# Patient Record
Sex: MALE | Race: OTHER
[De-identification: names, ages, dates, MRNs, and addresses within clinical notes are randomized per-mention and may not be internally consistent; named-entity substitution may affect disease eponyms.]

---

## 2019-04-02 ENCOUNTER — APPOINTMENT (OUTPATIENT)
Dept: GASTROENTEROLOGY | Facility: CLINIC | Age: 79
End: 2019-04-02
Payer: MEDICARE

## 2019-04-02 VITALS
BODY MASS INDEX: 29.99 KG/M2 | HEART RATE: 80 BPM | TEMPERATURE: 97.8 F | OXYGEN SATURATION: 97 % | HEIGHT: 65 IN | DIASTOLIC BLOOD PRESSURE: 77 MMHG | WEIGHT: 180 LBS | SYSTOLIC BLOOD PRESSURE: 136 MMHG | RESPIRATION RATE: 16 BRPM

## 2019-04-02 DIAGNOSIS — Z86.010 PERSONAL HISTORY OF COLONIC POLYPS: ICD-10-CM

## 2019-04-02 PROCEDURE — 99203 OFFICE O/P NEW LOW 30 MIN: CPT

## 2019-04-02 NOTE — ASSESSMENT
[FreeTextEntry1] : 78 year old male with a history of colon polyps presents for hospital based colonoscopy evaluation. \par \par Prep with [facility] \par Colonoscopy: The risks, benefits, and alternatives were explained in detail to the patient. Risks including but not limited to bleeding, perforation, adverse reaction to medications, cardiopulmonary compromise and their attendant sequalae explained.  Sequelae including but not limited to need for surgery, colostomy, prolonged hospital stay, placement of drainage tubes, blood transfusions, disability, morbidity and mortality was explained. \par 	It has been made clear to the patient that although colonoscopy is still considered the best test to screen for colon cancer and polyps, no test is 100% accurate. He/she indicated understanding of the above and wishes to proceed. \par All questions and concerns have been addressed. \par \par

## 2019-04-02 NOTE — HISTORY OF PRESENT ILLNESS
[de-identified] : Referring Physician: DEANN HERNANDEZ,KEY ASHBY /Zaina \par 78 year  old   male  with  a past medical history as below presents for evaluation for surveillance colonoscopy. Last colonoscopy was in 2014 and he was found to have polyps with suggested repeat in 3 years. He has no GI complaints. Was referred for a hospital based colonoscopy given his age. \par ROS: He  denies headache, rash, eye pain, joint pain, weight loss, fever, chills, night sweats, chest pain, dyspnea, cough, diarrhea, constipation, melena, abdominal pain, nausea and vomiting. \par MHx: HTN, Diabetes , CKD \par SHx:  None \par Significant Meds: Denies ASA or AC. \par Allergies: \par Family history: Denies a family history of colon CA, gastric CA, high risk polyps, Inflammatory and autoimmune disease. \par Social: Denies TOB, ETOH, IVDA. No Tattoos \par Health maintenance: -ve history of blood transfusions, \par Last colonoscopy:   2014              Last EGD :     \par \par

## 2019-06-19 ENCOUNTER — RESULT REVIEW (OUTPATIENT)
Age: 79
End: 2019-06-19

## 2019-06-19 ENCOUNTER — OUTPATIENT (OUTPATIENT)
Dept: OUTPATIENT SERVICES | Facility: HOSPITAL | Age: 79
LOS: 1 days | End: 2019-06-19
Payer: COMMERCIAL

## 2019-06-19 DIAGNOSIS — Z86.010 PERSONAL HISTORY OF COLONIC POLYPS: ICD-10-CM

## 2019-06-19 PROCEDURE — 45380 COLONOSCOPY AND BIOPSY: CPT

## 2019-06-19 PROCEDURE — 88305 TISSUE EXAM BY PATHOLOGIST: CPT | Mod: 26

## 2019-06-19 PROCEDURE — 45380 COLONOSCOPY AND BIOPSY: CPT | Mod: PT

## 2019-06-19 PROCEDURE — 88305 TISSUE EXAM BY PATHOLOGIST: CPT

## 2021-06-25 ENCOUNTER — APPOINTMENT (OUTPATIENT)
Dept: UROLOGY | Facility: CLINIC | Age: 81
End: 2021-06-25
Payer: MEDICARE

## 2021-06-25 VITALS
DIASTOLIC BLOOD PRESSURE: 73 MMHG | RESPIRATION RATE: 15 BRPM | TEMPERATURE: 98.7 F | BODY MASS INDEX: 30.62 KG/M2 | WEIGHT: 184 LBS | SYSTOLIC BLOOD PRESSURE: 126 MMHG | HEART RATE: 73 BPM

## 2021-06-25 DIAGNOSIS — Z87.891 PERSONAL HISTORY OF NICOTINE DEPENDENCE: ICD-10-CM

## 2021-06-25 DIAGNOSIS — Z86.79 PERSONAL HISTORY OF OTHER DISEASES OF THE CIRCULATORY SYSTEM: ICD-10-CM

## 2021-06-25 DIAGNOSIS — Z86.39 PERSONAL HISTORY OF OTHER ENDOCRINE, NUTRITIONAL AND METABOLIC DISEASE: ICD-10-CM

## 2021-06-25 PROCEDURE — 99204 OFFICE O/P NEW MOD 45 MIN: CPT

## 2021-06-28 LAB — PSA SERPL-MCNC: 0.73 NG/ML

## 2021-06-28 NOTE — HISTORY OF PRESENT ILLNESS
[FreeTextEntry1] : cc bph\par pt is  yo male referred for bph . The patient reports dribbling hesitancy  . He denies frequency, incomplete emptying, intermittency, straining, urgency and weak stream. hematuria and dysuria  The patient states symptoms are of  moderate severity. The symptoms have been present for months. He reports a history of no complicating symptoms. He denies flank pain, gross hematuria, kidney stones and recurrent UTI.\par  Prior treatments include none\par last psa 0.69 2018\par The is no family history of prostate cancer\par ua neg \par

## 2021-06-28 NOTE — REVIEW OF SYSTEMS
[see HPI] : see HPI [Wake up at night to urinate  How many times?  ___] : wakes up to urinate [unfilled] times during the night [Interrupted urine stream] : interrupted urine stream [Negative] : Heme/Lymph [Wait a long time to urinate] : denies waiting a long time to urinate [Slow urine stream] : denies slow urine stream

## 2021-06-28 NOTE — ASSESSMENT
[FreeTextEntry1] : pvr 70 ml \par will start tamsulosin\par side effects reviewed\par fam would like psa checked

## 2021-08-04 ENCOUNTER — APPOINTMENT (OUTPATIENT)
Dept: UROLOGY | Facility: CLINIC | Age: 81
End: 2021-08-04

## 2021-08-05 ENCOUNTER — APPOINTMENT (OUTPATIENT)
Dept: UROLOGY | Facility: CLINIC | Age: 81
End: 2021-08-05
Payer: MEDICARE

## 2021-08-05 PROCEDURE — 99213 OFFICE O/P EST LOW 20 MIN: CPT

## 2021-08-19 NOTE — HISTORY OF PRESENT ILLNESS
[FreeTextEntry1] : cc bph\par pt is  yo male referred for bph . The patient reports dribbling hesitancy  . He denies frequency, incomplete emptying, intermittency, straining, urgency and weak stream. hematuria and dysuria  The patient states symptoms are of  moderate severity. The symptoms have been present for months. He reports a history of no complicating symptoms. He denies flank pain, gross hematuria, kidney stones and recurrent UTI.\par  Prior treatments include none\par last psa 0.69 2018\par The is no family history of prostate cancer\par ua neg \par now taking tamsulosin bid with improvement \par

## 2021-12-11 ENCOUNTER — INPATIENT (INPATIENT)
Facility: HOSPITAL | Age: 81
LOS: 1 days | Discharge: HOME | End: 2021-12-13
Attending: INTERNAL MEDICINE | Admitting: INTERNAL MEDICINE
Payer: MEDICARE

## 2021-12-11 ENCOUNTER — TRANSCRIPTION ENCOUNTER (OUTPATIENT)
Age: 81
End: 2021-12-11

## 2021-12-11 VITALS
OXYGEN SATURATION: 96 % | RESPIRATION RATE: 14 BRPM | SYSTOLIC BLOOD PRESSURE: 139 MMHG | HEART RATE: 65 BPM | TEMPERATURE: 98 F | DIASTOLIC BLOOD PRESSURE: 76 MMHG

## 2021-12-11 DIAGNOSIS — N18.9 CHRONIC KIDNEY DISEASE, UNSPECIFIED: ICD-10-CM

## 2021-12-11 DIAGNOSIS — A41.9 SEPSIS, UNSPECIFIED ORGANISM: ICD-10-CM

## 2021-12-11 DIAGNOSIS — R53.1 WEAKNESS: ICD-10-CM

## 2021-12-11 DIAGNOSIS — Z29.9 ENCOUNTER FOR PROPHYLACTIC MEASURES, UNSPECIFIED: ICD-10-CM

## 2021-12-11 DIAGNOSIS — Z87.438 PERSONAL HISTORY OF OTHER DISEASES OF MALE GENITAL ORGANS: ICD-10-CM

## 2021-12-11 DIAGNOSIS — I10 ESSENTIAL (PRIMARY) HYPERTENSION: ICD-10-CM

## 2021-12-11 DIAGNOSIS — I95.9 HYPOTENSION, UNSPECIFIED: ICD-10-CM

## 2021-12-11 LAB
ALBUMIN SERPL ELPH-MCNC: 3.8 G/DL — SIGNIFICANT CHANGE UP (ref 3.3–5)
ALP SERPL-CCNC: 118 U/L — SIGNIFICANT CHANGE UP (ref 40–120)
ALT FLD-CCNC: 180 U/L — HIGH (ref 4–41)
ANION GAP SERPL CALC-SCNC: 10 MMOL/L — SIGNIFICANT CHANGE UP (ref 7–14)
AST SERPL-CCNC: 89 U/L — HIGH (ref 4–40)
BASE EXCESS BLDV CALC-SCNC: 0.2 MMOL/L — SIGNIFICANT CHANGE UP (ref -2–3)
BASOPHILS # BLD AUTO: 0.02 K/UL — SIGNIFICANT CHANGE UP (ref 0–0.2)
BASOPHILS NFR BLD AUTO: 0.4 % — SIGNIFICANT CHANGE UP (ref 0–2)
BILIRUB SERPL-MCNC: 0.7 MG/DL — SIGNIFICANT CHANGE UP (ref 0.2–1.2)
BLOOD GAS VENOUS COMPREHENSIVE RESULT: SIGNIFICANT CHANGE UP
BUN SERPL-MCNC: 25 MG/DL — HIGH (ref 7–23)
CALCIUM SERPL-MCNC: 8.6 MG/DL — SIGNIFICANT CHANGE UP (ref 8.4–10.5)
CHLORIDE BLDV-SCNC: 105 MMOL/L — SIGNIFICANT CHANGE UP (ref 96–108)
CHLORIDE SERPL-SCNC: 105 MMOL/L — SIGNIFICANT CHANGE UP (ref 98–107)
CO2 BLDV-SCNC: 28.5 MMOL/L — HIGH (ref 22–26)
CO2 SERPL-SCNC: 25 MMOL/L — SIGNIFICANT CHANGE UP (ref 22–31)
CREAT SERPL-MCNC: 1.37 MG/DL — HIGH (ref 0.5–1.3)
EOSINOPHIL # BLD AUTO: 0.17 K/UL — SIGNIFICANT CHANGE UP (ref 0–0.5)
EOSINOPHIL NFR BLD AUTO: 3 % — SIGNIFICANT CHANGE UP (ref 0–6)
FLUAV AG NPH QL: SIGNIFICANT CHANGE UP
FLUBV AG NPH QL: SIGNIFICANT CHANGE UP
GAS PNL BLDV: 136 MMOL/L — SIGNIFICANT CHANGE UP (ref 136–145)
GAS PNL BLDV: SIGNIFICANT CHANGE UP
GLUCOSE BLDV-MCNC: 131 MG/DL — HIGH (ref 70–99)
GLUCOSE SERPL-MCNC: 133 MG/DL — HIGH (ref 70–99)
HCO3 BLDV-SCNC: 27 MMOL/L — SIGNIFICANT CHANGE UP (ref 22–29)
HCT VFR BLD CALC: 40.1 % — SIGNIFICANT CHANGE UP (ref 39–50)
HCT VFR BLDA CALC: 41 % — SIGNIFICANT CHANGE UP (ref 39–51)
HGB BLD CALC-MCNC: 13.5 G/DL — SIGNIFICANT CHANGE UP (ref 13–17)
HGB BLD-MCNC: 13.6 G/DL — SIGNIFICANT CHANGE UP (ref 13–17)
IANC: 3.71 K/UL — SIGNIFICANT CHANGE UP (ref 1.5–8.5)
IMM GRANULOCYTES NFR BLD AUTO: 0.4 % — SIGNIFICANT CHANGE UP (ref 0–1.5)
LACTATE BLDV-MCNC: 1.4 MMOL/L — SIGNIFICANT CHANGE UP (ref 0.5–2)
LYMPHOCYTES # BLD AUTO: 0.98 K/UL — LOW (ref 1–3.3)
LYMPHOCYTES # BLD AUTO: 17.4 % — SIGNIFICANT CHANGE UP (ref 13–44)
MCHC RBC-ENTMCNC: 30.2 PG — SIGNIFICANT CHANGE UP (ref 27–34)
MCHC RBC-ENTMCNC: 33.9 GM/DL — SIGNIFICANT CHANGE UP (ref 32–36)
MCV RBC AUTO: 89.1 FL — SIGNIFICANT CHANGE UP (ref 80–100)
MONOCYTES # BLD AUTO: 0.73 K/UL — SIGNIFICANT CHANGE UP (ref 0–0.9)
MONOCYTES NFR BLD AUTO: 13 % — SIGNIFICANT CHANGE UP (ref 2–14)
NEUTROPHILS # BLD AUTO: 3.71 K/UL — SIGNIFICANT CHANGE UP (ref 1.8–7.4)
NEUTROPHILS NFR BLD AUTO: 65.8 % — SIGNIFICANT CHANGE UP (ref 43–77)
NRBC # BLD: 0 /100 WBCS — SIGNIFICANT CHANGE UP
NRBC # FLD: 0 K/UL — SIGNIFICANT CHANGE UP
PCO2 BLDV: 51 MMHG — SIGNIFICANT CHANGE UP (ref 42–55)
PH BLDV: 7.33 — SIGNIFICANT CHANGE UP (ref 7.32–7.43)
PLATELET # BLD AUTO: 134 K/UL — LOW (ref 150–400)
PO2 BLDV: 34 MMHG — SIGNIFICANT CHANGE UP
POTASSIUM BLDV-SCNC: 3.6 MMOL/L — SIGNIFICANT CHANGE UP (ref 3.5–5.1)
POTASSIUM SERPL-MCNC: 3.8 MMOL/L — SIGNIFICANT CHANGE UP (ref 3.5–5.3)
POTASSIUM SERPL-SCNC: 3.8 MMOL/L — SIGNIFICANT CHANGE UP (ref 3.5–5.3)
PROT SERPL-MCNC: 6.3 G/DL — SIGNIFICANT CHANGE UP (ref 6–8.3)
RBC # BLD: 4.5 M/UL — SIGNIFICANT CHANGE UP (ref 4.2–5.8)
RBC # FLD: 13.2 % — SIGNIFICANT CHANGE UP (ref 10.3–14.5)
RSV RNA NPH QL NAA+NON-PROBE: SIGNIFICANT CHANGE UP
SAO2 % BLDV: 51.5 % — SIGNIFICANT CHANGE UP
SARS-COV-2 RNA SPEC QL NAA+PROBE: SIGNIFICANT CHANGE UP
SODIUM SERPL-SCNC: 140 MMOL/L — SIGNIFICANT CHANGE UP (ref 135–145)
WBC # BLD: 5.63 K/UL — SIGNIFICANT CHANGE UP (ref 3.8–10.5)
WBC # FLD AUTO: 5.63 K/UL — SIGNIFICANT CHANGE UP (ref 3.8–10.5)

## 2021-12-11 PROCEDURE — 76705 ECHO EXAM OF ABDOMEN: CPT | Mod: 26

## 2021-12-11 PROCEDURE — 99223 1ST HOSP IP/OBS HIGH 75: CPT | Mod: GC

## 2021-12-11 PROCEDURE — 71045 X-RAY EXAM CHEST 1 VIEW: CPT | Mod: 26

## 2021-12-11 PROCEDURE — 99285 EMERGENCY DEPT VISIT HI MDM: CPT

## 2021-12-11 RX ORDER — SODIUM CHLORIDE 9 MG/ML
1000 INJECTION, SOLUTION INTRAVENOUS
Refills: 0 | Status: DISCONTINUED | OUTPATIENT
Start: 2021-12-11 | End: 2021-12-13

## 2021-12-11 RX ORDER — VANCOMYCIN HCL 1 G
1000 VIAL (EA) INTRAVENOUS ONCE
Refills: 0 | Status: COMPLETED | OUTPATIENT
Start: 2021-12-11 | End: 2021-12-11

## 2021-12-11 RX ORDER — LANOLIN ALCOHOL/MO/W.PET/CERES
3 CREAM (GRAM) TOPICAL AT BEDTIME
Refills: 0 | Status: DISCONTINUED | OUTPATIENT
Start: 2021-12-11 | End: 2021-12-13

## 2021-12-11 RX ORDER — INSULIN LISPRO 100/ML
VIAL (ML) SUBCUTANEOUS AT BEDTIME
Refills: 0 | Status: DISCONTINUED | OUTPATIENT
Start: 2021-12-11 | End: 2021-12-13

## 2021-12-11 RX ORDER — DEXTROSE 50 % IN WATER 50 %
25 SYRINGE (ML) INTRAVENOUS ONCE
Refills: 0 | Status: DISCONTINUED | OUTPATIENT
Start: 2021-12-11 | End: 2021-12-13

## 2021-12-11 RX ORDER — ACETAMINOPHEN 500 MG
650 TABLET ORAL EVERY 6 HOURS
Refills: 0 | Status: DISCONTINUED | OUTPATIENT
Start: 2021-12-11 | End: 2021-12-13

## 2021-12-11 RX ORDER — INSULIN LISPRO 100/ML
VIAL (ML) SUBCUTANEOUS
Refills: 0 | Status: DISCONTINUED | OUTPATIENT
Start: 2021-12-11 | End: 2021-12-13

## 2021-12-11 RX ORDER — GLUCAGON INJECTION, SOLUTION 0.5 MG/.1ML
1 INJECTION, SOLUTION SUBCUTANEOUS ONCE
Refills: 0 | Status: DISCONTINUED | OUTPATIENT
Start: 2021-12-11 | End: 2021-12-13

## 2021-12-11 RX ORDER — ONDANSETRON 8 MG/1
4 TABLET, FILM COATED ORAL EVERY 8 HOURS
Refills: 0 | Status: DISCONTINUED | OUTPATIENT
Start: 2021-12-11 | End: 2021-12-13

## 2021-12-11 RX ORDER — SODIUM CHLORIDE 9 MG/ML
1400 INJECTION INTRAMUSCULAR; INTRAVENOUS; SUBCUTANEOUS ONCE
Refills: 0 | Status: COMPLETED | OUTPATIENT
Start: 2021-12-11 | End: 2021-12-11

## 2021-12-11 RX ORDER — DEXTROSE 50 % IN WATER 50 %
15 SYRINGE (ML) INTRAVENOUS ONCE
Refills: 0 | Status: DISCONTINUED | OUTPATIENT
Start: 2021-12-11 | End: 2021-12-13

## 2021-12-11 RX ORDER — PIPERACILLIN AND TAZOBACTAM 4; .5 G/20ML; G/20ML
3.38 INJECTION, POWDER, LYOPHILIZED, FOR SOLUTION INTRAVENOUS ONCE
Refills: 0 | Status: COMPLETED | OUTPATIENT
Start: 2021-12-11 | End: 2021-12-11

## 2021-12-11 RX ORDER — DEXTROSE 50 % IN WATER 50 %
12.5 SYRINGE (ML) INTRAVENOUS ONCE
Refills: 0 | Status: DISCONTINUED | OUTPATIENT
Start: 2021-12-11 | End: 2021-12-13

## 2021-12-11 RX ORDER — INFLUENZA VIRUS VACCINE 15; 15; 15; 15 UG/.5ML; UG/.5ML; UG/.5ML; UG/.5ML
0.7 SUSPENSION INTRAMUSCULAR ONCE
Refills: 0 | Status: DISCONTINUED | OUTPATIENT
Start: 2021-12-11 | End: 2021-12-13

## 2021-12-11 RX ADMIN — Medication 250 MILLIGRAM(S): at 12:13

## 2021-12-11 RX ADMIN — PIPERACILLIN AND TAZOBACTAM 200 GRAM(S): 4; .5 INJECTION, POWDER, LYOPHILIZED, FOR SOLUTION INTRAVENOUS at 11:54

## 2021-12-11 RX ADMIN — PIPERACILLIN AND TAZOBACTAM 3.38 GRAM(S): 4; .5 INJECTION, POWDER, LYOPHILIZED, FOR SOLUTION INTRAVENOUS at 12:20

## 2021-12-11 RX ADMIN — SODIUM CHLORIDE 1400 MILLILITER(S): 9 INJECTION INTRAMUSCULAR; INTRAVENOUS; SUBCUTANEOUS at 11:47

## 2021-12-11 NOTE — ED ADULT NURSE NOTE - CHIEF COMPLAINT QUOTE
pt biba from urgent care with low bp, given right wrist #[id number] with ns given by ems with increased bp achieved. pt given 100% NRB at , ems removed Os with stable 02 sat achieved, pt in no acute distress at this time on room air. as per ems pt went to  for dizziness, chills/ fevers/ body aches x 3 days. pt states he feels better, denies chest pain.  pt is Kinyarwanda speaking, PMH: HTN, HDL

## 2021-12-11 NOTE — ED PROVIDER NOTE - NS ED ROS FT
Gen: No F/NS; +chills, +weakness   Head: No falls   Eyes: No changes in vision   Resp: No cough or trouble breathing  Cardiovascular: No chest pain or palpitation  Gastroenteric: No N/V/D  :  No change in urine output, dysuria or hematuria   MS: No joint or muscle pain  Neuro: No headache; +dizziness   Skin: No new rash

## 2021-12-11 NOTE — H&P ADULT - ATTENDING COMMENTS
The patient is an 82 yo man with PMH of HTN, T2DM, CKD p/w hypotension and generalized weakness    - not hypotensive at this time, responded to fluid bolus  - no definitive infectious source, f/u blood cx x2, f/u RVP, monitor off abx  - procal in AM  - hold home antihypertensives at this time, f/u orthostatics  - PT evaluation  - hep panel for transaminitis   - pending orthostatics may require TTE, EKG NSR no Q waves.

## 2021-12-11 NOTE — ED ADULT TRIAGE NOTE - CHIEF COMPLAINT QUOTE
pt biba from urgent care with low bp, given right wrist #[id number] with ns given by ems with increased bp achieved. pt given 100% NRB at , ems removed Os with stable 02 sat achieved, pt in no acute distress at this time on room air. as per ems pt went to  for dizziness, chills/ fevers/ body aches x 3 days. pt states he feels better, denies chest pain.  pt is Romanian speaking, PMH: HTN, HDL

## 2021-12-11 NOTE — ED ADULT NURSE NOTE - NSFALLRSKINDICATORS_ED_ALL_ED
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

## 2021-12-11 NOTE — H&P ADULT - NSHPLABSRESULTS_GEN_ALL_CORE
LABS:                        13.6   5.63  )-----------( 134      ( 11 Dec 2021 11:36 )             40.1     11 Dec 2021 11:36    140    |  105    |  25     ----------------------------<  133    3.8     |  25     |  1.37     Ca    8.6        11 Dec 2021 11:36    TPro  6.3    /  Alb  3.8    /  TBili  0.7    /  DBili  x      /  AST  89     /  ALT  180    /  AlkPhos  118    11 Dec 2021 11:36      CAPILLARY BLOOD GLUCOSE        BLOOD CULTURE    RADIOLOGY & ADDITIONAL TESTS:    Imaging Personally Reviewed:  [ ] YES

## 2021-12-11 NOTE — ED PROVIDER NOTE - ATTENDING CONTRIBUTION TO CARE
DR. ARCHULETA, ATTENDING MD-  I performed a face to face bedside interview with the patient regarding history of present illness, review of symptoms and past medical history. I completed an independent physical exam.  I have discussed the patient's plan of care with the resident.   Documentation as above in the note.    80 y/o male h/o htn hld bibems from [facility] after found to be hypotensive.  Initially went to [facility] for 3 days of myalgias, fatigue, f/c.  Likely viral sepsis vs pna vs uti.  Obtain cbc cmp blood cx x2 covid swab vbg ua ucx cxr give ivf bolus abx admit for further care and evaluation.

## 2021-12-11 NOTE — ED PROVIDER NOTE - CLINICAL SUMMARY MEDICAL DECISION MAKING FREE TEXT BOX
82yo Urdu speaking male with a past medical history significant for HTN, Diabetes, CKD, BPH, sciatica presents from  for hypotension. S/p 1L IVF. Hypotensive to 80s/50s at  per report. Received Tylenol at 7am this morning. Likely masking a fever. Plan to obtain rectal temp, obtain sepsis labs, reassess.

## 2021-12-11 NOTE — PATIENT PROFILE ADULT - FALL HARM RISK - HARM RISK INTERVENTIONS
Assistance with ambulation/Assistance OOB with selected safe patient handling equipment/Communicate Risk of Fall with Harm to all staff/Discuss with provider need for PT consult/Monitor for mental status changes/Monitor gait and stability/Provide patient with walking aids - walker, cane, crutches/Reinforce activity limits and safety measures with patient and family/Reorient to person, place and time as needed/Review medications for side effects contributing to fall risk/Sit up slowly, dangle for a short time, stand at bedside before walking/Tailored Fall Risk Interventions/Toileting schedule using arm’s reach rule for commode and bathroom/Visual Cue: Yellow wristband and red socks/Bed in lowest position, wheels locked, appropriate side rails in place/Call bell, personal items and telephone in reach/Instruct patient to call for assistance before getting out of bed or chair/Non-slip footwear when patient is out of bed/[place] to call system/Physically safe environment - no spills, clutter or unnecessary equipment/Purposeful Proactive Rounding/Room/bathroom lighting operational, light cord in reach

## 2021-12-11 NOTE — H&P ADULT - HISTORY OF PRESENT ILLNESS
81M Albanian speaking male w/ PMH of HTN, Diabetes, CKD, and BPH presents from urgent care for hypotension. The patient has been feeling "unwell," for the past 3 days. He denies fevers, but endorses feeling hot followed by chills, body aches and extreme fatigue. He denies N/V/C/D. He felt a little short of breath this morning while sitting in the urgent care before he felt like he was going to pass out, but denies previously feeling SOB on exertion or orthopnea. He denies dysuria, hematuria or melena/BRBPR. He denies any sick contacts or travel but note 1 month ago he was unable to travel to [place] because he felt dizzy and they did not let him onto the plane. This morning the patient took tylenol at 7 am. He denies any CP, palpitations, current dizziness or SOB, vision changes, sore throat, cough, abdominal pain or increased swelling of lower extremities. Of note pt has increased swelling of hands he noticed during exam but could not place when the swelling began.     The patient was reported to be hypotensive 80s/50s in UC, was given 1L NS by EMS on his way over here. On px to the ED vitals were 97.7, 60 bpm, 124/62  CXR showed left basilar opacity w/ potential consolidation +/- atelectasis w/ adj. pleural effusions. , s/p vanc and zosyn

## 2021-12-11 NOTE — ED PROVIDER NOTE - CARE PLAN
1 Principal Discharge DX:	Sepsis   Principal Discharge DX:	Sepsis  Secondary Diagnosis:	Near syncope  Secondary Diagnosis:	Hypotension

## 2021-12-11 NOTE — H&P ADULT - PROBLEM SELECTOR PLAN 1
No documented fever, so does not meet sepsis criteria, however pt received tylenol this morning so temperature may not be accurate (Pt hypotensive at UC), no leukocytosis  Unclear etx, may be secondary to viral illness vs PNA vs UTI  CXR shows potential consolidation  s/p 2L IV Fluid, s/p vanc and zosyn  f/up UCx, blood Cx  f/up urine legionella  - continue vanc/zosyn/azithro  - trend CBC No documented fever, so does not meet sepsis criteria, however pt received tylenol this morning so temperature so temp may not be accuate   May be secondary to viral illness vs PNA vs UTI  CXR shows potential consolidation  No leukocytosis, elevated LFTs  s/p 2L IV Fluid, s/p vanc and zosyn  f/up UCx, blood Cx  f/up urine legionella  f/up hep panel  f/up RUQ U/S   - continue vanc/zosyn/azithro  - trend CBC No documented fever, so does not meet sepsis criteria, however pt received tylenol this morning so temperature so temp may not be accuate   May be secondary to viral illness vs PNA vs UTI or dehydration   CXR shows potential consolidation  No leukocytosis, elevated LFTs  s/p 2L IV Fluid, s/p vanc and zosyn  f/up UCx, blood Cx  f/up urine legionella  f/up hep panel  f/up RUQ U/S   f/up procal   f/up RVP   f/up TSH, B12, folate   - trend CBC

## 2021-12-11 NOTE — PATIENT PROFILE ADULT - OVER THE PAST TWO WEEKS HAVE YOU FELT DOWN, DEPRESSED OR HOPELESS?
w/ mech soft solids/Delayed oral transit time/Lingual stasis Decreased anterior-posterior movement of the bolus/Delayed oral transit time/Stasis in anterior sulcus/Lingual stasis no

## 2021-12-11 NOTE — H&P ADULT - ASSESSMENT
81M Mongolian speaking male w/ PMH of HTN, Diabetes, CKD, and BPH presents from urgent care for myalgias and hypotension.

## 2021-12-11 NOTE — H&P ADULT - NSHPREVIEWOFSYSTEMS_GEN_ALL_CORE
CONSTITUTIONAL:  No weight loss or fever + chills, weakness and fatigue.  HEENT:  Eyes:  No visual loss, blurred vision, double vision or yellow sclerae. Ears, Nose, Throat:  No hearing loss, sneezing, congestion, runny nose or sore throat.  CARDIOVASCULAR:  No chest pain, chest pressure or chest discomfort. No palpitations.  RESPIRATORY:  No shortness of breath, cough or sputum.  GASTROINTESTINAL:  No anorexia, nausea, vomiting or diarrhea. No abdominal pain or blood.  GENITOURINARY:  Denies hematuria, dysuria.   NEUROLOGICAL:  No headache, syncope, paralysis, ataxia, numbness or tingling in the extremities. No change in bowel or bladder control. +dizziness prior to admission  MUSCULOSKELETAL:  . + bodyaches  HEMATOLOGIC:  No anemia, bleeding or bruising.  LYMPHATICS:  No enlarged nodes.   PSYCHIATRIC:  No history of depression or anxiety.  ENDOCRINOLOGIC:  No reports of sweating, cold or heat intolerance. No polyuria or polydipsia.  ALLERGIES:  No history of asthma, hives, eczema or rhinitis.

## 2021-12-11 NOTE — H&P ADULT - NSHPPHYSICALEXAM_GEN_ALL_CORE
GENERAL APPEARANCE: Well developed, NAD  HEENT:  PERRL, EOMI. hearing grossly intact.  NECK: Neck supple, non-tender no lymphadenopathy, masses or thyromegaly.  CARDIAC: Normal S1 and S2. no mrg. RRR  LUNGS: Clear to auscultation B/L, no rales, rhonchi, or wheezing  ABDOMEN: Soft , NTND, bowel sounds normal. No guarding or rebound.   MUSCULOSKELETAL: ROM intact.  No joint erythema or tenderness.   EXTREMITIES: +1 pitting edema in LE, hands appear swollen  NEUROLOGICAL: Non focal. Strength and sensation symmetric and intact throughout.   SKIN: Warm and dry , Well perfused

## 2021-12-11 NOTE — ED ADULT NURSE NOTE - OBJECTIVE STATEMENT
pt c/o feeling dizzy and shaking chills that started today/ pt had iv on rt hand placed by ems  I v placed in left ac  labs sent off/  fluids infusing/ antibiotics strted

## 2021-12-11 NOTE — H&P ADULT - PROBLEM SELECTOR PLAN 2
RESOLVED   - s/p 2L NS w/ resolution of hypotension to 124/82  - hold antihypertensives RESOLVED   - s/p 2L NS w/ resolution of hypotension to 124/82  - hold antihypertensives  - f/up orhtostatics

## 2021-12-11 NOTE — PHARMACOTHERAPY INTERVENTION NOTE - COMMENTS
attempted to reach pharmacy multiple times, no response- Luis Daniel [phone]   per ins fill patient should be on the following:   Amlodipine 10mg   Carvedilol 12.5mg   Atorvastatin 20mg  Omega-3-acid  Losartan 25mg

## 2021-12-11 NOTE — H&P ADULT - NSICDXPASTMEDICALHX_GEN_ALL_CORE_FT
PAST MEDICAL HISTORY:  Benign prostate hyperplasia     Chronic kidney disease     Diabetes mellitus     Hyperlipidemia     Hypertension

## 2021-12-11 NOTE — PATIENT PROFILE ADULT - ARRIVAL FROM
Problem: Safety  Goal: Will remain free from injury  Outcome: PROGRESSING AS EXPECTED  Patient free from accidental injury at this time. Safety precautions in place. Hourly rounding in place.     Problem: Pain Management  Goal: Pain level will decrease to patient's comfort goal  Outcome: PROGRESSING AS EXPECTED  Patient declining pain medication at this time. Patient encouraged to call if pain worsens.Hourly rounding in place.        Home

## 2021-12-11 NOTE — ED PROVIDER NOTE - OBJECTIVE STATEMENT
82yo Romansh speaking male with a past medical history significant for HTN, Diabetes, CKD, BPH, sciatica presents from  for hypotension. Per pt, he presented to UC d/t c/f dizziness (not room-spinning, intermittent in nature), chills, fevers, body aches ongoing for 3 days. No dysuria, hematuria, change in frequency. No rash. No new meds. No sick contacts. Given 1L IVF by EMS. Last received Tyelnol at 7am this morning; received it at 3am prior to that. Last colonoscopy 2019; 2014 colonoscopy w polyps. No hematochezia or melena.

## 2021-12-12 ENCOUNTER — TRANSCRIPTION ENCOUNTER (OUTPATIENT)
Age: 81
End: 2021-12-12

## 2021-12-12 DIAGNOSIS — E11.9 TYPE 2 DIABETES MELLITUS WITHOUT COMPLICATIONS: ICD-10-CM

## 2021-12-12 LAB
A1C WITH ESTIMATED AVERAGE GLUCOSE RESULT: 6.4 % — HIGH (ref 4–5.6)
ALBUMIN SERPL ELPH-MCNC: 4 G/DL — SIGNIFICANT CHANGE UP (ref 3.3–5)
ALP SERPL-CCNC: 135 U/L — HIGH (ref 40–120)
ALT FLD-CCNC: 158 U/L — HIGH (ref 4–41)
ANION GAP SERPL CALC-SCNC: 13 MMOL/L — SIGNIFICANT CHANGE UP (ref 7–14)
AST SERPL-CCNC: 66 U/L — HIGH (ref 4–40)
BILIRUB SERPL-MCNC: 0.6 MG/DL — SIGNIFICANT CHANGE UP (ref 0.2–1.2)
BUN SERPL-MCNC: 18 MG/DL — SIGNIFICANT CHANGE UP (ref 7–23)
CALCIUM SERPL-MCNC: 9.2 MG/DL — SIGNIFICANT CHANGE UP (ref 8.4–10.5)
CHLORIDE SERPL-SCNC: 102 MMOL/L — SIGNIFICANT CHANGE UP (ref 98–107)
CO2 SERPL-SCNC: 24 MMOL/L — SIGNIFICANT CHANGE UP (ref 22–31)
CREAT SERPL-MCNC: 1.19 MG/DL — SIGNIFICANT CHANGE UP (ref 0.5–1.3)
CULTURE RESULTS: NO GROWTH — SIGNIFICANT CHANGE UP
ESTIMATED AVERAGE GLUCOSE: 137 — SIGNIFICANT CHANGE UP
FOLATE SERPL-MCNC: 17.5 NG/ML — SIGNIFICANT CHANGE UP (ref 3.1–17.5)
GLUCOSE SERPL-MCNC: 106 MG/DL — HIGH (ref 70–99)
HCT VFR BLD CALC: 46.8 % — SIGNIFICANT CHANGE UP (ref 39–50)
HGB BLD-MCNC: 15 G/DL — SIGNIFICANT CHANGE UP (ref 13–17)
MAGNESIUM SERPL-MCNC: 2.2 MG/DL — SIGNIFICANT CHANGE UP (ref 1.6–2.6)
MCHC RBC-ENTMCNC: 29.5 PG — SIGNIFICANT CHANGE UP (ref 27–34)
MCHC RBC-ENTMCNC: 32.1 GM/DL — SIGNIFICANT CHANGE UP (ref 32–36)
MCV RBC AUTO: 92.1 FL — SIGNIFICANT CHANGE UP (ref 80–100)
NRBC # BLD: 0 /100 WBCS — SIGNIFICANT CHANGE UP
NRBC # FLD: 0 K/UL — SIGNIFICANT CHANGE UP
PHOSPHATE SERPL-MCNC: 3 MG/DL — SIGNIFICANT CHANGE UP (ref 2.5–4.5)
PLATELET # BLD AUTO: 163 K/UL — SIGNIFICANT CHANGE UP (ref 150–400)
POTASSIUM SERPL-MCNC: 3.7 MMOL/L — SIGNIFICANT CHANGE UP (ref 3.5–5.3)
POTASSIUM SERPL-SCNC: 3.7 MMOL/L — SIGNIFICANT CHANGE UP (ref 3.5–5.3)
PROT SERPL-MCNC: 6.5 G/DL — SIGNIFICANT CHANGE UP (ref 6–8.3)
RBC # BLD: 5.08 M/UL — SIGNIFICANT CHANGE UP (ref 4.2–5.8)
RBC # FLD: 12.9 % — SIGNIFICANT CHANGE UP (ref 10.3–14.5)
SODIUM SERPL-SCNC: 139 MMOL/L — SIGNIFICANT CHANGE UP (ref 135–145)
SPECIMEN SOURCE: SIGNIFICANT CHANGE UP
TSH SERPL-MCNC: 3.21 UIU/ML — SIGNIFICANT CHANGE UP (ref 0.27–4.2)
VIT B12 SERPL-MCNC: 623 PG/ML — SIGNIFICANT CHANGE UP (ref 200–900)
WBC # BLD: 5.82 K/UL — SIGNIFICANT CHANGE UP (ref 3.8–10.5)
WBC # FLD AUTO: 5.82 K/UL — SIGNIFICANT CHANGE UP (ref 3.8–10.5)

## 2021-12-12 PROCEDURE — 99232 SBSQ HOSP IP/OBS MODERATE 35: CPT | Mod: GC

## 2021-12-12 RX ORDER — ENOXAPARIN SODIUM 100 MG/ML
40 INJECTION SUBCUTANEOUS DAILY
Refills: 0 | Status: DISCONTINUED | OUTPATIENT
Start: 2021-12-12 | End: 2021-12-13

## 2021-12-12 RX ADMIN — ENOXAPARIN SODIUM 40 MILLIGRAM(S): 100 INJECTION SUBCUTANEOUS at 11:19

## 2021-12-12 NOTE — PROGRESS NOTE ADULT - PROBLEM SELECTOR PLAN 4
hold antihypertensives in the setting of hypotension/potential sepsis hold antihypertensives in the setting of hypotension/potential sepsis  - BP 120s/150s systolic, continue to trend, add meds back in as eneded hold antihypertensives in the setting of hypotension/potential sepsis  - BP 120s/150s systolic, continue to trend, add meds back in as needed hold antihypertensives in the setting of hypotension  - BP 120s/150s systolic, continue to trend, add meds back in as needed

## 2021-12-12 NOTE — DISCHARGE NOTE PROVIDER - NSDCFUADDAPPT_GEN_ALL_CORE_FT
Please follow up with PCP Dr. Meneses in 1 to 2 weeks for:   -Further adjustments of blood pressure medications as needed   -Continued monitoring of your liver function after discontinuation of Atorvastatin (Lipitor)   -Follow up for lung opacity noted on chest x-ray

## 2021-12-12 NOTE — PHYSICAL THERAPY INITIAL EVALUATION ADULT - PATIENT PROFILE REVIEW, REHAB EVAL
PT orders received:  no formal activity order. Consult with MARILEE Vail , pt may participate in PT evaluation./yes

## 2021-12-12 NOTE — DISCHARGE NOTE PROVIDER - NSDCCPCAREPLAN_GEN_ALL_CORE_FT
PRINCIPAL DISCHARGE DIAGNOSIS  Diagnosis: Generalized weakness  Assessment and Plan of Treatment: Your generalized weakness resolved without any specific treatment while you were in the hospital. This weakness may be secondary to your statin use. Please follow up with your primary care doctor outpatient.  If you have increasing weakness, dizziness or pains please return to the hospital      SECONDARY DISCHARGE DIAGNOSES  Diagnosis: Hypotension  Assessment and Plan of Treatment: Your blood pressure was low when you came to the hospital. We gave you fluids and your blood pressure got better. You will be discharged on x antihypertensive medications  If you have increasing weakness, dizziness or pains please return to the hospital     PRINCIPAL DISCHARGE DIAGNOSIS  Diagnosis: Generalized weakness  Assessment and Plan of Treatment: Your generalized weakness resolved while you were in the hospital. We performed an infectious workup which was negative. However, we noted that your liver enzymes were elevated (a condition called transaminitis) - we were concerned that this was due to Atorvastatin (Lipitor). We did not give Lipitor while you were in the hospital, and your liver enzymes improved.   Please do not take Lipitor until you see your primary care physician. At the time of discharge, your generalized weakness was improved. Please be sure to eat all your meals and stay hydrated.      SECONDARY DISCHARGE DIAGNOSES  Diagnosis: Hypotension  Assessment and Plan of Treatment: Your blood pressure was low when you came to the hospital. We gave you fluids and your blood pressure got better.   It is possible that your blood pressure became low because of decreased fluid and food intake.   Since your blood pressure was low during this admission, we recommend only taking Losartan for now, and to hold your remaining blood pressure medications (hold Amlodipine, Carvedilol, and Hydralazine) until you see your primary care physician.    Please be sure to stay hydrated and eat all your meals.   If you have increasing weakness, dizziness or pains please return to the hospital.    Diagnosis: Opacity of lung on imaging study  Assessment and Plan of Treatment: An area of opacity was noted in the left basilar region of your lungs. This is unlikely to be infectious as your infectious workup was negative. Please be sure to follow up with your primary care physician for additional monitoring.

## 2021-12-12 NOTE — DISCHARGE NOTE PROVIDER - ATTENDING DISCHARGE PHYSICAL EXAMINATION:
Patient was seen at bedside with resident team.  Patient is stable   .  VITAL SIGNS:  T(C): 36.6 (12-13-21 @ 12:16), Max: 36.9 (12-13-21 @ 04:44)  T(F): 97.8 (12-13-21 @ 12:16), Max: 98.5 (12-13-21 @ 04:44)  HR: 68 (12-13-21 @ 12:16) (68 - 73)  BP: 136/66 (12-13-21 @ 12:16) (123/67 - 147/67)  BP(mean): --  RR: 18 (12-13-21 @ 12:16) (18 - 18)  SpO2: 98% (12-13-21 @ 12:16) (96% - 100%)  Wt(kg): --    PHYSICAL EXAM:    Constitutional: WDWN resting comfortably in bed; NAD  Head: NC/AT  Eyes: PERRL, EOMI, clear conjunctiva  Neck: supple; no JVD or thyromegaly  Respiratory: CTA B/L; no W/R/R, no retractions  Cardiac: +S1/S2; RRR; no M/R/G; PMI non-displaced  Gastrointestinal: soft, NT/ND; no rebound or guarding; +BSx4  Genitourinary: normal external genitalia  Back: spine midline, no bony tenderness or step-offs; no CVAT B/L  Extremities: WWP, no clubbing or cyanosis; no peripheral edema  Musculoskeletal: NROM x4; no joint swelling, tenderness or erythema  Vascular: 2+ radial, femoral, DP/PT pulses B/L  Dermatologic: skin warm, dry and intact; no rashes, wounds, or scars  Lymphatic: no submandibular or cervical LAD  Neurologic: AAOx3; CNII-XII grossly intact; no focal deficits  Psychiatric: affect and characteristics of appearance, verbalizations, behaviors are appropriate

## 2021-12-12 NOTE — PROGRESS NOTE ADULT - PROBLEM SELECTOR PLAN 1
Does not meet sepsis criteria, however did take tylenol prior to admission to initial temp may be inaccurate   May be secondary to viral illness vs PNA vs UTI or dehydration   CXR shows potential consolidation  No leukocytosis, elevated LFTs  s/p 2L IV Fluid, s/p vanc and zosyn  f/up UCx, blood Cx  f/up urine legionella  f/up hep panel  - RUQ U/S - performed not read yet   - procal elevated at 0.71  - RVP negative   f/up TSH, B12, folate   - trend CBC Does not meet sepsis criteria, however did take tylenol prior to admission to initial temp may be inaccurate   May be secondary to viral illness vs PNA vs UTI or dehydration   CXR shows potential consolidation  No leukocytosis, elevated LFTs    #infectious workup  s/p 2L IV Fluid, s/p vanc and zosyn - d/c'ed antibiotics   - RVP negative   - procal elevated at 0.71  - F/up urine Cx, blood cx  - F/up urine legionella  - trend CBC    #elevated LFTs, suspect underlying liver pathology   - RUQ U/S - performed not read yet   - f/up hep panel    #metabolic causes   - f/up TSH, B12, folate Does not meet sepsis criteria, however did take tylenol prior to admission to initial temp may be inaccurate   May be secondary to viral illness vs PNA vs UTI or dehydration   CXR shows potential consolidation  No leukocytosis, elevated LFTs    #infectious workup  s/p 2L IV Fluid, s/p vanc and zosyn - d/c'ed antibiotics   - RVP negative   - procal elevated at 0.71  - F/up urine Cx, blood cx  - F/up urine legionella  - trend CBC    #elevated LFTs, suspect underlying liver pathology   - may be secondary to new statin use (started September), hold statin while in hospital  - RUQ U/S - performed not read yet   - f/up hep panel    #metabolic causes   - f/up TSH, B12, folate Does not meet sepsis criteria, however did take tylenol prior to admission to initial temp may be inaccurate   May be secondary to viral illness vs PNA vs UTI or dehydration   CXR shows potential consolidation  No leukocytosis, elevated LFTs    #infectious workup  s/p 2L IV Fluid, s/p vanc and zosyn - d/c'ed antibiotics   - RVP negative   - procal elevated at 0.71  - F/up urine Cx, blood cx  - F/up urine legionella  - trend CBC    # transaminitis, elevated LFTs, suspect underlying liver pathology   - may be secondary to new statin use (started September), hold statin while in hospital  - RUQ U/S - + cholelithiasis w/o cholecystitis  - f/up hep panel    #metabolic causes   - f/up TSH, B12, folate

## 2021-12-12 NOTE — PROGRESS NOTE ADULT - PROBLEM SELECTOR PLAN 2
RESOLVED   - s/p 2L NS w/ resolution of hypotension to 124/82  - hold antihypertensives  - BP coming back up to 150s/70-80s systolic  - orthostatics negative RESOLVED   - s/p 2L NS w/ resolution of hypotension to 124/82  - hold antihypertensives for now  - BP coming back up to 150s/70-80s systolic  - will slowly reintroduce BP meds as needed  - goal SBP <150 given age and wish to avoid hypotension/falls  - orthostatics negative

## 2021-12-12 NOTE — DISCHARGE NOTE PROVIDER - NSDCCPTREATMENT_GEN_ALL_CORE_FT
PRINCIPAL PROCEDURE  Procedure: Right upper quadrant ultrasound  Findings and Treatment:   ACC: [id number] EXAM:  US ABDOMEN RT UPR QUADRANT                        PROCEDURE DATE:  12/11/2021    INTERPRETATION:  CLINICAL INFORMATION: Transaminitis. Chronic kidney   disease. BPH..  COMPARISON: None available.  TECHNIQUE: Sonography of the right upper quadrant.  FINDINGS:  Liver: Right lobe of the liver = 15.3 cm in CC dimension. Smooth hepatic   contour with normal appearance of the parenchyma. Main portal vein is   patent with normal anterograde flow.  Bile ducts: Normal caliber. Common bile duct measures 5 mm.  Gallbladder: 1.1 cm gallstone within the gallbladder neck. Assessment for   gallstone mobility was limited due to overlying bowel gas. However, the   gallbladder is not significantly distended measuring up to 3.3 cm in AP   dimension. No pericholecystic fluid. Sonographic Hill sign is reported   negative.  Pancreas: Visualized portions are within normal limits. Evaluation of the   pancreatic body and tail is limited due to overlying bowel gas.  Right kidney: 11.1 cm. No hydronephrosis. Mild increased cortical   echogenicity.  Ascites: None.  IVC: Visualized portions are within normal limits.  IMPRESSION:  Cholelithiasis without other secondary findings to suggest acute   cholecystitis.  Mild increased cortical echogenicity of the right kidney suggesting   medical renal disease. No right-sided hydronephrosis.  --- End of Report ---  ALEXANDER LEE MD; Fellow Radiology  This document has been electronically signed.  MELBA VELARDE MD; Attending Radiologist  This document has been electronically signed. Dec 12 2021  2:54PM        SECONDARY PROCEDURE  Procedure: Portable x-ray of chest, single view  Findings and Treatment: ACC: [id number] EXAM:  XR CHEST PORTABLE URGENT 1V                        PROCEDURE DATE:  12/11/2021    INTERPRETATION:  EXAMINATION: XR CHEST URGENT  CLINICAL INDICATION: Sepsis  TECHNIQUE: Single frontal, portable view of the chest wasobtained.  COMPARISON: Chest x-ray None.  FINDINGS:  The heart is normal in size.  Left basilar opacity.  There is no pneumothorax.  IMPRESSION:  Left basilar opacity which may represent consolidation and/or atelectasis   with adjacent pleural effusion.  --- End of Report ---  GUNNAR SHUKLA M.D., RADIOLOGY RESIDENT  This document has been electronically signed.  BATSHEVA GARCIA MD; Attending Radiologist  This document has been electronically signed. Dec 11 2021 12:57PM

## 2021-12-12 NOTE — PHYSICAL THERAPY INITIAL EVALUATION ADULT - ORIENTATION, REHAB EVAL
Chief Complaint   Patient presents with     Annual Eye Exam      Accompanied by father  Last Eye Exam: 1 years  Dilated Previously: No, side effects of dilation explained today,info given to dad    What are you currently using to see?  does not use glasses or contacts       Distance Vision Acuity: Noticed gradual change in both eyes    Near Vision Acuity: Satisfied with vision while reading  unaided    Eye Comfort: good  Do you use eye drops? : No  Occupation or Hobbies: 6th grade    Susan Gomez Optometric Assistant, A.B.O.C.          Medical, surgical and family histories reviewed and updated 1/7/2020.       OBJECTIVE: See Ophthalmology exam    ASSESSMENT:    ICD-10-CM    1. Examination of eyes and vision Z01.00    2. Myopia of both eyes H52.13       PLAN:     Patient Instructions   Recommend new glasses.  Glasses mainly for distance vision.    Return in 1 year for a complete eye exam or sooner if needed.    Aden Mclean, OD        oriented to person, place, time and situation

## 2021-12-12 NOTE — PHYSICAL THERAPY INITIAL EVALUATION ADULT - GENERAL OBSERVATIONS, REHAB EVAL
Patient was received semi-supine in bed in NAD. Patient was received sitting at edge of bed in [facility].

## 2021-12-12 NOTE — PROGRESS NOTE ADULT - PROBLEM SELECTOR PLAN 3
baseline 1.2-1.3 per review of HIE  avoid nephrotoxic agents  continue to trend bmp baseline 1.2-1.3 per review of HIE  avoid nephrotoxic agents  continue to trend bmp  - downtrending 1.37 --> 1.19 baseline 1.2-1.3 per review of HIE, likely CKD3  avoid nephrotoxic agents  continue to trend bmp  - downtrending 1.37 --> 1.19

## 2021-12-12 NOTE — PROGRESS NOTE ADULT - ASSESSMENT
81M Vietnamese speaking male w/ PMH of HTN, Diabetes, CKD, and BPH presents from urgent care for myalgias and hypotension. 81M Romansh speaking male w/ PMH of HTN, DM2, CKD3, and BPH presents from urgent care for myalgias and hypotension. Found to have transminitis.

## 2021-12-12 NOTE — DISCHARGE NOTE PROVIDER - NSDCMRMEDTOKEN_GEN_ALL_CORE_FT
amLODIPine 10 mg oral tablet: 1 tab(s) orally once a day  atorvastatin 20 mg oral tablet: 1 tab(s) orally once a day  carvedilol 12.5 mg oral tablet: 1 tab(s) orally once a day  ciclopirox 0.77% topical cream: Apply topically to affected area 2 times a day  hydrALAZINE 25 mg oral tablet: 1 tab(s) orally once a day  losartan 25 mg oral tablet: 1 tab(s) orally once a day   ciclopirox 0.77% topical cream: Apply topically to affected area 2 times a day  losartan 25 mg oral tablet: 1 tab(s) orally once a day   ciclopirox 0.77% topical cream: Apply topically to affected area 2 times a day  losartan 25 mg oral tablet: 1 tab(s) orally once a day  Outpatient physical therapy: Outpatient physical therapy

## 2021-12-12 NOTE — PROGRESS NOTE ADULT - PROBLEM SELECTOR PLAN 7
DVT ppx: lovenox  PT consult, pt walks with cane DVT ppx: lovenox  PT consult, pt walks with cane  MEDS: Meds reconciled based on old image family has, bringing in bottles currently in use for further reconciliation

## 2021-12-12 NOTE — DISCHARGE NOTE PROVIDER - NSDCQMCOGNITION_NEU_ALL_CORE
NO CHANGE IN STATUS ,STABLE, CALM ON PROPOFOL DRIP,AROUSBALE TO PAIN. FEEDING OFF ,NPO POST 
MIDNIGHT FOR POSSIBLE PEG TUBE INSERTION IN AM. No difficulties

## 2021-12-12 NOTE — PHYSICAL THERAPY INITIAL EVALUATION ADULT - ADDITIONAL COMMENTS
Patient reports he lives with his daughter in a private house, +flight of stairs. Patient reports being independent in ADLs and ambulated with a cane.    Patient was left sitting at edge of bed, all lines/tubes intact and call bell within reach, RN aware

## 2021-12-12 NOTE — PROGRESS NOTE ADULT - SUBJECTIVE AND OBJECTIVE BOX
Patient is a 81y old  Male who presents with a chief complaint of dizziness and body aches (11 Dec 2021 15:57)      SUBJECTIVE / OVERNIGHT EVENTS: Patient seen and examined at bedside.    MEDICATIONS  (STANDING):  dextrose 40% Gel 15 Gram(s) Oral once  dextrose 5%. 1000 milliLiter(s) (50 mL/Hr) IV Continuous <Continuous>  dextrose 5%. 1000 milliLiter(s) (100 mL/Hr) IV Continuous <Continuous>  dextrose 50% Injectable 25 Gram(s) IV Push once  dextrose 50% Injectable 12.5 Gram(s) IV Push once  dextrose 50% Injectable 25 Gram(s) IV Push once  glucagon  Injectable 1 milliGRAM(s) IntraMuscular once  influenza  Vaccine (HIGH DOSE) 0.7 milliLiter(s) IntraMuscular once  insulin lispro (ADMELOG) corrective regimen sliding scale   SubCutaneous three times a day before meals  insulin lispro (ADMELOG) corrective regimen sliding scale   SubCutaneous at bedtime    MEDICATIONS  (PRN):  acetaminophen     Tablet .. 650 milliGRAM(s) Oral every 6 hours PRN Temp greater or equal to 38C (100.4F), Mild Pain (1 - 3)  aluminum hydroxide/magnesium hydroxide/simethicone Suspension 30 milliLiter(s) Oral every 4 hours PRN Dyspepsia  melatonin 3 milliGRAM(s) Oral at bedtime PRN Insomnia  ondansetron Injectable 4 milliGRAM(s) IV Push every 8 hours PRN Nausea and/or Vomiting      Vital Signs Last 24 Hrs  T(C): 37.1 (12 Dec 2021 05:42), Max: 37.2 (11 Dec 2021 23:13)  T(F): 98.7 (12 Dec 2021 05:42), Max: 98.9 (11 Dec 2021 23:13)  HR: 78 (12 Dec 2021 05:42) (60 - 78)  BP: 151/82 (12 Dec 2021 05:42) (118/62 - 152/62)  BP(mean): --  RR: 18 (12 Dec 2021 05:42) (14 - 18)  SpO2: 97% (12 Dec 2021 05:42) (94% - 99%)  CAPILLARY BLOOD GLUCOSE      POCT Blood Glucose.: 147 mg/dL (11 Dec 2021 22:35)  POCT Blood Glucose.: 118 mg/dL (11 Dec 2021 17:58)    I&O's Summary      PHYSICAL EXAM:  GENERAL APPEARANCE: Well developed, NAD  HEENT:  PERRL, EOMI. hearing grossly intact.  NECK: Neck supple, non-tender no lymphadenopathy, masses or thyromegaly.  CARDIAC: Normal S1 and S2. no mrg. RRR  LUNGS: Clear to auscultation B/L, no rales, rhonchi, or wheezing  ABDOMEN: Soft , NTND, bowel sounds normal. No guarding or rebound.   MUSCULOSKELETAL: ROM intact.  No joint erythema or tenderness.   EXTREMITIES: +1 pitting edema in LE, hands appear swollen  NEUROLOGICAL: Non focal. Strength and sensation symmetric and intact throughout.   SKIN: Warm and dry , Well perfused    LABS:                        15.0   5.82  )-----------( 163      ( 12 Dec 2021 07:11 )             46.8     12-11    140  |  105  |  25<H>  ----------------------------<  133<H>  3.8   |  25  |  1.37<H>    Ca    8.6      11 Dec 2021 11:36    TPro  6.3  /  Alb  3.8  /  TBili  0.7  /  DBili  x   /  AST  89<H>  /  ALT  180<H>  /  AlkPhos  118  12-11              RADIOLOGY & ADDITIONAL TESTS:    Imaging Personally Reviewed:    Consultant(s) Notes Reviewed:      Care Discussed with Consultants/Other Providers:    Adenike Sosa, PGY-1; [facility] Pager: [phone]; [facility] Pager: [phone]   Patient is a 81y old  Male who presents with a chief complaint of dizziness and body aches (11 Dec 2021 15:57)      SUBJECTIVE / OVERNIGHT EVENTS: Patient seen and examined at bedside.  Swedish translation provided by family member per patient request. Patient denies any current fever/cough/SOB/lightheadedness/abdominal pain. Feels back to his normal self.     MEDICATIONS  (STANDING):  dextrose 40% Gel 15 Gram(s) Oral once  dextrose 5%. 1000 milliLiter(s) (50 mL/Hr) IV Continuous <Continuous>  dextrose 5%. 1000 milliLiter(s) (100 mL/Hr) IV Continuous <Continuous>  dextrose 50% Injectable 25 Gram(s) IV Push once  dextrose 50% Injectable 12.5 Gram(s) IV Push once  dextrose 50% Injectable 25 Gram(s) IV Push once  glucagon  Injectable 1 milliGRAM(s) IntraMuscular once  influenza  Vaccine (HIGH DOSE) 0.7 milliLiter(s) IntraMuscular once  insulin lispro (ADMELOG) corrective regimen sliding scale   SubCutaneous three times a day before meals  insulin lispro (ADMELOG) corrective regimen sliding scale   SubCutaneous at bedtime    MEDICATIONS  (PRN):  acetaminophen     Tablet .. 650 milliGRAM(s) Oral every 6 hours PRN Temp greater or equal to 38C (100.4F), Mild Pain (1 - 3)  aluminum hydroxide/magnesium hydroxide/simethicone Suspension 30 milliLiter(s) Oral every 4 hours PRN Dyspepsia  melatonin 3 milliGRAM(s) Oral at bedtime PRN Insomnia  ondansetron Injectable 4 milliGRAM(s) IV Push every 8 hours PRN Nausea and/or Vomiting      Vital Signs Last 24 Hrs  T(C): 37.1 (12 Dec 2021 05:42), Max: 37.2 (11 Dec 2021 23:13)  T(F): 98.7 (12 Dec 2021 05:42), Max: 98.9 (11 Dec 2021 23:13)  HR: 78 (12 Dec 2021 05:42) (60 - 78)  BP: 151/82 (12 Dec 2021 05:42) (118/62 - 152/62)  RR: 18 (12 Dec 2021 05:42) (14 - 18)  SpO2: 97% (12 Dec 2021 05:42) (94% - 99%)  CAPILLARY BLOOD GLUCOSE      POCT Blood Glucose.: 147 mg/dL (11 Dec 2021 22:35)  POCT Blood Glucose.: 118 mg/dL (11 Dec 2021 17:58)    I&O's Summary    PHYSICAL EXAM:  GENERAL APPEARANCE: Well developed, NAD  HEENT:  PERRL, EOMI. hearing grossly intact.  NECK: Neck supple, non-tender no lymphadenopathy, masses or thyromegaly.  CARDIAC: Normal S1 and S2. no mrg. RRR  LUNGS: Clear to auscultation B/L, no rales, rhonchi, or wheezing  ABDOMEN: Soft , NTND, bowel sounds normal. No guarding or rebound.   MUSCULOSKELETAL: ROM intact.  No joint erythema or tenderness.   EXTREMITIES: +1 pitting edema in LE, hands appear swollen  NEUROLOGICAL: Non focal. Strength and sensation symmetric and intact throughout.   SKIN: Warm and dry , Well perfused    LABS:                        15.0   5.82  )-----------( 163      ( 12 Dec 2021 07:11 )             46.8     12-12    139  |  102  |  18  ----------------------------<  106<H>  3.7   |  24  |  1.19    Ca    9.2      12 Dec 2021 07:11  Phos  3.0     12-12  Mg     2.20     12-12    TPro  6.5  /  Alb  4.0  /  TBili  0.6  /  DBili  x   /  AST  66<H>  /  ALT  158<H>  /  AlkPhos  135<H>  12-12      12-11    140  |  105  |  25<H>  ----------------------------<  133<H>  3.8   |  25  |  1.37<H>    Ca    8.6      11 Dec 2021 11:36    TPro  6.3  /  Alb  3.8  /  TBili  0.7  /  DBili  x   /  AST  89<H>  /  ALT  180<H>  /  AlkPhos  118  12-11      RADIOLOGY & ADDITIONAL TESTS:    Imaging Personally Reviewed:  < from: US Abdomen Upper Quadrant Right (12.11.21 @ 16:41) >  IMPRESSION:    Cholelithiasis without other secondary findings to suggest acute   cholecystitis.    Mild increased cortical echogenicity of the right kidney suggesting   medical renal disease. No right-sided hydronephrosis.    < end of copied text >      Consultant(s) Notes Reviewed:      Care Discussed with Consultants/Other Providers:    Adenike Sosa, PGY-1; [facility] Pager: [phone]; [facility] Pager: [phone]

## 2021-12-12 NOTE — PHYSICAL THERAPY INITIAL EVALUATION ADULT - PERTINENT HX OF CURRENT PROBLEM, REHAB EVAL
Patient is an 81 year old male admitted to [facility] from urgent care for hypotension. The patient has been feeling "unwell," for the past 3 days. He denies fevers, but endorses feeling hot followed by chills, body aches and extreme fatigue. PMH of HTN, Diabetes, CKD.

## 2021-12-12 NOTE — DISCHARGE NOTE PROVIDER - HOSPITAL COURSE
81M Kuwaiti speaking male w/ PMH of HTN, Diabetes, CKD, and BPH presents from urgent care for hypotension. The patient has been feeling "unwell," for the past 3 days. He denies fevers, but endorses feeling hot followed by chills, body aches and extreme fatigue. He denies N/V/C/D. He felt a little short of breath this morning while sitting in the urgent care before he felt like he was going to pass out, but denies previously feeling SOB on exertion or orthopnea. He denies dysuria, hematuria or melena/BRBPR. He denies any sick contacts or travel but note 1 month ago he was unable to travel to [place] because he felt dizzy and they did not let him onto the plane. This morning the patient took tylenol at 7 am. He denies any CP, palpitations, current dizziness or SOB, vision changes, sore throat, cough, abdominal pain or increased swelling of lower extremities. Of note pt has increased swelling of hands he noticed during exam but could not place when the swelling began.     Hospital Course:    The patient was reported to be hypotensive 80s/50s in UC, was given 1L NS by EMS on his way over here. On px to the ED vitals were 97.7, 60 bpm, 124/62. CXR showed left basilar opacity w/ potential consolidation +/- atelectasis w/ adj. pleural effusions. , s/p vanc and zosyn    For his generalized weakness the patient did not meet sepsis criteria, however did take tylenol prior to admission to initial temp may be inaccurate, CXR shows potential consolidation and pt but the patient had no leukocytosis. Antibiotics were not continued. RVP was negative, procal elevated at 0.71, Urine and blood cultures showed X.     His elevated LFTs, may be secondary to new statin use (started September), hold statin while in hospital. The RUQ U/S and hep panel showed X    His hypotension resolved after initial 2L NS, his BP came up to 150s systolic, hypertensives were held. His orthostatics were negative. He will be discharged on X    The patient is currently medically stable to be discharged home 81M Sammarinese speaking male w/ PMH of HTN, Diabetes, CKD, and BPH presents from urgent care for hypotension. The patient has been feeling "unwell," for the past 3 days. He denies fevers, but endorses feeling hot followed by chills, body aches and extreme fatigue. He denies N/V/C/D. He felt a little short of breath this morning while sitting in the urgent care before he felt like he was going to pass out, but denies previously feeling SOB on exertion or orthopnea. He denies dysuria, hematuria or melena/BRBPR. He denies any sick contacts or travel but note 1 month ago he was unable to travel to [place] because he felt dizzy and they did not let him onto the plane. This morning the patient took tylenol at 7 am. He denies any CP, palpitations, current dizziness or SOB, vision changes, sore throat, cough, abdominal pain or increased swelling of lower extremities. Of note pt has increased swelling of hands he noticed during exam but could not place when the swelling began.     Hospital Course:  The patient was reported to be hypotensive 80s/50s in UC, was given 1L NS by EMS on his way over here. On px to the ED vitals were 97.7, 60 bpm, 124/62. CXR showed left basilar opacity w/ potential consolidation +/- atelectasis w/ adj. pleural effusions, s/p dose of vanc and zosyn in ED.   Infectious workup sent for pt w negative findings. No elevated WBC, no fevers during hospital stay. No growth on blood cultures and urine cultures. RVP negative.   However, noted elevated LFTs, suspect secondary to new statin use since September.   Held statins, with gradual improvement of transaminitis. RUQ US w cholelithiasis w/o cholecystitis, hepatitis panel negative.   Generalized weakness and myalgias improved while in the hospital. Hypotension resolved after       His hypotension resolved after initial 2L NS, his BP came up to 150s systolic, hypertensives were held. His orthostatics were negative. He will be discharged on X    The patient is currently medically stable to be discharged home 81M Cook Islander speaking male w/ PMH of HTN, Diabetes, CKD, and BPH presents from urgent care for hypotension. The patient has been feeling "unwell," for the past 3 days. He denies fevers, but endorses feeling hot followed by chills, body aches and extreme fatigue. He denies N/V/C/D. He felt a little short of breath this morning while sitting in the urgent care before he felt like he was going to pass out, but denies previously feeling SOB on exertion or orthopnea. He denies dysuria, hematuria or melena/BRBPR. He denies any sick contacts or travel but note 1 month ago he was unable to travel to [place] because he felt dizzy and they did not let him onto the plane. This morning the patient took tylenol at 7 am. He denies any CP, palpitations, current dizziness or SOB, vision changes, sore throat, cough, abdominal pain or increased swelling of lower extremities. Of note pt has increased swelling of hands he noticed during exam but could not place when the swelling began.     Hospital Course:  The patient was reported to be hypotensive 80s/50s in UC, was given 1L NS by EMS on his way over here. On px to the ED vitals were 97.7, 60 bpm, 124/62. CXR showed left basilar opacity w/ potential consolidation +/- atelectasis w/ adj. pleural effusions, s/p dose of vanc and zosyn in ED.   Infectious workup sent for pt w negative findings. No elevated WBC, no fevers during hospital stay. No growth on blood cultures and urine cultures. RVP negative.   However, noted elevated LFTs, suspect secondary to new statin use since September.   Held statins, with gradual improvement of transaminitis. RUQ US w cholelithiasis w/o cholecystitis, hepatitis panel negative.   Generalized weakness and myalgias improved while in the hospital. Hypotension resolved after fluid boluses and encouragement of PO intake.       Possible that statins led to myalgias and discomfort. Pt's family noted lower PO intake in recent days - hypotension could be related to decreased PO intake, also could be due to blood pressure medications.     At time of discharge, pt's weakness and myalgias have resolved, hypotension also resolved. He will be discharged with Losartan 20, with other blood pressure medications on hold until follow up with PCP. Will also discontinue statin due to transaminitis. He will additionally follow up with PCP for left basilar opacity noted on CXR. 81M Haitian speaking male w/ PMH of HTN, Diabetes, CKD, and BPH presents from urgent care for hypotension. The patient has been feeling "unwell," for the past 3 days. He denies fevers, but endorses feeling hot followed by chills, body aches and extreme fatigue. He denies N/V/C/D. He felt a little short of breath this morning while sitting in the urgent care before he felt like he was going to pass out, but denies previously feeling SOB on exertion or orthopnea. He denies dysuria, hematuria or melena/BRBPR. He denies any sick contacts or travel but note 1 month ago he was unable to travel to [place] because he felt dizzy and they did not let him onto the plane. This morning the patient took tylenol at 7 am. He denies any CP, palpitations, current dizziness or SOB, vision changes, sore throat, cough, abdominal pain or increased swelling of lower extremities. Of note pt has increased swelling of hands he noticed during exam but could not place when the swelling began.     Hospital Course:  The patient was reported to be hypotensive 80s/50s in UC, was given 1L NS by EMS on his way over here. On px to the ED vitals were 97.7, 60 bpm, 124/62. CXR showed left basilar opacity w/ potential consolidation +/- atelectasis w/ adj. pleural effusions, s/p dose of vanc and zosyn in ED.   Infectious workup sent for pt w negative findings. No elevated WBC, no fevers during hospital stay. No growth on blood cultures and urine cultures. RVP negative.   However, noted elevated LFTs, suspect secondary to new statin use since September.   Held statins, with gradual improvement of transaminitis. RUQ US w cholelithiasis w/o cholecystitis, hepatitis panel negative.   Generalized weakness and myalgias improved while in the hospital. Hypotension resolved after fluid boluses and encouragement of PO intake.       Possible that statins led to myalgias and discomfort. Pt's family noted lower PO intake in recent days - hypotension could be related to decreased PO intake, also could be due to blood pressure medications.     At time of discharge, pt's weakness and myalgias have resolved, hypotension also resolved. He will be discharged with Losartan 20, with other blood pressure medications on hold until follow up with PCP. Will also discontinue statin due to transaminitis. He will additionally follow up with PCP for left basilar opacity noted on CXR.     Pt will require TTE as outpt.

## 2021-12-12 NOTE — PROGRESS NOTE ADULT - PROBLEM SELECTOR PLAN 5
- continue tamsulosin - a1c 6.4  - No meds outpt, diet controlled  - continue to monitor POC glucose levels while in hospital - a1c 6.4, lss inpatient  - No meds outpt, diet controlled  - continue to monitor POC glucose levels while in hospital

## 2021-12-13 ENCOUNTER — TRANSCRIPTION ENCOUNTER (OUTPATIENT)
Age: 81
End: 2021-12-13

## 2021-12-13 VITALS
OXYGEN SATURATION: 98 % | RESPIRATION RATE: 18 BRPM | HEART RATE: 68 BPM | TEMPERATURE: 98 F | SYSTOLIC BLOOD PRESSURE: 136 MMHG | DIASTOLIC BLOOD PRESSURE: 66 MMHG

## 2021-12-13 LAB
ALBUMIN SERPL ELPH-MCNC: 3.6 G/DL — SIGNIFICANT CHANGE UP (ref 3.3–5)
ALP SERPL-CCNC: 132 U/L — HIGH (ref 40–120)
ALT FLD-CCNC: 116 U/L — HIGH (ref 4–41)
ANION GAP SERPL CALC-SCNC: 11 MMOL/L — SIGNIFICANT CHANGE UP (ref 7–14)
AST SERPL-CCNC: 49 U/L — HIGH (ref 4–40)
BILIRUB SERPL-MCNC: 0.4 MG/DL — SIGNIFICANT CHANGE UP (ref 0.2–1.2)
BUN SERPL-MCNC: 24 MG/DL — HIGH (ref 7–23)
CALCIUM SERPL-MCNC: 8.8 MG/DL — SIGNIFICANT CHANGE UP (ref 8.4–10.5)
CHLORIDE SERPL-SCNC: 103 MMOL/L — SIGNIFICANT CHANGE UP (ref 98–107)
CK SERPL-CCNC: 73 U/L — SIGNIFICANT CHANGE UP (ref 30–200)
CO2 SERPL-SCNC: 22 MMOL/L — SIGNIFICANT CHANGE UP (ref 22–31)
CREAT SERPL-MCNC: 1.15 MG/DL — SIGNIFICANT CHANGE UP (ref 0.5–1.3)
GLUCOSE BLDC GLUCOMTR-MCNC: 115 MG/DL — HIGH (ref 70–99)
GLUCOSE SERPL-MCNC: 110 MG/DL — HIGH (ref 70–99)
HAV IGM SER-ACNC: SIGNIFICANT CHANGE UP
HBV CORE IGM SER-ACNC: SIGNIFICANT CHANGE UP
HBV SURFACE AG SER-ACNC: SIGNIFICANT CHANGE UP
HCT VFR BLD CALC: 41.7 % — SIGNIFICANT CHANGE UP (ref 39–50)
HCV AB S/CO SERPL IA: 0.09 S/CO — SIGNIFICANT CHANGE UP (ref 0–0.99)
HCV AB SERPL-IMP: SIGNIFICANT CHANGE UP
HGB BLD-MCNC: 14 G/DL — SIGNIFICANT CHANGE UP (ref 13–17)
MAGNESIUM SERPL-MCNC: 2.1 MG/DL — SIGNIFICANT CHANGE UP (ref 1.6–2.6)
MCHC RBC-ENTMCNC: 29.7 PG — SIGNIFICANT CHANGE UP (ref 27–34)
MCHC RBC-ENTMCNC: 33.6 GM/DL — SIGNIFICANT CHANGE UP (ref 32–36)
MCV RBC AUTO: 88.5 FL — SIGNIFICANT CHANGE UP (ref 80–100)
NRBC # BLD: 0 /100 WBCS — SIGNIFICANT CHANGE UP
NRBC # FLD: 0 K/UL — SIGNIFICANT CHANGE UP
PHOSPHATE SERPL-MCNC: 3.4 MG/DL — SIGNIFICANT CHANGE UP (ref 2.5–4.5)
PLATELET # BLD AUTO: 161 K/UL — SIGNIFICANT CHANGE UP (ref 150–400)
POTASSIUM SERPL-MCNC: 3.5 MMOL/L — SIGNIFICANT CHANGE UP (ref 3.5–5.3)
POTASSIUM SERPL-SCNC: 3.5 MMOL/L — SIGNIFICANT CHANGE UP (ref 3.5–5.3)
PROT SERPL-MCNC: 6.4 G/DL — SIGNIFICANT CHANGE UP (ref 6–8.3)
RBC # BLD: 4.71 M/UL — SIGNIFICANT CHANGE UP (ref 4.2–5.8)
RBC # FLD: 12.7 % — SIGNIFICANT CHANGE UP (ref 10.3–14.5)
SODIUM SERPL-SCNC: 136 MMOL/L — SIGNIFICANT CHANGE UP (ref 135–145)
WBC # BLD: 4.73 K/UL — SIGNIFICANT CHANGE UP (ref 3.8–10.5)
WBC # FLD AUTO: 4.73 K/UL — SIGNIFICANT CHANGE UP (ref 3.8–10.5)

## 2021-12-13 PROCEDURE — 99239 HOSP IP/OBS DSCHRG MGMT >30: CPT

## 2021-12-13 RX ORDER — HYDRALAZINE HCL 50 MG
1 TABLET ORAL
Qty: 0 | Refills: 0 | DISCHARGE

## 2021-12-13 RX ORDER — AMLODIPINE BESYLATE 2.5 MG/1
1 TABLET ORAL
Qty: 0 | Refills: 0 | DISCHARGE

## 2021-12-13 RX ORDER — ATORVASTATIN CALCIUM 80 MG/1
1 TABLET, FILM COATED ORAL
Qty: 0 | Refills: 0 | DISCHARGE

## 2021-12-13 RX ORDER — CARVEDILOL PHOSPHATE 80 MG/1
1 CAPSULE, EXTENDED RELEASE ORAL
Qty: 0 | Refills: 0 | DISCHARGE

## 2021-12-13 RX ADMIN — ENOXAPARIN SODIUM 40 MILLIGRAM(S): 100 INJECTION SUBCUTANEOUS at 12:42

## 2021-12-13 NOTE — PROGRESS NOTE ADULT - PROBLEM SELECTOR PLAN 7
DVT ppx: lovenox  PT consult, pt walks with cane  MEDS: Meds reconciled based on old image family has, bringing in bottles currently in use for further reconciliation

## 2021-12-13 NOTE — PROGRESS NOTE ADULT - ATTENDING COMMENTS
Patient is able for DC.  Patient should stop atorvastatin as hepatitis/ myalgia .  LFT's improving, hepatitis panel negative.  DC home on losartan as DM2, follow up with PCP outpatient .  Patient is stable for DC with PT
Patient seen and examined, d/w Dr. Sosa, agree w/ above with following additions:     80 yo M w/ HTN, DM2, CKD3, BPH, presenting w/ weakness, myalgias and hypotension at urgent care, s/p IV fluid administration, now feeling back at baseline. Infectious workup thus far has been negative (RVP/COVID/BCx/Ucx) and patient remains stable off antibiotics, not hypotensive. Further history provided by family, they report he had some diarrhea recently (family was concerned if he could have had "food poisoning"). Could the patient have had a viral GI illness resulting in weakness/hypotension and myalgias? Appears improved at this time, anticipate d/c home tomorrow, no skilled PT needs. Discussed w/ patient and family differential dx for transaminitis, pending hepatitis panel, holding statin (family states they are familiar w/ concept of statin myopathy, liver injury), they are in agreement w/ plan.

## 2021-12-13 NOTE — PROGRESS NOTE ADULT - PROBLEM SELECTOR PLAN 2
RESOLVED   - s/p 2L NS w/ resolution of hypotension to 124/82  - hold antihypertensives for now  - BP coming back up to 150s/70-80s systolic  - will slowly reintroduce BP meds as needed  - goal SBP <150 given age and wish to avoid hypotension/falls  - orthostatics negative

## 2021-12-13 NOTE — PROGRESS NOTE ADULT - PROBLEM SELECTOR PLAN 1
Does not meet sepsis criteria, however did take tylenol prior to admission to initial temp may be inaccurate   May be secondary to viral illness vs PNA vs UTI or dehydration   CXR shows potential consolidation  No leukocytosis, elevated LFTs    #infectious workup  s/p 2L IV Fluid, s/p vanc and zosyn - d/c'ed antibiotics   - RVP negative   - procal elevated at 0.71  - F/up urine Cx, blood cx  - F/up urine legionella  - trend CBC    # transaminitis, elevated LFTs, suspect underlying liver pathology   - may be secondary to new statin use (started September), hold statin while in hospital  - RUQ U/S - + cholelithiasis w/o cholecystitis  - f/up hep panel    #metabolic causes   - f/up TSH, B12, folate

## 2021-12-13 NOTE — PROGRESS NOTE ADULT - ASSESSMENT
81M Kinyarwanda speaking male w/ PMH of HTN, DM2, CKD3, and BPH presents from urgent care for myalgias and hypotension. Found to have transminitis.

## 2021-12-13 NOTE — DISCHARGE NOTE NURSING/CASE MANAGEMENT/SOCIAL WORK - PATIENT PORTAL LINK FT
You can access the FollowMyHealth Patient Portal offered by [facility] by registering at the following website: [email or web address]. By joining [employer]’s FollowMyHealth portal, you will also be able to view your health information using other applications (apps) compatible with our system.

## 2021-12-13 NOTE — PROGRESS NOTE ADULT - SUBJECTIVE AND OBJECTIVE BOX
COBY BEGUM  81y  Male    Patient is a 81y old  Male who presents with a chief complaint of dizziness and body aches (12 Dec 2021 12:35)    INTERVAL HPI/OVERNIGHT EVENTS:  No acute events overnight.       VITALS:   T(C): 36.9 (12-13-21 @ 04:44), Max: 36.9 (12-13-21 @ 04:44)  HR: 73 (12-13-21 @ 04:44) (70 - 73)  BP: 147/67 (12-13-21 @ 04:44) (123/66 - 147/67)  RR: 18 (12-13-21 @ 04:44) (18 - 18)  SpO2: 100% (12-13-21 @ 04:44) (96% - 100%)    PHYSICAL EXAM:  GENERAL APPEARANCE: Well developed, NAD  HEENT:  PERRL, EOMI. hearing grossly intact.  NECK: Neck supple, non-tender no lymphadenopathy, masses or thyromegaly.  CARDIAC: Normal S1 and S2. no mrg. RRR  LUNGS: Clear to auscultation B/L, no rales, rhonchi, or wheezing  ABDOMEN: Soft , NTND, bowel sounds normal. No guarding or rebound.   MUSCULOSKELETAL: ROM intact.  No joint erythema or tenderness.   EXTREMITIES: +1 pitting edema in LE, hands appear swollen  NEUROLOGICAL: Non focal. Strength and sensation symmetric and intact throughout.   SKIN: Warm and dry , Well perfused    Consultant(s) Notes Reviewed:  [x ] YES  [ ] NO  Care Discussed with Consultants/Other Providers [ x] YES  [ ] NO    LABS:      RADIOLOGY & ADDITIONAL TESTS:    Imaging Personally Reviewed:  [ ] YES  [ ] NO  acetaminophen     Tablet .. 650 milliGRAM(s) Oral every 6 hours PRN  aluminum hydroxide/magnesium hydroxide/simethicone Suspension 30 milliLiter(s) Oral every 4 hours PRN  dextrose 40% Gel 15 Gram(s) Oral once  dextrose 5%. 1000 milliLiter(s) IV Continuous <Continuous>  dextrose 5%. 1000 milliLiter(s) IV Continuous <Continuous>  dextrose 50% Injectable 25 Gram(s) IV Push once  dextrose 50% Injectable 12.5 Gram(s) IV Push once  dextrose 50% Injectable 25 Gram(s) IV Push once  enoxaparin Injectable 40 milliGRAM(s) SubCutaneous daily  glucagon  Injectable 1 milliGRAM(s) IntraMuscular once  influenza  Vaccine (HIGH DOSE) 0.7 milliLiter(s) IntraMuscular once  insulin lispro (ADMELOG) corrective regimen sliding scale   SubCutaneous three times a day before meals  insulin lispro (ADMELOG) corrective regimen sliding scale   SubCutaneous at bedtime  melatonin 3 milliGRAM(s) Oral at bedtime PRN  ondansetron Injectable 4 milliGRAM(s) IV Push every 8 hours PRN      HEALTH ISSUES - PROBLEM Dx:  Prophylactic measure    Hypotension    HTN (hypertension)    History of BPH    CKD (chronic kidney disease)    General weakness    DM (diabetes mellitus)           COBY BEGUM  81y  Male    Patient is a 81y old  Male who presents with a chief complaint of dizziness and body aches (12 Dec 2021 12:35)    INTERVAL HPI/OVERNIGHT EVENTS:  No acute events overnight. Reports feeling well this AM, no complaints.       VITALS:   T(C): 36.9 (12-13-21 @ 04:44), Max: 36.9 (12-13-21 @ 04:44)  HR: 73 (12-13-21 @ 04:44) (70 - 73)  BP: 147/67 (12-13-21 @ 04:44) (123/66 - 147/67)  RR: 18 (12-13-21 @ 04:44) (18 - 18)  SpO2: 100% (12-13-21 @ 04:44) (96% - 100%)    PHYSICAL EXAM:  GENERAL APPEARANCE: Well developed, NAD  HEENT:  PERRL, EOMI. hearing grossly intact.  NECK: Neck supple, non-tender no lymphadenopathy, masses or thyromegaly.  CARDIAC: Normal S1 and S2. no mrg. RRR  LUNGS: Clear to auscultation B/L, no rales, rhonchi, or wheezing  ABDOMEN: Soft , NTND, bowel sounds normal. No guarding or rebound.   MUSCULOSKELETAL: ROM intact.  No joint erythema or tenderness.   EXTREMITIES: +1 pitting edema in LE, hands appear swollen  NEUROLOGICAL: Non focal. Strength and sensation symmetric and intact throughout.   SKIN: Warm and dry , Well perfused    Consultant(s) Notes Reviewed:  [x ] YES  [ ] NO  Care Discussed with Consultants/Other Providers [ x] YES  [ ] NO      LABS:                          14.0   4.73  )-----------( 161      ( 13 Dec 2021 06:43 )             41.7     12-13    136  |  103  |  24<H>  ----------------------------<  110<H>  3.5   |  22  |  1.15    Ca    8.8      13 Dec 2021 06:43  Phos  3.4     12-13  Mg     2.10     12-13    TPro  6.4  /  Alb  3.6  /  TBili  0.4  /  DBili  x   /  AST  49<H>  /  ALT  116<H>  /  AlkPhos  132<H>  12-13    LIVER FUNCTIONS - ( 13 Dec 2021 06:43 )  Alb: 3.6 g/dL / Pro: 6.4 g/dL / ALK PHOS: 132 U/L / ALT: 116 U/L / AST: 49 U/L / GGT: x                 acetaminophen     Tablet .. 650 milliGRAM(s) Oral every 6 hours PRN  aluminum hydroxide/magnesium hydroxide/simethicone Suspension 30 milliLiter(s) Oral every 4 hours PRN  dextrose 40% Gel 15 Gram(s) Oral once  dextrose 5%. 1000 milliLiter(s) IV Continuous <Continuous>  dextrose 5%. 1000 milliLiter(s) IV Continuous <Continuous>  dextrose 50% Injectable 25 Gram(s) IV Push once  dextrose 50% Injectable 12.5 Gram(s) IV Push once  dextrose 50% Injectable 25 Gram(s) IV Push once  enoxaparin Injectable 40 milliGRAM(s) SubCutaneous daily  glucagon  Injectable 1 milliGRAM(s) IntraMuscular once  influenza  Vaccine (HIGH DOSE) 0.7 milliLiter(s) IntraMuscular once  insulin lispro (ADMELOG) corrective regimen sliding scale   SubCutaneous three times a day before meals  insulin lispro (ADMELOG) corrective regimen sliding scale   SubCutaneous at bedtime  melatonin 3 milliGRAM(s) Oral at bedtime PRN  ondansetron Injectable 4 milliGRAM(s) IV Push every 8 hours PRN      HEALTH ISSUES - PROBLEM Dx:  Prophylactic measure    Hypotension    HTN (hypertension)    History of BPH    CKD (chronic kidney disease)    General weakness    DM (diabetes mellitus)

## 2021-12-13 NOTE — PROGRESS NOTE ADULT - PROBLEM SELECTOR PLAN 4
hold antihypertensives in the setting of hypotension  - BP 120s/150s systolic, continue to trend, add meds back in as needed

## 2021-12-13 NOTE — PROGRESS NOTE ADULT - PROBLEM SELECTOR PLAN 5
- a1c 6.4, lss inpatient  - No meds outpt, diet controlled  - continue to monitor POC glucose levels while in hospital

## 2021-12-13 NOTE — DISCHARGE NOTE NURSING/CASE MANAGEMENT/SOCIAL WORK - NSDCPEFALRISK_GEN_ALL_CORE
For information on Fall & Injury Prevention, visit: [email or web address] OR  [email or web address] OR  https://www.cdc.gov/steadi/patient.html

## 2021-12-13 NOTE — PROGRESS NOTE ADULT - PROBLEM SELECTOR PLAN 3
baseline 1.2-1.3 per review of HIE, likely CKD3  avoid nephrotoxic agents  continue to trend bmp  - downtrending 1.37 --> 1.19

## 2021-12-16 LAB
CULTURE RESULTS: SIGNIFICANT CHANGE UP
CULTURE RESULTS: SIGNIFICANT CHANGE UP
SPECIMEN SOURCE: SIGNIFICANT CHANGE UP
SPECIMEN SOURCE: SIGNIFICANT CHANGE UP

## 2022-01-13 PROBLEM — I10 ESSENTIAL (PRIMARY) HYPERTENSION: Chronic | Status: ACTIVE | Noted: 2021-12-11

## 2022-01-13 PROBLEM — N18.9 CHRONIC KIDNEY DISEASE, UNSPECIFIED: Chronic | Status: ACTIVE | Noted: 2021-12-11

## 2022-01-13 PROBLEM — E78.5 HYPERLIPIDEMIA, UNSPECIFIED: Chronic | Status: ACTIVE | Noted: 2021-12-11

## 2022-01-13 PROBLEM — N40.0 BENIGN PROSTATIC HYPERPLASIA WITHOUT LOWER URINARY TRACT SYMPTOMS: Chronic | Status: ACTIVE | Noted: 2021-12-11

## 2022-02-09 ENCOUNTER — APPOINTMENT (OUTPATIENT)
Dept: UROLOGY | Facility: CLINIC | Age: 82
End: 2022-02-09
Payer: MEDICARE

## 2022-02-09 VITALS
RESPIRATION RATE: 14 BRPM | HEART RATE: 86 BPM | SYSTOLIC BLOOD PRESSURE: 150 MMHG | TEMPERATURE: 98.2 F | DIASTOLIC BLOOD PRESSURE: 83 MMHG

## 2022-02-09 PROCEDURE — 99213 OFFICE O/P EST LOW 20 MIN: CPT

## 2022-02-09 NOTE — HISTORY OF PRESENT ILLNESS
[FreeTextEntry1] : cc bph\par pt is  yo male referred for bph . was on tamsulosin but admitted to hospital for dizziness and low bp so stopped \par now voidng well nocturia x2 good flow \par The is no family history of prostate cancer\par ua neg \par \par

## 2022-02-16 ENCOUNTER — APPOINTMENT (OUTPATIENT)
Dept: DERMATOLOGY | Facility: CLINIC | Age: 82
End: 2022-02-16
Payer: MEDICARE

## 2022-02-16 VITALS — WEIGHT: 174 LBS | BODY MASS INDEX: 28.99 KG/M2 | HEIGHT: 65 IN

## 2022-02-16 DIAGNOSIS — L82.1 OTHER SEBORRHEIC KERATOSIS: ICD-10-CM

## 2022-02-16 DIAGNOSIS — L82.0 INFLAMED SEBORRHEIC KERATOSIS: ICD-10-CM

## 2022-02-16 PROCEDURE — 99202 OFFICE O/P NEW SF 15 MIN: CPT | Mod: 25

## 2022-02-16 PROCEDURE — 17110 DESTRUCTION B9 LES UP TO 14: CPT

## 2022-03-18 ENCOUNTER — EMERGENCY (EMERGENCY)
Facility: HOSPITAL | Age: 82
LOS: 1 days | Discharge: HOME | End: 2022-03-18
Attending: EMERGENCY MEDICINE | Admitting: EMERGENCY MEDICINE
Payer: MEDICARE

## 2022-03-18 VITALS
HEIGHT: 65 IN | SYSTOLIC BLOOD PRESSURE: 189 MMHG | TEMPERATURE: 98 F | HEART RATE: 88 BPM | RESPIRATION RATE: 16 BRPM | OXYGEN SATURATION: 100 % | DIASTOLIC BLOOD PRESSURE: 87 MMHG

## 2022-03-18 VITALS
HEART RATE: 76 BPM | OXYGEN SATURATION: 97 % | SYSTOLIC BLOOD PRESSURE: 155 MMHG | DIASTOLIC BLOOD PRESSURE: 88 MMHG | RESPIRATION RATE: 16 BRPM | TEMPERATURE: 98 F

## 2022-03-18 LAB
ALBUMIN SERPL ELPH-MCNC: 4.4 G/DL — SIGNIFICANT CHANGE UP (ref 3.3–5)
ALP SERPL-CCNC: 96 U/L — SIGNIFICANT CHANGE UP (ref 40–120)
ALT FLD-CCNC: 42 U/L — HIGH (ref 4–41)
ANION GAP SERPL CALC-SCNC: 13 MMOL/L — SIGNIFICANT CHANGE UP (ref 7–14)
AST SERPL-CCNC: 39 U/L — SIGNIFICANT CHANGE UP (ref 4–40)
BASOPHILS # BLD AUTO: 0.02 K/UL — SIGNIFICANT CHANGE UP (ref 0–0.2)
BASOPHILS NFR BLD AUTO: 0.3 % — SIGNIFICANT CHANGE UP (ref 0–2)
BILIRUB SERPL-MCNC: 0.4 MG/DL — SIGNIFICANT CHANGE UP (ref 0.2–1.2)
BUN SERPL-MCNC: 17 MG/DL — SIGNIFICANT CHANGE UP (ref 7–23)
CALCIUM SERPL-MCNC: 9.6 MG/DL — SIGNIFICANT CHANGE UP (ref 8.4–10.5)
CHLORIDE SERPL-SCNC: 101 MMOL/L — SIGNIFICANT CHANGE UP (ref 98–107)
CO2 SERPL-SCNC: 25 MMOL/L — SIGNIFICANT CHANGE UP (ref 22–31)
CREAT SERPL-MCNC: 0.9 MG/DL — SIGNIFICANT CHANGE UP (ref 0.5–1.3)
EGFR: 85 ML/MIN/1.73M2 — SIGNIFICANT CHANGE UP
EOSINOPHIL # BLD AUTO: 0.39 K/UL — SIGNIFICANT CHANGE UP (ref 0–0.5)
EOSINOPHIL NFR BLD AUTO: 5.1 % — SIGNIFICANT CHANGE UP (ref 0–6)
GLUCOSE SERPL-MCNC: 118 MG/DL — HIGH (ref 70–99)
HCT VFR BLD CALC: 44.8 % — SIGNIFICANT CHANGE UP (ref 39–50)
HGB BLD-MCNC: 15.3 G/DL — SIGNIFICANT CHANGE UP (ref 13–17)
IANC: 5.28 K/UL — SIGNIFICANT CHANGE UP (ref 1.5–8.5)
IMM GRANULOCYTES NFR BLD AUTO: 0.3 % — SIGNIFICANT CHANGE UP (ref 0–1.5)
LYMPHOCYTES # BLD AUTO: 1.52 K/UL — SIGNIFICANT CHANGE UP (ref 1–3.3)
LYMPHOCYTES # BLD AUTO: 19.8 % — SIGNIFICANT CHANGE UP (ref 13–44)
MCHC RBC-ENTMCNC: 29.7 PG — SIGNIFICANT CHANGE UP (ref 27–34)
MCHC RBC-ENTMCNC: 34.2 GM/DL — SIGNIFICANT CHANGE UP (ref 32–36)
MCV RBC AUTO: 86.8 FL — SIGNIFICANT CHANGE UP (ref 80–100)
MONOCYTES # BLD AUTO: 0.45 K/UL — SIGNIFICANT CHANGE UP (ref 0–0.9)
MONOCYTES NFR BLD AUTO: 5.9 % — SIGNIFICANT CHANGE UP (ref 2–14)
NEUTROPHILS # BLD AUTO: 5.28 K/UL — SIGNIFICANT CHANGE UP (ref 1.8–7.4)
NEUTROPHILS NFR BLD AUTO: 68.6 % — SIGNIFICANT CHANGE UP (ref 43–77)
NRBC # BLD: 0 /100 WBCS — SIGNIFICANT CHANGE UP
NRBC # FLD: 0 K/UL — SIGNIFICANT CHANGE UP
PLATELET # BLD AUTO: 194 K/UL — SIGNIFICANT CHANGE UP (ref 150–400)
POTASSIUM SERPL-MCNC: 4.3 MMOL/L — SIGNIFICANT CHANGE UP (ref 3.5–5.3)
POTASSIUM SERPL-SCNC: 4.3 MMOL/L — SIGNIFICANT CHANGE UP (ref 3.5–5.3)
PROT SERPL-MCNC: 7.4 G/DL — SIGNIFICANT CHANGE UP (ref 6–8.3)
RBC # BLD: 5.16 M/UL — SIGNIFICANT CHANGE UP (ref 4.2–5.8)
RBC # FLD: 12.8 % — SIGNIFICANT CHANGE UP (ref 10.3–14.5)
SODIUM SERPL-SCNC: 139 MMOL/L — SIGNIFICANT CHANGE UP (ref 135–145)
TROPONIN T, HIGH SENSITIVITY RESULT: 15 NG/L — SIGNIFICANT CHANGE UP
WBC # BLD: 7.68 K/UL — SIGNIFICANT CHANGE UP (ref 3.8–10.5)
WBC # FLD AUTO: 7.68 K/UL — SIGNIFICANT CHANGE UP (ref 3.8–10.5)

## 2022-03-18 PROCEDURE — 99285 EMERGENCY DEPT VISIT HI MDM: CPT

## 2022-03-18 RX ORDER — AMLODIPINE BESYLATE 2.5 MG/1
10 TABLET ORAL ONCE
Refills: 0 | Status: COMPLETED | OUTPATIENT
Start: 2022-03-18 | End: 2022-03-18

## 2022-03-18 RX ORDER — ACETAMINOPHEN 500 MG
1000 TABLET ORAL ONCE
Refills: 0 | Status: COMPLETED | OUTPATIENT
Start: 2022-03-18 | End: 2022-03-18

## 2022-03-18 RX ADMIN — Medication 400 MILLIGRAM(S): at 14:11

## 2022-03-18 RX ADMIN — AMLODIPINE BESYLATE 10 MILLIGRAM(S): 2.5 TABLET ORAL at 14:03

## 2022-03-18 NOTE — ED ADULT NURSE NOTE - BEFAST ARM SIDE DRIFT
Dr. Yasir Whitfield updated on bloody stool and Integrilin drip.   H & H ordered, will update Dr. [place] Doing if results are abnormal. No

## 2022-03-18 NOTE — ED PROVIDER NOTE - PHYSICAL EXAMINATION
CONSTITUTIONAL: Well-appearing; well-nourished; in no apparent distress. Non-toxic appearing.   NEURO: Alert & oriented. Gait steady without assistance. Sensory and motor functions are grossly intact. No facial droop, pt speak Kazakh primarily - fluent.   PSYCH: Mood appropriate. Thought processes intact.   NECK: Supple  CARD: Regular rate and rhythm, no murmurs  RESP: No accessory muscle use; breath sounds clear and equal bilaterally; no wheezes, rhonchi, or rales     ABD: Soft; non-distended; non-tender.   MUSCULOSKELETAL/EXTREMITIES: FROM in all four extremities; no extremity edema.  SKIN: Warm; dry; no apparent lesions or exudate

## 2022-03-18 NOTE — ED PROVIDER NOTE - OBJECTIVE STATEMENT
82 y/o male with hx of HTN, HLD, and DM presents c/o elevated blood pressure and HA that began this morning. Pt notes when he took his pressure it was 190/106 despite taking Metoprolol earlier today. Pt states HA is posterior and into his neck. Not associated with visual changes, CP, SOB, abd pain, NVD. Pt concerned because his BP is high but denies any other symptoms. Hx translated by daughter at bedside. No other complaints.

## 2022-03-18 NOTE — ED PROVIDER NOTE - ATTENDING CONTRIBUTION TO CARE
joanne Patient with HTN as described; doubt that HA is related to BP given normal exam and lack of eye findings    /87 mm Hg HR 88 /min RR 16 /min RR 97.7 Degrees F  As documented  IMP: Essential HTN;  do not think CT warranted   Plan  Labs ekg to assess end organ damage  Will reassess as work up further/admit/d/c based on results

## 2022-03-18 NOTE — ED PROVIDER NOTE - NS ED ROS FT
ROS:  GENERAL: No fever, no chills  EYES: no change in vision  HEENT: no trouble swallowing, no trouble speaking  CARDIAC: no chest pain  PULMONARY: no cough, no shortness of breath  GI: no abdominal pain, no nausea, no vomiting, no diarrhea, no constipation  SKIN: no rashes  NEURO: as per HPI  MSK: No joint pain   All other systems reviewed as negative.

## 2022-03-18 NOTE — ED PROVIDER NOTE - NS ED ATTENDING STATEMENT MOD
This was a shared visit with the SHEEBA. I reviewed and verified the documentation and independently performed the documented:

## 2022-03-18 NOTE — ED ADULT NURSE NOTE - OBJECTIVE STATEMENT
81 year old male received to rm 26 AAOx3 [place] citizen speaking 81 year old male received to rm 26 AAOx3 Qatari speaking, family member translating. Pt c/o hypertension and headache. Pt denies dizziness, change in vision, chest pain, shortness of breath, n/v/d. Respirations even and unlabored. Skin intact. PIV #20 right AC, labs drawn and sent. VS as noted. Medicated per MD orders. Bed in lowest position, call bell within reach

## 2022-03-18 NOTE — ED ADULT TRIAGE NOTE - CHIEF COMPLAINT QUOTE
c/o hypertension (194/106) and headache since yesterday, complaint with BP medications, denies vision changes, no facial droop or slurred speech noted

## 2022-03-18 NOTE — ED PROVIDER NOTE - PATIENT PORTAL LINK FT
You can access the FollowMyHealth Patient Portal offered by [facility] by registering at the following website: [email or web address]. By joining [employer]’s FollowMyHealth portal, you will also be able to view your health information using other applications (apps) compatible with our system.

## 2022-03-18 NOTE — ED PROVIDER NOTE - NSFOLLOWUPINSTRUCTIONS_ED_ALL_ED_FT
Your blood pressure came down to 166 about 30 min. after Tylenol.  Your blood pressure at time of discharge was 152/84.  Your bloodwork showed no signs of heart or kidney damage.  Your headache most likely caused an elevation in your blood pressure.  Now that your headache is gone, you can take Tylenol as needed if it returns.  Follow up with your doctor for continued blood pressure checks and to see if you need your medication adjusted.  Return to Emergency for any worsening pain, fever, weakness, numbness, difficulty breathing, change in speech or voice, or any signs of distress.

## 2022-03-18 NOTE — ED PROVIDER NOTE - CLINICAL SUMMARY MEDICAL DECISION MAKING FREE TEXT BOX
82 y/o male with hx of HTN, HLD, and DM presents c/o elevated blood pressure and HA that began this morning. Exam without neurological deficits - will send for CTA and assess labs to r/o hemorrhagic stroke. After attending evaluation Debbie - CT cancelled after speaking with daughter, CT cancelled, BP improved, d/c'ed home to follow up with PCP. 80 y/o male with hx of HTN, HLD, and DM presents c/o elevated blood pressure and HA that began this morning. Exam without neurological deficits - will send for CTA and assess labs to r/o hemorrhagic stroke. After attending evaluation Debbie - CT cancelled after he spoke with the patient's daughter, CT cancelled, BP improved, d/c'ed home to follow up with PCP.

## 2022-03-20 ENCOUNTER — EMERGENCY (EMERGENCY)
Facility: HOSPITAL | Age: 82
LOS: 1 days | Discharge: HOME | End: 2022-03-20
Attending: STUDENT IN AN ORGANIZED HEALTH CARE EDUCATION/TRAINING PROGRAM | Admitting: STUDENT IN AN ORGANIZED HEALTH CARE EDUCATION/TRAINING PROGRAM
Payer: MEDICARE

## 2022-03-20 VITALS
RESPIRATION RATE: 16 BRPM | HEART RATE: 79 BPM | OXYGEN SATURATION: 99 % | HEIGHT: 65 IN | SYSTOLIC BLOOD PRESSURE: 151 MMHG | DIASTOLIC BLOOD PRESSURE: 80 MMHG | TEMPERATURE: 99 F

## 2022-03-20 DIAGNOSIS — Z98.890 OTHER SPECIFIED POSTPROCEDURAL STATES: Chronic | ICD-10-CM

## 2022-03-20 DIAGNOSIS — Z98.49 CATARACT EXTRACTION STATUS, UNSPECIFIED EYE: Chronic | ICD-10-CM

## 2022-03-20 LAB
ALBUMIN SERPL ELPH-MCNC: 4.4 G/DL — SIGNIFICANT CHANGE UP (ref 3.3–5)
ALP SERPL-CCNC: 102 U/L — SIGNIFICANT CHANGE UP (ref 40–120)
ALT FLD-CCNC: 33 U/L — SIGNIFICANT CHANGE UP (ref 4–41)
ANION GAP SERPL CALC-SCNC: 13 MMOL/L — SIGNIFICANT CHANGE UP (ref 7–14)
APTT BLD: 34.6 SEC — SIGNIFICANT CHANGE UP (ref 27–36.3)
AST SERPL-CCNC: 29 U/L — SIGNIFICANT CHANGE UP (ref 4–40)
BASOPHILS # BLD AUTO: 0.02 K/UL — SIGNIFICANT CHANGE UP (ref 0–0.2)
BASOPHILS NFR BLD AUTO: 0.3 % — SIGNIFICANT CHANGE UP (ref 0–2)
BILIRUB SERPL-MCNC: 0.4 MG/DL — SIGNIFICANT CHANGE UP (ref 0.2–1.2)
BUN SERPL-MCNC: 19 MG/DL — SIGNIFICANT CHANGE UP (ref 7–23)
CALCIUM SERPL-MCNC: 9.6 MG/DL — SIGNIFICANT CHANGE UP (ref 8.4–10.5)
CHLORIDE SERPL-SCNC: 100 MMOL/L — SIGNIFICANT CHANGE UP (ref 98–107)
CHOLEST SERPL-MCNC: 170 MG/DL — SIGNIFICANT CHANGE UP
CO2 SERPL-SCNC: 21 MMOL/L — LOW (ref 22–31)
CREAT SERPL-MCNC: 1 MG/DL — SIGNIFICANT CHANGE UP (ref 0.5–1.3)
EGFR: 75 ML/MIN/1.73M2 — SIGNIFICANT CHANGE UP
EOSINOPHIL # BLD AUTO: 0.47 K/UL — SIGNIFICANT CHANGE UP (ref 0–0.5)
EOSINOPHIL NFR BLD AUTO: 6.6 % — HIGH (ref 0–6)
FLUAV AG NPH QL: SIGNIFICANT CHANGE UP
FLUBV AG NPH QL: SIGNIFICANT CHANGE UP
GLUCOSE SERPL-MCNC: 120 MG/DL — HIGH (ref 70–99)
HCT VFR BLD CALC: 45.7 % — SIGNIFICANT CHANGE UP (ref 39–50)
HDLC SERPL-MCNC: 49 MG/DL — SIGNIFICANT CHANGE UP
HGB BLD-MCNC: 15.7 G/DL — SIGNIFICANT CHANGE UP (ref 13–17)
IANC: 4.25 K/UL — SIGNIFICANT CHANGE UP (ref 1.5–8.5)
IMM GRANULOCYTES NFR BLD AUTO: 0.3 % — SIGNIFICANT CHANGE UP (ref 0–1.5)
INR BLD: 0.97 RATIO — SIGNIFICANT CHANGE UP (ref 0.88–1.16)
LIPID PNL WITH DIRECT LDL SERPL: 77 MG/DL — SIGNIFICANT CHANGE UP
LYMPHOCYTES # BLD AUTO: 1.86 K/UL — SIGNIFICANT CHANGE UP (ref 1–3.3)
LYMPHOCYTES # BLD AUTO: 26.2 % — SIGNIFICANT CHANGE UP (ref 13–44)
MCHC RBC-ENTMCNC: 29.7 PG — SIGNIFICANT CHANGE UP (ref 27–34)
MCHC RBC-ENTMCNC: 34.4 GM/DL — SIGNIFICANT CHANGE UP (ref 32–36)
MCV RBC AUTO: 86.4 FL — SIGNIFICANT CHANGE UP (ref 80–100)
MONOCYTES # BLD AUTO: 0.47 K/UL — SIGNIFICANT CHANGE UP (ref 0–0.9)
MONOCYTES NFR BLD AUTO: 6.6 % — SIGNIFICANT CHANGE UP (ref 2–14)
NEUTROPHILS # BLD AUTO: 4.25 K/UL — SIGNIFICANT CHANGE UP (ref 1.8–7.4)
NEUTROPHILS NFR BLD AUTO: 60 % — SIGNIFICANT CHANGE UP (ref 43–77)
NON HDL CHOLESTEROL: 121 MG/DL — SIGNIFICANT CHANGE UP
NRBC # BLD: 0 /100 WBCS — SIGNIFICANT CHANGE UP
NRBC # FLD: 0 K/UL — SIGNIFICANT CHANGE UP
PLATELET # BLD AUTO: 197 K/UL — SIGNIFICANT CHANGE UP (ref 150–400)
POTASSIUM SERPL-MCNC: 3.9 MMOL/L — SIGNIFICANT CHANGE UP (ref 3.5–5.3)
POTASSIUM SERPL-SCNC: 3.9 MMOL/L — SIGNIFICANT CHANGE UP (ref 3.5–5.3)
PROT SERPL-MCNC: 7.5 G/DL — SIGNIFICANT CHANGE UP (ref 6–8.3)
PROTHROM AB SERPL-ACNC: 11.3 SEC — SIGNIFICANT CHANGE UP (ref 10.5–13.4)
RBC # BLD: 5.29 M/UL — SIGNIFICANT CHANGE UP (ref 4.2–5.8)
RBC # FLD: 12.8 % — SIGNIFICANT CHANGE UP (ref 10.3–14.5)
RSV RNA NPH QL NAA+NON-PROBE: SIGNIFICANT CHANGE UP
SARS-COV-2 RNA SPEC QL NAA+PROBE: SIGNIFICANT CHANGE UP
SODIUM SERPL-SCNC: 134 MMOL/L — LOW (ref 135–145)
TRIGL SERPL-MCNC: 219 MG/DL — HIGH
TROPONIN T, HIGH SENSITIVITY RESULT: 15 NG/L — SIGNIFICANT CHANGE UP
TROPONIN T, HIGH SENSITIVITY RESULT: 15 NG/L — SIGNIFICANT CHANGE UP
WBC # BLD: 7.09 K/UL — SIGNIFICANT CHANGE UP (ref 3.8–10.5)
WBC # FLD AUTO: 7.09 K/UL — SIGNIFICANT CHANGE UP (ref 3.8–10.5)

## 2022-03-20 PROCEDURE — 70496 CT ANGIOGRAPHY HEAD: CPT | Mod: 26,MA

## 2022-03-20 PROCEDURE — 70498 CT ANGIOGRAPHY NECK: CPT | Mod: 26,MA

## 2022-03-20 PROCEDURE — 99220: CPT

## 2022-03-20 RX ORDER — ATORVASTATIN CALCIUM 80 MG/1
80 TABLET, FILM COATED ORAL AT BEDTIME
Refills: 0 | Status: DISCONTINUED | OUTPATIENT
Start: 2022-03-20 | End: 2022-03-24

## 2022-03-20 RX ORDER — DIAZEPAM 5 MG
5 TABLET ORAL ONCE
Refills: 0 | Status: DISCONTINUED | OUTPATIENT
Start: 2022-03-20 | End: 2022-03-21

## 2022-03-20 RX ORDER — ASPIRIN/CALCIUM CARB/MAGNESIUM 324 MG
81 TABLET ORAL DAILY
Refills: 0 | Status: DISCONTINUED | OUTPATIENT
Start: 2022-03-20 | End: 2022-03-24

## 2022-03-20 RX ORDER — ASPIRIN/CALCIUM CARB/MAGNESIUM 324 MG
324 TABLET ORAL ONCE
Refills: 0 | Status: COMPLETED | OUTPATIENT
Start: 2022-03-20 | End: 2022-03-20

## 2022-03-20 RX ORDER — LOSARTAN POTASSIUM 100 MG/1
25 TABLET, FILM COATED ORAL DAILY
Refills: 0 | Status: DISCONTINUED | OUTPATIENT
Start: 2022-03-20 | End: 2022-03-24

## 2022-03-20 RX ORDER — CLOPIDOGREL BISULFATE 75 MG/1
75 TABLET, FILM COATED ORAL DAILY
Refills: 0 | Status: DISCONTINUED | OUTPATIENT
Start: 2022-03-20 | End: 2022-03-24

## 2022-03-20 RX ORDER — AMLODIPINE BESYLATE 2.5 MG/1
5 TABLET ORAL DAILY
Refills: 0 | Status: DISCONTINUED | OUTPATIENT
Start: 2022-03-20 | End: 2022-03-24

## 2022-03-20 RX ORDER — CLOPIDOGREL BISULFATE 75 MG/1
75 TABLET, FILM COATED ORAL ONCE
Refills: 0 | Status: COMPLETED | OUTPATIENT
Start: 2022-03-20 | End: 2022-03-20

## 2022-03-20 RX ADMIN — Medication 324 MILLIGRAM(S): at 18:30

## 2022-03-20 RX ADMIN — CLOPIDOGREL BISULFATE 75 MILLIGRAM(S): 75 TABLET, FILM COATED ORAL at 18:30

## 2022-03-20 RX ADMIN — ATORVASTATIN CALCIUM 80 MILLIGRAM(S): 80 TABLET, FILM COATED ORAL at 21:52

## 2022-03-20 NOTE — ED PROVIDER NOTE - PHYSICAL EXAMINATION
Gen: Alert and oriented. Lying comfortably in bed. Answering questions appropriately  HEENT: symmetric facies, extra occular movements intact, no nasal discharge, mucous membranes moist  CV: Regular rate and rhythm, +S1/S2, no murmurs/rubs/gallops,   Resp: Clear to ausculation bilaterally, no wheezes/rhonchi/rales  GI: Abdomen soft non-distended, non tender to palpation, no masses  MSK: No open wounds, no bruising, no LE edema, Homans sign negative bl  Neuro: A&Ox4, following commands, moving all four extremities spontaneously, strength and sensation intact in extremities, no pronator drift  Psych: appropriate mood

## 2022-03-20 NOTE — ED PROVIDER NOTE - SHIFT CHANGE DETAILS
Dr. Evans: Pt signed out to me by Dr. Rojas. Pt pending CT/CTA and neuro eval. Likely adm. Hemodynamically stable at this time.

## 2022-03-20 NOTE — ED PROVIDER NOTE - OBJECTIVE STATEMENT
80 y/o male with hx of HTN, HLD, and DM presenting for headache and slurred speech. Per daughter, at bedside, patient has been having posterior headaches for 3 days which was gradual in onset and have come and gone. thinks it may be secondary to elevated blood pressure. Currently it is only mild. Daughter concerned because today she witnessed an episode of slurred speech at around 9 am which resolved after an hour. Patient is now asymptomatic aside from headache. Denies n/v, weakness, numbness, tingling, vision changes.

## 2022-03-20 NOTE — ED ADULT TRIAGE NOTE - WILL THE PATIENT ACCEPT THE PFIZER COVID-19 VACCINE IF ELIGIBLE AND IT IS AVAILABLE?
Health Maintenance Summary     Topic Due On Due Status Completed On    Colorectal Cancer Screening - Colonoscopy Nov 12, 2023 Not Due Nov 12, 2013    IMMUNIZATION - DTaP/Tdap/Td Feb 10, 2016 Overdue Feb 10, 2006    Immunization-Influenza Sep 1, 2017 Not Due Feb 9, 2017    Hepatitis C Screening Apr 5, 2014 Overdue           Patient is due for topics as listed above, he wishes to proceed at this time, order (s) placed and patient given information .       Not applicable

## 2022-03-20 NOTE — CONSULT NOTE ADULT - ATTENDING COMMENTS
Mr. Hans june is an 81-year-old man who presented with sudden onset of dysarthria and headache.  He also had symptoms of persistent headache with extension of neck associated with dizziness.  Today on neurological exam he is alert, awake oriented x4, motor strength 5 x 5 all 4 extremities, no sensory loss or cranial nerve signs.  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  Left vertebral atherosclerosis/ right vertebral dominant  Imaging also shows a mass at the level of cervical spinal cord on T1 sagittal images.  Impression: Dizziness/headache–nonspecific differential diagnosis includes vertebrobasilar insufficiency however there is good filling of basilar artery through right vertebral.  No ischemia or infarction on MRI.  Incidentally detected cervical mass with mass-effect on cervical spinal cord  Will need neurosurgical consultation/MR spine imaging with and without contrast  No objection to continuation of aspirin and statins from neurological standpoint   N# [id number]

## 2022-03-20 NOTE — ED PROVIDER NOTE - CLINICAL SUMMARY MEDICAL DECISION MAKING FREE TEXT BOX
Joseph Frankel PGY3: Patient with persistent headache and episode of slurred speech. VSS. Patient looks well and is non toxic appearing. PE as above. Concern for stroke vs mass vs bleed but less likely as no trauma. Will get labs, imaging, neuro consult, and reassess.

## 2022-03-20 NOTE — CONSULT NOTE ADULT - ASSESSMENT
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%), Small vessel disease, Embolic stroke of undetermined source, Stroke of other determined etiology    Recommendations:  []   [] Atorvastatin 80 mg daily (long-term goal and titrate to LDL < 70)  [] HgbA1C, fasting lipid panel, CBC, CMP, coag panel, troponin, toxic/ metabolic workup per primary team  [] follow up CT head w/o contrast and CTA head/neck w/ contrast  [] MRI brain w/o con if CT head is unrevealing  [] TTE w/ bubble study  [] telemetry to check for arrhythmia, EKG, while here    - Tight glucose control (long-term goal HgbA1c < 6%)  - Neuro-checks and VS q4h  - Permissive HTN up to 220/120 for 24-48h from symptom onset  - Dysphagia screen. If fails, speech/swallow eval  - aspiration precautions  - STAT CT head non-contrast for change in neuro exam.       To be discussed with neurology attending and will be formally staffed by attending during morning rounds. Recommendations will be finalized once signed by attending.  Patient ED is a 82 yo M PMHx HTN, HLD, preDM, who presents with headache and acute onset slurred speech. Patient has been having, posterior intermittent mild headache w/ extension to neck, with dizziness the latter which was new today. LKN 22:00 last night 3/19/22 prior to bed. Today AM still with headache but was noted at 9AM by daughter with slurred speech that resolved at 10AM today. She did note patient's face was twisted but resolved as well. Then later in ED was noted on phone call with another family member to have mild slurred speech. Otherwise denies blurry vision, double vision, word finding difficulty, focal weakness or numbness. Not on any antiplatelet or anticoagulant agents.      NIHSS:  1 dysarthria  Baseline MRS: 2   Not a tPA candidate due to outside window and very mild deficits   Not a thrombectomy candidate due to absent large vessel occlusion     CT head: There is moderate to severe cerebral volume loss and ventricles are enlarged. There are areas of white matter hypoattenuation surrounding the ventricles indicative of old infarct and chronic microvascular disease.  CTA head/neck: No intracranial hemodynamically significant stenosis. Mild (less than 50%) narrowing at origin of right internal carotid artery. Moderate (50-70% ) narrowing at origin of left internal carotid artery.    Impression: Patient with headache, dizziness, slurred speech found to be hypertensive concerning for stroke. Difficult to localize at this time but given prior chronic strokes seen on CT imaging, there is suspicion for acute stroke. Not on antiplatelet/anticoagulant agents prior to admission.     Mechanism: possibly large artery athero, otherwise ESUS    Recommendations:  [] Recommend starting aspirin 81mg daily and adding plavix 75 mg (plavix for 3 weeks only) as per CHANCE given concern for stroke  [] Atorvastatin 80 mg daily (long-term goal and titrate to LDL < 70)  [] HgbA1C, fasting lipid panel, CBC, CMP, coag panel, troponin, toxic/ metabolic workup per primary team  [] follow up CT head w/o contrast and CTA head/neck w/ contrast  [] MRI brain w/o con   [] TTE w/ bubble study  [] telemetry to check for arrhythmia, EKG, while here    - Tight glucose control (long-term goal HgbA1c < 6%)  - Neuro-checks and VS q4h  - Permissive HTN up to 220/120 for 24-48h from symptom onset  - Dysphagia screen. If fails, speech/swallow eval  - aspiration precautions  - STAT CT head non-contrast for change in neuro exam.       To be discussed with neurology attending and will be formally staffed by attending during morning rounds. Recommendations will be finalized once signed by attending.  Patient ED is a 80 yo M PMHx HTN, HLD, preDM, who presents with headache and acute onset slurred speech. Patient has been having, posterior intermittent mild headache w/ extension to neck, with dizziness the latter which was new today. LKN 22:00 last night 3/19/22 prior to bed. Today AM still with headache but was noted at 9AM by daughter with slurred speech that resolved at 10AM today. She did note patient's face was twisted but resolved as well. Then later in ED was noted on phone call with another family member to have mild slurred speech. Otherwise denies blurry vision, double vision, word finding difficulty, focal weakness or numbness. Not on any antiplatelet or anticoagulant agents.      NIHSS:  1 dysarthria  Baseline MRS: 2   Not a tPA candidate due to outside window and very mild deficits   Not a thrombectomy candidate due to absent large vessel occlusion     CT head: There is moderate to severe cerebral volume loss and ventricles are enlarged. There are areas of white matter hypoattenuation surrounding the ventricles indicative of old infarct and chronic microvascular disease. No acute intracranial hemorrhage.  CTA head/neck: No intracranial hemodynamically significant stenosis. Mild (less than 50%) narrowing at origin of right internal carotid artery. Moderate (50-70% ) narrowing at origin of left internal carotid artery.  `  Impression: Patient with headache, dizziness, slurred speech found to be hypertensive concerning for stroke. Difficult to localize at this time but given prior chronic strokes seen on CT imaging, there is suspicion for acute stroke. Not on antiplatelet/anticoagulant agents prior to admission.     Mechanism: possibly large artery athero, otherwise ESUS    Recommendations:  [] Recommend starting aspirin 81mg daily and adding plavix 75 mg (plavix for 3 weeks only) as per CHANCE given concern for stroke  [] Atorvastatin 80 mg daily (long-term goal and titrate to LDL < 70)  [] HgbA1C, fasting lipid panel, CBC, CMP, coag panel, troponin, toxic/ metabolic workup per primary team  [] follow up CT head w/o contrast and CTA head/neck w/ contrast  [] MRI brain w/o con   [] TTE w/ bubble study  [] telemetry to check for arrhythmia, EKG, while here    - Tight glucose control (long-term goal HgbA1c < 6%)  - Neuro-checks and VS q4h  - Permissive HTN up to 220/120 for 24-48h from symptom onset  - Dysphagia screen. If fails, speech/swallow eval  - aspiration precautions  - STAT CT head non-contrast for change in neuro exam.       To be discussed with neurology attending and will be formally staffed by attending during morning rounds. Recommendations will be finalized once signed by attending.

## 2022-03-20 NOTE — ED CDU PROVIDER INITIAL DAY NOTE - OBJECTIVE STATEMENT
82 y/o male with hx of HTN, HLD, and DM presenting for headache and slurred speech. Per daughter, at bedside, patient has been having posterior headaches for 3 days which was gradual in onset and have come and gone. thinks it may be secondary to elevated blood pressure. Currently it is only mild. Daughter concerned because today she witnessed an episode of slurred speech at around 9 am which resolved after an hour. Patient is now asymptomatic aside from headache. Denies n/v, weakness, numbness, tingling, vision changes.    CDU PA Sosa: agrees w/ above: 82 yo M with PMH of HTN, HLD, pre-DM (diet control), presents to ER c/o headache x3 days w/ slurred speech this morning around 8-9am. In ED, Code stroke called, Pt seen & evaluated by neurology team. CTA head & neck w/o acute finding.

## 2022-03-20 NOTE — ED PROVIDER NOTE - ATTENDING CONTRIBUTION TO CARE
Garry Rojas DO:  patient seen and evaluated with the resident.  I was present for key portions of the History & Physical, and I agree with the Impression & Plan. 82 yo m pmh htn, hld, pre dm, pw ha. pw daughter who provides collateral. reports that pt has been having HA, posterior, intermittent, sometimes improved w/ apap, had episode 2-3 days ago, seen here, treated and DC. last known normal 2200 yesterday. then today had continued ha, no relief w/ apap. at 0900 this morning pts daughter noted slurred speech, first encounter with him today, which resolved by 1000. asymptomatic in ed, except for mild ha. denies n/v, cp, sob, paresthesias, f/c, cough, congestion. arrives hds, well appearing, neurovascularly intact. will check labs, imaging, obtain neuro consult, reassess.

## 2022-03-20 NOTE — ED CDU PROVIDER INITIAL DAY NOTE - ATTENDING CONTRIBUTION TO CARE
I have personally performed a history and physical examination of the patient and discussed management with the ACP as well as the patient.  I reviewed the ACP's note and agree with the documented findings and plan of care.  I have authored and modified critical sections of the Provider Note, including but not limited to HPI, Physical Exam and MDM.    82 y/o male with hx of HTN, HLD, and DM presenting for headache and slurred speech. Per daughter, at bedside, patient has been having posterior headaches for 3 days which was gradual in onset and have come and gone. thinks it may be secondary to elevated blood pressure. Pt presented for witnessed episode of slurred speech at around 9 am which resolved after an hour. Patient is now asymptomatic aside from headache. Unknown last known well. Pt seen and evaluated by neurology team. CTA head and neck w/o acute finding. Admitted to CDU for MRI. Neuro recommended ASA and Plavix starting today.

## 2022-03-20 NOTE — CONSULT NOTE ADULT - SUBJECTIVE AND OBJECTIVE BOX
Neurology Consultation  Franco Chance, PGY-2      HPI: Patient ED is a 82 yo M PMHx HTN, HLD, preDM, who presents with headache and acute onset slurred speech. Patient has been having, posterior intermittent headache sometimes improved w/ apap, with an episode 2-3 days ago for which he was treated in ED and discharged. LKN 22:00 last night 3/19/22 prior to bed. Today AM still with headache but was noted at 9AM by daughter with slurred sleech that resolved at 10AM today. Was without symptoms in ED.      (Stroke only)  NIHSS:   preMRS:     PAST MEDICAL & SURGICAL HISTORY:  Hypertension    Hyperlipidemia    Chronic kidney disease    Diabetes mellitus    Benign prostate hyperplasia    MEDICATIONS (HOME):  Home Medications:  ciclopirox 0.77% topical cream: Apply topically to affected area 2 times a day (12 Dec 2021 14:09)  losartan 25 mg oral tablet: 1 tab(s) orally once a day (12 Dec 2021 14:09)    MEDICATIONS  (STANDING):    MEDICATIONS  (PRN):    ALLERGIES/INTOLERANCES:  Allergies  penicillin (Rash)    Intolerances    VITALS & EXAMINATION:  Vital Signs Last 24 Hrs  T(C): 36.7 (20 Mar 2022 13:23), Max: 37.1 (20 Mar 2022 12:49)  T(F): 98.1 (20 Mar 2022 13:23), Max: 98.7 (20 Mar 2022 12:49)  HR: 74 (20 Mar 2022 13:23) (74 - 79)  BP: 181/85 (20 Mar 2022 13:23) (151/80 - 181/85)  BP(mean): --  RR: 16 (20 Mar 2022 13:23) (16 - 16)  SpO2: 100% (20 Mar 2022 13:23) (99% - 100%)    LABORATORY:  CBC                       15.7   7.09  )-----------( 197      ( 20 Mar 2022 13:25 )             45.7     Chem 03-20    134<L>  |  100  |  19  ----------------------------<  120<H>  3.9   |  21<L>  |  1.00    Ca    9.6      20 Mar 2022 13:25    TPro  7.5  /  Alb  4.4  /  TBili  0.4  /  DBili  x   /  AST  29  /  ALT  33  /  AlkPhos  102  03-20    LFTs LIVER FUNCTIONS - ( 20 Mar 2022 13:25 )  Alb: 4.4 g/dL / Pro: 7.5 g/dL / ALK PHOS: 102 U/L / ALT: 33 U/L / AST: 29 U/L / GGT: x           Coagulopathy PT/INR - ( 20 Mar 2022 13:25 )   PT: 11.3 sec;   INR: 0.97 ratio      PTT - ( 20 Mar 2022 13:25 )  PTT:34.6 sec  Lipid Panel   A1c   Cardiac enzymes     U/A   CSF  Other    STUDIES & IMAGING: (EEG, CT, MR, U/S, TTE/ALISE): Neurology Consultation  Franco Chance, PGY-2      HPI: Patient ED is a 80 yo M PMHx HTN, HLD, preDM, who presents with headache and acute onset slurred speech. Patient has been having, posterior intermittent mild headache w/ extension to neck, with dizziness the latter which was new today. LKN 22:00 last night 3/19/22 prior to bed. Today AM still with headache but was noted at 9AM by daughter with slurred speech that resolved at 10AM today. She did note patient's face was twisted but resolved as well. Then later in ED was noted on phone call with another family member to have mild slurred speech. Otherwise denies blurry vision, double vision, word finding difficulty, focal weakness or numbness. Not on any antiplatelet or anticoagulant agents.     (Stroke only)  NIHSS: 1 slurred speech  preMRS: 2     PAST MEDICAL & SURGICAL HISTORY:  Hypertension    Hyperlipidemia    Chronic kidney disease    Diabetes mellitus    Benign prostate hyperplasia    MEDICATIONS (HOME):  Home Medications:  ciclopirox 0.77% topical cream: Apply topically to affected area 2 times a day (12 Dec 2021 14:09)  losartan 25 mg oral tablet: 1 tab(s) orally once a day (12 Dec 2021 14:09)    MEDICATIONS  (STANDING):    MEDICATIONS  (PRN):    ALLERGIES/INTOLERANCES:  Allergies  penicillin (Rash)    Intolerances    VITALS & EXAMINATION:  Vital Signs Last 24 Hrs  T(C): 36.7 (20 Mar 2022 13:23), Max: 37.1 (20 Mar 2022 12:49)  T(F): 98.1 (20 Mar 2022 13:23), Max: 98.7 (20 Mar 2022 12:49)  HR: 74 (20 Mar 2022 13:23) (74 - 79)  BP: 181/85 (20 Mar 2022 13:23) (151/80 - 181/85)  BP(mean): --  RR: 16 (20 Mar 2022 13:23) (16 - 16)  SpO2: 100% (20 Mar 2022 13:23) (99% - 100%)    General:  Constitutional: Male, appears stated age, in no apparent distress including pain  Head: Normocephalic; Eyes: clear sclera;   Extremities: No cyanosis, clubbing; Skin: No ever edema of LE  Resp: breathing comfortably     Neurological (>12):  MS: Awake, alert, oriented to person, place, situation, time. Normal affect. Follows all commands. Cooperative.   Language: Speech is mildly dysarthric, fluent, good repetition,  comprehension, registration of words.      CNs: PERRL (R = 3mm, L = 3mm). VFF to finger movement. EOMI no nystagmus. V1-3 intact to LT, No facial asymmetry b/l, full eye closure strength b/l. Tongue midline. Hearing grossly normal (rubbing fingers) b/l.       Motor: Normal muscle bulk & tone. No noticeable tremor or seizure. No pronator drift.              Deltoid	Biceps	Triceps	   R	5	5	5	5		 	  L	5	5	5	5			  	H-Flex	K-Ext	D-Flex	P-Flex  R	5	-	5	5			 	   L	5          -	5	5		     Sensation: Intact to LT b/l., Cortical: Extinction on DSS (neglect): none   Coordination: No dysmetria to FTN b/l UE    LABORATORY:  CBC                       15.7   7.09  )-----------( 197      ( 20 Mar 2022 13:25 )             45.7     Chem 03-20    134<L>  |  100  |  19  ----------------------------<  120<H>  3.9   |  21<L>  |  1.00    Ca    9.6      20 Mar 2022 13:25    TPro  7.5  /  Alb  4.4  /  TBili  0.4  /  DBili  x   /  AST  29  /  ALT  33  /  AlkPhos  102  03-20    LFTs LIVER FUNCTIONS - ( 20 Mar 2022 13:25 )  Alb: 4.4 g/dL / Pro: 7.5 g/dL / ALK PHOS: 102 U/L / ALT: 33 U/L / AST: 29 U/L / GGT: x           Coagulopathy PT/INR - ( 20 Mar 2022 13:25 )   PT: 11.3 sec;   INR: 0.97 ratio      PTT - ( 20 Mar 2022 13:25 )  PTT:34.6 sec  Lipid Panel   A1c   Cardiac enzymes     U/A   CSF  Other    STUDIES & IMAGING: (EEG, CT, MR, U/S, TTE/ALISE):    < from: CT Angio Neck w/ IV Cont (03.20.22 @ 15:38) >    ACC: [id number] EXAM:  CT ANGIO BRAIN (W)AW IC                        ACC: [id number] EXAM:  CT ANGIO NECK (W)AW IC                          PROCEDURE DATE:  03/20/2022          INTERPRETATION:  CLINICAL INFORMATION: Slurred speech.    TECHNIQUE:  1.  Noncontrast head CT scan was performed reconstructed into 5 mm thick   axial slices.  2.  Contrast enhanced CT angiography of the neck and head was performed.     MIP reformats were generated.  3.  Postcontrast head CT scan was performed and reconstructed into 5 mm   thick axial slices.    Intravenous contrast: 90 cc of Omnipaque-350 mg/ml were administered, and   10 cc were discarded.    COMPARISON STUDY: None.    FINDINGS:  NONCONTRAST HEAD CT SCAN:  There is no CT evidence of acute intracranialhemorrhage, mass effect or   midline shift.    There is moderate to severe cerebral volume loss and ventricles are   enlarged. There are areas of white matter hypoattenuation surrounding the   ventricles indicative of old infarct and chronic microvascular disease.    The paranasal sinuses and tympanomastoid cavities are grossly clear.    The calvarium and skull base are intact.      CTA of the neck:      The common carotid and internal carotid arteries are patent. Calcified   atherosclerotic plaques carotid bulbs noted. Mild narrowing at the origin   of right internal carotid artery. Moderate focal narrowing at origin of   left internal carotid artery.    The extracranial vertebral arteries are patent. Dominant right vertebral   artery    Multilevel degenerative changes noted. There is thickening of the   esophagus wall.    CTA Head:      The proximal anterior, middle and posterior cerebral arteries are patent.    The intracranial vertebral and basilar arteries are patent.    There is no intracranial aneurysm.    IMPRESSION:  No acute intracranial hemorrhage or acute territorial infarct. If acute   ischemia is a strong clinical concern, MRI exam is recommended for   further evaluation if there is no contraindication.    No intracranial hemodynamically significant stenosis.    Mild (less than 50%) narrowing at origin of right internal carotid artery.    Moderate (50-70% ) narrowing at origin of left internal carotid artery.    --- End of Report ---    WADE DUBOIS MD; Resident Radiologist  This document has been electronically signed.  PEDRO PABLO PAPPAS MD; Attending Radiologist  This document has been electronically signed. Mar 20 2022  5:28PM    < end of copied text >

## 2022-03-20 NOTE — CONSULT NOTE ADULT - CONSULT REASON
slurred speech [Yes] : Patient goes to dentist yearly [Father] : father [Toothpaste] : Primary Fluoride Source: Toothpaste [Eats meals with family] : eats meals with family [Needs Immunizations] : needs immunizations [Is permitted and is able to make independent decisions] : Is permitted and is able to make independent decisions [Sleep Concerns] : sleep concerns [Has family members/adults to turn to for help] : has family members/adults to turn to for help [Normal Homework] : normal homework [Normal Performance] : normal performance [Normal Behavior/Attention] : normal behavior/attention [Calcium source] : calcium source [Drinks non-sweetened liquids] : drinks non-sweetened liquids  [Eats regular meals including adequate fruits and vegetables] : eats regular meals including adequate fruits and vegetables [Has friends] : has friends [At least 1 hour of physical activity a day] : at least 1 hour of physical activity a day [Has interests/participates in community activities/volunteers] : has interests/participates in community activities/volunteers. [Screen time (except homework) less than 2 hours a day] : screen time (except homework) less than 2 hours a day [No] : No cigarette smoke exposure [Has peer relationships free of violence] : has peer relationships free of violence [Uses safety belts/safety equipment] : uses safety belts/safety equipment  [Displays self-confidence] : displays self-confidence [Has ways to cope with stress] : has ways to cope with stress [With Parent/Guardian] : parent/guardian [With Teen] : teen [Grade: ____] : Grade: [unfilled] [Has concerns about body or appearance] : does not have concerns about body or appearance [Uses electronic nicotine delivery system] : does not use electronic nicotine delivery system [Exposure to electronic nicotine delivery system] : no exposure to electronic nicotine delivery system [Uses tobacco] : does not use tobacco [Exposure to tobacco] : no exposure to tobacco [Uses drugs] : does not use drugs  [Exposure to drugs] : no exposure to drugs [Drinks alcohol] : does not drink alcohol [Exposure to alcohol] : no exposure to alcohol [Gets depressed, anxious, or irritable/has mood swings] : does not get depressed, anxious, or irritable/has mood swings [Has problems with sleep] : does not have problems with sleep [Has thought about hurting self or considered suicide] : has not thought about hurting self or considered suicide [FreeTextEntry7] : concussion [de-identified] : basketball, camps / soccer/ softball- 2 teams/ reading/baking/ [employer] envir group  [FreeTextEntry8] : p [de-identified] : History/ writing

## 2022-03-20 NOTE — ED CDU PROVIDER INITIAL DAY NOTE - MEDICAL DECISION MAKING DETAILS
80 yo M with PMH of HTN, HLD, pre-DM (diet control), presents to ER c/o headache x3 days w/ slurred speech this morning around 8-9am. In ED, Code stroke called, Pt seen & evaluated by neurology team. CTA head & neck w/o acute finding. trop 15. Pt w/ no focal neuro deficits. Pt given Aspirin & Plavix. Pt sent to CDU for MR brain, neuro checks, echo, and telemetry and observation.

## 2022-03-20 NOTE — ED ADULT TRIAGE NOTE - CHIEF COMPLAINT QUOTE
As per daughter, pt. c/o headache, dizziness and was slurring his words this AM. All symptoms have since subsided. No neuro deficits noted.  in triage.

## 2022-03-20 NOTE — ED ADULT NURSE NOTE - OBJECTIVE STATEMENT
Received patient to tr [facility] for hypertension. a&o4, ambulatory, [place]ian speaking but can make needs known in english, daughter at bedside to translate, c/o headache at the back of head and hypertension for few days, went to bed around 10pm last night with no complaints, after breakfast took BP and noticed elevation. per daughter, pt was slurring words, has now resolved. NSR on monitor, hypertensive at this time, right 20G AC placed, labs sent.

## 2022-03-21 DIAGNOSIS — N88.8 OTHER SPECIFIED NONINFLAMMATORY DISORDERS OF CERVIX UTERI: ICD-10-CM

## 2022-03-21 PROCEDURE — 71260 CT THORAX DX C+: CPT | Mod: 26,MG

## 2022-03-21 PROCEDURE — 74177 CT ABD & PELVIS W/CONTRAST: CPT | Mod: 26,MG

## 2022-03-21 PROCEDURE — G1004: CPT

## 2022-03-21 PROCEDURE — 99226: CPT

## 2022-03-21 PROCEDURE — 99285 EMERGENCY DEPT VISIT HI MDM: CPT

## 2022-03-21 PROCEDURE — 93306 TTE W/DOPPLER COMPLETE: CPT | Mod: 26

## 2022-03-21 PROCEDURE — 70551 MRI BRAIN STEM W/O DYE: CPT | Mod: 26,MA

## 2022-03-21 RX ORDER — KETOROLAC TROMETHAMINE 30 MG/ML
15 SYRINGE (ML) INJECTION EVERY 6 HOURS
Refills: 0 | Status: DISCONTINUED | OUTPATIENT
Start: 2022-03-21 | End: 2022-03-21

## 2022-03-21 RX ORDER — ACETAMINOPHEN 500 MG
650 TABLET ORAL EVERY 6 HOURS
Refills: 0 | Status: DISCONTINUED | OUTPATIENT
Start: 2022-03-21 | End: 2022-03-24

## 2022-03-21 RX ORDER — MORPHINE SULFATE 50 MG/1
2 CAPSULE, EXTENDED RELEASE ORAL ONCE
Refills: 0 | Status: DISCONTINUED | OUTPATIENT
Start: 2022-03-21 | End: 2022-03-22

## 2022-03-21 RX ADMIN — AMLODIPINE BESYLATE 5 MILLIGRAM(S): 2.5 TABLET ORAL at 05:25

## 2022-03-21 RX ADMIN — Medication 650 MILLIGRAM(S): at 22:35

## 2022-03-21 RX ADMIN — ATORVASTATIN CALCIUM 80 MILLIGRAM(S): 80 TABLET, FILM COATED ORAL at 22:35

## 2022-03-21 RX ADMIN — CLOPIDOGREL BISULFATE 75 MILLIGRAM(S): 75 TABLET, FILM COATED ORAL at 12:00

## 2022-03-21 RX ADMIN — Medication 81 MILLIGRAM(S): at 12:00

## 2022-03-21 RX ADMIN — Medication 650 MILLIGRAM(S): at 23:05

## 2022-03-21 RX ADMIN — Medication 5 MILLIGRAM(S): at 01:26

## 2022-03-21 RX ADMIN — Medication 1 MILLIGRAM(S): at 13:45

## 2022-03-21 RX ADMIN — LOSARTAN POTASSIUM 25 MILLIGRAM(S): 100 TABLET, FILM COATED ORAL at 05:25

## 2022-03-21 NOTE — CONSULT NOTE ADULT - ASSESSMENT
81year old male with HTN, HLD presenting with 3 days of posterior HA/neck pain, episode of self-resolving slurred speech, numbness in BLE with ambulation, found to have craniocervical mass on MRI without contrast.

## 2022-03-21 NOTE — CONSULT NOTE ADULT - SUBJECTIVE AND OBJECTIVE BOX
HPI:   82 yo M PMHx HTN, HLD, preDM, who presents with headache and acute onset slurred speech. Patient has been having, posterior intermittent mild headache w/ extension to neck traveling down his shoulders. LKN 22:00 last night 3/19/22 prior to bed. Today AM still with headache but was noted at 9AM by daughter with slurred speech that resolved at 10AM today. She did note patient's face was "twisted" but resolved as well. Pt states he also has numbness in BLE when walking around requiring him to sit to rest. Otherwise denies blurry vision, double vision, word finding difficulty, focal weakness or numbness. Not on any antiplatelet or anticoagulant agents.      PAST MEDICAL & SURGICAL HISTORY:  Hypertension  Hyperlipidemia  Chronic kidney disease  Diabetes mellitus  Benign prostate hyperplasia  History of carpal tunnel surgery  History of cataract surgery      Allergies  penicillin (Rash)  Intolerances      amLODIPine   Tablet 5 milliGRAM(s) Oral daily  aspirin  chewable 81 milliGRAM(s) Oral daily  atorvastatin 80 milliGRAM(s) Oral at bedtime  clopidogrel Tablet 75 milliGRAM(s) Oral daily  losartan 25 milliGRAM(s) Oral daily    SOCIAL HISTORY:  FAMILY HISTORY:  No pertinent family history in first degree relatives      Vital Signs Last 24 Hrs  T(C): 36.5 (21 Mar 2022 10:00), Max: 37.1 (20 Mar 2022 12:49)  T(F): 97.7 (21 Mar 2022 10:00), Max: 98.7 (20 Mar 2022 12:49)  HR: 85 (21 Mar 2022 10:00) (64 - 85)  BP: 156/85 (21 Mar 2022 10:00) (133/72 - 201/88)  BP(mean): --  RR: 17 (21 Mar 2022 10:00) (16 - 20)  SpO2: 97% (21 Mar 2022 10:00) (97% - 100%)    PHYSICAL EXAM:  Awake Alert Attentive Affect appropriate Ox3  PERRL EOMI  MotorStrength:  5/5 throughout  Sensory Intact to Light Touch  No drift  No clonus, no hoffmans    LABS:                          15.7   7.09  )-----------( 197      ( 20 Mar 2022 13:25 )             45.7     03-20    134<L>  |  100  |  19  ----------------------------<  120<H>  3.9   |  21<L>  |  1.00    Ca    9.6      20 Mar 2022 13:25    TPro  7.5  /  Alb  4.4  /  TBili  0.4  /  DBili  x   /  AST  29  /  ALT  33  /  AlkPhos  102  03-20    PT/INR - ( 20 Mar 2022 13:25 )   PT: 11.3 sec;   INR: 0.97 ratio         PTT - ( 20 Mar 2022 13:25 )  PTT:34.6 sec      RADIOLOGY & ADDITIONAL STUDIES:

## 2022-03-21 NOTE — ED CDU PROVIDER SUBSEQUENT DAY NOTE - HISTORY
82 yo M with PMH of HTN, HLD, pre-DM (diet control), presents to ER c/o headache x3 days w/ slurred speech. Now resolved. In ED, Code stroke called, Pt seen & evaluated by neurology team. CTA head & neck w/o acute finding.  MRI pending. No events overnight.

## 2022-03-22 VITALS
DIASTOLIC BLOOD PRESSURE: 78 MMHG | OXYGEN SATURATION: 93 % | HEART RATE: 81 BPM | RESPIRATION RATE: 19 BRPM | SYSTOLIC BLOOD PRESSURE: 139 MMHG | TEMPERATURE: 98 F

## 2022-03-22 PROCEDURE — 72149 MRI LUMBAR SPINE W/DYE: CPT | Mod: 26,MG

## 2022-03-22 PROCEDURE — G1004: CPT

## 2022-03-22 PROCEDURE — 72147 MRI CHEST SPINE W/DYE: CPT | Mod: 26,MG

## 2022-03-22 PROCEDURE — 99217: CPT | Mod: FS

## 2022-03-22 PROCEDURE — 72142 MRI NECK SPINE W/DYE: CPT | Mod: 26,MA

## 2022-03-22 PROCEDURE — 70552 MRI BRAIN STEM W/DYE: CPT | Mod: 26,MA

## 2022-03-22 RX ORDER — MORPHINE SULFATE 50 MG/1
4 CAPSULE, EXTENDED RELEASE ORAL ONCE
Refills: 0 | Status: DISCONTINUED | OUTPATIENT
Start: 2022-03-22 | End: 2022-03-22

## 2022-03-22 RX ORDER — KETOROLAC TROMETHAMINE 30 MG/ML
15 SYRINGE (ML) INJECTION ONCE
Refills: 0 | Status: DISCONTINUED | OUTPATIENT
Start: 2022-03-22 | End: 2022-03-22

## 2022-03-22 RX ADMIN — Medication 15 MILLIGRAM(S): at 12:41

## 2022-03-22 RX ADMIN — CLOPIDOGREL BISULFATE 75 MILLIGRAM(S): 75 TABLET, FILM COATED ORAL at 12:22

## 2022-03-22 RX ADMIN — MORPHINE SULFATE 2 MILLIGRAM(S): 50 CAPSULE, EXTENDED RELEASE ORAL at 04:30

## 2022-03-22 RX ADMIN — AMLODIPINE BESYLATE 5 MILLIGRAM(S): 2.5 TABLET ORAL at 06:47

## 2022-03-22 RX ADMIN — Medication 1 MILLIGRAM(S): at 03:19

## 2022-03-22 RX ADMIN — MORPHINE SULFATE 2 MILLIGRAM(S): 50 CAPSULE, EXTENDED RELEASE ORAL at 03:12

## 2022-03-22 RX ADMIN — LOSARTAN POTASSIUM 25 MILLIGRAM(S): 100 TABLET, FILM COATED ORAL at 06:47

## 2022-03-22 RX ADMIN — MORPHINE SULFATE 4 MILLIGRAM(S): 50 CAPSULE, EXTENDED RELEASE ORAL at 12:42

## 2022-03-22 RX ADMIN — Medication 81 MILLIGRAM(S): at 12:22

## 2022-03-22 NOTE — ED CDU PROVIDER DISPOSITION NOTE - CLINICAL COURSE
80 yo M with PMH of HTN, HLD, pre-DM (diet control), presents to ER c/o headache x3 days w/ slurred speech. Now resolved. In ED, Code stroke called, Pt seen & evaluated by neurology team. CTA head & neck w/o acute findings. MRI with possible cervical mass, CT abdomen and pelvis with incidental CBD enlargement, renal cyst and gastric lesion. Pt has no acute abdominal pain and is not jaundiced or febrile. Pt was seen by Neurosurgery and is awaiting MRI C T and L spine with IV contrast. Pt states he is feeling better, reassessed the patient with  [id number] 82 yo M with PMH of HTN, HLD, pre-DM (diet control), presents to ER c/o headache x3 days w/ slurred speech. Now resolved. In ED, Code stroke called, Pt seen & evaluated by neurology team. CTA head & neck w/o acute findings. 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 Pt to follow up with neurosurgery, gi, rheum

## 2022-03-22 NOTE — ED CDU PROVIDER SUBSEQUENT DAY NOTE - CRANIAL NERVE AND PUPILLARY EXAM
EOMI, 5/5 strength and intact sensation bilaterally in upper and lower extremity./cranial nerves 2-12 intact
cranial nerves 2-12 intact/extra-ocular movements intact/tongue is midline

## 2022-03-22 NOTE — ED CDU PROVIDER SUBSEQUENT DAY NOTE - PROGRESS NOTE DETAILS
MRI tech reports pt not able to tolerate MRI in spite of pre-medication with Morphine and Ativan. Pt to return to CDU. MRI results reviewed by neurosurgery, recommending f/u outpt with dr. long     number: [id number]  Discussed results with pt, answered all questions. Pt to f/u with neurosurgery, rheum, gi

## 2022-03-22 NOTE — ED CDU PROVIDER SUBSEQUENT DAY NOTE - NS ED ATTENDING STATEMENT MOD
This was a shared visit with the SHEEBA. I reviewed and verified the documentation and independently performed the documented:
This was a shared visit with the SHEEBA. I reviewed and verified the documentation and independently performed the documented:

## 2022-03-22 NOTE — ED CDU PROVIDER DISPOSITION NOTE - PATIENT PORTAL LINK FT
You can access the FollowMyHealth Patient Portal offered by [facility] by registering at the following website: [email or web address]. By joining [facility]’s FollowMyHealth portal, you will also be able to view your health information using other applications (apps) compatible with our system.

## 2022-03-22 NOTE — ED CDU PROVIDER SUBSEQUENT DAY NOTE - NSICDXPASTMEDICALHX_GEN_ALL_CORE_FT
PAST MEDICAL HISTORY:  Benign prostate hyperplasia     Chronic kidney disease     Diabetes mellitus     Hyperlipidemia     Hypertension     
PAST MEDICAL HISTORY:  Benign prostate hyperplasia     Chronic kidney disease     Diabetes mellitus     Hyperlipidemia     Hypertension

## 2022-03-22 NOTE — ED CDU PROVIDER SUBSEQUENT DAY NOTE - ATTENDING CONTRIBUTION TO CARE
82 yo m past medical history htn, hld, pre-DM presented to ED for HA assoc with slurred speech s/p ct/cta, MR head C- showing mass at craniocervical junction, MR neg for stroke. several attempts overnight to performed MR C+ to further evaluate but at that time pain too uncomfortable to complete exam. NSX following. patient feeling better without focal deficit, slurring of speech or HA. exam as above. plan: for MR (will premedicate for pain), NSX, reassess.   MR reviewed, stable for out-patient follow up with NSX, rheum, GI given gastric, hepatobiliary findings on CT, pcp for DM management.
I performed a history and physical exam of the patient and discussed their management with the advanced care provider. I reviewed the advanced care provider's note and agree with the documented findings and plan of care. My medical decision making and objective findings are found above.

## 2022-03-22 NOTE — ED CDU PROVIDER DISPOSITION NOTE - NSFOLLOWUPINSTRUCTIONS_ED_ALL_ED_FT
You came to the ER with a headache and slurred speech, there was concern for stroke, however the CT of the head as well as the angiogram of the head and neck were negative for a stroke. An epidural mass was seen, to better visualize this an MRI was performed of the head and entire spine. The mass was found to be fat that was ca You came to the ER with a headache and slurred speech, there was concern for stroke, however the CT of the head as well as the angiogram of the head and neck were negative for a stroke. An epidural mass was seen, to better visualize this an MRI was performed of the head and entire spine. The mass was found to be fat in upper ventral cervical canal.  You will need to follow up with a neurosurgeon, Dr. Levin, see more info on the first page  You will also need to follow up with:  Rheumatology, as the fat in upper ventral cervical canal may be from a rheumatological disorder  Gastroenterology, You will need an endoscopy as 2 polypoid structures were visualized in the stomach and a possible lesion was seen in the common bile duct  Darshana will call to help arrange follow ups

## 2022-03-22 NOTE — ED CDU PROVIDER DISPOSITION NOTE - ATTENDING CONTRIBUTION TO CARE
I performed a history and physical exam of the patient and discussed their management with the advanced care provider. I reviewed the advanced care provider's note and agree with the documented findings and plan of care. My medical decision making and objective findings are found above. Bj Cameron DO:  80 yo m past medical history htn, hld, pre-DM presented to ED for HA assoc with slurred speech s/p ct/cta, MR head C- showing mass at craniocervical junction, MR neg for stroke. several attempts overnight to performed MR C+ to further evaluate but at that time pain too uncomfortable to complete exam. NSX following. patient feeling better without focal deficit, slurring of speech or HA. exam as above. plan: for MR (will premedicate for pain), NSX, reassess.   MR reviewed, stable for out-patient follow up with NSX, rheum, GI given gastric, hepatobiliary findings on CT, pcp for DM managemen

## 2022-03-22 NOTE — ED ADULT NURSE REASSESSMENT NOTE - NS ED NURSE REASSESS COMMENT FT1
Break coverage RN: pt pre medicated for MRI as per MD order. PT aware of plan of care. MRI aware, awaiting transport at this time.

## 2022-03-22 NOTE — ED CDU PROVIDER SUBSEQUENT DAY NOTE - HISTORY
82 yo M with PMH of HTN, HLD, pre-DM (diet control), presents to ER c/o headache x3 days w/ slurred speech. Now resolved. In ED, Code stroke called, Pt seen & evaluated by neurology team. CTA head & neck w/o acute findings. MRI with possible cervical mass, CT abdomen and pelvis with incidental CBD enlargemnt, renal cyst and 80 yo M with PMH of HTN, HLD, pre-DM (diet control), presents to ER c/o headache x3 days w/ slurred speech. Now resolved. In ED, Code stroke called, Pt seen & evaluated by neurology team. CTA head & neck w/o acute findings. MRI with possible cervical mass, CT abdomen and pelvis with incidental CBD enlargement, renal cyst and gastric lesion. Pt has no acute abdominal pain and is not jaundiced or febrile. Pt was seen by Neurosurgery and is awaiting MRI C T and L spine with IV contrast. Pt states he is feeling better, reassessed the patient with  [id number].

## 2022-03-22 NOTE — ED CDU PROVIDER DISPOSITION NOTE - NS ED ATTENDING STATEMENT MOD
This was a shared visit with the SHEEBA. I reviewed and verified the documentation and independently performed the documented: Attending with

## 2022-03-22 NOTE — ED CDU PROVIDER SUBSEQUENT DAY NOTE - MEDICAL DECISION MAKING DETAILS
80 yo M with PMH of HTN, HLD, pre-DM (diet control), presents to ER c/o headache x3 days w/ slurred speech this morning around 8-9am. In ED, Code stroke called, Pt seen & evaluated by neurology team. CTA head & neck w/o acute finding. trop 15. Pt w/ no focal neuro deficits. Pt given Aspirin & Plavix. Pt sent to CDU for MR brain, neuro checks, echo, and telemetry and observation.
82 yo M with PMH of HTN, HLD, pre-DM (diet control), presents to ER c/o headache x3 days w/ slurred speech. Now resolved. In ED, Code stroke called, Pt seen & evaluated by neurology team. CTA head & neck w/o acute findings. MRI with possible cervical mass, CT abdomen and pelvis with incidental CBD enlargement, renal cyst and gastric lesion. Pt has no acute abdominal pain and is not jaundiced or febrile. Pt was seen by Neurosurgery and is awaiting MRI C T and L spine with IV contrast. Pt is currently feeling better, planning to have an MRI to eval to further characterize the patient's cervical lesion.

## 2022-03-22 NOTE — ED CDU PROVIDER DISPOSITION NOTE - CARE PROVIDER_API CALL
Jorge Levin (MD; AYANNA; MS)  Neurosurgery  [address], [address]  [place] [address]  Phone: [phone]  Fax: [phone]  Follow Up Time: 7-10 Days

## 2022-03-27 ENCOUNTER — EMERGENCY (EMERGENCY)
Facility: HOSPITAL | Age: 82
LOS: 1 days | Discharge: HOME | End: 2022-03-27
Attending: STUDENT IN AN ORGANIZED HEALTH CARE EDUCATION/TRAINING PROGRAM | Admitting: STUDENT IN AN ORGANIZED HEALTH CARE EDUCATION/TRAINING PROGRAM
Payer: MEDICARE

## 2022-03-27 VITALS
OXYGEN SATURATION: 100 % | TEMPERATURE: 98 F | SYSTOLIC BLOOD PRESSURE: 189 MMHG | HEIGHT: 65 IN | HEART RATE: 83 BPM | RESPIRATION RATE: 16 BRPM | DIASTOLIC BLOOD PRESSURE: 81 MMHG

## 2022-03-27 DIAGNOSIS — Z98.890 OTHER SPECIFIED POSTPROCEDURAL STATES: Chronic | ICD-10-CM

## 2022-03-27 DIAGNOSIS — Z98.49 CATARACT EXTRACTION STATUS, UNSPECIFIED EYE: Chronic | ICD-10-CM

## 2022-03-27 PROCEDURE — 99284 EMERGENCY DEPT VISIT MOD MDM: CPT

## 2022-03-27 NOTE — ED ADULT TRIAGE NOTE - CHIEF COMPLAINT QUOTE
HTN x3 days with headache. hx of htn, states he is compliant with his medications. no CP/SOB/ Double vision. ekg to be completed

## 2022-03-27 NOTE — ED ADULT TRIAGE NOTE - HEIGHT IN INCHES
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)     Esophageal stricture     GERD (gastroesophageal reflux disease)     HTN (hypertension)     Interstitial lung disease (H)     Moderate persistent asthma without complication     Onychomycosis     Restless leg syndrome     Seasonal allergies     Smoker     Stress     Respiratory bronchiolitis interstitial lung disease (H)     Spinal epidural abscess     Lumbar spondylosis     Inflammatory arthritis     Arthralgia of temporomandibular joint     Articular disc disorder of temporomandibular joint     Derangement of temporomandibular joint     Calculus of gallbladder without cholecystitis without obstruction     Displacement of articular disc of temporomandibular joint with reduction     Myofascial pain     Oral lesion     Symptomatic cholelithiasis     Sacro-iliac pain     Degenerative lumbar spinal stenosis     Chronic lumbar radiculopathy     Glucose intolerance     Chronic neck pain     Lumbar radiculopathy, chronic       Past Surgical History:    Past Surgical History:   Procedure Laterality Date     CHOLECYSTECTOMY       COLONOSCOPY       COLONOSCOPY N/A 2/6/2020    Procedure: COLONOSCOPY, WITH POLYPECTOMY AND BIOPSY;  Surgeon: Julian Mccullough MD;  Location:  GI     ENT SURGERY       ESOPHAGOSCOPY, GASTROSCOPY, DUODENOSCOPY (EGD), COMBINED N/A 2/6/2020    Procedure: ESOPHAGOGASTRODUODENOSCOPY (EGD);  Surgeon: Julian Mccullough MD;  Location:  GI     EXCISE LESION INTRAORAL Bilateral 10/3/2018    Procedure: EXCISE LESION INTRAORAL;  Wide Local Excision Of of Left Oral Cavity Ulcer;  
temporal
Surgeon: Morro Mijares MD;  Location: [facility] OR     HC DRAIN SKIN ABSCESS SIMPLE/SINGLE  3/16/2012    Procedure:INCISION AND DRAINAGE, ABSCESS, SIMPLE; Surgeon:CHRISTIANO HANCOCK; Location: GI     HEAD & NECK SURGERY       HYSTERECTOMY       INCISION AND DRAINAGE ABDOMEN WASHOUT, COMBINED       INJECT EPIDURAL LUMBAR Right 9/15/2020    Procedure: Lumbar5- sacral 1 epidural steroid injection with fluoroscopy;  Surgeon: Tram Florian MD;  Location: [facility] OR     INJECT SACROILIAC JOINT Bilateral 6/16/2020    Procedure: Bilateral sacroiliac joint steroid injection with fluoroscopy;  Surgeon: Tram Florian MD;  Location: [facility] OR     LAMINECTOMY THORACIC ONE LEVEL N/A 8/19/2019    Procedure: LAMINECTOMY, SPINE, THORACIC, 11-12 and Part of Lumbar 1, DRAINAGE OF EPIDURAL ABCESS, Epidural Drain Placement X 2;  Surgeon: Yadiel Beal MD;  Location: [facility] OR     RADIO FREQUENCY ABLATION / DESTRUCTION OF SACROILOAC JOINT DORSAL PRIMARY RAMUS Bilateral 12/17/2019    Procedure: Bilateral lumbar radiofrequency ablation with fluoroscopy and intravenous sedation ( Lumbar 2,3,4,5 medial branch nerves for the bilateral lumbar3-4, 4-5 and 5-sacral1 joints.;  Surgeon: Tram Florian MD;  Location: [facility] OR     RADIO FREQUENCY ABLATION / DESTRUCTION OF SACROILOAC JOINT DORSAL PRIMARY RAMUS Right 11/17/2020    Procedure: Right lumbar medial branch nerve radiofrequency ablation right L2,3,4,5 nerves supplying the right L3-4, L4-5 and L5-S1 facet joints;  Surgeon: Tram Florian MD;  Location: [facility] [place]     spinal cord stimulator  08/08/2019     spinal cord stimulator removal  08/13/2019       Past Medical History:   Past Medical History:   Diagnosis Date     Allergic rhinitis      Anemia      Arthritis      Asthma     copd     Dental abscess 8-2015     Depressive disorder      Gastroesophageal reflux disease      History of emphysema      Hoarseness      Hypertension      Morbid obesity with BMI of 40.0-44.9, adult (H)      
Obstructive sleep apnea      Other chronic pain      Respiratory bronchiolitis interstitial lung disease (H)      Sleep apnea        Social History:   Social History     Tobacco Use     Smoking status: Current Every Day Smoker     Packs/day: 1.00     Years: 30.00     Pack years: 30.00     Types: Cigarettes     Start date: 1/1/1996     Smokeless tobacco: Never Used     Tobacco comment: has tried the patch   Substance Use Topics     Alcohol use: No     Binge frequency: Monthly       Family History:   Family History   Problem Relation Age of Onset     Other Cancer Father         base of tongue cancer at age ~65     Hypertension Father      Back Pain Father      Restless Leg Syndrome Father      Asthma Mother      Restless Leg Syndrome Mother      Restless Leg Syndrome Sister      Breast Cancer Maternal Aunt 55       Allergies:   Allergies   Allergen Reactions     Bee Venom Anaphylaxis     Bees      Doxycycline Anaphylaxis     Patient thinks it may have been just nausea and vomiting, however unable to confirm     Erythromycin Anaphylaxis and Shortness Of Breath     Other reaction(s): Vomiting     Hydrocodone-Acetaminophen Itching       Active Medications:   Current Outpatient Medications   Medication Sig Dispense Refill     albuterol (PROAIR HFA, PROVENTIL HFA, VENTOLIN HFA) 108 (90 BASE) MCG/ACT inhaler Inhale 2 puffs into the lungs every 6 hours as needed for shortness of breath / dyspnea or wheezing (PT last dose 1.23.2020)        busPIRone HCl (BUSPAR) 30 MG tablet Take 15 mg by mouth At Bedtime       celecoxib (CELEBREX) 200 MG capsule Take 1 capsule (200 mg) by mouth every morning 60 capsule 4     cetirizine (ZYRTEC) 10 MG tablet Take 1 tablet (10 mg) by mouth daily 69 tablet 3     cholecalciferol ( ULTRA STRENGTH) 2000 units CAPS TAKE ONE CAPSULE BY MOUTH DAILY IN AM       cyclobenzaprine (FLEXERIL) 10 MG tablet TAKE 1 TABLET BY MOUTH EVERYDAY AT BEDTIME 90 tablet 0     DULoxetine (CYMBALTA) 60 MG capsule 
Take 120 mg by mouth At Bedtime        ferrous sulfate (FEROSUL) 325 (65 Fe) MG tablet Take 325 mg by mouth daily (with breakfast)   0     fluconazole (DIFLUCAN) 100 MG tablet Take 2 tabs by mouth on day 1, then 1 tab daily x 14 days. 15 tablet 0     lisinopril-hydrochlorothiazide (ZESTORETIC) 20-25 MG tablet Take 1 tablet by mouth daily 90 tablet 1     methotrexate sodium 2.5 MG TABS TAKE 9 TABLETS BY MOUTH ONCE WEEKLY  Labs  past due. appt past due. 108 tablet 5     montelukast (SINGULAIR) 10 MG tablet Take 10 mg by mouth At Bedtime       nystatin (MYCOSTATIN) 387702 UNIT/GM external cream Apply topically 2 times daily 30 g 3     omeprazole (PRILOSEC) 40 MG DR capsule Take 40 mg by mouth as needed (PT last dose appr 2 weeks ago)       pregabalin (LYRICA) 150 MG capsule Take 1 capsule (150 mg) by mouth 2 times daily 60 capsule 3     rOPINIRole (REQUIP) 0.5 MG tablet Take 1 tablet (0.5 mg) by mouth At Bedtime Take 1 tab by mouth at bedtime. 90 tablet 1     vitamin C 500 MG TABS Take 500 mg by mouth daily (with lunch)        zinc sulfate (ZINCATE) 220 (50 Zn) MG capsule Take 220 mg by mouth daily (with lunch)        ARIPiprazole (ABILIFY) 15 MG tablet Take 15 mg by mouth At Bedtime        EPINEPHrine (EPIPEN/ADRENACLICK/OR ANY BX GENERIC EQUIV) 0.3 MG/0.3ML injection 2-pack INJECT 0.3ML INTO THE MUSCLE ONCE AS NEEDED FOR ANAPHYLAXIS       fluconazole (DIFLUCAN) 200 MG tablet TAKE 1 TABLET BY MOUTH EVERY DAY FOR 14 DAYS 14 tablet 0     folic acid (FOLVITE) 1 MG tablet Take 1 tablet (1 mg) by mouth daily 90 tablet 3     OXYGEN-HELIUM IN 2-3L PRN during the day, 3L @ night       Respiratory Therapy Supplies ([facility] CPAP FILTER) MISC          Systemic Review:   CONSTITUTIONAL: NEGATIVE for fever, chills, change in weight  ENT/MOUTH: NEGATIVE for ear, mouth and throat problems  RESP: NEGATIVE for significant cough or SOB  CV: NEGATIVE for chest pain, palpitations or peripheral edema    Physical Examination: 
"  Vital Signs: BP (!) 145/86   Pulse 74   Temp 97.5  F (36.4  C) (Temporal)   Resp 20   Ht 1.619 m (5' 3.75\")   Wt 100.7 kg (222 lb)   LMP  (LMP Unknown)   SpO2 97%   BMI 38.41 kg/m    GENERAL: healthy, alert and no distress  NECK: no adenopathy, no asymmetry, masses, or scars  RESP: lungs clear to auscultation - no rales, rhonchi or wheezes  CV: regular rate and rhythm, normal S1 S2, no S3 or S4, no murmur, click or rub, no peripheral edema and peripheral pulses strong  ABDOMEN: soft, nontender, no hepatosplenomegaly, no masses and bowel sounds normal  MS: no gross musculoskeletal defects noted, no edema    Plan: Appropriate to proceed as scheduled.      Tram Florian MD  6/29/2021          "
5

## 2022-03-28 VITALS
SYSTOLIC BLOOD PRESSURE: 152 MMHG | RESPIRATION RATE: 16 BRPM | OXYGEN SATURATION: 100 % | HEART RATE: 66 BPM | TEMPERATURE: 98 F | DIASTOLIC BLOOD PRESSURE: 76 MMHG

## 2022-03-28 RX ORDER — IBUPROFEN 200 MG
600 TABLET ORAL ONCE
Refills: 0 | Status: COMPLETED | OUTPATIENT
Start: 2022-03-28 | End: 2022-03-28

## 2022-03-28 RX ADMIN — Medication 600 MILLIGRAM(S): at 00:38

## 2022-03-28 NOTE — ED ADULT NURSE NOTE - OBJECTIVE STATEMENT
Pt. is an 82 yo male who presents to [facility] w/ c/o HA. Pt. A&Ox4 and ambulatory, Croatian speaking. Per pt. ok for daughter to translate. Pt. states he was recently told he has a mass on the back of his head after having an MRI done. States he has been experiencing high BP since. Pt. endorsing HA which he rates 8/10. Denies any dizziness/lightheadedness/blurry vision/N/V/D. Respirations equal and unlabored b/l. NSR on cardiac monitor. VSS. Comfort measures provided, safety measures implemented, call bell in reach.

## 2022-03-28 NOTE — ED PROVIDER NOTE - CLINICAL SUMMARY MEDICAL DECISION MAKING FREE TEXT BOX
Plan/MDM: 81M hx of HTN, HLD, and DM presents to the ED w a cc of HTN x3 days, a/w posterior HA that is c/w HA he had approx x1 week ago prompting ED visit at that time, was sent to CDU CTA MRI all non actionable seen by both neuro and neurosurg and cleared for discharge, headache is not worse today, is at baseline, however his daughter has been checking his BP at home ant noticed it was holland prompting ED visit, high to 180/111. Per pt and daughter denies new or worsening features of HA, meds help but then comes back exam vss non toxic well appearing vitals stable neuro exam is reassuring ddx c/f likely HTN in setting of HA pt has access to close follow up w PMD has neuro surg appointment on 4/4 as well, do not think pt would benefit form additional studies imaging or labs at this time discussed plan of care and return precautions with daughter who is in agreement w plan for dc.

## 2022-03-28 NOTE — ED PROVIDER NOTE - NSFOLLOWUPINSTRUCTIONS_ED_ALL_ED_FT
Thank you for visiting our Emergency Department, it has been a pleasure taking part in your healthcare.    Your discharge diagnosis is: HTN, headache  Please take all discharge medications as indicated below:  Take Motrin/Tylenol for pain as needed, please follow instructions on manufacturers label. If you have any questions please consult a pharmacist or your PMD.  Please follow up with your PMD within x48 hours.  A copy of resulted labs, imaging, and findings have been provided to you.   You have had a detailed discussion with your provider regarding your diagnosis, care management and discharge planning including, but not limited to: return precautions, follow up visits with existing or new providers, new prescriptions and/or medication changes, wound and/or splint/cast care or other care   aspects specific to your diagnosis and treatment. You have been given the opportunity to have your questions answered. At this time you have been deemed stable and fit for discharge.  Return precautions to the Emergency Department include but are not limited to: unrelenting nausea, vomiting, fever, chills, chest pain, shortness of breath, dizziness, chest or abdominal pain, worsening back pain, syncope, blood in urine or stool, headache that doesn't resolve, numbness or tingling, loss of sensation, loss of motor function, or any other concerning symptoms.    Take Tylenol 1000mg every 8 hours as needed for headache  Add Motrin 600mg once every 6 hours as needed for headache.

## 2022-03-28 NOTE — ED PROVIDER NOTE - PHYSICAL EXAMINATION
VITALS: Initial triage and subsequent vitals have been reviewed by me.  GEN APPEARANCE: WDWN, alert, non-toxic, NAD  HEAD: Atraumatic.  EYES: PERRLa, EOMI, vision grossly intact.   NECK: Supple  CV: RRR, S1S2, no c/r/m/g. Cap refill <2 seconds. No bruits.   LUNGS: CTAB. No abnormal breath sounds.  ABDOMEN: Soft, NTND. No guarding or rebound.   MSK/EXT: No spinal or extremity point tenderness. No CVA ttp. Pelvis stable. No peripheral edema.  NEURO: Alert, follows commands. Weight bearing normal. Speech normal. Sensation and motor normal x4 extremities. CN2-12 normal, coordination normal, ambulating normally.  SKIN: Warm, dry and intact. No rash.  PSYCH: Appropriate

## 2022-03-28 NOTE — ED PROVIDER NOTE - PATIENT PORTAL LINK FT
You can access the FollowMyHealth Patient Portal offered by [facility] by registering at the following website: [email or web address]. By joining [employer]’s FollowMyHealth portal, you will also be able to view your health information using other applications (apps) compatible with our system.

## 2022-03-28 NOTE — ED PROVIDER NOTE - OBJECTIVE STATEMENT
81M hx of HTN, HLD, and DM presents to the ED w a cc of HTN x3 days, a/w posterior HA that is c/w HA he had approx x1 week ago prompting ED visit at that time, was sent to CDU CTA MRI all non actionable seen by both neuro and neurosurg and cleared for discharge, headache is not worse today, is at baseline, however his daughter has been checking his BP at home ant noticed it was holland prompting ED visit, high to 180/111. Per pt and daughter denies new or worsening features of HA, meds help but then comes back. Denies numbness, tingling or loss of sensation. Denies loss of motor function. Denies changes in vision. Denies n/v/f/c/cp/sob. Denies syncope, lightheadedness, dizziness. Denies chest palpitations, abdominal pain. Denies edema. Denies dysuria, hematuria, BRBPR, tarry stools, diarrhea, constipation.

## 2022-04-04 ENCOUNTER — NON-APPOINTMENT (OUTPATIENT)
Age: 82
End: 2022-04-04

## 2022-04-04 ENCOUNTER — APPOINTMENT (OUTPATIENT)
Dept: SPINE | Facility: CLINIC | Age: 82
End: 2022-04-04
Payer: MEDICARE

## 2022-04-04 VITALS
HEART RATE: 70 BPM | DIASTOLIC BLOOD PRESSURE: 95 MMHG | OXYGEN SATURATION: 94 % | SYSTOLIC BLOOD PRESSURE: 169 MMHG | HEIGHT: 65 IN | WEIGHT: 172 LBS | BODY MASS INDEX: 28.66 KG/M2

## 2022-04-04 PROCEDURE — 99202 OFFICE O/P NEW SF 15 MIN: CPT

## 2022-04-04 RX ORDER — AMLODIPINE BESYLATE 5 MG/1
5 TABLET ORAL
Refills: 0 | Status: ACTIVE | COMMUNITY

## 2022-04-04 RX ORDER — ATORVASTATIN CALCIUM 20 MG/1
20 TABLET, FILM COATED ORAL
Refills: 0 | Status: DISCONTINUED | COMMUNITY
End: 2022-04-04

## 2022-04-04 RX ORDER — TAMSULOSIN HYDROCHLORIDE 0.4 MG/1
0.4 CAPSULE ORAL TWICE DAILY
Qty: 180 | Refills: 1 | Status: DISCONTINUED | COMMUNITY
Start: 2021-06-25 | End: 2022-04-04

## 2022-04-04 RX ORDER — LOSARTAN POTASSIUM 25 MG/1
25 TABLET, FILM COATED ORAL
Refills: 0 | Status: ACTIVE | COMMUNITY

## 2022-04-04 RX ORDER — CARVEDILOL 12.5 MG/1
12.5 TABLET, FILM COATED ORAL
Refills: 0 | Status: DISCONTINUED | COMMUNITY
End: 2022-04-04

## 2022-04-04 RX ORDER — POLYETHYLENE GLYCOL 3350 17 G/17G
17 POWDER, FOR SOLUTION ORAL
Qty: 238 | Refills: 0 | Status: DISCONTINUED | COMMUNITY
Start: 2019-04-02 | End: 2022-04-04

## 2022-04-04 RX ORDER — ATORVASTATIN CALCIUM 80 MG/1
80 TABLET, FILM COATED ORAL
Refills: 0 | Status: ACTIVE | COMMUNITY

## 2022-04-04 RX ORDER — ACETAMINOPHEN 650 MG/20.3ML
650 SUSPENSION ORAL
Refills: 0 | Status: ACTIVE | COMMUNITY

## 2022-04-04 RX ORDER — IRBESARTAN 300 MG/1
300 TABLET ORAL
Refills: 0 | Status: DISCONTINUED | COMMUNITY
End: 2022-04-04

## 2022-04-04 RX ORDER — CETIRIZINE HYDROCHLORIDE 10 MG/1
10 TABLET, COATED ORAL
Refills: 0 | Status: DISCONTINUED | COMMUNITY
End: 2022-04-04

## 2022-04-04 RX ORDER — AMLODIPINE BESYLATE 10 MG/1
10 TABLET ORAL
Refills: 0 | Status: DISCONTINUED | COMMUNITY
End: 2022-04-04

## 2022-04-04 RX ORDER — CALCIUM CARBONATE/VITAMIN D3 600 MG-10
TABLET ORAL
Refills: 0 | Status: DISCONTINUED | COMMUNITY
End: 2022-04-04

## 2022-04-04 RX ORDER — CLOTRIMAZOLE 10 MG/G
1 CREAM TOPICAL
Refills: 0 | Status: DISCONTINUED | COMMUNITY
End: 2022-04-04

## 2022-04-04 RX ORDER — LORAZEPAM 1 MG/1
1 TABLET ORAL
Refills: 0 | Status: ACTIVE | COMMUNITY

## 2022-04-21 ENCOUNTER — APPOINTMENT (OUTPATIENT)
Dept: RHEUMATOLOGY | Facility: CLINIC | Age: 82
End: 2022-04-21
Payer: MEDICARE

## 2022-04-21 VITALS
OXYGEN SATURATION: 98 % | HEART RATE: 76 BPM | WEIGHT: 172 LBS | DIASTOLIC BLOOD PRESSURE: 96 MMHG | HEIGHT: 65 IN | TEMPERATURE: 97.2 F | BODY MASS INDEX: 28.66 KG/M2 | SYSTOLIC BLOOD PRESSURE: 199 MMHG

## 2022-04-21 PROCEDURE — 99214 OFFICE O/P EST MOD 30 MIN: CPT

## 2022-04-21 PROCEDURE — 99204 OFFICE O/P NEW MOD 45 MIN: CPT

## 2022-04-22 ENCOUNTER — OUTPATIENT (OUTPATIENT)
Dept: OUTPATIENT SERVICES | Facility: HOSPITAL | Age: 82
LOS: 1 days | End: 2022-04-22
Payer: COMMERCIAL

## 2022-04-22 ENCOUNTER — APPOINTMENT (OUTPATIENT)
Dept: RADIOLOGY | Facility: IMAGING CENTER | Age: 82
End: 2022-04-22
Payer: MEDICARE

## 2022-04-22 DIAGNOSIS — M06.38: ICD-10-CM

## 2022-04-22 DIAGNOSIS — Z98.890 OTHER SPECIFIED POSTPROCEDURAL STATES: Chronic | ICD-10-CM

## 2022-04-22 DIAGNOSIS — Z98.49 CATARACT EXTRACTION STATUS, UNSPECIFIED EYE: Chronic | ICD-10-CM

## 2022-04-22 PROCEDURE — 73130 X-RAY EXAM OF HAND: CPT | Mod: 26,50

## 2022-04-22 PROCEDURE — 73130 X-RAY EXAM OF HAND: CPT

## 2022-04-23 NOTE — HISTORY OF PRESENT ILLNESS
[FreeTextEntry1] : 81 year old man with multiple comorbidities here today for rheum eval.  Patient has been to the emergency room 3 times this year for headaches with hypertension.  He was found to have a mass on the cervical spine which was evaluated by neurosurgery.  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  Has been referred to another neurosurgeon Dr. Devendra Alicea, to perform surgery, however  wants clearance with rheumatology before doing so.\par \par Along with neck pain, he has complaints of bilateral wrist pain.  He finds relief with tylenol for neck pain, however patient reports his blood pressure remains uncontrolled.  Last year patient was going to PT for pain in knees and walking.  Patient has trouble going up the stairs, daughter reports he has to crawl to get up the stairs. \par \par Patient feels numbness in both feet as well as hands.  Denies any swelling of the joints.  Also having pain in finger.  Has dry eyes.  Daughter reports patient walks hunched over due to back pain, uses walker at home. Patient has trouble walking long distances without back pain.  Is able to care for himself independently.  \par \par patient needs to stretch for 30 minutes every morning due to stiffness, also stretching throughout the day \par \par Patient is unsure of his family medical history.\par \par Denies any fevers, chills, rashes, weight loss, fatigue, hair loss, dry mouth, mouth sores, Raynaud's, chest pain pain or SOB, GI or , numbness/tingling   \par \par  [Currently Experiencing] : currently [Arthralgias] : arthralgias

## 2022-04-23 NOTE — PHYSICAL EXAM
[General Appearance - Alert] : alert [General Appearance - In No Acute Distress] : in no acute distress [Sclera] : the sclera and conjunctiva were normal [PERRL With Normal Accommodation] : pupils were equal in size, round, and reactive to light [Outer Ear] : the ears and nose were normal in appearance [Neck Appearance] : the appearance of the neck was normal [Neck Cervical Mass (___cm)] : no neck mass was observed [Jugular Venous Distention Increased] : there was no jugular-venous distention [Thyroid Diffuse Enlargement] : the thyroid was not enlarged [Thyroid Nodule] : there were no palpable thyroid nodules [Auscultation Breath Sounds / Voice Sounds] : lungs were clear to auscultation bilaterally [Heart Rate And Rhythm] : heart rate was normal and rhythm regular [Heart Sounds] : normal S1 and S2 [Heart Sounds Gallop] : no gallops [Murmurs] : no murmurs [Heart Sounds Pericardial Friction Rub] : no pericardial rub [Full Pulse] : the pedal pulses are present [Edema] : there was no peripheral edema [Bowel Sounds] : normal bowel sounds [Abdomen Soft] : soft [Abdomen Tenderness] : non-tender [Abdomen Mass (___ Cm)] : no abdominal mass palpated [Cervical Lymph Nodes Enlarged Posterior Bilaterally] : posterior cervical [Cervical Lymph Nodes Enlarged Anterior Bilaterally] : anterior cervical [Supraclavicular Lymph Nodes Enlarged Bilaterally] : supraclavicular [No CVA Tenderness] : no ~M costovertebral angle tenderness [No Spinal Tenderness] : no spinal tenderness [Musculoskeletal - Swelling] : no joint swelling seen [Motor Tone] : muscle strength and tone were normal [Skin Color & Pigmentation] : normal skin color and pigmentation [Skin Turgor] : normal skin turgor [] : no rash [Sensation] : the sensory exam was normal to light touch and pinprick [Oriented To Time, Place, And Person] : oriented to person, place, and time [Impaired Insight] : insight and judgment were intact [Affect] : the affect was normal [FreeTextEntry1] : uses cane, hunched over when walking

## 2022-04-23 NOTE — REVIEW OF SYSTEMS
[Dry Eyes] : dryness of the eyes [As Noted in HPI] : as noted in HPI [Arthralgias] : arthralgias [Joint Pain] : joint pain [Joint Stiffness] : joint stiffness [Difficulty Walking] : difficulty walking [Negative] : Heme/Lymph [Joint Swelling] : no joint swelling

## 2022-04-23 NOTE — ASSESSMENT
[FreeTextEntry1] : \par \par 1. C2 pannus formation - no previous hx of RA - add full rheum work-up - add hand x-rays\par unsure as to the origin of pannus\par patient has mild joint pain and no overt signs of RA at this time\par \par patient to call in 1 week to discuss results and next steps\par \par \par f/u 8 weeks

## 2022-04-25 ENCOUNTER — APPOINTMENT (OUTPATIENT)
Dept: NEUROSURGERY | Facility: CLINIC | Age: 82
End: 2022-04-25

## 2022-04-29 ENCOUNTER — NON-APPOINTMENT (OUTPATIENT)
Age: 82
End: 2022-04-29

## 2022-04-29 LAB
CCP AB SER IA-ACNC: <8 UNITS
CRP SERPL-MCNC: <3 MG/L
DSDNA AB SER-ACNC: <12 IU/ML
ENA RNP AB SER IA-ACNC: 0.7 AL
ENA SM AB SER IA-ACNC: <0.2 AL
ENA SS-A AB SER IA-ACNC: <0.2 AL
ENA SS-B AB SER IA-ACNC: <0.2 AL
ERYTHROCYTE [SEDIMENTATION RATE] IN BLOOD BY WESTERGREN METHOD: 7 MM/HR
HBV CORE IGG+IGM SER QL: NONREACTIVE
HBV SURFACE AB SER QL: NONREACTIVE
HBV SURFACE AG SER QL: NONREACTIVE
HCV AB SER QL: NONREACTIVE
HCV S/CO RATIO: 0.09 S/CO
HLA-B27 RELATED AG QL: NEGATIVE
M TB IFN-G BLD-IMP: NEGATIVE
QUANTIFERON TB PLUS MITOGEN MINUS NIL: 9.73 IU/ML
QUANTIFERON TB PLUS NIL: 0.27 IU/ML
QUANTIFERON TB PLUS TB1 MINUS NIL: 0.18 IU/ML
QUANTIFERON TB PLUS TB2 MINUS NIL: 0.05 IU/ML
RF+CCP IGG SER-IMP: NEGATIVE
RHEUMATOID FACT SER QL: <10 IU/ML

## 2022-05-05 ENCOUNTER — APPOINTMENT (OUTPATIENT)
Dept: RHEUMATOLOGY | Facility: CLINIC | Age: 82
End: 2022-05-05

## 2022-05-16 ENCOUNTER — APPOINTMENT (OUTPATIENT)
Dept: SPINE | Facility: CLINIC | Age: 82
End: 2022-05-16

## 2022-05-23 ENCOUNTER — NON-APPOINTMENT (OUTPATIENT)
Age: 82
End: 2022-05-23

## 2022-05-24 ENCOUNTER — NON-APPOINTMENT (OUTPATIENT)
Age: 82
End: 2022-05-24

## 2022-05-25 ENCOUNTER — APPOINTMENT (OUTPATIENT)
Dept: UROLOGY | Facility: CLINIC | Age: 82
End: 2022-05-25
Payer: MEDICARE

## 2022-05-25 VITALS
TEMPERATURE: 97.3 F | DIASTOLIC BLOOD PRESSURE: 85 MMHG | BODY MASS INDEX: 28.49 KG/M2 | HEIGHT: 65 IN | HEART RATE: 79 BPM | WEIGHT: 171 LBS | SYSTOLIC BLOOD PRESSURE: 161 MMHG | OXYGEN SATURATION: 98 %

## 2022-05-25 LAB
APPEARANCE: CLEAR
BACTERIA UR CULT: NORMAL
BACTERIA: NEGATIVE
BILIRUBIN URINE: NEGATIVE
BLOOD URINE: NEGATIVE
COLOR: YELLOW
GLUCOSE QUALITATIVE U: NEGATIVE
HYALINE CASTS: 4 /LPF
KETONES URINE: NORMAL
LEUKOCYTE ESTERASE URINE: NEGATIVE
MICROSCOPIC-UA: NORMAL
NITRITE URINE: NEGATIVE
PH URINE: 6
PROTEIN URINE: ABNORMAL
RED BLOOD CELLS URINE: 5 /HPF
SPECIFIC GRAVITY URINE: 1.03
SQUAMOUS EPITHELIAL CELLS: 1 /HPF
UROBILINOGEN URINE: ABNORMAL
WHITE BLOOD CELLS URINE: 2 /HPF

## 2022-05-25 PROCEDURE — 99213 OFFICE O/P EST LOW 20 MIN: CPT

## 2022-05-31 NOTE — HISTORY OF PRESENT ILLNESS
[FreeTextEntry1] : cc bph\par pt is  yo male referred for bph . was on tamsulosin but admitted to hospital for dizziness and low bp so stopped \par now voidng well nocturia x2 good flow \par The is no family history of prostate cancer\par recently c/o discomfort urinating \par \par

## 2022-06-08 ENCOUNTER — APPOINTMENT (OUTPATIENT)
Dept: UROLOGY | Facility: CLINIC | Age: 82
End: 2022-06-08
Payer: MEDICARE

## 2022-06-08 VITALS
SYSTOLIC BLOOD PRESSURE: 159 MMHG | HEART RATE: 72 BPM | DIASTOLIC BLOOD PRESSURE: 85 MMHG | BODY MASS INDEX: 28.99 KG/M2 | HEIGHT: 65 IN | TEMPERATURE: 97.4 F | WEIGHT: 174 LBS | OXYGEN SATURATION: 98 %

## 2022-06-08 PROCEDURE — 52000 CYSTOURETHROSCOPY: CPT

## 2022-06-13 ENCOUNTER — APPOINTMENT (OUTPATIENT)
Dept: SPINE | Facility: CLINIC | Age: 82
End: 2022-06-13

## 2022-06-20 ENCOUNTER — APPOINTMENT (OUTPATIENT)
Dept: NEUROSURGERY | Facility: CLINIC | Age: 82
End: 2022-06-20
Payer: MEDICARE

## 2022-06-20 ENCOUNTER — RESULT REVIEW (OUTPATIENT)
Age: 82
End: 2022-06-20

## 2022-06-20 ENCOUNTER — OUTPATIENT (OUTPATIENT)
Dept: OUTPATIENT SERVICES | Facility: HOSPITAL | Age: 82
LOS: 1 days | End: 2022-06-20
Payer: COMMERCIAL

## 2022-06-20 ENCOUNTER — APPOINTMENT (OUTPATIENT)
Dept: RADIOLOGY | Facility: IMAGING CENTER | Age: 82
End: 2022-06-20
Payer: MEDICARE

## 2022-06-20 DIAGNOSIS — M48.02 SPINAL STENOSIS, CERVICAL REGION: ICD-10-CM

## 2022-06-20 DIAGNOSIS — M06.38 RHEUMATOID NODULE, VERTEBRAE: ICD-10-CM

## 2022-06-20 DIAGNOSIS — M06.38: ICD-10-CM

## 2022-06-20 DIAGNOSIS — Z98.49 CATARACT EXTRACTION STATUS, UNSPECIFIED EYE: Chronic | ICD-10-CM

## 2022-06-20 DIAGNOSIS — Z98.890 OTHER SPECIFIED POSTPROCEDURAL STATES: Chronic | ICD-10-CM

## 2022-06-20 PROCEDURE — 72052 X-RAY EXAM NECK SPINE 6/>VWS: CPT

## 2022-06-20 PROCEDURE — 72052 X-RAY EXAM NECK SPINE 6/>VWS: CPT | Mod: 26

## 2022-06-20 PROCEDURE — 99214 OFFICE O/P EST MOD 30 MIN: CPT

## 2022-06-20 RX ORDER — OMEGA-3-ACID ETHYL ESTERS CAPSULES 1 G/1
1 CAPSULE, LIQUID FILLED ORAL
Qty: 360 | Refills: 0 | Status: ACTIVE | COMMUNITY
Start: 2021-12-21

## 2022-06-20 NOTE — HISTORY OF PRESENT ILLNESS
[de-identified] : The patient was admitted to [facility] in March 2022 with HTN and slurred speech.  He had extensive work up and MRI of spine. 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 He reports having pain in the back of his head intermittently and it radiates down his neck and to between his shoulders Pain is worse with neck flexion and extension.. He also has been having difficulty with walking. It has gotten worse recently and he had multiple falls. He did physical therapy but is was not helpful. He is using a cane to ambulate.\par

## 2022-06-20 NOTE — ASSESSMENT
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 He reports having pain in the back of his head intermittently and difficulty with walking. \par \par Plan:\par - Xray cervical spine- AP/ lateral and f. /ex/ext\par - Will contact his PCP to understand his overall health condition\par - Follow up after returning from [place] at 805\par \par

## 2022-09-14 ENCOUNTER — APPOINTMENT (OUTPATIENT)
Dept: NEUROSURGERY | Facility: CLINIC | Age: 82
End: 2022-09-14

## 2022-09-14 VITALS
BODY MASS INDEX: 28.99 KG/M2 | HEART RATE: 95 BPM | HEIGHT: 65 IN | DIASTOLIC BLOOD PRESSURE: 98 MMHG | WEIGHT: 174 LBS | SYSTOLIC BLOOD PRESSURE: 180 MMHG | OXYGEN SATURATION: 96 %

## 2022-09-14 DIAGNOSIS — R26.9 UNSPECIFIED ABNORMALITIES OF GAIT AND MOBILITY: ICD-10-CM

## 2022-09-14 PROCEDURE — 99213 OFFICE O/P EST LOW 20 MIN: CPT

## 2022-09-14 RX ORDER — IBUPROFEN 600 MG/1
600 TABLET, FILM COATED ORAL
Qty: 30 | Refills: 0 | Status: DISCONTINUED | COMMUNITY
Start: 2022-02-15 | End: 2022-09-14

## 2022-09-14 RX ORDER — CLOPIDOGREL BISULFATE 75 MG/1
75 TABLET, FILM COATED ORAL
Refills: 0 | Status: DISCONTINUED | COMMUNITY
End: 2022-09-14

## 2022-09-27 ENCOUNTER — OUTPATIENT (OUTPATIENT)
Dept: OUTPATIENT SERVICES | Facility: HOSPITAL | Age: 82
LOS: 1 days | End: 2022-09-27
Payer: COMMERCIAL

## 2022-09-27 VITALS
WEIGHT: 173.5 LBS | RESPIRATION RATE: 15 BRPM | DIASTOLIC BLOOD PRESSURE: 83 MMHG | SYSTOLIC BLOOD PRESSURE: 150 MMHG | TEMPERATURE: 98 F | HEART RATE: 87 BPM | OXYGEN SATURATION: 95 % | HEIGHT: 63.5 IN

## 2022-09-27 DIAGNOSIS — Z98.49 CATARACT EXTRACTION STATUS, UNSPECIFIED EYE: Chronic | ICD-10-CM

## 2022-09-27 DIAGNOSIS — G95.9 DISEASE OF SPINAL CORD, UNSPECIFIED: ICD-10-CM

## 2022-09-27 DIAGNOSIS — Z01.818 ENCOUNTER FOR OTHER PREPROCEDURAL EXAMINATION: ICD-10-CM

## 2022-09-27 DIAGNOSIS — Z98.890 OTHER SPECIFIED POSTPROCEDURAL STATES: Chronic | ICD-10-CM

## 2022-09-27 DIAGNOSIS — Z29.9 ENCOUNTER FOR PROPHYLACTIC MEASURES, UNSPECIFIED: ICD-10-CM

## 2022-09-27 LAB
ANION GAP SERPL CALC-SCNC: 11 MMOL/L — SIGNIFICANT CHANGE UP (ref 5–17)
BLD GP AB SCN SERPL QL: NEGATIVE — SIGNIFICANT CHANGE UP
BUN SERPL-MCNC: 30 MG/DL — HIGH (ref 7–23)
CALCIUM SERPL-MCNC: 9.6 MG/DL — SIGNIFICANT CHANGE UP (ref 8.4–10.5)
CHLORIDE SERPL-SCNC: 102 MMOL/L — SIGNIFICANT CHANGE UP (ref 96–108)
CO2 SERPL-SCNC: 27 MMOL/L — SIGNIFICANT CHANGE UP (ref 22–31)
CREAT SERPL-MCNC: 1.23 MG/DL — SIGNIFICANT CHANGE UP (ref 0.5–1.3)
EGFR: 58 ML/MIN/1.73M2 — LOW
GLUCOSE SERPL-MCNC: 132 MG/DL — HIGH (ref 70–99)
HCT VFR BLD CALC: 46.8 % — SIGNIFICANT CHANGE UP (ref 39–50)
HGB BLD-MCNC: 15.8 G/DL — SIGNIFICANT CHANGE UP (ref 13–17)
MCHC RBC-ENTMCNC: 30.6 PG — SIGNIFICANT CHANGE UP (ref 27–34)
MCHC RBC-ENTMCNC: 33.8 GM/DL — SIGNIFICANT CHANGE UP (ref 32–36)
MCV RBC AUTO: 90.5 FL — SIGNIFICANT CHANGE UP (ref 80–100)
MRSA PCR RESULT.: SIGNIFICANT CHANGE UP
NRBC # BLD: 0 /100 WBCS — SIGNIFICANT CHANGE UP (ref 0–0)
PLATELET # BLD AUTO: 221 K/UL — SIGNIFICANT CHANGE UP (ref 150–400)
POTASSIUM SERPL-MCNC: 4.3 MMOL/L — SIGNIFICANT CHANGE UP (ref 3.5–5.3)
POTASSIUM SERPL-SCNC: 4.3 MMOL/L — SIGNIFICANT CHANGE UP (ref 3.5–5.3)
RBC # BLD: 5.17 M/UL — SIGNIFICANT CHANGE UP (ref 4.2–5.8)
RBC # FLD: 12.8 % — SIGNIFICANT CHANGE UP (ref 10.3–14.5)
RH IG SCN BLD-IMP: POSITIVE — SIGNIFICANT CHANGE UP
S AUREUS DNA NOSE QL NAA+PROBE: DETECTED
SODIUM SERPL-SCNC: 140 MMOL/L — SIGNIFICANT CHANGE UP (ref 135–145)
WBC # BLD: 8.77 K/UL — SIGNIFICANT CHANGE UP (ref 3.8–10.5)
WBC # FLD AUTO: 8.77 K/UL — SIGNIFICANT CHANGE UP (ref 3.8–10.5)

## 2022-09-27 PROCEDURE — 87640 STAPH A DNA AMP PROBE: CPT

## 2022-09-27 PROCEDURE — 86900 BLOOD TYPING SEROLOGIC ABO: CPT

## 2022-09-27 PROCEDURE — 86850 RBC ANTIBODY SCREEN: CPT

## 2022-09-27 PROCEDURE — G0463: CPT

## 2022-09-27 PROCEDURE — 80048 BASIC METABOLIC PNL TOTAL CA: CPT

## 2022-09-27 PROCEDURE — 87641 MR-STAPH DNA AMP PROBE: CPT

## 2022-09-27 PROCEDURE — 83036 HEMOGLOBIN GLYCOSYLATED A1C: CPT

## 2022-09-27 PROCEDURE — 86901 BLOOD TYPING SEROLOGIC RH(D): CPT

## 2022-09-27 PROCEDURE — 85027 COMPLETE CBC AUTOMATED: CPT

## 2022-09-27 RX ORDER — VANCOMYCIN HCL 1 G
1000 VIAL (EA) INTRAVENOUS ONCE
Refills: 0 | Status: COMPLETED | OUTPATIENT
Start: 2022-10-18 | End: 2022-10-18

## 2022-09-27 RX ORDER — LOSARTAN POTASSIUM 100 MG/1
1 TABLET, FILM COATED ORAL
Qty: 0 | Refills: 0 | DISCHARGE

## 2022-09-27 RX ORDER — CICLOPIROX OLAMINE 7.7 MG/G
1 CREAM TOPICAL
Qty: 0 | Refills: 0 | DISCHARGE

## 2022-09-27 NOTE — H&P PST ADULT - HISTORY OF PRESENT ILLNESS
82 year old male with PMH of HTN, HLD    preop covid swab 10/15 @ [facility] 82 year old male with PMH of HTN, HLD, Pre-diabetes, BPH with complaint of neck pain. Patient endorses increasing neck pain, gait instability and clumsiness of the hands, He ambulates with a cane. He denies fever, chills, trauma or injury. He presents to [facility] prior to scheduled Posterior C1-6 Decompression, C4-5 Posterior Column Osteotomy, C1-C7 Fusion, Complex Plastics Closure on 10/18/2022.    preop covid swab 10/15 @ On license of [facility]

## 2022-09-27 NOTE — H&P PST ADULT - FALL HARM RISK - UNIVERSAL INTERVENTIONS
Bed in lowest position, wheels locked, appropriate side rails in place/Call bell, personal items and telephone in reach/Instruct patient to call for assistance before getting out of bed or chair/Non-slip footwear when patient is out of bed/Ray to call system/Physically safe environment - no spills, clutter or unnecessary equipment/Purposeful Proactive Rounding/Room/bathroom lighting operational, light cord in reach

## 2022-09-27 NOTE — H&P PST ADULT - NSANTHOSAYNRD_GEN_A_CORE
No. FATIMAH screening performed.  STOP BANG Legend: 0-2 = LOW Risk; 3-4 = INTERMEDIATE Risk; 5-8 = HIGH Risk

## 2022-09-27 NOTE — H&P PST ADULT - ASSESSMENT
ONURI VTE 2.0 SCORE [CLOT updated 2019]    AGE RELATED RISK FACTORS                                                       MOBILITY RELATED FACTORS  [ ] Age 41-60 years                                            (1 Point)                    [ ] Bed rest                                                        (1 Point)  [ ] Age: 61-74 years                                           (2 Points)                  [ ] Plaster cast                                                   (2 Points)  [x] Age= 75 years                                              (3 Points)                    [ ] Bed bound for more than 72 hours                 (2 Points)    DISEASE RELATED RISK FACTORS                                               GENDER SPECIFIC FACTORS  [ ] Edema in the lower extremities                       (1 Point)              [ ] Pregnancy                                                     (1 Point)  [ ] Varicose veins                                               (1 Point)                     [ ] Post-partum < 6 weeks                                   (1 Point)             [x] BMI > 25 Kg/m2                                            (1 Point)                     [ ] Hormonal therapy  or oral contraception          (1 Point)                 [ ] Sepsis (in the previous month)                        (1 Point)               [ ] History of pregnancy complications                 (1 point)  [ ] Pneumonia or serious lung disease                                               [ ] Unexplained or recurrent                     (1 Point)           (in the previous month)                               (1 Point)  [ ] Abnormal pulmonary function test                     (1 Point)                 SURGERY RELATED RISK FACTORS  [ ] Acute myocardial infarction                              (1 Point)               [ ]  Section                                             (1 Point)  [ ] Congestive heart failure (in the previous month)  (1 Point)      [ ] Minor surgery                                                  (1 Point)   [ ] Inflammatory bowel disease                             (1 Point)               [ ] Arthroscopic surgery                                        (2 Points)  [ ] Central venous access                                      (2 Points)                [x] General surgery lasting more than 45 minutes (2 points)  [ ] Malignancy- Present or previous                   (2 Points)                [ ] Elective arthroplasty                                         (5 points)    [ ] Stroke (in the previous month)                          (5 Points)                                                                                                                                                           HEMATOLOGY RELATED FACTORS                                                 TRAUMA RELATED RISK FACTORS  [ ] Prior episodes of VTE                                     (3 Points)                [ ] Fracture of the hip, pelvis, or leg                       (5 Points)  [ ] Positive family history for VTE                         (3 Points)             [ ] Acute spinal cord injury (in the previous month)  (5 Points)  [ ] Prothrombin 30212 A                                     (3 Points)               [ ] Paralysis  (less than 1 month)                             (5 Points)  [ ] Factor V Leiden                                             (3 Points)                  [ ] Multiple Trauma within 1 month                        (5 Points)  [ ] Lupus anticoagulants                                     (3 Points)                                                           [ ] Anticardiolipin antibodies                               (3 Points)                                                       [ ] High homocysteine in the blood                      (3 Points)                                             [ ] Other congenital or acquired thrombophilia      (3 Points)                                                [ ] Heparin induced thrombocytopenia                  (3 Points)                                     Total Score [  6]

## 2022-09-27 NOTE — H&P PST ADULT - ACTIVITY
ADL's, able to climbs stairs, prepares meals, mops the floors ADL's, prepares meals, light household chores

## 2022-09-27 NOTE — H&P PST ADULT - NSICDXPASTMEDICALHX_GEN_ALL_CORE_FT
PAST MEDICAL HISTORY:  Benign prostate hyperplasia     Chronic kidney disease     COVID-19 virus infection -dec 2021 (cough, fever, malaise)    Diabetes mellitus -patient states prediabetes    Hyperlipidemia     Hypertension      PAST MEDICAL HISTORY:  Benign prostate hyperplasia     Chronic kidney disease     COVID-19 virus infection -dec 2021 (cough, fever, malaise)    Diabetes mellitus -patient states prediabetes, not on medication    Hyperlipidemia     Hypertension

## 2022-09-27 NOTE — H&P PST ADULT - NSICDXPASTSURGICALHX_GEN_ALL_CORE_FT
PAST SURGICAL HISTORY:  H/O colonoscopy     History of carpal tunnel surgery -bilateral hands    History of cataract surgery -bilateral

## 2022-09-27 NOTE — H&P PST ADULT - PROBLEM SELECTOR PLAN 1
Posterior C1-C6 Decompression, C4-5 Posterior Column Osteotomy, C1-C7 Fusion, Complex Plastic Closure  -cbc, bmp, type and screen, A1c, mrsa/mssa swab done at [facility]  -medical evaluation  -preop instructions provided. All questions and concerns addressed  -instructed to hold low dose aspirin and omega-3 supplement 10 days preop  -ABO day of surgery  -fingerstick on admit Posterior C1-C6 Decompression, C4-5 Posterior Column Osteotomy, C1-C7 Fusion, Complex Plastic Closure  -cbc, bmp, type and screen, A1c, mrsa/mssa swab done at [facility]  -medical evaluation  -preop instructions provided. All questions and concerns addressed  -instructed to hold low dose aspirin and omega-3 supplement 10 days preop (as directed by surgeon)  -ABO day of surgery  -fingerstick on admit

## 2022-09-27 NOTE — H&P PST ADULT - MUSCULOSKELETAL
ambulates with cane outdoors/no joint swelling/no joint warmth/no calf tenderness/strength 5/5 bilateral upper extremities/decreased strength details… neck/no joint swelling/no joint warmth/no calf tenderness/decreased ROM due to pain/strength 5/5 bilateral upper extremities/decreased strength

## 2022-09-28 LAB
A1C WITH ESTIMATED AVERAGE GLUCOSE RESULT: 7 % — HIGH (ref 4–5.6)
ESTIMATED AVERAGE GLUCOSE: 154 MG/DL — HIGH (ref 68–114)

## 2022-10-02 ENCOUNTER — NON-APPOINTMENT (OUTPATIENT)
Age: 82
End: 2022-10-02

## 2022-10-05 NOTE — ASSESSMENT
[FreeTextEntry1] : Mr. Georgi Brandt s a 82 year old man presenting with cervical myelopathy and Lumbar Stenosis\par Recommendation to proceed with Posterior Cervical Spine fusion\par Posterior Cervical Fusion C2-C6. Pt ready to proceed. Confirmed that pt is not taking Plavix\par \par \par Surgery recommendation was result of shared decision making. The patient tried conservative treatment and failed therapy. All surgical  and non-surgical options were discussed. After detailed imaging review and patient examination surgical intervention was discussed with the patient to halt progression of symptoms. \par Potential surgical risks and benefits and alternative discussed with time allowed for questions and answers given. \par Pre-op requirements and postop recovery and expectations discussed regarding the benefits and risks for surgery.\par \par Instructions to Stop taking all non-steroidal anti-inflammatory medicines (ibuprofen, naproxen, etc.) and blood thinners (Coumadin, Plavix, aspirin, etc.) 7-10 days before surgery. Stop using nicotine and drinking alcohol 1 week before and 2 weeks after surgery to avoid bleeding and healing problems.\par \par  Pt agrees to proceed with surgery.\par Preop planning and care coordination in progress. Spine surgery questionnaire and Virtual Spine class information provided.\par Teachback protocol implemented.\par \par \par \par Please see Dr. Bond's dictation for details.\par I, Dr. Kerrie Bond evaluated the patient with the nurse practitioner Mian Fields and established the plan of care. I personally discuss this patient with the nurse practitioner at the time of the visit. I agree with the assessment and plan as written, unless noted below.\par \par

## 2022-10-05 NOTE — REASON FOR VISIT
[Follow-Up: _____] : a [unfilled] follow-up visit [Interpreters_IDNumber] : [id number] [Interpreters_FullName] : Jocelyn [[id number]] : [place] [FreeTextEntry1] : Today he reports that he has had multiple fall and continue difficulty with his balance. He reports that his balance difficulty is impacting his ability to care for himself. He ambulates with a cane.

## 2022-10-05 NOTE — PHYSICAL EXAM
[Oriented To Time, Place, And Person] : oriented to person, place, and time [Impaired Insight] : insight and judgment were intact [No Visual Abnormalities] : no visible abnormalities [Neck Appearance] : the appearance of the neck was normal [] : no respiratory distress [Heart Rate And Rhythm] : heart rate was normal and rhythm regular [FreeTextEntry1] : Ambulates with cane

## 2022-10-11 DIAGNOSIS — A49.01 METHICILLIN SUSCEPTIBLE STAPHYLOCOCCUS AUREUS INFECTION, UNSPECIFIED SITE: ICD-10-CM

## 2022-10-15 ENCOUNTER — OUTPATIENT (OUTPATIENT)
Dept: OUTPATIENT SERVICES | Facility: HOSPITAL | Age: 82
LOS: 1 days | End: 2022-10-15
Payer: COMMERCIAL

## 2022-10-15 DIAGNOSIS — Z11.52 ENCOUNTER FOR SCREENING FOR COVID-19: ICD-10-CM

## 2022-10-15 DIAGNOSIS — Z98.890 OTHER SPECIFIED POSTPROCEDURAL STATES: Chronic | ICD-10-CM

## 2022-10-15 DIAGNOSIS — Z98.49 CATARACT EXTRACTION STATUS, UNSPECIFIED EYE: Chronic | ICD-10-CM

## 2022-10-15 LAB — SARS-COV-2 RNA SPEC QL NAA+PROBE: SIGNIFICANT CHANGE UP

## 2022-10-15 PROCEDURE — U0003: CPT

## 2022-10-15 PROCEDURE — U0005: CPT

## 2022-10-15 PROCEDURE — C9803: CPT

## 2022-10-17 ENCOUNTER — TRANSCRIPTION ENCOUNTER (OUTPATIENT)
Age: 82
End: 2022-10-17

## 2022-10-18 ENCOUNTER — APPOINTMENT (OUTPATIENT)
Dept: NEUROSURGERY | Facility: HOSPITAL | Age: 82
End: 2022-10-18

## 2022-10-18 ENCOUNTER — INPATIENT (INPATIENT)
Facility: HOSPITAL | Age: 82
LOS: 8 days | Discharge: NURSING, REHAB OR LONG-TERM CARE | DRG: 471 | End: 2022-10-27
Attending: NEUROLOGICAL SURGERY | Admitting: NEUROLOGICAL SURGERY
Payer: COMMERCIAL

## 2022-10-18 ENCOUNTER — TRANSCRIPTION ENCOUNTER (OUTPATIENT)
Age: 82
End: 2022-10-18

## 2022-10-18 VITALS
OXYGEN SATURATION: 97 % | DIASTOLIC BLOOD PRESSURE: 80 MMHG | SYSTOLIC BLOOD PRESSURE: 158 MMHG | TEMPERATURE: 98 F | HEART RATE: 77 BPM | WEIGHT: 179.9 LBS | HEIGHT: 65 IN | RESPIRATION RATE: 16 BRPM

## 2022-10-18 DIAGNOSIS — Z98.890 OTHER SPECIFIED POSTPROCEDURAL STATES: Chronic | ICD-10-CM

## 2022-10-18 DIAGNOSIS — Z98.49 CATARACT EXTRACTION STATUS, UNSPECIFIED EYE: Chronic | ICD-10-CM

## 2022-10-18 DIAGNOSIS — G95.9 DISEASE OF SPINAL CORD, UNSPECIFIED: ICD-10-CM

## 2022-10-18 LAB
ANION GAP SERPL CALC-SCNC: 16 MMOL/L — SIGNIFICANT CHANGE UP (ref 5–17)
BUN SERPL-MCNC: 14 MG/DL — SIGNIFICANT CHANGE UP (ref 7–23)
CALCIUM SERPL-MCNC: 8.1 MG/DL — LOW (ref 8.4–10.5)
CHLORIDE SERPL-SCNC: 105 MMOL/L — SIGNIFICANT CHANGE UP (ref 96–108)
CO2 SERPL-SCNC: 19 MMOL/L — LOW (ref 22–31)
CREAT SERPL-MCNC: 0.77 MG/DL — SIGNIFICANT CHANGE UP (ref 0.5–1.3)
EGFR: 88 ML/MIN/1.73M2 — SIGNIFICANT CHANGE UP
GAS PNL BLDA: SIGNIFICANT CHANGE UP
GLUCOSE BLDC GLUCOMTR-MCNC: 95 MG/DL — SIGNIFICANT CHANGE UP (ref 70–99)
GLUCOSE SERPL-MCNC: 187 MG/DL — HIGH (ref 70–99)
HCT VFR BLD CALC: 36.8 % — LOW (ref 39–50)
HGB BLD-MCNC: 13 G/DL — SIGNIFICANT CHANGE UP (ref 13–17)
MAGNESIUM SERPL-MCNC: 1.7 MG/DL — SIGNIFICANT CHANGE UP (ref 1.6–2.6)
MCHC RBC-ENTMCNC: 30.2 PG — SIGNIFICANT CHANGE UP (ref 27–34)
MCHC RBC-ENTMCNC: 35.3 GM/DL — SIGNIFICANT CHANGE UP (ref 32–36)
MCV RBC AUTO: 85.6 FL — SIGNIFICANT CHANGE UP (ref 80–100)
NRBC # BLD: 0 /100 WBCS — SIGNIFICANT CHANGE UP (ref 0–0)
PHOSPHATE SERPL-MCNC: 5 MG/DL — HIGH (ref 2.5–4.5)
PLATELET # BLD AUTO: 175 K/UL — SIGNIFICANT CHANGE UP (ref 150–400)
POTASSIUM SERPL-MCNC: 3.5 MMOL/L — SIGNIFICANT CHANGE UP (ref 3.5–5.3)
POTASSIUM SERPL-SCNC: 3.5 MMOL/L — SIGNIFICANT CHANGE UP (ref 3.5–5.3)
RBC # BLD: 4.3 M/UL — SIGNIFICANT CHANGE UP (ref 4.2–5.8)
RBC # FLD: 12.2 % — SIGNIFICANT CHANGE UP (ref 10.3–14.5)
RH IG SCN BLD-IMP: POSITIVE — SIGNIFICANT CHANGE UP
SODIUM SERPL-SCNC: 140 MMOL/L — SIGNIFICANT CHANGE UP (ref 135–145)
WBC # BLD: 9.68 K/UL — SIGNIFICANT CHANGE UP (ref 3.8–10.5)
WBC # FLD AUTO: 9.68 K/UL — SIGNIFICANT CHANGE UP (ref 3.8–10.5)

## 2022-10-18 PROCEDURE — 22614 ARTHRD PST TQ 1NTRSPC EA ADD: CPT

## 2022-10-18 PROCEDURE — 22843 INSERT SPINE FIXATION DEVICE: CPT

## 2022-10-18 PROCEDURE — 63048 LAM FACETEC &FORAMOT EA ADDL: CPT

## 2022-10-18 PROCEDURE — 63045 LAM FACETEC & FORAMOT CRV: CPT

## 2022-10-18 PROCEDURE — 22595 ARTHRD PST TQ ATLAS-AXIS: CPT

## 2022-10-18 PROCEDURE — 22600 ARTHRD PST TQ 1NTRSPC CRV: CPT

## 2022-10-18 PROCEDURE — 99291 CRITICAL CARE FIRST HOUR: CPT

## 2022-10-18 PROCEDURE — 93970 EXTREMITY STUDY: CPT | Mod: 26

## 2022-10-18 PROCEDURE — 72040 X-RAY EXAM NECK SPINE 2-3 VW: CPT | Mod: 26

## 2022-10-18 DEVICE — IMPLANTABLE DEVICE: Type: IMPLANTABLE DEVICE | Status: FUNCTIONAL

## 2022-10-18 DEVICE — SURGIFOAM PAD 8CM X 12.5CM X 10MM (100): Type: IMPLANTABLE DEVICE | Status: FUNCTIONAL

## 2022-10-18 DEVICE — FLOSEAL FAST PREP 10ML: Type: IMPLANTABLE DEVICE | Status: FUNCTIONAL

## 2022-10-18 DEVICE — MAYFIELD SKULL PIN ADULT PLASTIC: Type: IMPLANTABLE DEVICE | Status: FUNCTIONAL

## 2022-10-18 DEVICE — SET SCREW TI T15: Type: IMPLANTABLE DEVICE | Status: FUNCTIONAL

## 2022-10-18 DEVICE — SET SCREW TALL: Type: IMPLANTABLE DEVICE | Status: FUNCTIONAL

## 2022-10-18 DEVICE — SCREW POLY 3.5X14MM: Type: IMPLANTABLE DEVICE | Status: FUNCTIONAL

## 2022-10-18 DEVICE — KIT A-LINE 1LUM 20G X 12CM SAFE KIT: Type: IMPLANTABLE DEVICE | Status: FUNCTIONAL

## 2022-10-18 DEVICE — ROD TI STR 3.5X240MM: Type: IMPLANTABLE DEVICE | Status: FUNCTIONAL

## 2022-10-18 RX ORDER — ACETAMINOPHEN 500 MG
650 TABLET ORAL EVERY 6 HOURS
Refills: 0 | Status: DISCONTINUED | OUTPATIENT
Start: 2022-10-18 | End: 2022-10-24

## 2022-10-18 RX ORDER — OXYCODONE HYDROCHLORIDE 5 MG/1
10 TABLET ORAL EVERY 6 HOURS
Refills: 0 | Status: DISCONTINUED | OUTPATIENT
Start: 2022-10-18 | End: 2022-10-19

## 2022-10-18 RX ORDER — CEFAZOLIN SODIUM 1 G
2000 VIAL (EA) INJECTION EVERY 8 HOURS
Refills: 0 | Status: COMPLETED | OUTPATIENT
Start: 2022-10-18 | End: 2022-10-19

## 2022-10-18 RX ORDER — SODIUM CHLORIDE 9 MG/ML
1000 INJECTION INTRAMUSCULAR; INTRAVENOUS; SUBCUTANEOUS
Refills: 0 | Status: DISCONTINUED | OUTPATIENT
Start: 2022-10-18 | End: 2022-10-19

## 2022-10-18 RX ORDER — NICARDIPINE HYDROCHLORIDE 30 MG/1
5 CAPSULE, EXTENDED RELEASE ORAL
Qty: 40 | Refills: 0 | Status: DISCONTINUED | OUTPATIENT
Start: 2022-10-18 | End: 2022-10-19

## 2022-10-18 RX ORDER — HYDROMORPHONE HYDROCHLORIDE 2 MG/ML
1 INJECTION INTRAMUSCULAR; INTRAVENOUS; SUBCUTANEOUS EVERY 6 HOURS
Refills: 0 | Status: DISCONTINUED | OUTPATIENT
Start: 2022-10-18 | End: 2022-10-19

## 2022-10-18 RX ORDER — ACETAMINOPHEN 500 MG
1000 TABLET ORAL EVERY 6 HOURS
Refills: 0 | Status: DISCONTINUED | OUTPATIENT
Start: 2022-10-18 | End: 2022-10-18

## 2022-10-18 RX ORDER — ATORVASTATIN CALCIUM 80 MG/1
40 TABLET, FILM COATED ORAL AT BEDTIME
Refills: 0 | Status: DISCONTINUED | OUTPATIENT
Start: 2022-10-18 | End: 2022-10-27

## 2022-10-18 RX ORDER — MAGNESIUM HYDROXIDE 400 MG/1
30 TABLET, CHEWABLE ORAL EVERY 12 HOURS
Refills: 0 | Status: DISCONTINUED | OUTPATIENT
Start: 2022-10-18 | End: 2022-10-20

## 2022-10-18 RX ORDER — DEXMEDETOMIDINE HYDROCHLORIDE IN 0.9% SODIUM CHLORIDE 4 UG/ML
0.2 INJECTION INTRAVENOUS
Qty: 200 | Refills: 0 | Status: DISCONTINUED | OUTPATIENT
Start: 2022-10-18 | End: 2022-10-18

## 2022-10-18 RX ORDER — LIDOCAINE HCL 20 MG/ML
0.2 VIAL (ML) INJECTION ONCE
Refills: 0 | Status: DISCONTINUED | OUTPATIENT
Start: 2022-10-18 | End: 2022-10-18

## 2022-10-18 RX ORDER — SODIUM CHLORIDE 9 MG/ML
3 INJECTION INTRAMUSCULAR; INTRAVENOUS; SUBCUTANEOUS EVERY 8 HOURS
Refills: 0 | Status: DISCONTINUED | OUTPATIENT
Start: 2022-10-18 | End: 2022-10-18

## 2022-10-18 RX ORDER — HYDROMORPHONE HYDROCHLORIDE 2 MG/ML
1 INJECTION INTRAMUSCULAR; INTRAVENOUS; SUBCUTANEOUS EVERY 4 HOURS
Refills: 0 | Status: DISCONTINUED | OUTPATIENT
Start: 2022-10-18 | End: 2022-10-18

## 2022-10-18 RX ORDER — AMLODIPINE BESYLATE 2.5 MG/1
5 TABLET ORAL AT BEDTIME
Refills: 0 | Status: DISCONTINUED | OUTPATIENT
Start: 2022-10-18 | End: 2022-10-20

## 2022-10-18 RX ORDER — CHLORHEXIDINE GLUCONATE 213 G/1000ML
1 SOLUTION TOPICAL ONCE
Refills: 0 | Status: DISCONTINUED | OUTPATIENT
Start: 2022-10-18 | End: 2022-10-18

## 2022-10-18 RX ORDER — DEXAMETHASONE 0.5 MG/5ML
4 ELIXIR ORAL EVERY 6 HOURS
Refills: 0 | Status: COMPLETED | OUTPATIENT
Start: 2022-10-18 | End: 2022-10-19

## 2022-10-18 RX ORDER — CYCLOBENZAPRINE HYDROCHLORIDE 10 MG/1
10 TABLET, FILM COATED ORAL EVERY 8 HOURS
Refills: 0 | Status: DISCONTINUED | OUTPATIENT
Start: 2022-10-18 | End: 2022-10-21

## 2022-10-18 RX ORDER — LOSARTAN POTASSIUM 100 MG/1
25 TABLET, FILM COATED ORAL DAILY
Refills: 0 | Status: DISCONTINUED | OUTPATIENT
Start: 2022-10-18 | End: 2022-10-20

## 2022-10-18 RX ORDER — CYCLOBENZAPRINE HYDROCHLORIDE 10 MG/1
10 TABLET, FILM COATED ORAL EVERY 8 HOURS
Refills: 0 | Status: DISCONTINUED | OUTPATIENT
Start: 2022-10-18 | End: 2022-10-18

## 2022-10-18 RX ORDER — CHLORHEXIDINE GLUCONATE 213 G/1000ML
1 SOLUTION TOPICAL
Refills: 0 | Status: DISCONTINUED | OUTPATIENT
Start: 2022-10-18 | End: 2022-10-19

## 2022-10-18 RX ORDER — OXYCODONE HYDROCHLORIDE 5 MG/1
5 TABLET ORAL EVERY 6 HOURS
Refills: 0 | Status: DISCONTINUED | OUTPATIENT
Start: 2022-10-18 | End: 2022-10-19

## 2022-10-18 RX ORDER — SENNA PLUS 8.6 MG/1
2 TABLET ORAL AT BEDTIME
Refills: 0 | Status: DISCONTINUED | OUTPATIENT
Start: 2022-10-18 | End: 2022-10-27

## 2022-10-18 RX ORDER — ONDANSETRON 8 MG/1
4 TABLET, FILM COATED ORAL EVERY 6 HOURS
Refills: 0 | Status: DISCONTINUED | OUTPATIENT
Start: 2022-10-18 | End: 2022-10-27

## 2022-10-18 RX ADMIN — DEXMEDETOMIDINE HYDROCHLORIDE IN 0.9% SODIUM CHLORIDE 4.08 MICROGRAM(S)/KG/HR: 4 INJECTION INTRAVENOUS at 18:18

## 2022-10-18 RX ADMIN — Medication 4 MILLIGRAM(S): at 17:01

## 2022-10-18 RX ADMIN — ATORVASTATIN CALCIUM 40 MILLIGRAM(S): 80 TABLET, FILM COATED ORAL at 22:30

## 2022-10-18 RX ADMIN — HYDROMORPHONE HYDROCHLORIDE 1 MILLIGRAM(S): 2 INJECTION INTRAMUSCULAR; INTRAVENOUS; SUBCUTANEOUS at 16:15

## 2022-10-18 RX ADMIN — LOSARTAN POTASSIUM 25 MILLIGRAM(S): 100 TABLET, FILM COATED ORAL at 22:45

## 2022-10-18 RX ADMIN — NICARDIPINE HYDROCHLORIDE 25 MG/HR: 30 CAPSULE, EXTENDED RELEASE ORAL at 16:49

## 2022-10-18 RX ADMIN — AMLODIPINE BESYLATE 5 MILLIGRAM(S): 2.5 TABLET ORAL at 22:30

## 2022-10-18 RX ADMIN — SENNA PLUS 2 TABLET(S): 8.6 TABLET ORAL at 22:35

## 2022-10-18 RX ADMIN — HYDROMORPHONE HYDROCHLORIDE 1 MILLIGRAM(S): 2 INJECTION INTRAMUSCULAR; INTRAVENOUS; SUBCUTANEOUS at 16:00

## 2022-10-18 RX ADMIN — CHLORHEXIDINE GLUCONATE 1 APPLICATION(S): 213 SOLUTION TOPICAL at 21:30

## 2022-10-18 RX ADMIN — SODIUM CHLORIDE 75 MILLILITER(S): 9 INJECTION INTRAMUSCULAR; INTRAVENOUS; SUBCUTANEOUS at 16:49

## 2022-10-18 RX ADMIN — SODIUM CHLORIDE 75 MILLILITER(S): 9 INJECTION INTRAMUSCULAR; INTRAVENOUS; SUBCUTANEOUS at 17:01

## 2022-10-18 RX ADMIN — Medication 100 MILLIGRAM(S): at 22:00

## 2022-10-18 RX ADMIN — SODIUM CHLORIDE 3 MILLILITER(S): 9 INJECTION INTRAMUSCULAR; INTRAVENOUS; SUBCUTANEOUS at 08:12

## 2022-10-18 NOTE — PRE-ANESTHESIA EVALUATION ADULT - NSANTHPMHFT_GEN_ALL_CORE
82M (Hungarian-speaking) with HTN, pre-DM2, HLD, CKDIII, cervical stenosis with bilateral proximal upper and lower extremity weakness, ambulates with walker.

## 2022-10-18 NOTE — PATIENT PROFILE ADULT - NSPROHMDIABETBLDGLCUSUALFT_GEN_A_NUR
Medical Necessity Clause: This procedure was medically necessary because the lesions that were treated were: Post-Care Instructions: I reviewed with the patient in detail post-care instructions. Patient is to wear sunprotection, and avoid picking at any of the treated lesions. Pt may apply Vaseline to crusted or scabbing areas. Render Post-Care Instructions In Note?: no Detail Level: Detailed Medical Necessity Information: It is in your best interest to select a reason for this procedure from the list below. All of these items fulfill various CMS LCD requirements except the new and changing color options. Number Of Freeze-Thaw Cycles: 2 freeze-thaw cycles Consent: The patient's consent was obtained including but not limited to risks of crusting, scabbing, blistering, scarring, darker or lighter pigmentary change, recurrence, incomplete removal and infection. 150

## 2022-10-18 NOTE — PROGRESS NOTE ADULT - SUBJECTIVE AND OBJECTIVE BOX
NSCU ATTENDING -- ADDITIONAL PROGRESS NOTE    Nighttime rounds were performed -- please refer to earlier Progress Note for HPI details.    T(C): 36.3 (10-18-22 @ 19:00), Max: 36.6 (10-18-22 @ 07:56)  HR: 80 (10-18-22 @ 19:30) (77 - 118)  BP: 158/80 (10-18-22 @ 08:31) (158/80 - 158/80)  RR: 16 (10-18-22 @ 19:30) (12 - 29)  SpO2: 99% (10-18-22 @ 19:30) (94% - 100%)  Wt(kg): --    Relevant labwork and imaging reviewed.    Patient remains critically ill.    [A/P]    Additional 30 minutes of critical care time. NSCU ATTENDING -- ADDITIONAL PROGRESS NOTE    Nighttime rounds were performed -- please refer to earlier Progress Note for HPI details.    T(C): 36.3 (10-18-22 @ 19:00), Max: 36.6 (10-18-22 @ 07:56)  HR: 80 (10-18-22 @ 19:30) (77 - 118)  BP: 158/80 (10-18-22 @ 08:31) (158/80 - 158/80)  RR: 16 (10-18-22 @ 19:30) (12 - 29)  SpO2: 99% (10-18-22 @ 19:30) (94% - 100%)  Wt(kg): --    Relevant labwork and imaging reviewed.    Patient remains critically ill.    off precedex now, much calmer, difficult to arouse, but once woken up, intact exam with  #[id number], oriented x3    [A/P]  d/c precedex  pain control  dysphagia when able   decadron per neurosurgery     Additional 20 minutes of critical care time.

## 2022-10-18 NOTE — PROGRESS NOTE ADULT - ASSESSMENT
Summary: 81 yo male s/p cervical decompression/fusion    NEURO:  q1h neuro checks    CARDS:  MAP > 75 per NSG    PULM:  sat > 92%    RENAL:  IVL    GASTRO:  advance as tolerated  ---> Stress ulcer prophylaxis:  PPI not indicated    HEME:  monitor H/H    ---> DVT prophylaxis: SCDs, hold anticoagulation, fresh post-op    ENDO:  euglycemia    ID:  afebrile    Code status:  Full code  Disposition:  ICU    This patient was at high risk of neurologic deterioration and/or death due to: cervical surgery    ATTENDING STATEMENT:  Patient is critically ill, requiring critical care services.     Attending: I have personally and independently provided 45 minutes of critical care services.  This excludes any time spent on separate procedures or teaching.

## 2022-10-18 NOTE — PROGRESS NOTE ADULT - SUBJECTIVE AND OBJECTIVE BOX
HPI:  82 year old male with PMH of HTN, HLD, Pre-diabetes, BPH with complaint of neck pain. Patient endorses increasing neck pain, gait instability and clumsiness of the hands, He ambulates with a cane. He denies fever, chills, trauma or injury. He presents to [facility] prior to scheduled Posterior C1-6 Decompression, C4-5 Posterior Column Osteotomy, C1-C7 Fusion, Complex Plastics Closure on 10/18/2022.    *** HIGH RISK OF DVT PRESENT ON ADMISSION ***    VITALS:  T(C): , Max: 36.6 (10-18-22 @ 07:56)  HR:  (77 - 106)  BP:  (158/80 - 158/80)  ABP:  (148/59 - 180/84)  RR:  (13 - 29)  SpO2:  (96% - 100%)  Wt(kg): --      10-18-22 @ 07:01  -  10-18-22 @ 17:17  --------------------------------------------------------  IN: 175 mL / OUT: 400 mL / NET: -225 mL      LABS:  Pending      IMAGING:   Recent imaging studies were reviewed.    MEDICATIONS:  acetaminophen     Tablet .. 1000 milliGRAM(s) Oral every 6 hours  amLODIPine   Tablet 5 milliGRAM(s) Oral at bedtime  atorvastatin 40 milliGRAM(s) Oral at bedtime  bisacodyl 5 milliGRAM(s) Oral every 12 hours PRN  ceFAZolin   IVPB 2000 milliGRAM(s) IV Intermittent every 8 hours  cyclobenzaprine 10 milliGRAM(s) Oral every 8 hours  dexAMETHasone  Injectable 4 milliGRAM(s) IV Push every 6 hours  HYDROmorphone  Injectable 1 milliGRAM(s) IV Push every 6 hours PRN  losartan 25 milliGRAM(s) Oral daily  magnesium hydroxide Suspension 30 milliLiter(s) Oral every 12 hours PRN  niCARdipine Infusion 5 mG/Hr IV Continuous <Continuous>  ondansetron Injectable 4 milliGRAM(s) IV Push every 6 hours PRN  oxyCODONE    IR 5 milliGRAM(s) Oral every 6 hours PRN  oxyCODONE    IR 10 milliGRAM(s) Oral every 6 hours PRN  senna 2 Tablet(s) Oral at bedtime  sodium chloride 0.9%. 1000 milliLiter(s) IV Continuous <Continuous>    EXAMINATION:  General:  agitated  HEENT:  MMM  Neuro:  awake, alert, oriented x 1, follows commands, moves all extremities  Cards:  RRR  Respiratory:  no respiratory distress  Adomen:  soft  Extremities:  no edema  Skin:  warm/dry

## 2022-10-18 NOTE — PATIENT PROFILE ADULT - FALL HARM RISK - UNIVERSAL INTERVENTIONS
Bed in lowest position, wheels locked, appropriate side rails in place/Call bell, personal items and telephone in reach/Instruct patient to call for assistance before getting out of bed or chair/Non-slip footwear when patient is out of bed/[place] to call system/Physically safe environment - no spills, clutter or unnecessary equipment/Purposeful Proactive Rounding/Room/bathroom lighting operational, light cord in reach

## 2022-10-18 NOTE — PATIENT PROFILE ADULT - FUNCTIONAL ASSESSMENT - BASIC MOBILITY 2.
Patient:   MARIANA MORENO            MRN: [id number]            FIN: [id number]               Age:   54 years     Sex:  MALE     :  58   Associated Diagnoses:   None   Author:   SYBIL MARROQUIN         H&P UPDATE::    I HAVE REVIEWED THE HISTORY & PHYSICAL (H&P COMPLETED IN THE LAST 30 DAYS) AND EXAMINED THE PATIENT:    __x___   NO CHANGES HAVE OCCURRED IN THE PATIENT'S CONDITION SINCE THE H&P WAS COMPLETED       _____ THE FOLLOWING ARE THE CHANGES IN THE PATIENT'S CONDITION (SPECIFIED BELOW):                   Electronically Signed On 2017 16:02  __________________________________________________   SYBIL MARROQUIN    
CHECK ONBASE FOR SCAN    
4 = No assist / stand by assistance

## 2022-10-18 NOTE — CHART NOTE - NSCHARTNOTEFT_GEN_A_CORE
CAPRINI SCORE [CLOT] Score on Admission for     AGE RELATED RISK FACTORS                                                       MOBILITY RELATED FACTORS  [ ] Age 41-60 years                                            (1 Point)                  [ ] Bed rest                                                        (1 Point)  [ ] Age: 61-74 years                                           (2 Points)                 [ ] Plaster cast                                                   (2 Points)  [ x] Age= 75 years                                              (3 Points)                 [ ] Bed bound for more than 72 hours                 (2 Points)    DISEASE RELATED RISK FACTORS                                               GENDER SPECIFIC FACTORS  [ ] Edema in the lower extremities                       (1 Point)                  [ ] Pregnancy                                                     (1 Point)  [ ] Varicose veins                                               (1 Point)                  [ ] Post-partum < 6 weeks                                   (1 Point)             [ x] BMI > 25 Kg/m2                                            (1 Point)                  [ ] Hormonal therapy  or oral contraception          (1 Point)                 [ ] Sepsis (in the previous month)                        (1 Point)                  [ ] History of pregnancy complications                 (1 point)  [ ] Pneumonia or serious lung disease                                               [ ] Unexplained or recurrent                     (1 Point)           (in the previous month)                               (1 Point)  [ ] Abnormal pulmonary function test                     (1 Point)                 SURGERY RELATED RISK FACTORS (include planned surgeries)  [ ] Acute myocardial infarction                              (1 Point)                 [ ]  Section                                             (1 Point)  [ ] Congestive heart failure (in the previous month)  (1 Point)         [ ] Minor surgery                                                  (1 Point)   [ ] Inflammatory bowel disease                             (1 Point)                 [ ] Arthroscopic surgery                                        (2 Points)  [ ] Central venous access                                      (2 Points)                [ x] General surgery lasting more than 45 minutes   (2 Points)       [ ] Stroke (in the previous month)                          (5 Points)               [ ] Elective arthroplasty                                         (5 Points)            [ ] current or past malignancy                              (2 Points)                                                                                                       HEMATOLOGY RELATED FACTORS                                                 TRAUMA RELATED RISK FACTORS  [ ] Prior episodes of VTE                                     (3 Points)                [ ] Fracture of the hip, pelvis, or leg                       (5 Points)  [ ] Positive family history for VTE                         (3 Points)                 [ ] Acute spinal cord injury (in the previous month)  (5 Points)  [ ] Prothrombin 45612 A                                     (3 Points)                 [ ] Paralysis  (less than 1 month)                             (5 Points)  [ ] Factor V Leiden                                             (3 Points)                  [ ] Multiple Trauma within 1 month                        (5 Points)  [ ] Lupus anticoagulants                                     (3 Points)                                                           [ ] Anticardiolipin antibodies                               (3 Points)                                                       [ ] High homocysteine in the blood                      (3 Points)                                             [ ] Other congenital or acquired thrombophilia      (3 Points)                                                [ ] Heparin induced thrombocytopenia                  (3 Points)                                          Total Score [    6      ]    Risk:  Very low 0   Low 1 to 2   Moderate 3 to 4   High =5       VTE Prophylasix Recommednations:  [x ] mechanical pneumatic compression devices                                      [ ] contraindicated: _____________________  [ ] chemo prophylasix                                                                                   [x ] contraindicated ___newly post op__________________    **** HIGH LIKELIHOOD DVT PRESENT ON ADMISSION  [x ] (please order LE dopplers within 24 hours of admission)

## 2022-10-19 DIAGNOSIS — M48.02 SPINAL STENOSIS, CERVICAL REGION: ICD-10-CM

## 2022-10-19 DIAGNOSIS — I10 ESSENTIAL (PRIMARY) HYPERTENSION: ICD-10-CM

## 2022-10-19 DIAGNOSIS — E11.9 TYPE 2 DIABETES MELLITUS WITHOUT COMPLICATIONS: ICD-10-CM

## 2022-10-19 DIAGNOSIS — J96.01 ACUTE RESPIRATORY FAILURE WITH HYPOXIA: ICD-10-CM

## 2022-10-19 DIAGNOSIS — E78.5 HYPERLIPIDEMIA, UNSPECIFIED: ICD-10-CM

## 2022-10-19 LAB
GLUCOSE BLDC GLUCOMTR-MCNC: 232 MG/DL — HIGH (ref 70–99)
GLUCOSE BLDC GLUCOMTR-MCNC: 238 MG/DL — HIGH (ref 70–99)

## 2022-10-19 PROCEDURE — 71045 X-RAY EXAM CHEST 1 VIEW: CPT | Mod: 26

## 2022-10-19 PROCEDURE — 72125 CT NECK SPINE W/O DYE: CPT | Mod: 26

## 2022-10-19 PROCEDURE — 99223 1ST HOSP IP/OBS HIGH 75: CPT

## 2022-10-19 RX ORDER — INSULIN LISPRO 100/ML
VIAL (ML) SUBCUTANEOUS
Refills: 0 | Status: DISCONTINUED | OUTPATIENT
Start: 2022-10-19 | End: 2022-10-27

## 2022-10-19 RX ORDER — GLUCAGON INJECTION, SOLUTION 0.5 MG/.1ML
1 INJECTION, SOLUTION SUBCUTANEOUS ONCE
Refills: 0 | Status: DISCONTINUED | OUTPATIENT
Start: 2022-10-19 | End: 2022-10-27

## 2022-10-19 RX ORDER — HYDROMORPHONE HYDROCHLORIDE 2 MG/ML
0.5 INJECTION INTRAMUSCULAR; INTRAVENOUS; SUBCUTANEOUS EVERY 6 HOURS
Refills: 0 | Status: DISCONTINUED | OUTPATIENT
Start: 2022-10-19 | End: 2022-10-22

## 2022-10-19 RX ORDER — OXYCODONE HYDROCHLORIDE 5 MG/1
5 TABLET ORAL EVERY 4 HOURS
Refills: 0 | Status: DISCONTINUED | OUTPATIENT
Start: 2022-10-19 | End: 2022-10-24

## 2022-10-19 RX ORDER — INSULIN LISPRO 100/ML
VIAL (ML) SUBCUTANEOUS AT BEDTIME
Refills: 0 | Status: DISCONTINUED | OUTPATIENT
Start: 2022-10-19 | End: 2022-10-27

## 2022-10-19 RX ORDER — DEXTROSE 50 % IN WATER 50 %
25 SYRINGE (ML) INTRAVENOUS ONCE
Refills: 0 | Status: DISCONTINUED | OUTPATIENT
Start: 2022-10-19 | End: 2022-10-27

## 2022-10-19 RX ORDER — ENOXAPARIN SODIUM 100 MG/ML
40 INJECTION SUBCUTANEOUS
Refills: 0 | Status: DISCONTINUED | OUTPATIENT
Start: 2022-10-19 | End: 2022-10-27

## 2022-10-19 RX ORDER — DEXTROSE 50 % IN WATER 50 %
50 SYRINGE (ML) INTRAVENOUS ONCE
Refills: 0 | Status: DISCONTINUED | OUTPATIENT
Start: 2022-10-19 | End: 2022-10-19

## 2022-10-19 RX ORDER — DEXTROSE 50 % IN WATER 50 %
15 SYRINGE (ML) INTRAVENOUS ONCE
Refills: 0 | Status: DISCONTINUED | OUTPATIENT
Start: 2022-10-19 | End: 2022-10-27

## 2022-10-19 RX ORDER — OXYCODONE HYDROCHLORIDE 5 MG/1
10 TABLET ORAL EVERY 4 HOURS
Refills: 0 | Status: DISCONTINUED | OUTPATIENT
Start: 2022-10-19 | End: 2022-10-24

## 2022-10-19 RX ORDER — SODIUM CHLORIDE 9 MG/ML
1000 INJECTION, SOLUTION INTRAVENOUS
Refills: 0 | Status: DISCONTINUED | OUTPATIENT
Start: 2022-10-19 | End: 2022-10-27

## 2022-10-19 RX ORDER — DEXTROSE 50 % IN WATER 50 %
12.5 SYRINGE (ML) INTRAVENOUS ONCE
Refills: 0 | Status: DISCONTINUED | OUTPATIENT
Start: 2022-10-19 | End: 2022-10-27

## 2022-10-19 RX ORDER — POTASSIUM CHLORIDE 20 MEQ
40 PACKET (EA) ORAL ONCE
Refills: 0 | Status: COMPLETED | OUTPATIENT
Start: 2022-10-19 | End: 2022-10-19

## 2022-10-19 RX ORDER — POLYETHYLENE GLYCOL 3350 17 G/17G
17 POWDER, FOR SOLUTION ORAL DAILY
Refills: 0 | Status: DISCONTINUED | OUTPATIENT
Start: 2022-10-19 | End: 2022-10-20

## 2022-10-19 RX ORDER — MAGNESIUM SULFATE 500 MG/ML
2 VIAL (ML) INJECTION ONCE
Refills: 0 | Status: COMPLETED | OUTPATIENT
Start: 2022-10-19 | End: 2022-10-19

## 2022-10-19 RX ADMIN — OXYCODONE HYDROCHLORIDE 10 MILLIGRAM(S): 5 TABLET ORAL at 10:36

## 2022-10-19 RX ADMIN — OXYCODONE HYDROCHLORIDE 5 MILLIGRAM(S): 5 TABLET ORAL at 03:00

## 2022-10-19 RX ADMIN — CYCLOBENZAPRINE HYDROCHLORIDE 10 MILLIGRAM(S): 10 TABLET, FILM COATED ORAL at 13:58

## 2022-10-19 RX ADMIN — Medication 40 MILLIEQUIVALENT(S): at 04:00

## 2022-10-19 RX ADMIN — Medication 4 MILLIGRAM(S): at 06:00

## 2022-10-19 RX ADMIN — OXYCODONE HYDROCHLORIDE 10 MILLIGRAM(S): 5 TABLET ORAL at 23:41

## 2022-10-19 RX ADMIN — OXYCODONE HYDROCHLORIDE 5 MILLIGRAM(S): 5 TABLET ORAL at 02:31

## 2022-10-19 RX ADMIN — OXYCODONE HYDROCHLORIDE 10 MILLIGRAM(S): 5 TABLET ORAL at 15:48

## 2022-10-19 RX ADMIN — Medication 25 GRAM(S): at 04:00

## 2022-10-19 RX ADMIN — HYDROMORPHONE HYDROCHLORIDE 1 MILLIGRAM(S): 2 INJECTION INTRAMUSCULAR; INTRAVENOUS; SUBCUTANEOUS at 08:19

## 2022-10-19 RX ADMIN — OXYCODONE HYDROCHLORIDE 10 MILLIGRAM(S): 5 TABLET ORAL at 10:06

## 2022-10-19 RX ADMIN — Medication 4 MILLIGRAM(S): at 11:21

## 2022-10-19 RX ADMIN — ATORVASTATIN CALCIUM 40 MILLIGRAM(S): 80 TABLET, FILM COATED ORAL at 21:32

## 2022-10-19 RX ADMIN — POLYETHYLENE GLYCOL 3350 17 GRAM(S): 17 POWDER, FOR SOLUTION ORAL at 17:22

## 2022-10-19 RX ADMIN — HYDROMORPHONE HYDROCHLORIDE 1 MILLIGRAM(S): 2 INJECTION INTRAMUSCULAR; INTRAVENOUS; SUBCUTANEOUS at 08:04

## 2022-10-19 RX ADMIN — ENOXAPARIN SODIUM 40 MILLIGRAM(S): 100 INJECTION SUBCUTANEOUS at 17:50

## 2022-10-19 RX ADMIN — OXYCODONE HYDROCHLORIDE 10 MILLIGRAM(S): 5 TABLET ORAL at 14:48

## 2022-10-19 RX ADMIN — AMLODIPINE BESYLATE 5 MILLIGRAM(S): 2.5 TABLET ORAL at 21:31

## 2022-10-19 RX ADMIN — Medication 2: at 18:17

## 2022-10-19 RX ADMIN — LOSARTAN POTASSIUM 25 MILLIGRAM(S): 100 TABLET, FILM COATED ORAL at 21:32

## 2022-10-19 RX ADMIN — SENNA PLUS 2 TABLET(S): 8.6 TABLET ORAL at 21:32

## 2022-10-19 RX ADMIN — Medication 100 MILLIGRAM(S): at 06:00

## 2022-10-19 RX ADMIN — Medication 4 MILLIGRAM(S): at 00:00

## 2022-10-19 NOTE — PHYSICAL THERAPY INITIAL EVALUATION ADULT - GENERAL OBSERVATIONS, REHAB EVAL
Pt rec'd semi-supine in bed in NAD, +IVL, +3LNC, spinal dressing C/D/I, +hemovac, and KIRT drain. Daughter and wife present at bedside.

## 2022-10-19 NOTE — PHYSICAL THERAPY INITIAL EVALUATION ADULT - ADDITIONAL COMMENTS
Pt lives with daughter and spouse in a . Pt will be residing in basement after discharge, with 2 steps to enter. Pt states there is an additional 2 floors with 8-10 steps each. Pt was amb (I) without an AD and (I) with all ADLs PTA. Wife will be able to assist as needed. Daughter works during the day.

## 2022-10-19 NOTE — PHYSICAL THERAPY INITIAL EVALUATION ADULT - LEVEL OF INDEPENDENCE: STAND/SIT, REHAB EVAL
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    Medication verified, no changes.  PCP and Pharmacy verified.    Social History     Tobacco Use   Smoking Status Never Smoker   Smokeless Tobacco Never Used     Advance Directives Filed: No    ECOG:   ECOG [08/20/20 0829]   ECOG Performance Status 0       Height: No.  Ht Readings from Last 1 Encounters:   08/22/19 5' 4\" (1.626 m)     Weight:Yes, shoes on.  Wt Readings from Last 3 Encounters:   08/20/20 83 kg   08/22/19 81.1 kg   08/16/18 81.3 kg       BMI: Body mass index is 31.41 kg/m².    REVIEW OF SYSTEMS  GENERAL:  Patient denies headache, fevers, chills, night sweats, excessive fatigue, change in appetite, weight loss, dizziness  ALLERGIC/IMMUNOLOGIC: Verified allergies: Yes  EYES:  Patient denies significant visual difficulties, double vision, blurred vision  ENT/MOUTH: Patient denies problems with hearing, sore throat, sinus drainage, mouth sores  ENDOCRINE:  Patient denies diabetes, thyroid disease, hormone replacement, hot flashes  HEMATOLOGIC/LYMPHATIC: Patient denies easy bruising, bleeding, tender lymph nodes, swollen lymph nodes  BREASTS: Patient denies abnormal masses of breast, nipple discharge, pain  RESPIRATORY:  Patient denies lung pain with breathing, cough, coughing up blood, shortness of breath  CARDIOVASCULAR:  Patient denies anginal chest pain, palpitations, shortness of breath when lying flat, peripheral edema  GASTROINTESTINAL: Patient denies abdominal pain , nausea, vomiting, diarrhea, GI bleeding, constipation, change in bowel habits, heartburn, sensation of feeling full, difficulty swallowing  : Patient denies abnormal genital masses, blood in the urine, frequency, urgency, burning with urination, hesitancy, incontinence, vaginal bleeding, discharge  MUSCULOSKELETAL:  Patient denies joint  pain, bone pain, joint swelling, redness, decreased range of motion  SKIN:  Patient denies chronic rashes, inflammation, ulcerations, skin changes, itching  NEUROLOGIC:  Patient denies loss of balance, areas of focal weakness, abnormal gait, sensory problems, numbness, tingling  PSYCHIATRIC: Patient denies insomnia, depression, anxiety     contact guard/minimum assist (75% patients effort)

## 2022-10-19 NOTE — PHYSICAL THERAPY INITIAL EVALUATION ADULT - STRENGTHENING, PT EVAL
GOAL: Pt will improve bilateral LE strength by one MMT grade for increased limb stability, to improve gait and facilitate stair negotiation in 2 weeks.

## 2022-10-19 NOTE — PROVIDER CONTACT NOTE (OTHER) - ASSESSMENT
/82  HR 95  Temp 97.8  Spo2 99%     Pt denies SOB, chest pain, headache, lightheadedness, pain /82, Pt denies SOB, chest pain, headache, lightheadedness. Pt has c/o pain 8/10. Pt otherwise asymptomatic

## 2022-10-19 NOTE — PROGRESS NOTE ADULT - SUBJECTIVE AND OBJECTIVE BOX
HPI:  82 year old male with PMH of HTN, HLD, Pre-diabetes, BPH with complaint of neck pain. Patient endorses increasing neck pain, gait instability and clumsiness of the hands, He ambulates with a cane. He denies fever, chills, trauma or injury. He presents to [facility] prior to scheduled Posterior C1-6 Decompression, C4-5 Posterior Column Osteotomy, C1-C7 Fusion, Complex Plastics Closure on 10/18/2022.    preop covid swab 10/15 @ [facility] (27 Sep 2022 09:06)    OVERNIGHT EVENTS:   No acute events overnight.    VITALS:  T(C): , Max: 36.6 (10-18-22 @ 15:45)  HR:  (74 - 118)  BP: --  ABP:  (121/60 - 180/84)  RR:  (12 - 29)  SpO2:  (94% - 100%)  Wt(kg): --      10-18-22 @ 07:01  -  10-19-22 @ 07:00  --------------------------------------------------------  IN: 1679.7 mL / OUT: 2355 mL / NET: -675.3 mL      LABS:  Na: 140 (10-18 @ 22:41)  K: 3.5 (10-18 @ 22:41)  Cl: 105 (10-18 @ 22:41)  CO2: 19 (10-18 @ 22:41)  BUN: 14 (10-18 @ 22:41)  Cr: 0.77 (10-18 @ 22:41)  Glu: 187(10-18 @ 22:41)    Hgb: 13.0 (10-18 @ 22:41)  Hct: 36.8 (10-18 @ 22:41)  WBC: 9.68 (10-18 @ 22:41)  Plt: 175 (10-18 @ 22:41)    INR:   PTT:     IMAGING:   Recent imaging studies were reviewed.    MEDICATIONS:  acetaminophen     Tablet .. 650 milliGRAM(s) Oral every 6 hours PRN  amLODIPine   Tablet 5 milliGRAM(s) Oral at bedtime  atorvastatin 40 milliGRAM(s) Oral at bedtime  bisacodyl 5 milliGRAM(s) Oral every 12 hours PRN  chlorhexidine 4% Liquid 1 Application(s) Topical <User Schedule>  cyclobenzaprine 10 milliGRAM(s) Oral every 8 hours PRN  dexAMETHasone  Injectable 4 milliGRAM(s) IV Push every 6 hours  HYDROmorphone  Injectable 1 milliGRAM(s) IV Push every 6 hours PRN  losartan 25 milliGRAM(s) Oral daily  magnesium hydroxide Suspension 30 milliLiter(s) Oral every 12 hours PRN  ondansetron Injectable 4 milliGRAM(s) IV Push every 6 hours PRN  oxyCODONE    IR 5 milliGRAM(s) Oral every 6 hours PRN  oxyCODONE    IR 10 milliGRAM(s) Oral every 6 hours PRN  senna 2 Tablet(s) Oral at bedtime    EXAMINATION:  General:  calm  HEENT:  MMM  Neuro:  awake, alert, oriented x 3, follows commands, moves all extremities  Cards:  RRR  Respiratory:  no respiratory distress  Adomen:  soft  Extremities:  no edema  Skin:  warm/dry

## 2022-10-19 NOTE — OCCUPATIONAL THERAPY INITIAL EVALUATION ADULT - GENERAL OBSERVATIONS, REHAB EVAL
Pt received seated in bedside chair, +ICU monitoring, +IVL, +scott, +o2 via NC Pt received seated in bedside chair, +ICU monitoring, +IVL, +scott, +o2 via NC, +KIRT

## 2022-10-19 NOTE — CONSULT NOTE ADULT - PROBLEM SELECTOR RECOMMENDATION 9
CT cervical spine with post-op changes   Pain control per neurosurgery   bowel regimen with opioids  Monitor drain output

## 2022-10-19 NOTE — PROGRESS NOTE ADULT - SUBJECTIVE AND OBJECTIVE BOX
Patient seen and examined at bedside.    --Anticoagulation--    T(C): 36.4 (10-19-22 @ 03:00), Max: 36.6 (10-18-22 @ 07:56)  HR: 79 (10-19-22 @ 03:00) (75 - 118)  BP: 158/80 (10-18-22 @ 08:31) (158/80 - 158/80)  RR: 16 (10-19-22 @ 03:00) (12 - 29)  SpO2: 97% (10-19-22 @ 03:00) (94% - 100%)  Wt(kg): --    Exam: [place]-speaking, AOx3, TAVERA 5/5, SILT

## 2022-10-19 NOTE — CONSULT NOTE ADULT - PROBLEM SELECTOR RECOMMENDATION 2
Patient does not use oxygen at home   Is on 3L NC  Wean as tolerated and record room air saturation   CXR ordered  low suspicion PNA - no fever, cough, leukocytosis  Incentive spirometry   LE dopplers neg 10/18

## 2022-10-19 NOTE — CONSULT NOTE ADULT - PROBLEM SELECTOR RECOMMENDATION 5
A1c 7.0  Switched diet to carb controlled  insulin scale. can d/c in 24 hours if not requiring coverage

## 2022-10-19 NOTE — PROGRESS NOTE ADULT - ASSESSMENT
Summary: 83 yo male s/p cervical decompression/fusion    NEURO:  q1h neuro checks    CARDS:  MAP > 75 per NSG    PULM:  sat > 92%    RENAL:  IVL    GASTRO:  advance as tolerated  ---> Stress ulcer prophylaxis:  PPI not indicated    HEME:  monitor H/H    ---> DVT prophylaxis: SCDs, hold anticoagulation, fresh post-op    ENDO:  euglycemia    ID:  afebrile    Code status:  Full code  Disposition:  ICU    This patient was at high risk of neurologic deterioration and/or death due to: cervical surgery    ATTENDING STATEMENT:  Patient is critically ill, requiring critical care services.     Attending: I have personally and independently provided 45 minutes of critical care services.  This excludes any time spent on separate procedures or teaching. Summary: 81 yo male s/p cervical decompression/fusion    NEURO:  q4h neuro checks    CARDS:  auto MAP > 75    PULM:  sat > 92%    RENAL:  IVL    GASTRO:  regular diet  ---> Stress ulcer prophylaxis:  PPI not indicated    HEME:  monitor H/H    ---> DVT prophylaxis: SCDs, start SQL    ENDO:  euglycemia    ID:  afebrile    Code status:  Full code  Disposition:  ICU    This patient was at high risk of neurologic deterioration and/or death due to: cervical surgery

## 2022-10-19 NOTE — PHYSICAL THERAPY INITIAL EVALUATION ADULT - PERTINENT HX OF CURRENT PROBLEM, REHAB EVAL
Pt is a 81 y/o M with PMH: HTN, HLD, pre-diabetes, BPH with complaint of neck pain. Pt +increasing neck pain, gait instability and clumsiness of the hands. Pt is s/p C1-6 laminectomies with C1-7 posterior instrumented fusion and plastics closure on 10/18, [facility], now downgraded to floors on 10/19.

## 2022-10-19 NOTE — OCCUPATIONAL THERAPY INITIAL EVALUATION ADULT - PERTINENT HX OF CURRENT PROBLEM, REHAB EVAL
82 year old male with PMH of HTN, HLD, Pre-diabetes, BPH with complaint of neck pain. Patient endorses increasing neck pain, gait instability and clumsiness of the hands, He ambulates with a cane. He denies fever, chills, trauma or injury. He presents to [facility] prior to scheduled Posterior C1-6 Decompression, C4-5 Posterior Column Osteotomy, C1-C7 Fusion, Complex Plastics Closure on 10/18/2022. Pt s/p C1-6 laminectomies with C1-7 posterior instrumented fusion and plastics closure

## 2022-10-19 NOTE — CONSULT NOTE ADULT - ASSESSMENT
82 year old male with PMH of HTN, HLD, pre-diabetes, BPH with complaint of neck pain. S/p C1-6 laminectomies with C1-7 posterior instrumented fusion and plastics closure on 10/18.     PCP: Dr. Roselyn Meneses

## 2022-10-19 NOTE — PHYSICAL THERAPY INITIAL EVALUATION ADULT - PLANNED THERAPY INTERVENTIONS, PT EVAL
GOAL: Pt will ascend/descend 8 steps (I) with U HR and step over step pattern in 2 weeks./balance training/bed mobility training/gait training/strengthening/transfer training

## 2022-10-19 NOTE — PROVIDER CONTACT NOTE (OTHER) - ACTION/TREATMENT ORDERED:
Administer BP as per current order. Recheck vitals As per Administer MD, give bedtime B/P meds as per current order. Recheck vitals in one hour

## 2022-10-19 NOTE — OCCUPATIONAL THERAPY INITIAL EVALUATION ADULT - MD/RN NOTIFIED
30 day visit PT-PTA face-face discussion with NICKI Osorio re: patient status, POC, and plan for progression done 6/7/22.  Nichole Méndez, PTA     yes

## 2022-10-19 NOTE — CONSULT NOTE ADULT - PROBLEM SELECTOR RECOMMENDATION 6
DVT ppx: lovenox   No BM since sunday. Added miralax for constipation    Patient does not remember all home medications but knows he takes asa, something for BP and cholesterol. Verified via surescripts.

## 2022-10-19 NOTE — OCCUPATIONAL THERAPY INITIAL EVALUATION ADULT - LIVES WITH, PROFILE
Pt states he lives with wife and daughter in private home, unclear if he has steps outside the home, but states he has 2-3 steps to living room and flight to basement. Pt states he is I in ADLs and ambulation prior to admission

## 2022-10-20 DIAGNOSIS — D72.829 ELEVATED WHITE BLOOD CELL COUNT, UNSPECIFIED: ICD-10-CM

## 2022-10-20 LAB
ANION GAP SERPL CALC-SCNC: 12 MMOL/L — SIGNIFICANT CHANGE UP (ref 5–17)
BUN SERPL-MCNC: 24 MG/DL — HIGH (ref 7–23)
CALCIUM SERPL-MCNC: 9.6 MG/DL — SIGNIFICANT CHANGE UP (ref 8.4–10.5)
CHLORIDE SERPL-SCNC: 100 MMOL/L — SIGNIFICANT CHANGE UP (ref 96–108)
CO2 SERPL-SCNC: 26 MMOL/L — SIGNIFICANT CHANGE UP (ref 22–31)
CREAT SERPL-MCNC: 1.03 MG/DL — SIGNIFICANT CHANGE UP (ref 0.5–1.3)
EGFR: 72 ML/MIN/1.73M2 — SIGNIFICANT CHANGE UP
GLUCOSE BLDC GLUCOMTR-MCNC: 138 MG/DL — HIGH (ref 70–99)
GLUCOSE BLDC GLUCOMTR-MCNC: 149 MG/DL — HIGH (ref 70–99)
GLUCOSE BLDC GLUCOMTR-MCNC: 172 MG/DL — HIGH (ref 70–99)
GLUCOSE BLDC GLUCOMTR-MCNC: 183 MG/DL — HIGH (ref 70–99)
GLUCOSE SERPL-MCNC: 150 MG/DL — HIGH (ref 70–99)
HCT VFR BLD CALC: 41.9 % — SIGNIFICANT CHANGE UP (ref 39–50)
HGB BLD-MCNC: 14.5 G/DL — SIGNIFICANT CHANGE UP (ref 13–17)
MAGNESIUM SERPL-MCNC: 2.4 MG/DL — SIGNIFICANT CHANGE UP (ref 1.6–2.6)
MCHC RBC-ENTMCNC: 30 PG — SIGNIFICANT CHANGE UP (ref 27–34)
MCHC RBC-ENTMCNC: 34.6 GM/DL — SIGNIFICANT CHANGE UP (ref 32–36)
MCV RBC AUTO: 86.6 FL — SIGNIFICANT CHANGE UP (ref 80–100)
NRBC # BLD: 0 /100 WBCS — SIGNIFICANT CHANGE UP (ref 0–0)
PHOSPHATE SERPL-MCNC: 3 MG/DL — SIGNIFICANT CHANGE UP (ref 2.5–4.5)
PLATELET # BLD AUTO: 270 K/UL — SIGNIFICANT CHANGE UP (ref 150–400)
POTASSIUM SERPL-MCNC: 4.1 MMOL/L — SIGNIFICANT CHANGE UP (ref 3.5–5.3)
POTASSIUM SERPL-SCNC: 4.1 MMOL/L — SIGNIFICANT CHANGE UP (ref 3.5–5.3)
RBC # BLD: 4.84 M/UL — SIGNIFICANT CHANGE UP (ref 4.2–5.8)
RBC # FLD: 13 % — SIGNIFICANT CHANGE UP (ref 10.3–14.5)
SODIUM SERPL-SCNC: 138 MMOL/L — SIGNIFICANT CHANGE UP (ref 135–145)
WBC # BLD: 18.59 K/UL — HIGH (ref 3.8–10.5)
WBC # FLD AUTO: 18.59 K/UL — HIGH (ref 3.8–10.5)

## 2022-10-20 PROCEDURE — 99233 SBSQ HOSP IP/OBS HIGH 50: CPT

## 2022-10-20 RX ORDER — AMLODIPINE BESYLATE 2.5 MG/1
10 TABLET ORAL AT BEDTIME
Refills: 0 | Status: DISCONTINUED | OUTPATIENT
Start: 2022-10-20 | End: 2022-10-27

## 2022-10-20 RX ORDER — HYDRALAZINE HCL 50 MG
5 TABLET ORAL ONCE
Refills: 0 | Status: COMPLETED | OUTPATIENT
Start: 2022-10-20 | End: 2022-10-20

## 2022-10-20 RX ORDER — MAGNESIUM HYDROXIDE 400 MG/1
30 TABLET, CHEWABLE ORAL EVERY 12 HOURS
Refills: 0 | Status: DISCONTINUED | OUTPATIENT
Start: 2022-10-20 | End: 2022-10-27

## 2022-10-20 RX ORDER — LOSARTAN POTASSIUM 100 MG/1
25 TABLET, FILM COATED ORAL ONCE
Refills: 0 | Status: COMPLETED | OUTPATIENT
Start: 2022-10-20 | End: 2022-10-20

## 2022-10-20 RX ORDER — POLYETHYLENE GLYCOL 3350 17 G/17G
17 POWDER, FOR SOLUTION ORAL
Refills: 0 | Status: DISCONTINUED | OUTPATIENT
Start: 2022-10-20 | End: 2022-10-25

## 2022-10-20 RX ORDER — MINERAL OIL
133 OIL (ML) MISCELLANEOUS ONCE
Refills: 0 | Status: COMPLETED | OUTPATIENT
Start: 2022-10-20 | End: 2022-10-20

## 2022-10-20 RX ORDER — LOSARTAN POTASSIUM 100 MG/1
50 TABLET, FILM COATED ORAL DAILY
Refills: 0 | Status: DISCONTINUED | OUTPATIENT
Start: 2022-10-21 | End: 2022-10-27

## 2022-10-20 RX ADMIN — OXYCODONE HYDROCHLORIDE 10 MILLIGRAM(S): 5 TABLET ORAL at 12:56

## 2022-10-20 RX ADMIN — LOSARTAN POTASSIUM 25 MILLIGRAM(S): 100 TABLET, FILM COATED ORAL at 09:52

## 2022-10-20 RX ADMIN — ENOXAPARIN SODIUM 40 MILLIGRAM(S): 100 INJECTION SUBCUTANEOUS at 17:28

## 2022-10-20 RX ADMIN — OXYCODONE HYDROCHLORIDE 10 MILLIGRAM(S): 5 TABLET ORAL at 08:09

## 2022-10-20 RX ADMIN — Medication 5 MILLIGRAM(S): at 17:06

## 2022-10-20 RX ADMIN — OXYCODONE HYDROCHLORIDE 10 MILLIGRAM(S): 5 TABLET ORAL at 22:00

## 2022-10-20 RX ADMIN — CYCLOBENZAPRINE HYDROCHLORIDE 10 MILLIGRAM(S): 10 TABLET, FILM COATED ORAL at 11:30

## 2022-10-20 RX ADMIN — OXYCODONE HYDROCHLORIDE 10 MILLIGRAM(S): 5 TABLET ORAL at 00:10

## 2022-10-20 RX ADMIN — LOSARTAN POTASSIUM 25 MILLIGRAM(S): 100 TABLET, FILM COATED ORAL at 06:15

## 2022-10-20 RX ADMIN — POLYETHYLENE GLYCOL 3350 17 GRAM(S): 17 POWDER, FOR SOLUTION ORAL at 11:30

## 2022-10-20 RX ADMIN — SENNA PLUS 2 TABLET(S): 8.6 TABLET ORAL at 21:11

## 2022-10-20 RX ADMIN — ATORVASTATIN CALCIUM 40 MILLIGRAM(S): 80 TABLET, FILM COATED ORAL at 21:10

## 2022-10-20 RX ADMIN — POLYETHYLENE GLYCOL 3350 17 GRAM(S): 17 POWDER, FOR SOLUTION ORAL at 17:06

## 2022-10-20 RX ADMIN — OXYCODONE HYDROCHLORIDE 10 MILLIGRAM(S): 5 TABLET ORAL at 17:06

## 2022-10-20 RX ADMIN — OXYCODONE HYDROCHLORIDE 10 MILLIGRAM(S): 5 TABLET ORAL at 07:09

## 2022-10-20 RX ADMIN — Medication 133 MILLILITER(S): at 15:49

## 2022-10-20 RX ADMIN — AMLODIPINE BESYLATE 10 MILLIGRAM(S): 2.5 TABLET ORAL at 21:21

## 2022-10-20 RX ADMIN — OXYCODONE HYDROCHLORIDE 10 MILLIGRAM(S): 5 TABLET ORAL at 11:56

## 2022-10-20 RX ADMIN — Medication 1: at 11:54

## 2022-10-20 RX ADMIN — OXYCODONE HYDROCHLORIDE 10 MILLIGRAM(S): 5 TABLET ORAL at 18:06

## 2022-10-20 RX ADMIN — OXYCODONE HYDROCHLORIDE 10 MILLIGRAM(S): 5 TABLET ORAL at 21:12

## 2022-10-20 RX ADMIN — MAGNESIUM HYDROXIDE 30 MILLILITER(S): 400 TABLET, CHEWABLE ORAL at 17:06

## 2022-10-20 RX ADMIN — Medication 5 MILLIGRAM(S): at 00:47

## 2022-10-20 RX ADMIN — Medication 5 MILLIGRAM(S): at 01:54

## 2022-10-20 NOTE — PROGRESS NOTE ADULT - SUBJECTIVE AND OBJECTIVE BOX
[id number]  Patient was seen at bedside this am. Patient said he needs to strain for bowel movements. Denied any cp, sob, n/v, or abd pain.    OVERNIGHT EVENTS: No acute event overnight    Vital Signs Last 24 Hrs  T(C): 36.6 (20 Oct 2022 16:15), Max: 36.8 (20 Oct 2022 10:06)  T(F): 97.9 (20 Oct 2022 16:15), Max: 98.3 (20 Oct 2022 10:06)  HR: 94 (20 Oct 2022 16:15) (86 - 98)  BP: 149/89 (20 Oct 2022 16:15) (136/85 - 181/93)  BP(mean): --  RR: 18 (20 Oct 2022 16:15) (16 - 18)  SpO2: 95% (20 Oct 2022 16:15) (94% - 99%)    Parameters below as of 20 Oct 2022 16:15  Patient On (Oxygen Delivery Method): room air        I&O's Detail    19 Oct 2022 07:01  -  20 Oct 2022 07:00  --------------------------------------------------------  IN:    Oral Fluid: 1000 mL  Total IN: 1000 mL    OUT:    Bulb (mL): 85 mL    Drain (mL): 250 mL    Intermittent Catheterization - Urethral (mL): 400 mL    Voided (mL): 1170 mL  Total OUT: 1905 mL    Total NET: -905 mL      20 Oct 2022 07:01  -  20 Oct 2022 16:36  --------------------------------------------------------  IN:  Total IN: 0 mL    OUT:    Bulb (mL): 20 mL    Drain (mL): 55 mL  Total OUT: 75 mL    Total NET: -75 mL        I&O's Summary    19 Oct 2022 07:01  -  20 Oct 2022 07:00  --------------------------------------------------------  IN: 1000 mL / OUT: 1905 mL / NET: -905 mL    20 Oct 2022 07:01  -  20 Oct 2022 16:36  --------------------------------------------------------  IN: 0 mL / OUT: 75 mL / NET: -75 mL        PHYSICAL EXAM:  Neurological:  awake, alert, oriented to person, place, and date, PERRL, face symmetrical, speech clear and fluent, following commands, moving all extremities 5/5, sensation intact to light touch  Cardiovascular: +s1, s2  Respiratory: clear to auscultation b/l  Gastrointestinal: soft, non-distended, non-tender  Genitourinary: voiding  Extremities: warm, dry  Incision/Wound: incision site C/D/I, surgical drains output for past 24 hours: deep HMV- 250ml, superficial renetta- 85ml    LABS:                        14.5   18.59 )-----------( 270      ( 20 Oct 2022 07:27 )             41.9     10-20    138  |  100  |  24<H>  ----------------------------<  150<H>  4.1   |  26  |  1.03    Ca    9.6      20 Oct 2022 07:27  Phos  3.0     10-20  Mg     2.4     10-20              CAPILLARY BLOOD GLUCOSE      POCT Blood Glucose.: 183 mg/dL (20 Oct 2022 11:52)  POCT Blood Glucose.: 138 mg/dL (20 Oct 2022 08:26)  POCT Blood Glucose.: 238 mg/dL (19 Oct 2022 21:54)  POCT Blood Glucose.: 232 mg/dL (19 Oct 2022 18:10)      Drug Levels: [] N/A    CSF Analysis: [] N/A      Allergies    penicillin (Rash)    Intolerances      MEDICATIONS:  Antibiotics:    Neuro:  acetaminophen     Tablet .. 650 milliGRAM(s) Oral every 6 hours PRN  cyclobenzaprine 10 milliGRAM(s) Oral every 8 hours PRN  HYDROmorphone  Injectable 0.5 milliGRAM(s) IV Push every 6 hours PRN  ondansetron Injectable 4 milliGRAM(s) IV Push every 6 hours PRN  oxyCODONE    IR 5 milliGRAM(s) Oral every 4 hours PRN  oxyCODONE    IR 10 milliGRAM(s) Oral every 4 hours PRN    Anticoagulation:  enoxaparin Injectable 40 milliGRAM(s) SubCutaneous <User Schedule>    OTHER:  amLODIPine   Tablet 10 milliGRAM(s) Oral at bedtime  atorvastatin 40 milliGRAM(s) Oral at bedtime  bisacodyl 5 milliGRAM(s) Oral every 12 hours  dextrose 50% Injectable 25 Gram(s) IV Push once  dextrose 50% Injectable 12.5 Gram(s) IV Push once  dextrose 50% Injectable 25 Gram(s) IV Push once  dextrose Oral Gel 15 Gram(s) Oral once PRN  glucagon  Injectable 1 milliGRAM(s) IntraMuscular once  insulin lispro (ADMELOG) corrective regimen sliding scale   SubCutaneous three times a day before meals  insulin lispro (ADMELOG) corrective regimen sliding scale   SubCutaneous at bedtime  magnesium hydroxide Suspension 30 milliLiter(s) Oral every 12 hours  polyethylene glycol 3350 17 Gram(s) Oral two times a day  senna 2 Tablet(s) Oral at bedtime    IVF:  dextrose 5%. 1000 milliLiter(s) IV Continuous <Continuous>  dextrose 5%. 1000 milliLiter(s) IV Continuous <Continuous>    CULTURES:    RADIOLOGY & ADDITIONAL TESTS:

## 2022-10-20 NOTE — PROVIDER CONTACT NOTE (OTHER) - ASSESSMENT
/92   HR 88   Pt denies, lightheadedness, dizziness, headache, chest pain SOB. Denies pain. Pt otherwise asymptomatic

## 2022-10-20 NOTE — PROGRESS NOTE ADULT - PROBLEM SELECTOR PLAN 1
CT cervical spine with post-op changes   Pain control per neurosurgery   bowel regimen with opioids  Monitor drain output.

## 2022-10-20 NOTE — PROGRESS NOTE ADULT - ASSESSMENT
HPI:  82 year old male with PMH of HTN, HLD, Pre-diabetes, BPH with complaint of neck pain. Patient endorses increasing neck pain, gait instability and clumsiness of the hands, He ambulates with a cane. He denies fever, chills, trauma or injury. He presents to [facility] prior to scheduled Posterior C1-6 Decompression, C4-5 Posterior Column Osteotomy, C1-C7 Fusion, Complex Plastics Closure on 10/18/2022.    [facility]22 09:06[facility]    PROCEDURE: 10/18 s/p C1-6 laminectomies with C1-7 posterior instrumented fusion and plastics closure    PLAN:  - neuro and vital check Q4hrs  - continue to monitor drain output  - CT cervical spine post op showed hardware adequately placed  - pain control with tylenol and oxycodone prn, flexeril prn for muscle spasm  - elevated SBP, hospitalist consult appreciated today, Norvasc and losartan dose increased today, will continue to monitor  - HLD, continue lipitor  - continue ISS for euglycemia  - tolerating CCD diet  - dulcolax and mg hydroxide standing along with senna and miralax for bowel regimens  - activity increase as tolerated  - incentive spirometer  - DVT ppx: b/l SCDs and SQL; screening LE doppler negative for DVTs  Discharge planning: PT/OT- home PT/OT with rolling walker    will discuss above with Dr. Ronda banegas [phone]

## 2022-10-20 NOTE — PROGRESS NOTE ADULT - SUBJECTIVE AND OBJECTIVE BOX
[facility] Division of Hospital Medicine  Shelbie AlbrightDO alesia   Available via [employer] Teams  Spectra [phone]      Patient is a 82y old  Male who presents with a chief complaint of cervical spine (19 Oct 2022 15:25)      SUBJECTIVE / OVERNIGHT EVENTS:  Interviewed w/  Terence [phone]  He feels better than yesterday. No SOB, CP, abd pain, N/V. Pain in the neck improved. + constipation, no difficulty urinating     MEDICATIONS  (STANDING):  amLODIPine   Tablet 10 milliGRAM(s) Oral at bedtime  atorvastatin 40 milliGRAM(s) Oral at bedtime  bisacodyl 5 milliGRAM(s) Oral every 12 hours  dextrose 5%. 1000 milliLiter(s) (100 mL/Hr) IV Continuous <Continuous>  dextrose 5%. 1000 milliLiter(s) (50 mL/Hr) IV Continuous <Continuous>  dextrose 50% Injectable 25 Gram(s) IV Push once  dextrose 50% Injectable 12.5 Gram(s) IV Push once  dextrose 50% Injectable 25 Gram(s) IV Push once  enoxaparin Injectable 40 milliGRAM(s) SubCutaneous <User Schedule>  glucagon  Injectable 1 milliGRAM(s) IntraMuscular once  insulin lispro (ADMELOG) corrective regimen sliding scale   SubCutaneous three times a day before meals  insulin lispro (ADMELOG) corrective regimen sliding scale   SubCutaneous at bedtime  magnesium hydroxide Suspension 30 milliLiter(s) Oral every 12 hours  polyethylene glycol 3350 17 Gram(s) Oral two times a day  senna 2 Tablet(s) Oral at bedtime    MEDICATIONS  (PRN):  acetaminophen     Tablet .. 650 milliGRAM(s) Oral every 6 hours PRN Moderate Pain (4 - 6)  cyclobenzaprine 10 milliGRAM(s) Oral every 8 hours PRN Muscle Spasm  dextrose Oral Gel 15 Gram(s) Oral once PRN Blood Glucose LESS THAN 70 milliGRAM(s)/deciliter  HYDROmorphone  Injectable 0.5 milliGRAM(s) IV Push every 6 hours PRN breakthrough pain  ondansetron Injectable 4 milliGRAM(s) IV Push every 6 hours PRN Nausea and/or Vomiting  oxyCODONE    IR 5 milliGRAM(s) Oral every 4 hours PRN Moderate Pain (4 - 6)  oxyCODONE    IR 10 milliGRAM(s) Oral every 4 hours PRN Severe Pain (7 - 10)      CAPILLARY BLOOD GLUCOSE      POCT Blood Glucose.: 183 mg/dL (20 Oct 2022 11:52)  POCT Blood Glucose.: 138 mg/dL (20 Oct 2022 08:26)  POCT Blood Glucose.: 238 mg/dL (19 Oct 2022 21:54)  POCT Blood Glucose.: 232 mg/dL (19 Oct 2022 18:10)    I&O's Summary    19 Oct 2022 07:01  -  20 Oct 2022 07:00  --------------------------------------------------------  IN: 1000 mL / OUT: 1905 mL / NET: -905 mL        PHYSICAL EXAM:  Vital Signs Last 24 Hrs  T(C): 36.6 (20 Oct 2022 12:12), Max: 36.8 (20 Oct 2022 10:06)  T(F): 97.9 (20 Oct 2022 12:12), Max: 98.3 (20 Oct 2022 10:06)  HR: 96 (20 Oct 2022 12:12) (86 - 103)  BP: 175/83 (20 Oct 2022 12:12) (159/98 - 181/93)  BP(mean): --  RR: 18 (20 Oct 2022 12:12) (16 - 18)  SpO2: 95% (20 Oct 2022 12:12) (94% - 99%)    Parameters below as of 20 Oct 2022 12:12  Patient On (Oxygen Delivery Method): room air      CONSTITUTIONAL: NAD, well-developed, well-groomed, sitting in the chair   RESPIRATORY: Normal respiratory effort; lungs are clear to auscultation bilaterally  CARDIOVASCULAR: RRR, normal S1 and S2; No lower extremity edema  ABDOMEN: Nontender to palpation, normoactive bowel sounds, no rebound/guarding  MUSCULOSKELETAL: no clubbing or cyanosis of digits; no joint swelling or tenderness to palpation  PSYCH: A+O to person, place, and time; affect appropriate  NEUROLOGY: moving all extremities, following commands   SKIN: No rashes; no palpable lesions, dressing c/d/i, + drain     LABS:                        14.5   18.59 )-----------( 270      ( 20 Oct 2022 07:27 )             41.9     10-20    138  |  100  |  24<H>  ----------------------------<  150<H>  4.1   |  26  |  1.03    Ca    9.6      20 Oct 2022 07:27  Phos  3.0     10-20  Mg     2.4     10-20                  RADIOLOGY & ADDITIONAL TESTS:  Results Reviewed:   Imaging Personally Reviewed:  Electrocardiogram Personally Reviewed:    COORDINATION OF CARE:  Care Discussed with Consultants/Other Providers [Y/N]: Neurosurgery   Prior or Outpatient Records Reviewed [Y/N]:

## 2022-10-21 DIAGNOSIS — K59.00 CONSTIPATION, UNSPECIFIED: ICD-10-CM

## 2022-10-21 LAB
ANION GAP SERPL CALC-SCNC: 13 MMOL/L — SIGNIFICANT CHANGE UP (ref 5–17)
BUN SERPL-MCNC: 25 MG/DL — HIGH (ref 7–23)
CALCIUM SERPL-MCNC: 9.2 MG/DL — SIGNIFICANT CHANGE UP (ref 8.4–10.5)
CHLORIDE SERPL-SCNC: 96 MMOL/L — SIGNIFICANT CHANGE UP (ref 96–108)
CO2 SERPL-SCNC: 25 MMOL/L — SIGNIFICANT CHANGE UP (ref 22–31)
CREAT SERPL-MCNC: 1.25 MG/DL — SIGNIFICANT CHANGE UP (ref 0.5–1.3)
EGFR: 57 ML/MIN/1.73M2 — LOW
GLUCOSE BLDC GLUCOMTR-MCNC: 148 MG/DL — HIGH (ref 70–99)
GLUCOSE BLDC GLUCOMTR-MCNC: 154 MG/DL — HIGH (ref 70–99)
GLUCOSE BLDC GLUCOMTR-MCNC: 169 MG/DL — HIGH (ref 70–99)
GLUCOSE BLDC GLUCOMTR-MCNC: 219 MG/DL — HIGH (ref 70–99)
GLUCOSE SERPL-MCNC: 130 MG/DL — HIGH (ref 70–99)
HCT VFR BLD CALC: 41 % — SIGNIFICANT CHANGE UP (ref 39–50)
HGB BLD-MCNC: 14.1 G/DL — SIGNIFICANT CHANGE UP (ref 13–17)
MCHC RBC-ENTMCNC: 30.1 PG — SIGNIFICANT CHANGE UP (ref 27–34)
MCHC RBC-ENTMCNC: 34.4 GM/DL — SIGNIFICANT CHANGE UP (ref 32–36)
MCV RBC AUTO: 87.6 FL — SIGNIFICANT CHANGE UP (ref 80–100)
NRBC # BLD: 0 /100 WBCS — SIGNIFICANT CHANGE UP (ref 0–0)
PLATELET # BLD AUTO: 230 K/UL — SIGNIFICANT CHANGE UP (ref 150–400)
POTASSIUM SERPL-MCNC: 4.1 MMOL/L — SIGNIFICANT CHANGE UP (ref 3.5–5.3)
POTASSIUM SERPL-SCNC: 4.1 MMOL/L — SIGNIFICANT CHANGE UP (ref 3.5–5.3)
RBC # BLD: 4.68 M/UL — SIGNIFICANT CHANGE UP (ref 4.2–5.8)
RBC # FLD: 13.2 % — SIGNIFICANT CHANGE UP (ref 10.3–14.5)
SODIUM SERPL-SCNC: 134 MMOL/L — LOW (ref 135–145)
WBC # BLD: 12.91 K/UL — HIGH (ref 3.8–10.5)
WBC # FLD AUTO: 12.91 K/UL — HIGH (ref 3.8–10.5)

## 2022-10-21 PROCEDURE — 99232 SBSQ HOSP IP/OBS MODERATE 35: CPT

## 2022-10-21 PROCEDURE — 74018 RADEX ABDOMEN 1 VIEW: CPT | Mod: 26

## 2022-10-21 RX ORDER — ACETAMINOPHEN 500 MG
1000 TABLET ORAL ONCE
Refills: 0 | Status: COMPLETED | OUTPATIENT
Start: 2022-10-21 | End: 2022-10-21

## 2022-10-21 RX ORDER — ACETAMINOPHEN 500 MG
1000 TABLET ORAL ONCE
Refills: 0 | Status: COMPLETED | OUTPATIENT
Start: 2022-10-22 | End: 2022-10-22

## 2022-10-21 RX ORDER — CYCLOBENZAPRINE HYDROCHLORIDE 10 MG/1
5 TABLET, FILM COATED ORAL THREE TIMES A DAY
Refills: 0 | Status: DISCONTINUED | OUTPATIENT
Start: 2022-10-21 | End: 2022-10-27

## 2022-10-21 RX ORDER — POLYETHYLENE GLYCOL 3350 17 G/17G
34 POWDER, FOR SOLUTION ORAL ONCE
Refills: 0 | Status: COMPLETED | OUTPATIENT
Start: 2022-10-21 | End: 2022-10-21

## 2022-10-21 RX ADMIN — AMLODIPINE BESYLATE 10 MILLIGRAM(S): 2.5 TABLET ORAL at 21:16

## 2022-10-21 RX ADMIN — Medication 5 MILLIGRAM(S): at 05:45

## 2022-10-21 RX ADMIN — OXYCODONE HYDROCHLORIDE 10 MILLIGRAM(S): 5 TABLET ORAL at 08:31

## 2022-10-21 RX ADMIN — MAGNESIUM HYDROXIDE 30 MILLILITER(S): 400 TABLET, CHEWABLE ORAL at 17:21

## 2022-10-21 RX ADMIN — OXYCODONE HYDROCHLORIDE 10 MILLIGRAM(S): 5 TABLET ORAL at 12:38

## 2022-10-21 RX ADMIN — ENOXAPARIN SODIUM 40 MILLIGRAM(S): 100 INJECTION SUBCUTANEOUS at 17:21

## 2022-10-21 RX ADMIN — MAGNESIUM HYDROXIDE 30 MILLILITER(S): 400 TABLET, CHEWABLE ORAL at 05:45

## 2022-10-21 RX ADMIN — OXYCODONE HYDROCHLORIDE 10 MILLIGRAM(S): 5 TABLET ORAL at 07:31

## 2022-10-21 RX ADMIN — CYCLOBENZAPRINE HYDROCHLORIDE 5 MILLIGRAM(S): 10 TABLET, FILM COATED ORAL at 13:21

## 2022-10-21 RX ADMIN — POLYETHYLENE GLYCOL 3350 17 GRAM(S): 17 POWDER, FOR SOLUTION ORAL at 05:45

## 2022-10-21 RX ADMIN — CYCLOBENZAPRINE HYDROCHLORIDE 5 MILLIGRAM(S): 10 TABLET, FILM COATED ORAL at 21:16

## 2022-10-21 RX ADMIN — Medication 2: at 12:07

## 2022-10-21 RX ADMIN — POLYETHYLENE GLYCOL 3350 17 GRAM(S): 17 POWDER, FOR SOLUTION ORAL at 17:21

## 2022-10-21 RX ADMIN — POLYETHYLENE GLYCOL 3350 34 GRAM(S): 17 POWDER, FOR SOLUTION ORAL at 13:22

## 2022-10-21 RX ADMIN — LOSARTAN POTASSIUM 50 MILLIGRAM(S): 100 TABLET, FILM COATED ORAL at 05:47

## 2022-10-21 RX ADMIN — Medication 1000 MILLIGRAM(S): at 14:21

## 2022-10-21 RX ADMIN — Medication 1000 MILLIGRAM(S): at 22:00

## 2022-10-21 RX ADMIN — ATORVASTATIN CALCIUM 40 MILLIGRAM(S): 80 TABLET, FILM COATED ORAL at 21:16

## 2022-10-21 RX ADMIN — OXYCODONE HYDROCHLORIDE 10 MILLIGRAM(S): 5 TABLET ORAL at 11:38

## 2022-10-21 RX ADMIN — OXYCODONE HYDROCHLORIDE 10 MILLIGRAM(S): 5 TABLET ORAL at 03:26

## 2022-10-21 RX ADMIN — Medication 400 MILLIGRAM(S): at 13:21

## 2022-10-21 RX ADMIN — CYCLOBENZAPRINE HYDROCHLORIDE 10 MILLIGRAM(S): 10 TABLET, FILM COATED ORAL at 03:26

## 2022-10-21 RX ADMIN — Medication 1: at 17:21

## 2022-10-21 RX ADMIN — OXYCODONE HYDROCHLORIDE 10 MILLIGRAM(S): 5 TABLET ORAL at 04:00

## 2022-10-21 RX ADMIN — Medication 5 MILLIGRAM(S): at 17:21

## 2022-10-21 RX ADMIN — Medication 400 MILLIGRAM(S): at 21:16

## 2022-10-21 NOTE — PROGRESS NOTE ADULT - ASSESSMENT
82 year old male with PMH of HTN, HLD, pre-diabetes, BPH with complaint of neck pain. S/p C1-6 laminectomies with C1-7 posterior instrumented fusion and plastics closure on 10/18. Course complicated by severe constipation.    PCP: Dr. Roselyn Meneses

## 2022-10-21 NOTE — PROGRESS NOTE ADULT - ASSESSMENT
HPI:  82 year old male with PMH of HTN, HLD, Pre-diabetes, BPH with complaint of neck pain. Patient endorses increasing neck pain, gait instability and clumsiness of the hands, He ambulates with a cane. He denies fever, chills, trauma or injury. He presents to [facility] prior to scheduled Posterior C1-6 Decompression, C4-5 Posterior Column Osteotomy, C1-C7 Fusion, Complex Plastics Closure on 10/18/2022.    preop covid swab 10/15 @ [facility] (27 Sep 2022 09:06)    PROCEDURE: 10/18 s/p C1-6 laminectomies with C1-7 posterior instrumented fusion and plastics closure    PLAN:  - neuro and vital check Q4hrs  - continue drains and monitor drain output  - CT cervical spine post op showed hardware in good position  - pain control with tylenol and oxycodone prn, flexeril changed to ATC for muscle spasm  - elevated SBP, hospitalist consult appreciated, Norvasc and losartan dose adjusted with improvement  - HLD, continue lipitor  - continue ISS for euglycemia  - tolerating CCD diet  - dulcolax and mg hydroxide standing along with senna and miralax for bowel regimens; SMOG enema given with +BM  - activity increase as tolerated  - incentive spirometer  - DVT ppx: b/l SCDs and SQL; screening LE doppler negative for DVTs  Discharge planning: PT/OT- likely home PT/OT with rolling walker; may need rehab if pain continues to limit him, PT will follow  f/u am labs    will discuss above with Dr. Ronda banegas [id number]

## 2022-10-21 NOTE — PROGRESS NOTE ADULT - SUBJECTIVE AND OBJECTIVE BOX
used  Patient was seen at bedside this am. Reports incisional pain. +abdominal distention, several days without BM    Vital Signs Last 24 Hrs  T(C): 36.6 (21 Oct 2022 16:15), Max: 37.1 (20 Oct 2022 20:03)  T(F): 97.8 (21 Oct 2022 16:15), Max: 98.7 (20 Oct 2022 20:03)  HR: 96 (21 Oct 2022 16:15) (86 - 117)  BP: 133/75 (21 Oct 2022 16:15) (111/72 - 189/83)  BP(mean): --  RR: 18 (21 Oct 2022 16:15) (16 - 18)  SpO2: 93% (21 Oct 2022 16:15) (92% - 96%)    Parameters below as of 21 Oct 2022 16:15  Patient On (Oxygen Delivery Method): room air    KIRT 20cc/24h; HMVC 30cc/24h    PHYSICAL EXAM:  Neurological:  awake, alert, oriented to person, place, and date, PERRL, face symmetrical, speech clear and fluent, following commands, moving all extremities 5/5, sensation intact to light touch  Cardiovascular: +s1, s2  Respiratory: clear to auscultation b/l  Gastrointestinal: soft, +distended, non-tender, +BS  Genitourinary: voiding  Extremities: warm, dry  Incision/Wound: +aquacel drains x 2     LABS:                             14.1   12.91 )-----------( 230      ( 21 Oct 2022 07:00 )             41.0   10-21    134<L>  |  96  |  25<H>  ----------------------------<  130<H>  4.1   |  25  |  1.25    Ca    9.2      21 Oct 2022 07:15  Phos  3.0     10-20  Mg     2.4     10-20      Allergies  penicillin (Rash)  Intolerances    MEDICATIONS  (STANDING):  acetaminophen   IVPB .. 1000 milliGRAM(s) IV Intermittent once  amLODIPine   Tablet 10 milliGRAM(s) Oral at bedtime  atorvastatin 40 milliGRAM(s) Oral at bedtime  bisacodyl 5 milliGRAM(s) Oral every 12 hours  cyclobenzaprine 5 milliGRAM(s) Oral three times a day  dextrose 5%. 1000 milliLiter(s) (50 mL/Hr) IV Continuous <Continuous>  dextrose 5%. 1000 milliLiter(s) (100 mL/Hr) IV Continuous <Continuous>  dextrose 50% Injectable 25 Gram(s) IV Push once  dextrose 50% Injectable 25 Gram(s) IV Push once  dextrose 50% Injectable 12.5 Gram(s) IV Push once  enoxaparin Injectable 40 milliGRAM(s) SubCutaneous <User Schedule>  glucagon  Injectable 1 milliGRAM(s) IntraMuscular once  insulin lispro (ADMELOG) corrective regimen sliding scale   SubCutaneous three times a day before meals  insulin lispro (ADMELOG) corrective regimen sliding scale   SubCutaneous at bedtime  losartan 50 milliGRAM(s) Oral daily  magnesium hydroxide Suspension 30 milliLiter(s) Oral every 12 hours  polyethylene glycol 3350 17 Gram(s) Oral two times a day  senna 2 Tablet(s) Oral at bedtime    MEDICATIONS  (PRN):  acetaminophen     Tablet .. 650 milliGRAM(s) Oral every 6 hours PRN Moderate Pain (4 - 6)  bisacodyl Suppository 10 milliGRAM(s) Rectal daily PRN Constipation  dextrose Oral Gel 15 Gram(s) Oral once PRN Blood Glucose LESS THAN 70 milliGRAM(s)/deciliter  HYDROmorphone  Injectable 0.5 milliGRAM(s) IV Push every 6 hours PRN breakthrough pain  ondansetron Injectable 4 milliGRAM(s) IV Push every 6 hours PRN Nausea and/or Vomiting  oxyCODONE    IR 5 milliGRAM(s) Oral every 4 hours PRN Moderate Pain (4 - 6)  oxyCODONE    IR 10 milliGRAM(s) Oral every 4 hours PRN Severe Pain (7 - 10)    RADIOLOGY & ADDITIONAL TESTS:    < from: Xray Abdomen 1 View PORTABLE -Routine (Xray Abdomen 1 View PORTABLE -Routine .) (10.21.22 @ 15:03) >  IMPRESSION:  Air filled loops of bowel.  Nonobstructive bowel gas pattern.    < end of copied text >

## 2022-10-21 NOTE — PROGRESS NOTE ADULT - SUBJECTIVE AND OBJECTIVE BOX
[facility] Division of Hospital Medicine  Ashley Jacobo MD  Available via MS Teams  Pager: [phone]    SUBJECTIVE / OVERNIGHT EVENTS:  - [place] ID#[id number]   -Denies any nausea, vomiting, headaches, shortness of breath, cough, chest pain. Has yet to have a bowel movement for >5 days, denies any abdominal pain, rectal pain at this time.     ADDITIONAL REVIEW OF SYSTEMS:    MEDICATIONS  (STANDING):  amLODIPine   Tablet 10 milliGRAM(s) Oral at bedtime  atorvastatin 40 milliGRAM(s) Oral at bedtime  bisacodyl 5 milliGRAM(s) Oral every 12 hours  cyclobenzaprine 5 milliGRAM(s) Oral three times a day  dextrose 5%. 1000 milliLiter(s) (100 mL/Hr) IV Continuous <Continuous>  dextrose 5%. 1000 milliLiter(s) (50 mL/Hr) IV Continuous <Continuous>  dextrose 50% Injectable 25 Gram(s) IV Push once  dextrose 50% Injectable 12.5 Gram(s) IV Push once  dextrose 50% Injectable 25 Gram(s) IV Push once  enoxaparin Injectable 40 milliGRAM(s) SubCutaneous <User Schedule>  glucagon  Injectable 1 milliGRAM(s) IntraMuscular once  insulin lispro (ADMELOG) corrective regimen sliding scale   SubCutaneous three times a day before meals  insulin lispro (ADMELOG) corrective regimen sliding scale   SubCutaneous at bedtime  losartan 50 milliGRAM(s) Oral daily  magnesium hydroxide Suspension 30 milliLiter(s) Oral every 12 hours  polyethylene glycol 3350 17 Gram(s) Oral two times a day  senna 2 Tablet(s) Oral at bedtime    MEDICATIONS  (PRN):  acetaminophen     Tablet .. 650 milliGRAM(s) Oral every 6 hours PRN Moderate Pain (4 - 6)  bisacodyl Suppository 10 milliGRAM(s) Rectal daily PRN Constipation  dextrose Oral Gel 15 Gram(s) Oral once PRN Blood Glucose LESS THAN 70 milliGRAM(s)/deciliter  HYDROmorphone  Injectable 0.5 milliGRAM(s) IV Push every 6 hours PRN breakthrough pain  ondansetron Injectable 4 milliGRAM(s) IV Push every 6 hours PRN Nausea and/or Vomiting  oxyCODONE    IR 5 milliGRAM(s) Oral every 4 hours PRN Moderate Pain (4 - 6)  oxyCODONE    IR 10 milliGRAM(s) Oral every 4 hours PRN Severe Pain (7 - 10)      I&O's Summary    20 Oct 2022 07:01  -  21 Oct 2022 07:00  --------------------------------------------------------  IN: 800 mL / OUT: 995 mL / NET: -195 mL        PHYSICAL EXAM:  Vital Signs Last 24 Hrs  T(C): 36.7 (21 Oct 2022 07:44), Max: 37.1 (20 Oct 2022 20:03)  T(F): 98 (21 Oct 2022 07:44), Max: 98.7 (20 Oct 2022 20:03)  HR: 102 (21 Oct 2022 07:44) (86 - 117)  BP: 153/83 (21 Oct 2022 07:44) (136/85 - 189/83)  BP(mean): --  RR: 16 (21 Oct 2022 07:44) (16 - 18)  SpO2: 96% (21 Oct 2022 07:44) (93% - 96%)    Parameters below as of 21 Oct 2022 07:44  Patient On (Oxygen Delivery Method): nasal cannula  O2 Flow (L/min): 3    CONSTITUTIONAL: NAD, well-developed, well-groomed, on the bed, eating bread comfortably.   RESPIRATORY: Normal respiratory effort; lungs are clear to auscultation bilaterally  CARDIOVASCULAR: RRR, normal S1 and S2; No lower extremity edema  ABDOMEN: Nontender to palpation, hyperactive bowel sounds, no rebound/guarding  MUSCULOSKELETAL: no clubbing or cyanosis of digits; no joint swelling or tenderness to palpation  PSYCH: A+O to person, place, and time; affect appropriate  NEUROLOGY: moving all extremities, following commands   SKIN: No rashes; no palpable lesions, dressing c/d/i, + drain       LABS:                        14.1   12.91 )-----------( 230      ( 21 Oct 2022 07:00 )             41.0     10-21    134<L>  |  96  |  25<H>  ----------------------------<  130<H>  4.1   |  25  |  1.25    Ca    9.2      21 Oct 2022 07:15  Phos  3.0     10-20  Mg     2.4     10-20                COVID-19 PCR: NotDetec (15 Oct 2022 13:40)      RADIOLOGY & ADDITIONAL TESTS:  New Results Reviewed Today: cbc cmp  New Imaging Personally Reviewed Today: none  New Electrocardiogram Personally Reviewed Today: n/a  Prior or Outpatient Records Reviewed Today: n/a    COMMUNICATION:  Care Discussed with Consultants/Other Providers and Details of Discussion: acp  Discussions with Patient/Family: n/a  PCP Communication: n/a

## 2022-10-22 LAB
ANION GAP SERPL CALC-SCNC: 11 MMOL/L — SIGNIFICANT CHANGE UP (ref 5–17)
BASOPHILS # BLD AUTO: 0.02 K/UL — SIGNIFICANT CHANGE UP (ref 0–0.2)
BASOPHILS NFR BLD AUTO: 0.2 % — SIGNIFICANT CHANGE UP (ref 0–2)
BUN SERPL-MCNC: 29 MG/DL — HIGH (ref 7–23)
CALCIUM SERPL-MCNC: 9 MG/DL — SIGNIFICANT CHANGE UP (ref 8.4–10.5)
CHLORIDE SERPL-SCNC: 97 MMOL/L — SIGNIFICANT CHANGE UP (ref 96–108)
CO2 SERPL-SCNC: 27 MMOL/L — SIGNIFICANT CHANGE UP (ref 22–31)
CREAT SERPL-MCNC: 1.11 MG/DL — SIGNIFICANT CHANGE UP (ref 0.5–1.3)
EGFR: 65 ML/MIN/1.73M2 — SIGNIFICANT CHANGE UP
EOSINOPHIL # BLD AUTO: 0.44 K/UL — SIGNIFICANT CHANGE UP (ref 0–0.5)
EOSINOPHIL NFR BLD AUTO: 4.9 % — SIGNIFICANT CHANGE UP (ref 0–6)
GLUCOSE BLDC GLUCOMTR-MCNC: 134 MG/DL — HIGH (ref 70–99)
GLUCOSE BLDC GLUCOMTR-MCNC: 147 MG/DL — HIGH (ref 70–99)
GLUCOSE BLDC GLUCOMTR-MCNC: 167 MG/DL — HIGH (ref 70–99)
GLUCOSE BLDC GLUCOMTR-MCNC: 200 MG/DL — HIGH (ref 70–99)
GLUCOSE SERPL-MCNC: 162 MG/DL — HIGH (ref 70–99)
HCT VFR BLD CALC: 39.6 % — SIGNIFICANT CHANGE UP (ref 39–50)
HGB BLD-MCNC: 13.2 G/DL — SIGNIFICANT CHANGE UP (ref 13–17)
IMM GRANULOCYTES NFR BLD AUTO: 0.8 % — SIGNIFICANT CHANGE UP (ref 0–0.9)
LYMPHOCYTES # BLD AUTO: 2.39 K/UL — SIGNIFICANT CHANGE UP (ref 1–3.3)
LYMPHOCYTES # BLD AUTO: 26.4 % — SIGNIFICANT CHANGE UP (ref 13–44)
MCHC RBC-ENTMCNC: 29.9 PG — SIGNIFICANT CHANGE UP (ref 27–34)
MCHC RBC-ENTMCNC: 33.3 GM/DL — SIGNIFICANT CHANGE UP (ref 32–36)
MCV RBC AUTO: 89.6 FL — SIGNIFICANT CHANGE UP (ref 80–100)
MONOCYTES # BLD AUTO: 0.92 K/UL — HIGH (ref 0–0.9)
MONOCYTES NFR BLD AUTO: 10.1 % — SIGNIFICANT CHANGE UP (ref 2–14)
NEUTROPHILS # BLD AUTO: 5.23 K/UL — SIGNIFICANT CHANGE UP (ref 1.8–7.4)
NEUTROPHILS NFR BLD AUTO: 57.6 % — SIGNIFICANT CHANGE UP (ref 43–77)
NRBC # BLD: 0 /100 WBCS — SIGNIFICANT CHANGE UP (ref 0–0)
PLATELET # BLD AUTO: 227 K/UL — SIGNIFICANT CHANGE UP (ref 150–400)
POTASSIUM SERPL-MCNC: 4.1 MMOL/L — SIGNIFICANT CHANGE UP (ref 3.5–5.3)
POTASSIUM SERPL-SCNC: 4.1 MMOL/L — SIGNIFICANT CHANGE UP (ref 3.5–5.3)
RBC # BLD: 4.42 M/UL — SIGNIFICANT CHANGE UP (ref 4.2–5.8)
RBC # FLD: 12.8 % — SIGNIFICANT CHANGE UP (ref 10.3–14.5)
SODIUM SERPL-SCNC: 135 MMOL/L — SIGNIFICANT CHANGE UP (ref 135–145)
WBC # BLD: 9.07 K/UL — SIGNIFICANT CHANGE UP (ref 3.8–10.5)
WBC # FLD AUTO: 9.07 K/UL — SIGNIFICANT CHANGE UP (ref 3.8–10.5)

## 2022-10-22 PROCEDURE — 99232 SBSQ HOSP IP/OBS MODERATE 35: CPT

## 2022-10-22 RX ADMIN — Medication 400 MILLIGRAM(S): at 08:21

## 2022-10-22 RX ADMIN — Medication 650 MILLIGRAM(S): at 22:00

## 2022-10-22 RX ADMIN — OXYCODONE HYDROCHLORIDE 10 MILLIGRAM(S): 5 TABLET ORAL at 10:26

## 2022-10-22 RX ADMIN — Medication 1: at 17:18

## 2022-10-22 RX ADMIN — OXYCODONE HYDROCHLORIDE 10 MILLIGRAM(S): 5 TABLET ORAL at 03:33

## 2022-10-22 RX ADMIN — Medication 1000 MILLIGRAM(S): at 08:50

## 2022-10-22 RX ADMIN — OXYCODONE HYDROCHLORIDE 10 MILLIGRAM(S): 5 TABLET ORAL at 11:25

## 2022-10-22 RX ADMIN — CYCLOBENZAPRINE HYDROCHLORIDE 5 MILLIGRAM(S): 10 TABLET, FILM COATED ORAL at 21:12

## 2022-10-22 RX ADMIN — OXYCODONE HYDROCHLORIDE 10 MILLIGRAM(S): 5 TABLET ORAL at 19:03

## 2022-10-22 RX ADMIN — AMLODIPINE BESYLATE 10 MILLIGRAM(S): 2.5 TABLET ORAL at 21:12

## 2022-10-22 RX ADMIN — ENOXAPARIN SODIUM 40 MILLIGRAM(S): 100 INJECTION SUBCUTANEOUS at 17:11

## 2022-10-22 RX ADMIN — OXYCODONE HYDROCHLORIDE 10 MILLIGRAM(S): 5 TABLET ORAL at 20:00

## 2022-10-22 RX ADMIN — CYCLOBENZAPRINE HYDROCHLORIDE 5 MILLIGRAM(S): 10 TABLET, FILM COATED ORAL at 05:05

## 2022-10-22 RX ADMIN — ATORVASTATIN CALCIUM 40 MILLIGRAM(S): 80 TABLET, FILM COATED ORAL at 21:12

## 2022-10-22 RX ADMIN — CYCLOBENZAPRINE HYDROCHLORIDE 5 MILLIGRAM(S): 10 TABLET, FILM COATED ORAL at 13:48

## 2022-10-22 RX ADMIN — Medication 650 MILLIGRAM(S): at 21:12

## 2022-10-22 RX ADMIN — Medication 1: at 12:25

## 2022-10-22 RX ADMIN — OXYCODONE HYDROCHLORIDE 10 MILLIGRAM(S): 5 TABLET ORAL at 04:15

## 2022-10-22 RX ADMIN — LOSARTAN POTASSIUM 50 MILLIGRAM(S): 100 TABLET, FILM COATED ORAL at 05:04

## 2022-10-22 NOTE — PROGRESS NOTE ADULT - NSPROGADDITIONALINFOA_GEN_ALL_CORE
d/w SUZANNA Jacobo MD  [facility] Division of Hospital Medicine  Available via MS Teams  Pager: [phone]
d/w SUZANNA Mcintosh MD  [facility] Division of Hospital Medicine  Available via MS Teams  Pager: [phone]

## 2022-10-22 NOTE — PROGRESS NOTE ADULT - SUBJECTIVE AND OBJECTIVE BOX
[facility] Division of Hospital Medicine  Ashley Jacobo MD  Available via MS Teams  Pager: [phone]    SUBJECTIVE / OVERNIGHT EVENTS:   [place] ID [id number]  -Patient one bowel movement 1800 10/21 and one at 2100, which was also documented into EMR. Patient this Am feels well, states his only concern is his back pain which can get as high as 7 or 8/10 in intensity prior to his medication. he denies any nausea, vomiting, shortness of breath, headaches, abdominal pain.      ADDITIONAL REVIEW OF SYSTEMS:    MEDICATIONS  (STANDING):  amLODIPine   Tablet 10 milliGRAM(s) Oral at bedtime  atorvastatin 40 milliGRAM(s) Oral at bedtime  bisacodyl 5 milliGRAM(s) Oral every 12 hours  cyclobenzaprine 5 milliGRAM(s) Oral three times a day  dextrose 5%. 1000 milliLiter(s) (100 mL/Hr) IV Continuous <Continuous>  dextrose 5%. 1000 milliLiter(s) (50 mL/Hr) IV Continuous <Continuous>  dextrose 50% Injectable 25 Gram(s) IV Push once  dextrose 50% Injectable 12.5 Gram(s) IV Push once  dextrose 50% Injectable 25 Gram(s) IV Push once  enoxaparin Injectable 40 milliGRAM(s) SubCutaneous <User Schedule>  glucagon  Injectable 1 milliGRAM(s) IntraMuscular once  insulin lispro (ADMELOG) corrective regimen sliding scale   SubCutaneous three times a day before meals  insulin lispro (ADMELOG) corrective regimen sliding scale   SubCutaneous at bedtime  losartan 50 milliGRAM(s) Oral daily  magnesium hydroxide Suspension 30 milliLiter(s) Oral every 12 hours  polyethylene glycol 3350 17 Gram(s) Oral two times a day  senna 2 Tablet(s) Oral at bedtime    MEDICATIONS  (PRN):  acetaminophen     Tablet .. 650 milliGRAM(s) Oral every 6 hours PRN Moderate Pain (4 - 6)  bisacodyl Suppository 10 milliGRAM(s) Rectal daily PRN Constipation  dextrose Oral Gel 15 Gram(s) Oral once PRN Blood Glucose LESS THAN 70 milliGRAM(s)/deciliter  HYDROmorphone  Injectable 0.5 milliGRAM(s) IV Push every 6 hours PRN breakthrough pain  ondansetron Injectable 4 milliGRAM(s) IV Push every 6 hours PRN Nausea and/or Vomiting  oxyCODONE    IR 5 milliGRAM(s) Oral every 4 hours PRN Moderate Pain (4 - 6)  oxyCODONE    IR 10 milliGRAM(s) Oral every 4 hours PRN Severe Pain (7 - 10)      I&O's Summary    21 Oct 2022 07:01  -  22 Oct 2022 07:00  --------------------------------------------------------  IN: 1020 mL / OUT: 1065 mL / NET: -45 mL        PHYSICAL EXAM:  Vital Signs Last 24 Hrs  T(C): 36.3 (22 Oct 2022 05:00), Max: 37.1 (21 Oct 2022 13:53)  T(F): 97.3 (22 Oct 2022 05:00), Max: 98.7 (21 Oct 2022 13:53)  HR: 101 (22 Oct 2022 05:00) (96 - 104)  BP: 144/86 (22 Oct 2022 05:00) (111/72 - 144/86)  BP(mean): --  RR: 16 (22 Oct 2022 05:00) (16 - 18)  SpO2: 95% (22 Oct 2022 05:00) (92% - 95%)    Parameters below as of 22 Oct 2022 05:00  Patient On (Oxygen Delivery Method): nasal cannula  O2 Flow (L/min): 3    CONSTITUTIONAL: NAD, well-developed, well-groomed, on the bed, eating bread comfortably.   RESPIRATORY: Normal respiratory effort; lungs are clear to auscultation bilaterally  CARDIOVASCULAR: RRR, normal S1 and S2; No lower extremity edema  ABDOMEN: Nontender to palpation, hyperactive bowel sounds, no rebound/guarding  MUSCULOSKELETAL: no clubbing or cyanosis of digits; no joint swelling or tenderness to palpation  PSYCH: A+O to person, place, and time; affect appropriate  NEUROLOGY: moving all extremities, following commands   SKIN: No rashes; no palpable lesions, dressing c/d/i, + drain     LABS:                        13.2   9.07  )-----------( 227      ( 22 Oct 2022 05:58 )             39.6     10-22    135  |  97  |  29<H>  ----------------------------<  162<H>  4.1   |  27  |  1.11    Ca    9.0      22 Oct 2022 05:58    COVID-19 PCR: Silvanotec (15 Oct 2022 13:40)      RADIOLOGY & ADDITIONAL TESTS:  New Results Reviewed Today: cbc cmp  New Imaging Personally Reviewed Today: ABD Xray     < from: Xray Abdomen 1 View PORTABLE -Routine (Xray Abdomen 1 View PORTABLE -Routine .) (10.21.22 @ 15:03) >  FINDINGS:  AIr filled loops of bowel.  Nonobstructive bowel gas pattern.  There is no evidence of intraperitoneal free air on this single supine   radiograph.  IMPRESSION:  Air filled loops of bowel.  Nonobstructive bowel gas pattern.  < end of copied text >    New Electrocardiogram Personally Reviewed Today: n/a  Prior or Outpatient Records Reviewed Today: n/a    COMMUNICATION:  Care Discussed with Consultants/Other Providers and Details of Discussion: n/a  Discussions with Patient/Family: n/a  PCP Communication: n/a

## 2022-10-22 NOTE — PROGRESS NOTE ADULT - ASSESSMENT
HPI:  82 year old male with PMH of HTN, HLD, Pre-diabetes, BPH with complaint of neck pain. Patient endorses increasing neck pain, gait instability and clumsiness of the hands, He ambulates with a cane. He denies fever, chills, trauma or injury. He presents to [facility] prior to scheduled Posterior C1-6 Decompression, C4-5 Posterior Column Osteotomy, C1-C7 Fusion, Complex Plastics Closure on 10/18/2022.    preop covid swab 10/15 @ [facility] (27 Sep 2022 09:06)    PROCEDURE: 10/18 s/p C1-6 laminectomies with C1-7 posterior instrumented fusion and plastics closure    PLAN:  - neuro and vital check Q4hrs  - continue KIRT drain and monitor output  - CT cervical spine post op showed hardware in good position  - pain control with tylenol and oxycodone prn, flexeril changed to ATC for muscle spasm  - elevated SBP, hospitalist consult appreciated, Norvasc and losartan dose adjusted with improvement  - HLD, continue lipitor  - continue ISS for euglycemia  - tolerating CCD diet  - dulcolax and mg hydroxide standing along with senna and miralax for bowel regimens; SMOG enema given with +BM  - activity increase as tolerated  - incentive spirometer  - DVT ppx: b/l SCDs and SQL; screening LE doppler negative for DVTs  Discharge planning: PT/OT- will change to acute rehab, PMR consult placed  f/u am labs  son updated via phone    will discuss above with Dr. Ronda banegas [phone]

## 2022-10-22 NOTE — PROGRESS NOTE ADULT - SUBJECTIVE AND OBJECTIVE BOX
used  Patient was seen at bedside this am. Reports incisional pain. +BM yesterday  ambulating with PT in halls and walker  HMVC not holding suction- removed without difficulty    Vital Signs Last 24 Hrs  T(C): 36.8 (22 Oct 2022 12:06), Max: 36.9 (21 Oct 2022 20:45)  T(F): 98.3 (22 Oct 2022 12:06), Max: 98.5 (21 Oct 2022 20:45)  HR: 108 (22 Oct 2022 12:55) (89 - 108)  BP: 146/76 (22 Oct 2022 12:55) (126/75 - 146/76)  BP(mean): --  RR: 16 (22 Oct 2022 12:06) (16 - 18)  SpO2: 95% (22 Oct 2022 12:55) (93% - 96%)    Parameters below as of 22 Oct 2022 12:55  Patient On (Oxygen Delivery Method): room air    KIRT 65cc/24h; HMVC 30cc/24h    PHYSICAL EXAM:  Neurological:  awake, alert, oriented to person, place, and date, PERRL, face symmetrical, speech clear and fluent, following commands, moving all extremities 5/5, sensation intact to light touch; grasp strong but somewhat clumsy at times.   Cardiovascular: +s1, s2  Respiratory: clear to auscultation b/l  Gastrointestinal: soft, +distended, non-tender, +BS  Genitourinary: voiding  Extremities: warm, dry  Incision/Wound: +aquacel cahnged, + sutures, KIRT remains, HMVC removed without difficulty     LABS:                             14.1   12.91 )-----------( 230      ( 21 Oct 2022 07:00 )             41.0   10-21    134<L>  |  96  |  25<H>  ----------------------------<  130<H>  4.1   |  25  |  1.25    Ca    9.2      21 Oct 2022 07:15  Phos  3.0     10-20  Mg     2.4     10-20      Allergies  penicillin (Rash)  Intolerances      MEDICATIONS  (STANDING):  amLODIPine   Tablet 10 milliGRAM(s) Oral at bedtime  atorvastatin 40 milliGRAM(s) Oral at bedtime  bisacodyl 5 milliGRAM(s) Oral every 12 hours  cyclobenzaprine 5 milliGRAM(s) Oral three times a day  dextrose 5%. 1000 milliLiter(s) (100 mL/Hr) IV Continuous <Continuous>  dextrose 5%. 1000 milliLiter(s) (50 mL/Hr) IV Continuous <Continuous>  dextrose 50% Injectable 25 Gram(s) IV Push once  dextrose 50% Injectable 12.5 Gram(s) IV Push once  dextrose 50% Injectable 25 Gram(s) IV Push once  enoxaparin Injectable 40 milliGRAM(s) SubCutaneous <User Schedule>  glucagon  Injectable 1 milliGRAM(s) IntraMuscular once  insulin lispro (ADMELOG) corrective regimen sliding scale   SubCutaneous three times a day before meals  insulin lispro (ADMELOG) corrective regimen sliding scale   SubCutaneous at bedtime  losartan 50 milliGRAM(s) Oral daily  magnesium hydroxide Suspension 30 milliLiter(s) Oral every 12 hours  polyethylene glycol 3350 17 Gram(s) Oral two times a day  senna 2 Tablet(s) Oral at bedtime    MEDICATIONS  (PRN):  acetaminophen     Tablet .. 650 milliGRAM(s) Oral every 6 hours PRN Moderate Pain (4 - 6)  bisacodyl Suppository 10 milliGRAM(s) Rectal daily PRN Constipation  dextrose Oral Gel 15 Gram(s) Oral once PRN Blood Glucose LESS THAN 70 milliGRAM(s)/deciliter  HYDROmorphone  Injectable 0.5 milliGRAM(s) IV Push every 6 hours PRN breakthrough pain  ondansetron Injectable 4 milliGRAM(s) IV Push every 6 hours PRN Nausea and/or Vomiting  oxyCODONE    IR 5 milliGRAM(s) Oral every 4 hours PRN Moderate Pain (4 - 6)  oxyCODONE    IR 10 milliGRAM(s) Oral every 4 hours PRN Severe Pain (7 - 10)    RADIOLOGY & ADDITIONAL TESTS:    < from: Xray Abdomen 1 View PORTABLE -Routine (Xray Abdomen 1 View PORTABLE -Routine .) (10.21.22 @ 15:03) >  IMPRESSION:  Air filled loops of bowel.  Nonobstructive bowel gas pattern.    < end of copied text >

## 2022-10-23 LAB
ANION GAP SERPL CALC-SCNC: 12 MMOL/L — SIGNIFICANT CHANGE UP (ref 5–17)
BUN SERPL-MCNC: 21 MG/DL — SIGNIFICANT CHANGE UP (ref 7–23)
CALCIUM SERPL-MCNC: 8.7 MG/DL — SIGNIFICANT CHANGE UP (ref 8.4–10.5)
CHLORIDE SERPL-SCNC: 96 MMOL/L — SIGNIFICANT CHANGE UP (ref 96–108)
CO2 SERPL-SCNC: 26 MMOL/L — SIGNIFICANT CHANGE UP (ref 22–31)
CREAT SERPL-MCNC: 0.97 MG/DL — SIGNIFICANT CHANGE UP (ref 0.5–1.3)
EGFR: 77 ML/MIN/1.73M2 — SIGNIFICANT CHANGE UP
GLUCOSE BLDC GLUCOMTR-MCNC: 159 MG/DL — HIGH (ref 70–99)
GLUCOSE BLDC GLUCOMTR-MCNC: 169 MG/DL — HIGH (ref 70–99)
GLUCOSE BLDC GLUCOMTR-MCNC: 169 MG/DL — HIGH (ref 70–99)
GLUCOSE BLDC GLUCOMTR-MCNC: 182 MG/DL — HIGH (ref 70–99)
GLUCOSE SERPL-MCNC: 141 MG/DL — HIGH (ref 70–99)
HCT VFR BLD CALC: 38.8 % — LOW (ref 39–50)
HGB BLD-MCNC: 13.2 G/DL — SIGNIFICANT CHANGE UP (ref 13–17)
MCHC RBC-ENTMCNC: 30.4 PG — SIGNIFICANT CHANGE UP (ref 27–34)
MCHC RBC-ENTMCNC: 34 GM/DL — SIGNIFICANT CHANGE UP (ref 32–36)
MCV RBC AUTO: 89.4 FL — SIGNIFICANT CHANGE UP (ref 80–100)
NRBC # BLD: 0 /100 WBCS — SIGNIFICANT CHANGE UP (ref 0–0)
PLATELET # BLD AUTO: 210 K/UL — SIGNIFICANT CHANGE UP (ref 150–400)
POTASSIUM SERPL-MCNC: 3.8 MMOL/L — SIGNIFICANT CHANGE UP (ref 3.5–5.3)
POTASSIUM SERPL-SCNC: 3.8 MMOL/L — SIGNIFICANT CHANGE UP (ref 3.5–5.3)
RBC # BLD: 4.34 M/UL — SIGNIFICANT CHANGE UP (ref 4.2–5.8)
RBC # FLD: 12.5 % — SIGNIFICANT CHANGE UP (ref 10.3–14.5)
SODIUM SERPL-SCNC: 134 MMOL/L — LOW (ref 135–145)
WBC # BLD: 9.36 K/UL — SIGNIFICANT CHANGE UP (ref 3.8–10.5)
WBC # FLD AUTO: 9.36 K/UL — SIGNIFICANT CHANGE UP (ref 3.8–10.5)

## 2022-10-23 PROCEDURE — 99232 SBSQ HOSP IP/OBS MODERATE 35: CPT

## 2022-10-23 PROCEDURE — 71045 X-RAY EXAM CHEST 1 VIEW: CPT | Mod: 26

## 2022-10-23 PROCEDURE — 72040 X-RAY EXAM NECK SPINE 2-3 VW: CPT | Mod: 26

## 2022-10-23 PROCEDURE — 70450 CT HEAD/BRAIN W/O DYE: CPT | Mod: 26

## 2022-10-23 RX ADMIN — SENNA PLUS 2 TABLET(S): 8.6 TABLET ORAL at 21:29

## 2022-10-23 RX ADMIN — OXYCODONE HYDROCHLORIDE 10 MILLIGRAM(S): 5 TABLET ORAL at 12:18

## 2022-10-23 RX ADMIN — Medication 1: at 17:44

## 2022-10-23 RX ADMIN — CYCLOBENZAPRINE HYDROCHLORIDE 5 MILLIGRAM(S): 10 TABLET, FILM COATED ORAL at 23:33

## 2022-10-23 RX ADMIN — CYCLOBENZAPRINE HYDROCHLORIDE 5 MILLIGRAM(S): 10 TABLET, FILM COATED ORAL at 15:54

## 2022-10-23 RX ADMIN — CYCLOBENZAPRINE HYDROCHLORIDE 5 MILLIGRAM(S): 10 TABLET, FILM COATED ORAL at 05:25

## 2022-10-23 RX ADMIN — LOSARTAN POTASSIUM 50 MILLIGRAM(S): 100 TABLET, FILM COATED ORAL at 05:25

## 2022-10-23 RX ADMIN — POLYETHYLENE GLYCOL 3350 17 GRAM(S): 17 POWDER, FOR SOLUTION ORAL at 17:43

## 2022-10-23 RX ADMIN — OXYCODONE HYDROCHLORIDE 10 MILLIGRAM(S): 5 TABLET ORAL at 06:15

## 2022-10-23 RX ADMIN — Medication 1: at 08:13

## 2022-10-23 RX ADMIN — Medication 1: at 11:31

## 2022-10-23 RX ADMIN — AMLODIPINE BESYLATE 10 MILLIGRAM(S): 2.5 TABLET ORAL at 21:29

## 2022-10-23 RX ADMIN — OXYCODONE HYDROCHLORIDE 10 MILLIGRAM(S): 5 TABLET ORAL at 11:30

## 2022-10-23 RX ADMIN — OXYCODONE HYDROCHLORIDE 10 MILLIGRAM(S): 5 TABLET ORAL at 20:59

## 2022-10-23 RX ADMIN — MAGNESIUM HYDROXIDE 30 MILLILITER(S): 400 TABLET, CHEWABLE ORAL at 17:43

## 2022-10-23 RX ADMIN — ENOXAPARIN SODIUM 40 MILLIGRAM(S): 100 INJECTION SUBCUTANEOUS at 17:43

## 2022-10-23 RX ADMIN — OXYCODONE HYDROCHLORIDE 10 MILLIGRAM(S): 5 TABLET ORAL at 20:29

## 2022-10-23 RX ADMIN — OXYCODONE HYDROCHLORIDE 10 MILLIGRAM(S): 5 TABLET ORAL at 05:27

## 2022-10-23 RX ADMIN — ATORVASTATIN CALCIUM 40 MILLIGRAM(S): 80 TABLET, FILM COATED ORAL at 21:29

## 2022-10-23 NOTE — PROGRESS NOTE ADULT - ASSESSMENT
82 year old male with PMH of HTN, HLD, pre-diabetes, BPH with complaint of neck pain. S/p C1-6 laminectomies with C1-7 posterior instrumented fusion and plastics closure on 10/18. Course complicated by severe constipation with bowel movement after aggressive bowel regimen.     PCP: Dr. Roselyn Meneses

## 2022-10-23 NOTE — PROGRESS NOTE ADULT - SUBJECTIVE AND OBJECTIVE BOX
"3/11/2020       RE: Jesus Haley  [address]  [place] [address]     Dear Colleague,    Thank you for referring your patient, Jesus Haley, to the [facility] [place] at [facility]. Please see a copy of my visit note below.    SOUTHDACHA  CHIEF COMPLAINT   It was my pleasure to see Jesus Haley who is a 57 year old male for follow-up for post op check following RIGHT lap nephrectomy.      HPI   Jesus Haley is a very pleasant 57 year old male who presents with a history of metastatic kidney cancer.    ##Kidney Follow-up##  Patient is status post Laparoscopic Nephrectomy on 7/31/2019.   Tumor was on the right side.   Maximal tumor dimension was 7.5 cm.    The histologic subtype was Clear Cell.    ISUP Grade 4.    Pathologic Stage T2a.    Tumor thrombus level 0 (renal vein).    Tumor necrosis present: No.  Sarcomatoid features present: No.  Lymph Nodes Removed Yes.   Number of nodes removed 1, number of node positive for cancer 0.   Was the ipsilateral adrenal gland removed? No.  Neoadjuvant Chemotherapy? No.  Was Margin Positive? No.    There were not deviations from a normal post-operative course or complications?.    The patient was not readmitted to the hospital since post-operative discharge.    He is doing well from a postsurgical standpoint.      He is following with Dr. Lacey at [facility] for Oncology and is currently on cabozantinib. Last seen on 2/5/20 and planning for next surveillance imaging in a few months.       PHYSICAL EXAM  Patient is a 57 year old  male   Vitals: Blood pressure 138/74, pulse 76, height 1.702 m (5' 7\"), weight 69.4 kg (153 lb), SpO2 96 %.  General Appearance Adult: Body mass index is 23.96 kg/m .  Alert, no acute distress, oriented  HENT: throat/mouth:normal, good dentition  Lungs: no respiratory distress, or pursed lip breathing  Heart: No obvious jugular venous distension present  Abdomen: " soft, nontender, no organomegaly or masses  Incisions:  No evidence of hernia.  Musculoskeltal: extremities normal, no peripheral edema  Skin: no suspicious lesions or rashes  Neuro: Alert, oriented, speech and mentation normal  Psych: affect and mood normal  Gait: Normal    Creatinine   Date Value Ref Range Status   03/06/2020 0.97 0.66 - 1.25 mg/dL Final      PATHOLOGY 7/31/2019:  TUMOR     Histologic Type:         - Clear cell renal cell carcinoma     Histologic Grade (WHO / ISUP Grade):         - G4: Extreme nuclear pleomorphism and / or multi-nuclear giant   cells and         / or rhabdoid and / or sarcomatoid differentiation     Tumor Size: 7.5 Centimeters (cm)     Tumor Focality:         - Unifocal     Tumor Extent       Tumor Extension:           - Tumor limited to kidney     Accessory Findings       Sarcomatoid Features:           - Not identified       Rhabdoid Features:           - Not identified       Tumor Necrosis:           - Not identified       Lymphovascular Invasion:           - Present     MARGINS     Margins:         - Uninvolved by invasive carcinoma     LYMPH NODES     Number of Lymph Nodes Involved: 0     Number of Lymph Nodes Examined: 1     PATHOLOGIC STAGE CLASSIFICATION (PTNM, AJCC 8TH EDITION)     Primary Tumor (pT):         - pT2a     Regional Lymph Nodes (pN):         - pN0       ASSESSMENT and PLAN  57 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 Now s/p LEFT Lap Nx on 7/31/2019.     - He is following with Dr. Lacey in Oncology at [facility]  - We discussed that there is no further need for Urology follow up at this time  - I advised that should he have any questions or urologic needs in the further to not hesitate to reach out    I spent over 15 minutes with the patient.  Over half this time was spent on counseling regarding the above.    Jas Berrios   Urology  [facility]  Clinic Phone  [phone]      Again, thank you for allowing me to participate in the care of your patient.      Sincerely,    Jas Berrios MD       [facility] Division of Hospital Medicine  Ashley Jacobo MD  Available via MS Teams  Pager: [phone]    SUBJECTIVE / OVERNIGHT EVENTS:  Language Line Video  used - [place], ID [id number]  -reports that her feels "a little better" today. states his back pain is still there but improves with meds, especially the IV pain medication. Reports having 1 bm this AM. denies any nausea, vomiting, chest pain, shortness of breath, cough, headaches. Wishes to get stronger.     ADDITIONAL REVIEW OF SYSTEMS:    MEDICATIONS  (STANDING):  amLODIPine   Tablet 10 milliGRAM(s) Oral at bedtime  atorvastatin 40 milliGRAM(s) Oral at bedtime  bisacodyl 5 milliGRAM(s) Oral every 12 hours  cyclobenzaprine 5 milliGRAM(s) Oral three times a day  dextrose 5%. 1000 milliLiter(s) (100 mL/Hr) IV Continuous <Continuous>  dextrose 5%. 1000 milliLiter(s) (50 mL/Hr) IV Continuous <Continuous>  dextrose 50% Injectable 25 Gram(s) IV Push once  dextrose 50% Injectable 12.5 Gram(s) IV Push once  dextrose 50% Injectable 25 Gram(s) IV Push once  enoxaparin Injectable 40 milliGRAM(s) SubCutaneous <User Schedule>  glucagon  Injectable 1 milliGRAM(s) IntraMuscular once  insulin lispro (ADMELOG) corrective regimen sliding scale   SubCutaneous three times a day before meals  insulin lispro (ADMELOG) corrective regimen sliding scale   SubCutaneous at bedtime  losartan 50 milliGRAM(s) Oral daily  magnesium hydroxide Suspension 30 milliLiter(s) Oral every 12 hours  polyethylene glycol 3350 17 Gram(s) Oral two times a day  senna 2 Tablet(s) Oral at bedtime    MEDICATIONS  (PRN):  acetaminophen     Tablet .. 650 milliGRAM(s) Oral every 6 hours PRN Moderate Pain (4 - 6)  bisacodyl Suppository 10 milliGRAM(s) Rectal daily PRN Constipation  dextrose Oral Gel 15 Gram(s) Oral once PRN Blood Glucose LESS THAN 70 milliGRAM(s)/deciliter  ondansetron Injectable 4 milliGRAM(s) IV Push every 6 hours PRN Nausea and/or Vomiting  oxyCODONE    IR 5 milliGRAM(s) Oral every 4 hours PRN Moderate Pain (4 - 6)  oxyCODONE    IR 10 milliGRAM(s) Oral every 4 hours PRN Severe Pain (7 - 10)      I&O's Summary    22 Oct 2022 07:01  -  23 Oct 2022 07:00  --------------------------------------------------------  IN: 780 mL / OUT: 1570 mL / NET: -790 mL        PHYSICAL EXAM:  Vital Signs Last 24 Hrs  T(C): 36.7 (23 Oct 2022 09:10), Max: 36.9 (22 Oct 2022 17:02)  T(F): 98.1 (23 Oct 2022 09:10), Max: 98.4 (22 Oct 2022 17:02)  HR: 100 (23 Oct 2022 09:10) (89 - 109)  BP: 140/72 (23 Oct 2022 09:10) (122/81 - 151/76)  BP(mean): --  RR: 17 (23 Oct 2022 09:10) (16 - 18)  SpO2: 99% (23 Oct 2022 09:10) (94% - 99%)    Parameters below as of 23 Oct 2022 09:10  Patient On (Oxygen Delivery Method): room air      CONSTITUTIONAL: NAD, well-developed, well-groomed, on the bed, eating bread comfortably.   RESPIRATORY: Normal respiratory effort; lungs are clear to auscultation bilaterally  CARDIOVASCULAR: RRR, normal S1 and S2; No lower extremity edema  ABDOMEN: Nontender to palpation, normal sounding bowel sounds, no rebound/guarding  MUSCULOSKELETAL: no clubbing or cyanosis of digits; no joint swelling or tenderness to palpation  PSYCH: A+O to person, place, and time; affect appropriate  NEUROLOGY: moving all extremities, following commands   SKIN: No rashes; no palpable lesions, dressing c/d/i, + drain on the right side     LABS:                        13.2   9.36  )-----------( 210      ( 23 Oct 2022 08:01 )             38.8     10-23    134<L>  |  96  |  21  ----------------------------<  141<H>  3.8   |  26  |  0.97    Ca    8.7      23 Oct 2022 08:01    COVID-19 PCR: NotDetec (15 Oct 2022 13:40)      RADIOLOGY & ADDITIONAL TESTS:  New Results Reviewed Today: cbc cmp  New Imaging Personally Reviewed Today: none  New Electrocardiogram Personally Reviewed Today: n/a  Prior or Outpatient Records Reviewed Today: n/a     COMMUNICATION:  Care Discussed with Consultants/Other Providers and Details of Discussion: n/a  Discussions with Patient/Family: n/a  PCP Communication: n/a

## 2022-10-23 NOTE — PROGRESS NOTE ADULT - SUBJECTIVE AND OBJECTIVE BOX
HPI:  82 year old male with PMH of HTN, HLD, Pre-diabetes, BPH with complaint of neck pain. Patient endorses increasing neck pain, gait instability and clumsiness of the hands, He ambulates with a cane. He denies fever, chills, trauma or injury. He presents to [facility] prior to scheduled Posterior C1-6 Decompression, C4-5 Posterior Column Osteotomy, C1-C7 Fusion, Complex Plastics Closure on 10/18/2022.    preop covid swab 10/15 @ [facility] (27 Sep 2022 09:06)    OVERNIGHT EVENTS:  Vital Signs Last 24 Hrs  T(C): 36.8 (23 Oct 2022 12:21), Max: 36.9 (22 Oct 2022 17:02)  T(F): 98.3 (23 Oct 2022 12:21), Max: 98.4 (22 Oct 2022 17:02)  HR: 99 (23 Oct 2022 12:21) (99 - 109)  BP: 132/70 (23 Oct 2022 12:21) (122/81 - 151/76)  BP(mean): --  RR: 18 (23 Oct 2022 12:21) (16 - 18)  SpO2: 100% (23 Oct 2022 12:21) (95% - 100%)    Parameters below as of 23 Oct 2022 09:10  Patient On (Oxygen Delivery Method): room air        I&O's Detail    22 Oct 2022 07:01  -  23 Oct 2022 07:00  --------------------------------------------------------  IN:    Oral Fluid: 780 mL  Total IN: 780 mL    OUT:    Bulb (mL): 70 mL    Voided (mL): 1500 mL  Total OUT: 1570 mL    Total NET: -790 mL      23 Oct 2022 07:01  -  23 Oct 2022 12:50  --------------------------------------------------------  IN:    Oral Fluid: 960 mL  Total IN: 960 mL    OUT:    Voided (mL): 550 mL  Total OUT: 550 mL    Total NET: 410 mL        I&O's Summary    22 Oct 2022 07:01  -  23 Oct 2022 07:00  --------------------------------------------------------  IN: 780 mL / OUT: 1570 mL / NET: -790 mL    23 Oct 2022 07:01  -  23 Oct 2022 12:50  --------------------------------------------------------  IN: 960 mL / OUT: 550 mL / NET: 410 mL        PHYSICAL EXAM:  Neurological:    AOx3, FC, PERRL, EOMI, no facial, 5/5 throughout, no drift      LABS:                        13.2   9.36  )-----------( 210      ( 23 Oct 2022 08:01 )             38.8     10-23    134<L>  |  96  |  21  ----------------------------<  141<H>  3.8   |  26  |  0.97    Ca    8.7      23 Oct 2022 08:01              CAPILLARY BLOOD GLUCOSE      POCT Blood Glucose.: 159 mg/dL (23 Oct 2022 11:30)  POCT Blood Glucose.: 182 mg/dL (23 Oct 2022 08:11)  POCT Blood Glucose.: 147 mg/dL (22 Oct 2022 21:12)  POCT Blood Glucose.: 167 mg/dL (22 Oct 2022 17:18)      Drug Levels: [] N/A    CSF Analysis: [] N/A      Allergies    penicillin (Rash)    Intolerances      MEDICATIONS:  Antibiotics:    Neuro:  acetaminophen     Tablet .. 650 milliGRAM(s) Oral every 6 hours PRN  cyclobenzaprine 5 milliGRAM(s) Oral three times a day  ondansetron Injectable 4 milliGRAM(s) IV Push every 6 hours PRN  oxyCODONE    IR 5 milliGRAM(s) Oral every 4 hours PRN  oxyCODONE    IR 10 milliGRAM(s) Oral every 4 hours PRN    Anticoagulation:  enoxaparin Injectable 40 milliGRAM(s) SubCutaneous <User Schedule>    OTHER:  amLODIPine   Tablet 10 milliGRAM(s) Oral at bedtime  atorvastatin 40 milliGRAM(s) Oral at bedtime  bisacodyl 5 milliGRAM(s) Oral every 12 hours  bisacodyl Suppository 10 milliGRAM(s) Rectal daily PRN  dextrose 50% Injectable 25 Gram(s) IV Push once  dextrose 50% Injectable 12.5 Gram(s) IV Push once  dextrose 50% Injectable 25 Gram(s) IV Push once  dextrose Oral Gel 15 Gram(s) Oral once PRN  glucagon  Injectable 1 milliGRAM(s) IntraMuscular once  insulin lispro (ADMELOG) corrective regimen sliding scale   SubCutaneous three times a day before meals  insulin lispro (ADMELOG) corrective regimen sliding scale   SubCutaneous at bedtime  losartan 50 milliGRAM(s) Oral daily  magnesium hydroxide Suspension 30 milliLiter(s) Oral every 12 hours  polyethylene glycol 3350 17 Gram(s) Oral two times a day  senna 2 Tablet(s) Oral at bedtime    IVF:  dextrose 5%. 1000 milliLiter(s) IV Continuous <Continuous>  dextrose 5%. 1000 milliLiter(s) IV Continuous <Continuous>    CULTURES:    RADIOLOGY & ADDITIONAL TESTS:

## 2022-10-23 NOTE — PROVIDER CONTACT NOTE (FALL NOTIFICATION) - ASSESSMENT
pt was found on floor, chair alarm on, fall risk socks on. VSS upon return pt to bed. see flow sheet for vitals. pt has L side abrasion.

## 2022-10-23 NOTE — PROGRESS NOTE ADULT - ASSESSMENT
HPI:  82 year old male with PMH of HTN, HLD, Pre-diabetes, BPH with complaint of neck pain. Patient endorses increasing neck pain, gait instability and clumsiness of the hands, He ambulates with a cane. He denies fever, chills, trauma or injury. He presents to [facility] prior to scheduled Posterior C1-6 Decompression, C4-5 Posterior Column Osteotomy, C1-C7 Fusion, Complex Plastics Closure on 10/18/2022.    preop covid swab 10/15 @ [facility] (27 Sep 2022 09:06)    PROCEDURE: 10/18 s/p C1-6 laminectomies with C1-7 posterior instrumented fusion and plastics closure    PLAN:  - neuro and vital check Q4hrs  - continue KIRT drain and monitor output  - CT cervical spine post op showed hardware in good position  - pain control with tylenol and oxycodone prn, flexeril changed to ATC for muscle spasm  - elevated SBP, hospitalist consult appreciated, Norvasc and losartan dose adjusted with improvement  - HLD, continue lipitor  - continue ISS for euglycemia  - tolerating CCD diet  - dulcolax and mg hydroxide standing along with senna and miralax for bowel regimens; SMOG enema given with +BM  - activity increase as tolerated  - incentive spirometer  - DVT ppx: b/l SCDs and SQL; screening LE doppler negative for DVTs  Discharge planning: PT/OT- will change to acute rehab, PMR consult placed  f/u am labs      d/w with Dr. Bond

## 2022-10-23 NOTE — PROVIDER CONTACT NOTE (FALL NOTIFICATION) - RECOMMENDATIONS
Please see message below re: Concerta.    Last office visit 8-16-19    Please put prescription in [facility] folder.    Please advise, thanks.   xrays, provider to bedside for evaluation. Ct scan of head

## 2022-10-24 DIAGNOSIS — R09.02 HYPOXEMIA: ICD-10-CM

## 2022-10-24 DIAGNOSIS — E87.1 HYPO-OSMOLALITY AND HYPONATREMIA: ICD-10-CM

## 2022-10-24 LAB
ANION GAP SERPL CALC-SCNC: 11 MMOL/L — SIGNIFICANT CHANGE UP (ref 5–17)
ANION GAP SERPL CALC-SCNC: 13 MMOL/L — SIGNIFICANT CHANGE UP (ref 5–17)
APPEARANCE UR: CLEAR — SIGNIFICANT CHANGE UP
BACTERIA # UR AUTO: NEGATIVE — SIGNIFICANT CHANGE UP
BASOPHILS # BLD AUTO: 0.03 K/UL — SIGNIFICANT CHANGE UP (ref 0–0.2)
BASOPHILS NFR BLD AUTO: 0.3 % — SIGNIFICANT CHANGE UP (ref 0–2)
BILIRUB UR-MCNC: NEGATIVE — SIGNIFICANT CHANGE UP
BUN SERPL-MCNC: 22 MG/DL — SIGNIFICANT CHANGE UP (ref 7–23)
BUN SERPL-MCNC: 23 MG/DL — SIGNIFICANT CHANGE UP (ref 7–23)
CALCIUM SERPL-MCNC: 8.6 MG/DL — SIGNIFICANT CHANGE UP (ref 8.4–10.5)
CALCIUM SERPL-MCNC: 9 MG/DL — SIGNIFICANT CHANGE UP (ref 8.4–10.5)
CHLORIDE SERPL-SCNC: 92 MMOL/L — LOW (ref 96–108)
CHLORIDE SERPL-SCNC: 96 MMOL/L — SIGNIFICANT CHANGE UP (ref 96–108)
CO2 SERPL-SCNC: 24 MMOL/L — SIGNIFICANT CHANGE UP (ref 22–31)
CO2 SERPL-SCNC: 25 MMOL/L — SIGNIFICANT CHANGE UP (ref 22–31)
COLOR SPEC: YELLOW — SIGNIFICANT CHANGE UP
CREAT ?TM UR-MCNC: 49 MG/DL — SIGNIFICANT CHANGE UP
CREAT SERPL-MCNC: 1.09 MG/DL — SIGNIFICANT CHANGE UP (ref 0.5–1.3)
CREAT SERPL-MCNC: 1.18 MG/DL — SIGNIFICANT CHANGE UP (ref 0.5–1.3)
DIFF PNL FLD: NEGATIVE — SIGNIFICANT CHANGE UP
EGFR: 61 ML/MIN/1.73M2 — SIGNIFICANT CHANGE UP
EGFR: 67 ML/MIN/1.73M2 — SIGNIFICANT CHANGE UP
EOSINOPHIL # BLD AUTO: 0.26 K/UL — SIGNIFICANT CHANGE UP (ref 0–0.5)
EOSINOPHIL NFR BLD AUTO: 2.7 % — SIGNIFICANT CHANGE UP (ref 0–6)
EPI CELLS # UR: 0 /HPF — SIGNIFICANT CHANGE UP
GLUCOSE BLDC GLUCOMTR-MCNC: 132 MG/DL — HIGH (ref 70–99)
GLUCOSE BLDC GLUCOMTR-MCNC: 137 MG/DL — HIGH (ref 70–99)
GLUCOSE BLDC GLUCOMTR-MCNC: 155 MG/DL — HIGH (ref 70–99)
GLUCOSE BLDC GLUCOMTR-MCNC: 164 MG/DL — HIGH (ref 70–99)
GLUCOSE SERPL-MCNC: 144 MG/DL — HIGH (ref 70–99)
GLUCOSE SERPL-MCNC: 153 MG/DL — HIGH (ref 70–99)
GLUCOSE UR QL: NEGATIVE — SIGNIFICANT CHANGE UP
HCT VFR BLD CALC: 34.2 % — LOW (ref 39–50)
HGB BLD-MCNC: 12 G/DL — LOW (ref 13–17)
IMM GRANULOCYTES NFR BLD AUTO: 0.4 % — SIGNIFICANT CHANGE UP (ref 0–0.9)
KETONES UR-MCNC: NEGATIVE — SIGNIFICANT CHANGE UP
LEUKOCYTE ESTERASE UR-ACNC: NEGATIVE — SIGNIFICANT CHANGE UP
LYMPHOCYTES # BLD AUTO: 1.72 K/UL — SIGNIFICANT CHANGE UP (ref 1–3.3)
LYMPHOCYTES # BLD AUTO: 17.6 % — SIGNIFICANT CHANGE UP (ref 13–44)
MCHC RBC-ENTMCNC: 30.3 PG — SIGNIFICANT CHANGE UP (ref 27–34)
MCHC RBC-ENTMCNC: 35.1 GM/DL — SIGNIFICANT CHANGE UP (ref 32–36)
MCV RBC AUTO: 86.4 FL — SIGNIFICANT CHANGE UP (ref 80–100)
MONOCYTES # BLD AUTO: 1.02 K/UL — HIGH (ref 0–0.9)
MONOCYTES NFR BLD AUTO: 10.5 % — SIGNIFICANT CHANGE UP (ref 2–14)
NEUTROPHILS # BLD AUTO: 6.69 K/UL — SIGNIFICANT CHANGE UP (ref 1.8–7.4)
NEUTROPHILS NFR BLD AUTO: 68.5 % — SIGNIFICANT CHANGE UP (ref 43–77)
NITRITE UR-MCNC: NEGATIVE — SIGNIFICANT CHANGE UP
NRBC # BLD: 0 /100 WBCS — SIGNIFICANT CHANGE UP (ref 0–0)
OSMOLALITY UR: 269 MOS/KG — LOW (ref 300–900)
PH UR: 6.5 — SIGNIFICANT CHANGE UP (ref 5–8)
PLATELET # BLD AUTO: 241 K/UL — SIGNIFICANT CHANGE UP (ref 150–400)
POTASSIUM SERPL-MCNC: 3.7 MMOL/L — SIGNIFICANT CHANGE UP (ref 3.5–5.3)
POTASSIUM SERPL-MCNC: 3.9 MMOL/L — SIGNIFICANT CHANGE UP (ref 3.5–5.3)
POTASSIUM SERPL-SCNC: 3.7 MMOL/L — SIGNIFICANT CHANGE UP (ref 3.5–5.3)
POTASSIUM SERPL-SCNC: 3.9 MMOL/L — SIGNIFICANT CHANGE UP (ref 3.5–5.3)
PROT UR-MCNC: ABNORMAL
RBC # BLD: 3.96 M/UL — LOW (ref 4.2–5.8)
RBC # FLD: 12.4 % — SIGNIFICANT CHANGE UP (ref 10.3–14.5)
RBC CASTS # UR COMP ASSIST: 1 /HPF — SIGNIFICANT CHANGE UP (ref 0–4)
SARS-COV-2 RNA SPEC QL NAA+PROBE: SIGNIFICANT CHANGE UP
SODIUM SERPL-SCNC: 129 MMOL/L — LOW (ref 135–145)
SODIUM SERPL-SCNC: 132 MMOL/L — LOW (ref 135–145)
SODIUM UR-SCNC: 23 MMOL/L — SIGNIFICANT CHANGE UP
SP GR SPEC: 1.02 — SIGNIFICANT CHANGE UP (ref 1.01–1.02)
UROBILINOGEN FLD QL: ABNORMAL
WBC # BLD: 9.76 K/UL — SIGNIFICANT CHANGE UP (ref 3.8–10.5)
WBC # FLD AUTO: 9.76 K/UL — SIGNIFICANT CHANGE UP (ref 3.8–10.5)
WBC UR QL: 1 /HPF — SIGNIFICANT CHANGE UP (ref 0–5)

## 2022-10-24 PROCEDURE — 99222 1ST HOSP IP/OBS MODERATE 55: CPT

## 2022-10-24 PROCEDURE — 99233 SBSQ HOSP IP/OBS HIGH 50: CPT

## 2022-10-24 RX ORDER — ACETAMINOPHEN 500 MG
650 TABLET ORAL EVERY 8 HOURS
Refills: 0 | Status: COMPLETED | OUTPATIENT
Start: 2022-10-24 | End: 2022-10-27

## 2022-10-24 RX ORDER — ACETAMINOPHEN 500 MG
1000 TABLET ORAL EVERY 6 HOURS
Refills: 0 | Status: DISCONTINUED | OUTPATIENT
Start: 2022-10-24 | End: 2022-10-24

## 2022-10-24 RX ORDER — OXYCODONE HYDROCHLORIDE 5 MG/1
10 TABLET ORAL EVERY 6 HOURS
Refills: 0 | Status: DISCONTINUED | OUTPATIENT
Start: 2022-10-24 | End: 2022-10-26

## 2022-10-24 RX ORDER — TRAMADOL HYDROCHLORIDE 50 MG/1
25 TABLET ORAL ONCE
Refills: 0 | Status: DISCONTINUED | OUTPATIENT
Start: 2022-10-24 | End: 2022-10-24

## 2022-10-24 RX ORDER — SODIUM CHLORIDE 9 MG/ML
1 INJECTION INTRAMUSCULAR; INTRAVENOUS; SUBCUTANEOUS EVERY 8 HOURS
Refills: 0 | Status: DISCONTINUED | OUTPATIENT
Start: 2022-10-24 | End: 2022-10-27

## 2022-10-24 RX ORDER — OXYCODONE HYDROCHLORIDE 5 MG/1
5 TABLET ORAL EVERY 6 HOURS
Refills: 0 | Status: DISCONTINUED | OUTPATIENT
Start: 2022-10-24 | End: 2022-10-26

## 2022-10-24 RX ORDER — ACETAMINOPHEN 500 MG
1000 TABLET ORAL ONCE
Refills: 0 | Status: COMPLETED | OUTPATIENT
Start: 2022-10-24 | End: 2022-10-24

## 2022-10-24 RX ORDER — IPRATROPIUM/ALBUTEROL SULFATE 18-103MCG
3 AEROSOL WITH ADAPTER (GRAM) INHALATION EVERY 12 HOURS
Refills: 0 | Status: DISCONTINUED | OUTPATIENT
Start: 2022-10-24 | End: 2022-10-27

## 2022-10-24 RX ORDER — METHOCARBAMOL 500 MG/1
500 TABLET, FILM COATED ORAL THREE TIMES A DAY
Refills: 0 | Status: DISCONTINUED | OUTPATIENT
Start: 2022-10-24 | End: 2022-10-27

## 2022-10-24 RX ADMIN — POLYETHYLENE GLYCOL 3350 17 GRAM(S): 17 POWDER, FOR SOLUTION ORAL at 05:24

## 2022-10-24 RX ADMIN — LOSARTAN POTASSIUM 50 MILLIGRAM(S): 100 TABLET, FILM COATED ORAL at 05:24

## 2022-10-24 RX ADMIN — TRAMADOL HYDROCHLORIDE 25 MILLIGRAM(S): 50 TABLET ORAL at 05:21

## 2022-10-24 RX ADMIN — Medication 1000 MILLIGRAM(S): at 04:21

## 2022-10-24 RX ADMIN — Medication 3 MILLILITER(S): at 18:00

## 2022-10-24 RX ADMIN — CYCLOBENZAPRINE HYDROCHLORIDE 5 MILLIGRAM(S): 10 TABLET, FILM COATED ORAL at 15:35

## 2022-10-24 RX ADMIN — Medication 650 MILLIGRAM(S): at 10:00

## 2022-10-24 RX ADMIN — Medication 1000 MILLIGRAM(S): at 23:22

## 2022-10-24 RX ADMIN — METHOCARBAMOL 500 MILLIGRAM(S): 500 TABLET, FILM COATED ORAL at 05:21

## 2022-10-24 RX ADMIN — SODIUM CHLORIDE 1 GRAM(S): 9 INJECTION INTRAMUSCULAR; INTRAVENOUS; SUBCUTANEOUS at 21:18

## 2022-10-24 RX ADMIN — ATORVASTATIN CALCIUM 40 MILLIGRAM(S): 80 TABLET, FILM COATED ORAL at 21:16

## 2022-10-24 RX ADMIN — METHOCARBAMOL 500 MILLIGRAM(S): 500 TABLET, FILM COATED ORAL at 20:00

## 2022-10-24 RX ADMIN — MAGNESIUM HYDROXIDE 30 MILLILITER(S): 400 TABLET, CHEWABLE ORAL at 05:24

## 2022-10-24 RX ADMIN — Medication 400 MILLIGRAM(S): at 22:52

## 2022-10-24 RX ADMIN — TRAMADOL HYDROCHLORIDE 25 MILLIGRAM(S): 50 TABLET ORAL at 05:51

## 2022-10-24 RX ADMIN — Medication 400 MILLIGRAM(S): at 03:51

## 2022-10-24 RX ADMIN — Medication 1: at 09:12

## 2022-10-24 RX ADMIN — Medication 650 MILLIGRAM(S): at 16:30

## 2022-10-24 RX ADMIN — ENOXAPARIN SODIUM 40 MILLIGRAM(S): 100 INJECTION SUBCUTANEOUS at 18:00

## 2022-10-24 RX ADMIN — AMLODIPINE BESYLATE 10 MILLIGRAM(S): 2.5 TABLET ORAL at 21:16

## 2022-10-24 RX ADMIN — CYCLOBENZAPRINE HYDROCHLORIDE 5 MILLIGRAM(S): 10 TABLET, FILM COATED ORAL at 22:52

## 2022-10-24 RX ADMIN — Medication 650 MILLIGRAM(S): at 15:34

## 2022-10-24 RX ADMIN — Medication 650 MILLIGRAM(S): at 09:14

## 2022-10-24 RX ADMIN — Medication 5 MILLIGRAM(S): at 05:21

## 2022-10-24 NOTE — PROGRESS NOTE ADULT - ASSESSMENT
ASSESSMENT AND PLAN: 82 year old male with PMH of HTN, HLD, Pre-diabetes, BPH with complaint of neck pain. Patient endorses increasing neck pain, gait instability and clumsiness of the hands, He ambulates with a cane.    s/p S/p C1-6 laminectomies with C1-7 posterior instrumented fusion and plastics closure 10/18/22    NEURO:   - Continue neuro checks q 4  - Continue to monitor superficial diamond drain output (60cc/24hrs)  - Adjusted pain medications (was previously written for Tylenol) as needed for moderate pain - made Tylenol standing per Hospitalist  - Continue Oxycodone as needed for moderate / severe pain  - Continue Flexeril standing, and Robaxin as needed for muscle spasm  - PT/OT - Acute Rehab, PMR - P    PULM:   - On room air, O2Sat>91%  - Incentive spirometry    CV:  - -160  - Continue Norvasc and Losartan for HTN  - Continue Lipitor for HLD    ENDO:   - A1c 7.0, continue ISS and CCD diet  - Fingersticks are appropriate    HEME/ONC:            10/24 CBC stable          DVT ppx: SQL, SCDs, [place]pp neg 10/18    RENAL:   - IVL  - 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 If remains to be low, will add salt tabs. BMP in am    ID:   - Afebrile  - Received post-op abx   - 10/24 COVID neg    GI:    - Continue oral diet  - Continue senna, miralax, dulcolax, mag hydroxide - last BM 10/24    DISCHARGE PLANNING:   PT/OT - Acute Rehab, PMR - P    Plan to be discussed w/ Dr. Bond  [id number]

## 2022-10-24 NOTE — PROGRESS NOTE ADULT - PROBLEM SELECTOR PLAN 1
Pain control - will add tylenol 650mg q8h ATC for 3 days   bowel regimen   Monitor drain output  s/p fall while getting out of bed yesterday- CT head/C-spine xray neg for fracture. f/u CXR and monitor left rib abrasion.

## 2022-10-24 NOTE — PROGRESS NOTE ADULT - ASSESSMENT
82 year old male with PMH of HTN, HLD, pre-diabetes, BPH with complaint of neck pain s/p C1-6 laminectomies with C1-7 posterior instrumented fusion and plastics closure on 10/18. Course complicated by severe constipation now resolved, hyponatremia, fall.     PCP: Dr. Roselyn Meneses

## 2022-10-24 NOTE — PROGRESS NOTE ADULT - SUBJECTIVE AND OBJECTIVE BOX
[facility] Division of Hospital Medicine  Arianne Rivera MD  Available via MS Teams  Spectra [phone]    SUBJECTIVE / OVERNIGHT EVENTS: patient seen and examined this morning. he preferred to have his stepdaughter (Beba) translate for him. He endorsed having a lot of neck/back pain. He fell yesterday while trying to get out of bed to go to the bathroom and landed on his left side. Denies any other pain, leg weakness, SOB, cough. Daughter states the patient is intermittently confused and was wheezing on Saturday which has since resolved.     ADDITIONAL REVIEW OF SYSTEMS:    MEDICATIONS  (STANDING):  acetaminophen     Tablet .. 650 milliGRAM(s) Oral every 8 hours  albuterol/ipratropium for Nebulization 3 milliLiter(s) Nebulizer every 12 hours  amLODIPine   Tablet 10 milliGRAM(s) Oral at bedtime  atorvastatin 40 milliGRAM(s) Oral at bedtime  bisacodyl 5 milliGRAM(s) Oral every 12 hours  cyclobenzaprine 5 milliGRAM(s) Oral three times a day  dextrose 5%. 1000 milliLiter(s) (50 mL/Hr) IV Continuous <Continuous>  dextrose 5%. 1000 milliLiter(s) (100 mL/Hr) IV Continuous <Continuous>  dextrose 50% Injectable 25 Gram(s) IV Push once  dextrose 50% Injectable 12.5 Gram(s) IV Push once  dextrose 50% Injectable 25 Gram(s) IV Push once  enoxaparin Injectable 40 milliGRAM(s) SubCutaneous <User Schedule>  glucagon  Injectable 1 milliGRAM(s) IntraMuscular once  insulin lispro (ADMELOG) corrective regimen sliding scale   SubCutaneous three times a day before meals  insulin lispro (ADMELOG) corrective regimen sliding scale   SubCutaneous at bedtime  losartan 50 milliGRAM(s) Oral daily  magnesium hydroxide Suspension 30 milliLiter(s) Oral every 12 hours  polyethylene glycol 3350 17 Gram(s) Oral two times a day  senna 2 Tablet(s) Oral at bedtime    MEDICATIONS  (PRN):  acetaminophen     Tablet .. 1000 milliGRAM(s) Oral every 6 hours PRN Temp greater or equal to 38C (100.4F), Mild Pain (1 - 3)  bisacodyl Suppository 10 milliGRAM(s) Rectal daily PRN Constipation  dextrose Oral Gel 15 Gram(s) Oral once PRN Blood Glucose LESS THAN 70 milliGRAM(s)/deciliter  methocarbamol 500 milliGRAM(s) Oral three times a day PRN Muscle Spasm  ondansetron Injectable 4 milliGRAM(s) IV Push every 6 hours PRN Nausea and/or Vomiting  oxyCODONE    IR 5 milliGRAM(s) Oral every 6 hours PRN Moderate Pain (4 - 6)  oxyCODONE    IR 10 milliGRAM(s) Oral every 6 hours PRN Severe Pain (7 - 10)      I&O's Summary    23 Oct 2022 07:01  -  24 Oct 2022 07:00  --------------------------------------------------------  IN: 2020 mL / OUT: 2460 mL / NET: -440 mL    24 Oct 2022 07:01  -  24 Oct 2022 11:42  --------------------------------------------------------  IN: 240 mL / OUT: 300 mL / NET: -60 mL        PHYSICAL EXAM:  Vital Signs Last 24 Hrs  T(C): 36.8 (24 Oct 2022 08:40), Max: 37.3 (23 Oct 2022 19:57)  T(F): 98.2 (24 Oct 2022 08:40), Max: 99.1 (23 Oct 2022 19:57)  HR: 100 (24 Oct 2022 08:40) (99 - 119)  BP: 132/69 (24 Oct 2022 08:40) (128/71 - 162/85)  BP(mean): --  RR: 18 (24 Oct 2022 08:40) (18 - 18)  SpO2: 96% (24 Oct 2022 08:40) (91% - 100%)    Parameters below as of 24 Oct 2022 08:40  Patient On (Oxygen Delivery Method): room air      CONSTITUTIONAL: NAD, well-groomed  EYES: conjunctiva and sclera clear  NECK: limited ROM  RESPIRATORY: Normal respiratory effort; lungs are clear to auscultation bilaterally  CARDIOVASCULAR: normal S1 and S2, no murmur/rub/gallop; No lower extremity edema  ABDOMEN: Nontender to palpation, normoactive bowel sounds, no rebound/guarding; No hepatosplenomegaly  MUSCULOSKELETAL:  no clubbing or cyanosis of digits; no joint swelling or tenderness to palpation  PSYCH: A+O to person, place, and time; affect appropriate  NEUROLOGY: moves all extremities, no gross sensory deficits, follows commands  SKIN: + erythematous skin abrasion in left rib area, + dressing over incision site with hemovac drain    LABS:                        12.0   9.76  )-----------( 241      ( 24 Oct 2022 05:16 )             34.2     10-24    129<L>  |  92<L>  |  23  ----------------------------<  153<H>  3.7   |  24  |  1.18    Ca    8.6      24 Oct 2022 05:17            Urinalysis Basic - ( 24 Oct 2022 05:15 )    Color: Yellow / Appearance: Clear / S.020 / pH: x  Gluc: x / Ketone: Negative  / Bili: Negative / Urobili: 2 mg/dL   Blood: x / Protein: 30 mg/dL / Nitrite: Negative   Leuk Esterase: Negative / RBC: 1 /hpf / WBC 1 /HPF   Sq Epi: x / Non Sq Epi: 0 /hpf / Bacteria: Negative        COVID-19 PCR: NotDetec (15 Oct 2022 13:40)        RADIOLOGY & ADDITIONAL TESTS:  New Results Reviewed Today:     < from: CT Head No Cont (10.23.22 @ 19:02) >  IMPRESSION:    No acute intracranial hemorrhage, hydrocephalus or extra-axial fluid   collection.  Mild parenchymal volume loss and mild chronic microvascular ischemic   changes.  No CT evidence for acute transcortical infarction.    < end of copied text >    < from: Xray Cervical Spine AP and Lateral Only (10.23.22 @ 17:28) >  IMPRESSION: C2-C7 posterior spinal fusion hardware consisting of pedicle   screws with interconnecting rods which is better visualized on CT   cervical spine. No hardware fracture.    < end of copied text >      COMMUNICATION:  Care Discussed with Consultants/Other Providers and Details of Discussion: neurosurgery PA(Huong)  Discussions with Patient/Family: yes  PCP Communication:

## 2022-10-24 NOTE — CONSULT NOTE ADULT - SUBJECTIVE AND OBJECTIVE BOX
HPI:  82 year old male with PMH of HTN, HLD, Pre-diabetes, BPH with complaint of neck pain. Patient endorses increasing neck pain, gait instability and clumsiness of the hands, He ambulates with a cane. He denies fever, chills, trauma or injury. He presents to [facility] prior to scheduled Posterior C1-6 Decompression, C4-5 Posterior Column Osteotomy, C1-C7 Fusion, Complex Plastics Closure on 10/18/2022.    Patient was admitted on 10/18, s/p S/p C1-6 laminectomies with C1-7 posterior instrumented fusion and plastics closure, post op elevated BP, patient fell in room 10/23, CT negative. Patient seen today, pain controlled.       REVIEW OF SYSTEMS  Constitutional - No fever, No weight loss, No fatigue  HEENT - No eye pain, No visual disturbances, No difficulty hearing, No tinnitus, No vertigo, No neck pain  Respiratory - No cough, No wheezing, No shortness of breath  Cardiovascular - No chest pain, No palpitations  Gastrointestinal - No abdominal pain, No nausea, No vomiting, No diarrhea, No constipation  Genitourinary - No dysuria, No frequency, No hematuria, No incontinence  Psychiatric - No depression, No anxiety    VITALS  T(C): 36.8 (10-24-22 @ 08:40), Max: 37.3 (10-23-22 @ 19:57)  HR: 100 (10-24-22 @ 08:40) (100 - 119)  BP: 132/69 (10-24-22 @ 08:40) (128/71 - 162/85)  RR: 18 (10-24-22 @ 08:40) (18 - 18)  SpO2: 96% (10-24-22 @ 08:40) (91% - 96%)  Wt(kg): --    PAST MEDICAL & SURGICAL HISTORY  Hypertension    Hyperlipidemia    Chronic kidney disease    Diabetes mellitus    Benign prostate hyperplasia    COVID-19 virus infection    H/O carpal tunnel repair    History of carpal tunnel surgery    H/O cataract extraction    History of cataract surgery    H/O colonoscopy        SOCIAL HISTORY  Smoking - Denied  EtOH - Denied   Drugs - Denied    FUNCTIONAL HISTORY  Lives with family, 2 steps to enter, then two flights to basement living area   Independent AMB and ADLs PTA     CURRENT FUNCTIONAL STATUS  10/23 PT  bed mobility min to mod assist  transfers min assist with RW  gait min to mod assist with RW x 40 feet   seated rest break    10/19 OT  transfers supervision with RW    FAMILY HISTORY   No pertinent family history in first degree relatives        RECENT LABS/IMAGING  CBC Full  -  ( 24 Oct 2022 05:16 )  WBC Count : 9.76 K/uL  RBC Count : 3.96 M/uL  Hemoglobin : 12.0 g/dL  Hematocrit : 34.2 %  Platelet Count - Automated : 241 K/uL  Mean Cell Volume : 86.4 fl  Mean Cell Hemoglobin : 30.3 pg  Mean Cell Hemoglobin Concentration : 35.1 gm/dL  Auto Neutrophil # : 6.69 K/uL  Auto Lymphocyte # : 1.72 K/uL  Auto Monocyte # : 1.02 K/uL  Auto Eosinophil # : 0.26 K/uL  Auto Basophil # : 0.03 K/uL  Auto Neutrophil % : 68.5 %  Auto Lymphocyte % : 17.6 %  Auto Monocyte % : 10.5 %  Auto Eosinophil % : 2.7 %  Auto Basophil % : 0.3 %    10-24    129<L>  |  92<L>  |  23  ----------------------------<  153<H>  3.7   |  24  |  1.18    Ca    8.6      24 Oct 2022 05:17      Urinalysis Basic - ( 24 Oct 2022 05:15 )    Color: Yellow / Appearance: Clear / S.020 / pH: x  Gluc: x / Ketone: Negative  / Bili: Negative / Urobili: 2 mg/dL   Blood: x / Protein: 30 mg/dL / Nitrite: Negative   Leuk Esterase: Negative / RBC: 1 /hpf / WBC 1 /HPF   Sq Epi: x / Non Sq Epi: 0 /hpf / Bacteria: Negative    < from: CT Head No Cont (10.23.22 @ 19:02) >      IMPRESSION:    No acute intracranial hemorrhage, hydrocephalus or extra-axial fluid   collection.  Mild parenchymal volume loss and mild chronic microvascular ischemic   changes.  No CT evidence for acute transcortical infarction. Please note that MR   imaging is a more sensitive imaging modality for detection of acute   infarction and may be obtained as clinically warranted.      < end of copied text >    < from: CT Cervical Spine No Cont (10.19.22 @ 09:25) >    INTERPRETATION:  Clinical indications: Status post cervical spine surgery.    Multiple axial sections were performed through the cervical spine region.   Coronal and sagittal reconstructions were performed.    Postoperative changes compatible with bilateral laminectomy is seen   extending from C2-3 C3-4 C4-5 C5-6 levels. There is evidence of posterior   spinal fusion seen extending from C2 to T1. Evaluation of the postop   hardware and postop osseous structures appear adequately placed.    Loss of the normal cervical lordosis seen    Disc space narrowing endplates for changes and osteophytes are seen   involving involving cervical spine. This most prominent at the C5-6   levels secondary to chronic degenerative    Hypertrophic changes are seen involving both facet joints which is   secondary to chronic degenerative change    No acute fractures or dislocation seen.    Hypertrophic changesseen involving both facet joints at the C3-4 level.   Mild narrowing of the right neural foramen and moderate to severe   narrowing of the left neural foramen    Hypertrophic changes involving both facet joints are seen at the C4-5   level. Moderate to severe narrowing of the left neural foramen and severe   narrowing of the right neural foramen    Hypertrophic changes involving both facet joints are seen at the C5-6   level. Moderate to severe narrowing of both neural foramen    Hypertrophic changes seen involving both facet and uncovertebral joints.   Moderate narrowing of the left neural foramen and moderate to severe   narrowing of the right neural foramen    Evaluation of the paraspinal soft tissues aside from postop changes   appear normal    The visualized portions of both lung apices appear clear.    IMPRESSION: Degenerative changes as described above.    Postop changes as described above.    < end of copied text >      ALLERGIES  penicillin (Rash)      MEDICATIONS   acetaminophen     Tablet .. 650 milliGRAM(s) Oral every 8 hours  albuterol/ipratropium for Nebulization 3 milliLiter(s) Nebulizer every 12 hours  amLODIPine   Tablet 10 milliGRAM(s) Oral at bedtime  atorvastatin 40 milliGRAM(s) Oral at bedtime  bisacodyl 5 milliGRAM(s) Oral every 12 hours  bisacodyl Suppository 10 milliGRAM(s) Rectal daily PRN  cyclobenzaprine 5 milliGRAM(s) Oral three times a day  dextrose 5%. 1000 milliLiter(s) IV Continuous <Continuous>  dextrose 5%. 1000 milliLiter(s) IV Continuous <Continuous>  dextrose 50% Injectable 25 Gram(s) IV Push once  dextrose 50% Injectable 12.5 Gram(s) IV Push once  dextrose 50% Injectable 25 Gram(s) IV Push once  dextrose Oral Gel 15 Gram(s) Oral once PRN  enoxaparin Injectable 40 milliGRAM(s) SubCutaneous <User Schedule>  glucagon  Injectable 1 milliGRAM(s) IntraMuscular once  insulin lispro (ADMELOG) corrective regimen sliding scale   SubCutaneous three times a day before meals  insulin lispro (ADMELOG) corrective regimen sliding scale   SubCutaneous at bedtime  losartan 50 milliGRAM(s) Oral daily  magnesium hydroxide Suspension 30 milliLiter(s) Oral every 12 hours  methocarbamol 500 milliGRAM(s) Oral three times a day PRN  ondansetron Injectable 4 milliGRAM(s) IV Push every 6 hours PRN  oxyCODONE    IR 5 milliGRAM(s) Oral every 6 hours PRN  oxyCODONE    IR 10 milliGRAM(s) Oral every 6 hours PRN  polyethylene glycol 3350 17 Gram(s) Oral two times a day  senna 2 Tablet(s) Oral at bedtime      ----------------------------------------------------------------------------------------  PHYSICAL EXAM  Constitutional - NAD, Comfortable, laying in bed +drain   Chest - Breathing comfortably  Cardiovascular - S1S2   Abdomen - Soft   Extremities - No C/C/E, No calf tenderness   Neurologic Exam -                    Cognitive - Awake, Alert, AAO to self, place, date, year, situation     Communication - Fluent, No dysarthria        Motor -                     LEFT    UE - ShAB 5/5, EF 5/5, EE 5/5, WE 5/5,  5/5                    RIGHT UE - ShAB 5/5, EF 5/5, EE 5/5, WE 5/5,  5/5                    LEFT    LE - HF 5/5, KE 5/5, DF 5/5, PF 5/5                    RIGHT LE - HF 5/5, KE 5/5, DF 5/5, PF 5/5        Sensory - Intact to LT     Psychiatric - Mood stable, Affect WNL  ----------------------------------------------------------------------------------------  ASSESSMENT/PLAN  82yMale with functional deficits after cervical laminectomy, fusion  hyponatremia, fluid restriction   Pain - Tylenol, oxycodone, flexeril, robaxin  DVT PPX - SCDs, lovenox   Rehab - Will continue to follow for ongoing rehab needs and recommendations.   patient requires min assist with transfers, gait on PT follow up 10/23  needs OT follow up (recommended home on 10/19)  continue bedside therapy  out of bed to chair daily    Recommend ACUTE inpatient rehabilitation for the functional deficits consisting of 3 hours of therapy/day & 24 hour RN/daily PMR physician for comorbid medical management. Patient will be able to tolerate 3 hours a day.   
[facility] Division of Hospital Medicine  Shelbie PolkDO  Available via [employer]   Spectra: [phone]    HPI:  82 year old male with PMH of HTN, HLD, pre-diabetes, BPH with complaint of neck pain. Patient endorses increasing neck pain, gait instability and clumsiness of the hands, He ambulates with a cane. He denies fever, chills, trauma or injury. He presents to [facility] prior to scheduled Posterior C1-6 Decompression, C4-5 Posterior Column Osteotomy, C1-C7 Fusion, Complex Plastics Closure on 10/18/2022.    [facility]  marcial [id number].  Endorses neck pain but otherwise no complaints. No SOB, CP, fever, chills, dizziness. Last BM Sunday. Tolerating diet, no N/V. No history of blood clots, not on oxygen at home.     PAST MEDICAL & SURGICAL HISTORY:  Hypertension    Hyperlipidemia    Chronic kidney disease    Diabetes mellitus  -patient states prediabetes, not on medication    Benign prostate hyperplasia    COVID-19 virus infection  -dec 2021 (cough, fever, malaise)    History of carpal tunnel surgery  -bilateral hands    History of cataract surgery  -bilateral    H/O colonoscopy        Review of Systems:   CONSTITUTIONAL: No fever, chills  HEENT: No sore throat, vision changes  RESPIRATORY: No cough, wheezing, shortness of breath  CARDIOVASCULAR: No chest pain, palpitations, leg edema  GASTROINTESTINAL: No abdominal pain, nausea, vomiting, diarrhea. No melena or hematochezia.  GENITOURINARY: No dysuria, frequency, hematuria  NEUROLOGICAL: No headaches, memory loss, loss of strength, numbness, or tremors  SKIN: No itching, burning, rashes, or lesions   MUSCULOSKELETAL: No joint pain or swelling; No muscle, back, or extremity pain  PSYCHIATRIC: No depression, anxiety  HEME/LYMPH: No easy bruising, or bleeding gums      Allergies    penicillin (Rash)    Intolerances      Social History: Former smoker, quit in 1970. Denies alcohol use     FAMILY HISTORY:  Denies family hx of cardiac disease or malignancy     MEDICATIONS  (STANDING):  amLODIPine   Tablet 5 milliGRAM(s) Oral at bedtime  atorvastatin 40 milliGRAM(s) Oral at bedtime  enoxaparin Injectable 40 milliGRAM(s) SubCutaneous <User Schedule>  losartan 25 milliGRAM(s) Oral daily  polyethylene glycol 3350 17 Gram(s) Oral daily  senna 2 Tablet(s) Oral at bedtime    MEDICATIONS  (PRN):  acetaminophen     Tablet .. 650 milliGRAM(s) Oral every 6 hours PRN Moderate Pain (4 - 6)  bisacodyl 5 milliGRAM(s) Oral every 12 hours PRN Constipation  cyclobenzaprine 10 milliGRAM(s) Oral every 8 hours PRN Muscle Spasm  HYDROmorphone  Injectable 0.5 milliGRAM(s) IV Push every 6 hours PRN breakthrough pain  magnesium hydroxide Suspension 30 milliLiter(s) Oral every 12 hours PRN Constipation  ondansetron Injectable 4 milliGRAM(s) IV Push every 6 hours PRN Nausea and/or Vomiting  oxyCODONE    IR 5 milliGRAM(s) Oral every 4 hours PRN Moderate Pain (4 - 6)  oxyCODONE    IR 10 milliGRAM(s) Oral every 4 hours PRN Severe Pain (7 - 10)        CAPILLARY BLOOD GLUCOSE        I&O's Summary    18 Oct 2022 07:01  -  19 Oct 2022 07:00  --------------------------------------------------------  IN: 1679.7 mL / OUT: 2355 mL / NET: -675.3 mL    19 Oct 2022 07:01  -  19 Oct 2022 15:26  --------------------------------------------------------  IN: 400 mL / OUT: 130 mL / NET: 270 mL        Physical Exam:  Vital Signs Last 24 Hrs  T(C): 36.6 (19 Oct 2022 13:05), Max: 36.6 (18 Oct 2022 15:45)  T(F): 97.8 (19 Oct 2022 13:05), Max: 97.9 (18 Oct 2022 15:45)  HR: 105 (19 Oct 2022 13:05) (74 - 118)  BP: 163/82 (19 Oct 2022 13:05) (150/70 - 163/82)  BP(mean): 109 (19 Oct 2022 13:05) (96 - 109)  RR: 18 (19 Oct 2022 13:05) (12 - 29)  SpO2: 95% (19 Oct 2022 13:05) (92% - 100%)    Parameters below as of 19 Oct 2022 13:05  Patient On (Oxygen Delivery Method): nasal cannula  O2 Flow (L/min): 3      CONSTITUTIONAL: NAD, well-developed, well-groomed  RESPIRATORY: Normal respiratory effort; lungs are clear to auscultation bilaterally  CARDIOVASCULAR: normal S1 and S2, no murmur/rub/gallop; No lower extremity edema  ABDOMEN: Nontender to palpation, normoactive bowel sounds, no rebound/guarding  MUSCULOSKELETAL: no clubbing or cyanosis of digits; no joint swelling or tenderness to palpation  PSYCH: A+O to person, place, and time; affect appropriate  NEUROLOGY: moving all extremities, following commands   SKIN: No rashes; no palpable lesions, dressing c/d/i, + drain     LABS:                        13.0   9.68  )-----------( 175      ( 18 Oct 2022 22:41 )             36.8     10-18    140  |  105  |  14  ----------------------------<  187<H>  3.5   |  19<L>  |  0.77    Ca    8.1<L>      18 Oct 2022 22:41  Phos  5.0     10-18  Mg     1.7     10-18                  RADIOLOGY & ADDITIONAL TESTS:  Results Reviewed:   Imaging Personally Reviewed:  Electrocardiogram Personally Reviewed:    COORDINATION OF CARE:  Care Discussed with Consultants/Other Providers [Y/N]: Neurosurgery   Prior or Outpatient Records Reviewed [Y/N]:

## 2022-10-25 LAB
ANION GAP SERPL CALC-SCNC: 14 MMOL/L — SIGNIFICANT CHANGE UP (ref 5–17)
BUN SERPL-MCNC: 18 MG/DL — SIGNIFICANT CHANGE UP (ref 7–23)
CALCIUM SERPL-MCNC: 8.8 MG/DL — SIGNIFICANT CHANGE UP (ref 8.4–10.5)
CHLORIDE SERPL-SCNC: 97 MMOL/L — SIGNIFICANT CHANGE UP (ref 96–108)
CO2 SERPL-SCNC: 23 MMOL/L — SIGNIFICANT CHANGE UP (ref 22–31)
CREAT SERPL-MCNC: 1.02 MG/DL — SIGNIFICANT CHANGE UP (ref 0.5–1.3)
EGFR: 72 ML/MIN/1.73M2 — SIGNIFICANT CHANGE UP
GLUCOSE BLDC GLUCOMTR-MCNC: 133 MG/DL — HIGH (ref 70–99)
GLUCOSE BLDC GLUCOMTR-MCNC: 141 MG/DL — HIGH (ref 70–99)
GLUCOSE BLDC GLUCOMTR-MCNC: 144 MG/DL — HIGH (ref 70–99)
GLUCOSE BLDC GLUCOMTR-MCNC: 155 MG/DL — HIGH (ref 70–99)
GLUCOSE SERPL-MCNC: 142 MG/DL — HIGH (ref 70–99)
MAGNESIUM SERPL-MCNC: 2.2 MG/DL — SIGNIFICANT CHANGE UP (ref 1.6–2.6)
PHOSPHATE SERPL-MCNC: 3.5 MG/DL — SIGNIFICANT CHANGE UP (ref 2.5–4.5)
POTASSIUM SERPL-MCNC: 3.8 MMOL/L — SIGNIFICANT CHANGE UP (ref 3.5–5.3)
POTASSIUM SERPL-SCNC: 3.8 MMOL/L — SIGNIFICANT CHANGE UP (ref 3.5–5.3)
SODIUM SERPL-SCNC: 134 MMOL/L — LOW (ref 135–145)

## 2022-10-25 PROCEDURE — 99233 SBSQ HOSP IP/OBS HIGH 50: CPT

## 2022-10-25 RX ORDER — POLYETHYLENE GLYCOL 3350 17 G/17G
17 POWDER, FOR SOLUTION ORAL DAILY
Refills: 0 | Status: DISCONTINUED | OUTPATIENT
Start: 2022-10-26 | End: 2022-10-27

## 2022-10-25 RX ORDER — TRAMADOL HYDROCHLORIDE 50 MG/1
50 TABLET ORAL ONCE
Refills: 0 | Status: DISCONTINUED | OUTPATIENT
Start: 2022-10-25 | End: 2022-10-25

## 2022-10-25 RX ORDER — DIAZEPAM 5 MG
5 TABLET ORAL ONCE
Refills: 0 | Status: DISCONTINUED | OUTPATIENT
Start: 2022-10-25 | End: 2022-10-25

## 2022-10-25 RX ADMIN — MAGNESIUM HYDROXIDE 30 MILLILITER(S): 400 TABLET, CHEWABLE ORAL at 17:11

## 2022-10-25 RX ADMIN — SODIUM CHLORIDE 1 GRAM(S): 9 INJECTION INTRAMUSCULAR; INTRAVENOUS; SUBCUTANEOUS at 17:10

## 2022-10-25 RX ADMIN — SODIUM CHLORIDE 1 GRAM(S): 9 INJECTION INTRAMUSCULAR; INTRAVENOUS; SUBCUTANEOUS at 22:20

## 2022-10-25 RX ADMIN — TRAMADOL HYDROCHLORIDE 50 MILLIGRAM(S): 50 TABLET ORAL at 00:43

## 2022-10-25 RX ADMIN — Medication 650 MILLIGRAM(S): at 22:20

## 2022-10-25 RX ADMIN — SODIUM CHLORIDE 1 GRAM(S): 9 INJECTION INTRAMUSCULAR; INTRAVENOUS; SUBCUTANEOUS at 06:04

## 2022-10-25 RX ADMIN — Medication 1: at 17:47

## 2022-10-25 RX ADMIN — OXYCODONE HYDROCHLORIDE 10 MILLIGRAM(S): 5 TABLET ORAL at 03:38

## 2022-10-25 RX ADMIN — LOSARTAN POTASSIUM 50 MILLIGRAM(S): 100 TABLET, FILM COATED ORAL at 06:09

## 2022-10-25 RX ADMIN — OXYCODONE HYDROCHLORIDE 5 MILLIGRAM(S): 5 TABLET ORAL at 12:30

## 2022-10-25 RX ADMIN — Medication 650 MILLIGRAM(S): at 06:34

## 2022-10-25 RX ADMIN — Medication 5 MILLIGRAM(S): at 02:16

## 2022-10-25 RX ADMIN — OXYCODONE HYDROCHLORIDE 10 MILLIGRAM(S): 5 TABLET ORAL at 22:20

## 2022-10-25 RX ADMIN — Medication 650 MILLIGRAM(S): at 17:11

## 2022-10-25 RX ADMIN — TRAMADOL HYDROCHLORIDE 50 MILLIGRAM(S): 50 TABLET ORAL at 01:13

## 2022-10-25 RX ADMIN — CYCLOBENZAPRINE HYDROCHLORIDE 5 MILLIGRAM(S): 10 TABLET, FILM COATED ORAL at 10:20

## 2022-10-25 RX ADMIN — OXYCODONE HYDROCHLORIDE 10 MILLIGRAM(S): 5 TABLET ORAL at 04:00

## 2022-10-25 RX ADMIN — Medication 3 MILLILITER(S): at 19:09

## 2022-10-25 RX ADMIN — ATORVASTATIN CALCIUM 40 MILLIGRAM(S): 80 TABLET, FILM COATED ORAL at 22:20

## 2022-10-25 RX ADMIN — Medication 650 MILLIGRAM(S): at 23:20

## 2022-10-25 RX ADMIN — Medication 3 MILLILITER(S): at 06:04

## 2022-10-25 RX ADMIN — OXYCODONE HYDROCHLORIDE 5 MILLIGRAM(S): 5 TABLET ORAL at 11:49

## 2022-10-25 RX ADMIN — CYCLOBENZAPRINE HYDROCHLORIDE 5 MILLIGRAM(S): 10 TABLET, FILM COATED ORAL at 17:11

## 2022-10-25 RX ADMIN — Medication 650 MILLIGRAM(S): at 06:04

## 2022-10-25 RX ADMIN — OXYCODONE HYDROCHLORIDE 10 MILLIGRAM(S): 5 TABLET ORAL at 23:20

## 2022-10-25 RX ADMIN — METHOCARBAMOL 500 MILLIGRAM(S): 500 TABLET, FILM COATED ORAL at 06:04

## 2022-10-25 RX ADMIN — ENOXAPARIN SODIUM 40 MILLIGRAM(S): 100 INJECTION SUBCUTANEOUS at 18:07

## 2022-10-25 RX ADMIN — AMLODIPINE BESYLATE 10 MILLIGRAM(S): 2.5 TABLET ORAL at 22:20

## 2022-10-25 NOTE — PROGRESS NOTE ADULT - PROBLEM SELECTOR PLAN 1
Pain control - continue tylenol 650mg q8h ATC for a couple of days   patient now with pain radiating down left arm associated with weakness since fall- MRI cspine pending  consider adding lidoderm patch and gabapentin if pain remains uncontrolled  bowel regimen   Monitor drain output  s/p fall while getting out of bed on 10/23- CT head/C-spine xray neg for fracture. CXR also negative  monitor left sided rib abrasion    patient also with intermittent very transient visual disturbance of unclear etiology which he currently denies. CT head on 10/23 was unremarkable. ? related to opioid.

## 2022-10-25 NOTE — PROGRESS NOTE ADULT - SUBJECTIVE AND OBJECTIVE BOX
patient complains of pain in left shoulder, neck  had severe pain overnight, no relief with pain meds     REVIEW OF SYSTEMS  Constitutional - No fever,  No fatigue  HEENT - No vertigo, No neck pain  Neurological - No headaches, No memory loss, No loss of strength, No numbness, No tremors  Skin - No rashes, No lesions   Musculoskeletal - No joint pain, No joint swelling, No muscle pain  Psychiatric - No depression, No anxiety    FUNCTIONAL PROGRESS  10/25 PT  bed mobility min assist  transfers min to mod assist with RW  gait mod assist x 5 feet    10/23 PT  bed mobility min to mod assist   transfers min assist with RW  gait min assist x 40 feet with RW   sitting rest break     VITALS  T(C): 36.6 (10-25-22 @ 12:13), Max: 36.9 (10-24-22 @ 16:56)  HR: 93 (10-25-22 @ 12:13) (93 - 102)  BP: 124/67 (10-25-22 @ 12:13) (124/67 - 156/81)  RR: 18 (10-25-22 @ 12:13) (18 - 18)  SpO2: 94% (10-25-22 @ 12:13) (93% - 98%)  Wt(kg): --    MEDICATIONS   acetaminophen     Tablet .. 650 milliGRAM(s) every 8 hours  albuterol/ipratropium for Nebulization 3 milliLiter(s) every 12 hours  amLODIPine   Tablet 10 milliGRAM(s) at bedtime  atorvastatin 40 milliGRAM(s) at bedtime  bisacodyl Suppository 10 milliGRAM(s) daily PRN  cyclobenzaprine 5 milliGRAM(s) three times a day  dextrose 5%. 1000 milliLiter(s) <Continuous>  dextrose 5%. 1000 milliLiter(s) <Continuous>  dextrose 50% Injectable 25 Gram(s) once  dextrose 50% Injectable 12.5 Gram(s) once  dextrose 50% Injectable 25 Gram(s) once  dextrose Oral Gel 15 Gram(s) once PRN  enoxaparin Injectable 40 milliGRAM(s) <User Schedule>  glucagon  Injectable 1 milliGRAM(s) once  insulin lispro (ADMELOG) corrective regimen sliding scale   three times a day before meals  insulin lispro (ADMELOG) corrective regimen sliding scale   at bedtime  losartan 50 milliGRAM(s) daily  magnesium hydroxide Suspension 30 milliLiter(s) every 12 hours  methocarbamol 500 milliGRAM(s) three times a day PRN  ondansetron Injectable 4 milliGRAM(s) every 6 hours PRN  oxyCODONE    IR 5 milliGRAM(s) every 6 hours PRN  oxyCODONE    IR 10 milliGRAM(s) every 6 hours PRN  senna 2 Tablet(s) at bedtime  sodium chloride 1 Gram(s) every 8 hours      RECENT LABS - Reviewed                        12.0   9.76  )-----------( 241      ( 24 Oct 2022 05:16 )             34.2     10-25    134<L>  |  97  |  18  ----------------------------<  142<H>  3.8   |  23  |  1.02    Ca    8.8      25 Oct 2022 07:20  Phos  3.5     10-25  Mg     2.2     10-25        Urinalysis Basic - ( 24 Oct 2022 05:15 )    Color: Yellow / Appearance: Clear / S.020 / pH: x  Gluc: x / Ketone: Negative  / Bili: Negative / Urobili: 2 mg/dL   Blood: x / Protein: 30 mg/dL / Nitrite: Negative   Leuk Esterase: Negative / RBC: 1 /hpf / WBC 1 /HPF   Sq Epi: x / Non Sq Epi: 0 /hpf / Bacteria: Negative      ---------------------------------------------------------------------------  PHYSICAL EXAM  Constitutional - NAD, Comfortable, laying in bed   Chest - Breathing comfortably  Cardiovascular - S1S2   Abdomen - Soft   Extremities - No C/C/E, No calf tenderness   Neurologic Exam -                    Cognitive - Awake, Alert, AAO to self, place, date, year, situation     Communication - Fluent, No dysarthria        Motor -                     LEFT    UE - ShAB 3/5, EF 5/5, EE 5/5, WE 5/5,  5/5                    RIGHT UE - ShAB 5/5, EF 5/5, EE 5/5, WE 5/5,  5/5                    LEFT    LE - HF 5/5, KE 5/5, DF 5/5, PF 5/5                    RIGHT LE - HF 5/5, KE 5/5, DF 5/5, PF 5/5        Sensory - Intact to LT     Psychiatric - Mood stable, Affect WNL  ----------------------------------------------------------------------------------------  ASSESSMENT/PLAN  82yMale with functional deficits after cervical laminectomy, fusion  pain, left sided weakness, MRI pending   hyponatremia, fluid restriction   Pain - Tylenol, oxycodone, flexeril, robaxin  DVT PPX - SCDs, lovenox   Rehab - Will continue to follow for ongoing rehab needs and recommendations.   patient required mod assist with transfers/gait today, limited by pain  continue bedside therapy  out of bed to chair daily    Recommend ACUTE inpatient rehabilitation for the functional deficits consisting of 3 hours of therapy/day & 24 hour RN/daily PMR physician for comorbid medical management. Patient will be able to tolerate 3 hours a day.  d/w rehab team

## 2022-10-25 NOTE — PROGRESS NOTE ADULT - SUBJECTIVE AND OBJECTIVE BOX
SUBJECTIVE: HPI:  82 year old male with PMH of HTN, HLD, Pre-diabetes, BPH with complaint of neck pain. Patient endorses increasing neck pain, gait instability and clumsiness of the hands, He ambulates with a cane. He denies fever, chills, trauma or injury. He presents to [facility] prior to scheduled Posterior C1-6 Decompression, C4-5 Posterior Column Osteotomy, C1-C7 Fusion, Complex Plastics Closure on 10/18/2022.    preop covid swab 10/15 @ [facility] (27 Sep 2022 09:06)      OVERNIGHT EVENTS: Patient c/o left upper extremity pain radiating toward his posterior neck where his incision is, patient is noted to have LUE weakness - deltoid 3/5 and bicep 4+/5. Patient will get MRI C-spine today which patient is agreeable to.     Vital Signs Last 24 Hrs  T(C): 36.6 (25 Oct 2022 12:13), Max: 36.9 (24 Oct 2022 16:56)  T(F): 97.8 (25 Oct 2022 12:13), Max: 98.4 (24 Oct 2022 16:56)  HR: 93 (25 Oct 2022 12:13) (93 - 102)  BP: 124/67 (25 Oct 2022 12:13) (124/67 - 156/81)  BP(mean): --  RR: 18 (25 Oct 2022 12:13) (18 - 18)  SpO2: 94% (25 Oct 2022 12:13) (93% - 98%)    Parameters below as of 25 Oct 2022 12:13  Patient On (Oxygen Delivery Method): room air    DRAINS: 1 superficial diamond drain 50cc/24hrs    PHYSICAL EXAM:    Constitutional: No Acute Distress     Neurological: AOx3, Following Commands, Moving all Extremities     Motor exam:          Upper extremity                         Delt     Bicep     Tricep    HG                                                 R         5/5        5/5        5/5       5/5                                               L          3/5        4+/5        5/5       5/5          Lower extremity                        HF         KF        KE       DF         PF                                                  R        5/5        5/5        5/5       5/5         5/5                                               L         5/5        5/5       5/5       5/5          5/5                                                 Sensation: [x] intact to light touch  [] decreased:     Pulmonary: Clear to Auscultation, No rales, No rhonchi, No wheezes     Cardiovascular: S1, S2, Regular rate and rhythm     Gastrointestinal: Soft, Non-tender, Non-distended     Extremities: No calf tenderness     Incision: Regular aquacel with tegaderm in place C/D/I (I changed the dressing yesterday, remains clean and dry)    LABS:                                   12.0   9.76  )-----------( 241      ( 24 Oct 2022 05:16 )             34.2   10-25    134<L>  |  97  |  18  ----------------------------<  142<H>  3.8   |  23  |  1.02    Ca    8.8      25 Oct 2022 07:20  Phos  3.5     10-25  Mg     2.2     10-25    MEDICATIONS:  Antibiotics:    Neuro:  acetaminophen     Tablet .. 1000 milliGRAM(s) Oral every 6 hours PRN Temp greater or equal to 38C (100.4F), Mild Pain (1 - 3)  cyclobenzaprine 5 milliGRAM(s) Oral three times a day  methocarbamol 500 milliGRAM(s) Oral three times a day PRN Muscle Spasm  ondansetron Injectable 4 milliGRAM(s) IV Push every 6 hours PRN Nausea and/or Vomiting  oxyCODONE    IR 5 milliGRAM(s) Oral every 6 hours PRN Moderate Pain (4 - 6)  oxyCODONE    IR 10 milliGRAM(s) Oral every 6 hours PRN Severe Pain (7 - 10)    Cardiac:  amLODIPine   Tablet 10 milliGRAM(s) Oral at bedtime  losartan 50 milliGRAM(s) Oral daily    Pulm:    GI/:  bisacodyl 5 milliGRAM(s) Oral every 12 hours  bisacodyl Suppository 10 milliGRAM(s) Rectal daily PRN Constipation  magnesium hydroxide Suspension 30 milliLiter(s) Oral every 12 hours  polyethylene glycol 3350 17 Gram(s) Oral two times a day  senna 2 Tablet(s) Oral at bedtime    Other:   atorvastatin 40 milliGRAM(s) Oral at bedtime  dextrose 5%. 1000 milliLiter(s) IV Continuous <Continuous>  dextrose 5%. 1000 milliLiter(s) IV Continuous <Continuous>  dextrose 50% Injectable 25 Gram(s) IV Push once  dextrose 50% Injectable 12.5 Gram(s) IV Push once  dextrose 50% Injectable 25 Gram(s) IV Push once  dextrose Oral Gel 15 Gram(s) Oral once PRN Blood Glucose LESS THAN 70 milliGRAM(s)/deciliter  enoxaparin Injectable 40 milliGRAM(s) SubCutaneous <User Schedule>  glucagon  Injectable 1 milliGRAM(s) IntraMuscular once  insulin lispro (ADMELOG) corrective regimen sliding scale   SubCutaneous three times a day before meals  insulin lispro (ADMELOG) corrective regimen sliding scale   SubCutaneous at bedtime    DIET: [] Regular [x] CCD [] Renal [] Puree [] Dysphagia [] Tube Feeds:     IMAGING:   < from: CT Head No Cont (10.23.22 @ 19:02) >  IMPRESSION:    No acute intracranial hemorrhage, hydrocephalus or extra-axial fluid   collection.  Mild parenchymal volume loss and mild chronic microvascular ischemic   changes.  No CT evidence for acute transcortical infarction. Please note that MR   imaging is a more sensitive imaging modality for detection of acute   infarction and may be obtained as clinically warranted.    If clinicians have any questions/need for clarification regarding this   report, Dr. Nolan Hirsch can be best reached via the patient [employer]   hospital email system    --- End of Report ---      NOLAN WATSON MD; Attending Radiologist  This document has been electronically signed. Oct 23 2022  7:53PM    < end of copied text >    < from: Xray Cervical Spine AP and Lateral Only (10.23.22 @ 17:28) >  IMPRESSION: C2-C7 posterior spinal fusion hardware consisting of pedicle   screws with interconnecting rods which is better visualized on CT   cervical spine. No hardware fracture.    --- End of Report ---      EKTA SOSA DO; Attending Radiologist  This document has been electronically signed. Oct 24 2022  9:59AM    < end of copied text >

## 2022-10-25 NOTE — PROVIDER CONTACT NOTE (OTHER) - ASSESSMENT
Pt is complaining of severe shoulder, back pain and headache. PO Tramadol 50mg and IV Tylenol  given as order with no relief.

## 2022-10-25 NOTE — PROVIDER CONTACT NOTE (OTHER) - SITUATION
/92 manual
Bp 170/82 manual
Severe shoulder, back pain and headache. Pain medication given as order with no relief.
/82

## 2022-10-25 NOTE — PROGRESS NOTE ADULT - ASSESSMENT
ASSESSMENT AND PLAN: 82 year old male with PMH of HTN, HLD, Pre-diabetes, BPH with complaint of neck pain. Patient endorses increasing neck pain, gait instability and clumsiness of the hands, He ambulates with a cane.    s/p S/p C1-6 laminectomies with C1-7 posterior instrumented fusion and plastics closure 10/18/22    NEURO:   - Continue neuro checks q 4  - C/o LUE pain w/ weakness in deltoids and biceps - MRI Cervical Spine - P  - Continue to monitor superficial diamond drain output (50cc/24hrs)  - Standing Tylenol q 8 per Hospitalist for 3 days - then change to as needed after  - Continue Oxycodone as needed for moderate / severe pain  - Continue Flexeril standing, and Robaxin as needed for muscle spasm  - PT/OT/PMR - Acute Rehab    PULM:   - On room air, O2Sat>93%  - Incentive spirometry    CV:  - -160  - Continue Norvasc and Losartan for HTN  - Continue Lipitor for HLD    ENDO:   - A1c 7.0, continue ISS and CCD diet  - Fingersticks are appropriate    HEME/ONC:            10/24 CBC stable          DVT ppx: SQL, SCDs, Le Dopp neg 10/18    RENAL:   - IVL  - 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 Bond  [id number]

## 2022-10-25 NOTE — PROGRESS NOTE ADULT - SUBJECTIVE AND OBJECTIVE BOX
[facility] Division of Hospital Medicine  Arianne Rivera MD  Available via MS Teams  Spectra [phone]    SUBJECTIVE / OVERNIGHT EVENTS: patient seen and examined. patient preferred his stepdaughter, Beba, to translate at bedside. he continues to have pain in neck, intermittent headache, and now pain radiates down left shoulder/arm and has difficulty with raising left arm without numbness. pain improved slightly when he takes pain medication but doesn't completely take it away. patient also complained of intermittent transient episodes of visual disturbance     ADDITIONAL REVIEW OF SYSTEMS:    MEDICATIONS  (STANDING):  acetaminophen     Tablet .. 650 milliGRAM(s) Oral every 8 hours  albuterol/ipratropium for Nebulization 3 milliLiter(s) Nebulizer every 12 hours  amLODIPine   Tablet 10 milliGRAM(s) Oral at bedtime  atorvastatin 40 milliGRAM(s) Oral at bedtime  cyclobenzaprine 5 milliGRAM(s) Oral three times a day  dextrose 5%. 1000 milliLiter(s) (50 mL/Hr) IV Continuous <Continuous>  dextrose 5%. 1000 milliLiter(s) (100 mL/Hr) IV Continuous <Continuous>  dextrose 50% Injectable 25 Gram(s) IV Push once  dextrose 50% Injectable 12.5 Gram(s) IV Push once  dextrose 50% Injectable 25 Gram(s) IV Push once  enoxaparin Injectable 40 milliGRAM(s) SubCutaneous <User Schedule>  glucagon  Injectable 1 milliGRAM(s) IntraMuscular once  insulin lispro (ADMELOG) corrective regimen sliding scale   SubCutaneous three times a day before meals  insulin lispro (ADMELOG) corrective regimen sliding scale   SubCutaneous at bedtime  losartan 50 milliGRAM(s) Oral daily  magnesium hydroxide Suspension 30 milliLiter(s) Oral every 12 hours  senna 2 Tablet(s) Oral at bedtime  sodium chloride 1 Gram(s) Oral every 8 hours    MEDICATIONS  (PRN):  bisacodyl Suppository 10 milliGRAM(s) Rectal daily PRN Constipation  dextrose Oral Gel 15 Gram(s) Oral once PRN Blood Glucose LESS THAN 70 milliGRAM(s)/deciliter  methocarbamol 500 milliGRAM(s) Oral three times a day PRN Muscle Spasm  ondansetron Injectable 4 milliGRAM(s) IV Push every 6 hours PRN Nausea and/or Vomiting  oxyCODONE    IR 5 milliGRAM(s) Oral every 6 hours PRN Moderate Pain (4 - 6)  oxyCODONE    IR 10 milliGRAM(s) Oral every 6 hours PRN Severe Pain (7 - 10)      I&O's Summary    24 Oct 2022 07:01  -  25 Oct 2022 07:00  --------------------------------------------------------  IN: 480 mL / OUT: 1850 mL / NET: -1370 mL        PHYSICAL EXAM:  Vital Signs Last 24 Hrs  T(C): 36.7 (25 Oct 2022 08:18), Max: 36.9 (24 Oct 2022 16:56)  T(F): 98 (25 Oct 2022 08:18), Max: 98.4 (24 Oct 2022 16:56)  HR: 93 (25 Oct 2022 08:18) (93 - 102)  BP: 146/76 (25 Oct 2022 08:18) (134/71 - 156/81)  BP(mean): --  RR: 18 (25 Oct 2022 08:18) (18 - 18)  SpO2: 95% (25 Oct 2022 08:18) (93% - 98%)    Parameters below as of 25 Oct 2022 08:18  Patient On (Oxygen Delivery Method): room air      CONSTITUTIONAL: NAD, well-groomed  EYES: conjunctiva and sclera clear  NECK: limited ROM  RESPIRATORY: Normal respiratory effort; lungs are clear to auscultation bilaterally  CARDIOVASCULAR: normal S1 and S2, no murmur/rub/gallop; No lower extremity edema  ABDOMEN: Nontender to palpation, normoactive bowel sounds, no rebound/guarding; No hepatosplenomegaly  MUSCULOSKELETAL:  no clubbing or cyanosis of digits; no joint swelling or tenderness to palpation  PSYCH: A+O to person, place, and time; affect appropriate  NEUROLOGY: moves all extremities but has difficulty raising left arm at shoulder, no gross sensory deficits, follows commands  SKIN: + erythematous skin abrasion in left rib area, + dressing over incision site with hemovac drain    LABS:                        12.0   9.76  )-----------( 241      ( 24 Oct 2022 05:16 )             34.2     10-25    134<L>  |  97  |  18  ----------------------------<  142<H>  3.8   |  23  |  1.02    Ca    8.8      25 Oct 2022 07:20  Phos  3.5     10-25  Mg     2.2     10-25            Urinalysis Basic - ( 24 Oct 2022 05:15 )    Color: Yellow / Appearance: Clear / S.020 / pH: x  Gluc: x / Ketone: Negative  / Bili: Negative / Urobili: 2 mg/dL   Blood: x / Protein: 30 mg/dL / Nitrite: Negative   Leuk Esterase: Negative / RBC: 1 /hpf / WBC 1 /HPF   Sq Epi: x / Non Sq Epi: 0 /hpf / Bacteria: Negative        COVID-19 PCR: NotDetec (24 Oct 2022 07:47)  COVID-19 PCR: NotDetec (15 Oct 2022 13:40)        RADIOLOGY & ADDITIONAL TESTS:  New Results Reviewed Today:     < from: Xray Chest 1 View- PORTABLE-Urgent (Xray Chest 1 View- PORTABLE-Urgent .) (10.23. @ 17:28) >  FINDINGS:      The heart is normal in size.  The lungs are clear.  No pleural effusion or pneumothorax.    IMPRESSION:  Clear lungs.    < end of copied text >      COMMUNICATION:  Care Discussed with Consultants/Other Providers and Details of Discussion: neurosurgery PA(Huong)  Discussions with Patient/Family: yes       [facility] Division of Hospital Medicine  Arianne Rivera MD  Available via MS Teams  Spectra [phone]    SUBJECTIVE / OVERNIGHT EVENTS: patient seen and examined. patient preferred his stepdaughter, Beba, to translate at bedside. he continues to have pain in neck, intermittent headache, and now pain radiates down left shoulder/arm and has difficulty with raising left arm without numbness. pain improved slightly when he takes pain medication but doesn't completely take it away. patient also complained of intermittent transient episodes of visual disturbance (i.e., when looking straight at the TV, sometimes it seems like it is down below).     ADDITIONAL REVIEW OF SYSTEMS:    MEDICATIONS  (STANDING):  acetaminophen     Tablet .. 650 milliGRAM(s) Oral every 8 hours  albuterol/ipratropium for Nebulization 3 milliLiter(s) Nebulizer every 12 hours  amLODIPine   Tablet 10 milliGRAM(s) Oral at bedtime  atorvastatin 40 milliGRAM(s) Oral at bedtime  cyclobenzaprine 5 milliGRAM(s) Oral three times a day  dextrose 5%. 1000 milliLiter(s) (50 mL/Hr) IV Continuous <Continuous>  dextrose 5%. 1000 milliLiter(s) (100 mL/Hr) IV Continuous <Continuous>  dextrose 50% Injectable 25 Gram(s) IV Push once  dextrose 50% Injectable 12.5 Gram(s) IV Push once  dextrose 50% Injectable 25 Gram(s) IV Push once  enoxaparin Injectable 40 milliGRAM(s) SubCutaneous <User Schedule>  glucagon  Injectable 1 milliGRAM(s) IntraMuscular once  insulin lispro (ADMELOG) corrective regimen sliding scale   SubCutaneous three times a day before meals  insulin lispro (ADMELOG) corrective regimen sliding scale   SubCutaneous at bedtime  losartan 50 milliGRAM(s) Oral daily  magnesium hydroxide Suspension 30 milliLiter(s) Oral every 12 hours  senna 2 Tablet(s) Oral at bedtime  sodium chloride 1 Gram(s) Oral every 8 hours    MEDICATIONS  (PRN):  bisacodyl Suppository 10 milliGRAM(s) Rectal daily PRN Constipation  dextrose Oral Gel 15 Gram(s) Oral once PRN Blood Glucose LESS THAN 70 milliGRAM(s)/deciliter  methocarbamol 500 milliGRAM(s) Oral three times a day PRN Muscle Spasm  ondansetron Injectable 4 milliGRAM(s) IV Push every 6 hours PRN Nausea and/or Vomiting  oxyCODONE    IR 5 milliGRAM(s) Oral every 6 hours PRN Moderate Pain (4 - 6)  oxyCODONE    IR 10 milliGRAM(s) Oral every 6 hours PRN Severe Pain (7 - 10)      I&O's Summary    24 Oct 2022 07:01  -  25 Oct 2022 07:00  --------------------------------------------------------  IN: 480 mL / OUT: 1850 mL / NET: -1370 mL        PHYSICAL EXAM:  Vital Signs Last 24 Hrs  T(C): 36.7 (25 Oct 2022 08:18), Max: 36.9 (24 Oct 2022 16:56)  T(F): 98 (25 Oct 2022 08:18), Max: 98.4 (24 Oct 2022 16:56)  HR: 93 (25 Oct 2022 08:18) (93 - 102)  BP: 146/76 (25 Oct 2022 08:18) (134/71 - 156/81)  BP(mean): --  RR: 18 (25 Oct 2022 08:18) (18 - 18)  SpO2: 95% (25 Oct 2022 08:18) (93% - 98%)    Parameters below as of 25 Oct 2022 08:18  Patient On (Oxygen Delivery Method): room air      CONSTITUTIONAL: NAD, well-groomed  EYES: conjunctiva and sclera clear  NECK: limited ROM  RESPIRATORY: Normal respiratory effort; lungs are clear to auscultation bilaterally  CARDIOVASCULAR: normal S1 and S2, no murmur/rub/gallop; No lower extremity edema  ABDOMEN: Nontender to palpation, normoactive bowel sounds, no rebound/guarding; No hepatosplenomegaly  MUSCULOSKELETAL:  no clubbing or cyanosis of digits; no joint swelling or tenderness to palpation  PSYCH: A+O to person, place, and time; affect appropriate  NEUROLOGY: moves all extremities but has difficulty raising left arm at shoulder, no gross sensory deficits, follows commands  SKIN: + healing erythematous skin abrasion in left rib area, + dressing over incision site with hemovac drain    LABS:                        12.0   9.76  )-----------( 241      ( 24 Oct 2022 05:16 )             34.2     10-25    134<L>  |  97  |  18  ----------------------------<  142<H>  3.8   |  23  |  1.02    Ca    8.8      25 Oct 2022 07:20  Phos  3.5     10-25  Mg     2.2     10-25            Urinalysis Basic - ( 24 Oct 2022 05:15 )    Color: Yellow / Appearance: Clear / S.020 / pH: x  Gluc: x / Ketone: Negative  / Bili: Negative / Urobili: 2 mg/dL   Blood: x / Protein: 30 mg/dL / Nitrite: Negative   Leuk Esterase: Negative / RBC: 1 /hpf / WBC 1 /HPF   Sq Epi: x / Non Sq Epi: 0 /hpf / Bacteria: Negative        COVID-19 PCR: NotDetec (24 Oct 2022 07:47)  COVID-19 PCR: NotDetec (15 Oct 2022 13:40)        RADIOLOGY & ADDITIONAL TESTS:  New Results Reviewed Today:     < from: Xray Chest 1 View- PORTABLE-Urgent (Xray Chest 1 View- PORTABLE-Urgent .) (10.23.22 @ 17:28) >  FINDINGS:      The heart is normal in size.  The lungs are clear.  No pleural effusion or pneumothorax.    IMPRESSION:  Clear lungs.    < end of copied text >      COMMUNICATION:  Care Discussed with Consultants/Other Providers and Details of Discussion: neurosurgery PA(Huong)  Discussions with Patient/Family: yes

## 2022-10-26 LAB
ANION GAP SERPL CALC-SCNC: 13 MMOL/L — SIGNIFICANT CHANGE UP (ref 5–17)
ANION GAP SERPL CALC-SCNC: 14 MMOL/L — SIGNIFICANT CHANGE UP (ref 5–17)
APPEARANCE UR: CLEAR — SIGNIFICANT CHANGE UP
BACTERIA # UR AUTO: NEGATIVE — SIGNIFICANT CHANGE UP
BASOPHILS # BLD AUTO: 0.03 K/UL — SIGNIFICANT CHANGE UP (ref 0–0.2)
BASOPHILS NFR BLD AUTO: 0.2 % — SIGNIFICANT CHANGE UP (ref 0–2)
BILIRUB UR-MCNC: NEGATIVE — SIGNIFICANT CHANGE UP
BUN SERPL-MCNC: 20 MG/DL — SIGNIFICANT CHANGE UP (ref 7–23)
BUN SERPL-MCNC: 22 MG/DL — SIGNIFICANT CHANGE UP (ref 7–23)
CALCIUM SERPL-MCNC: 9 MG/DL — SIGNIFICANT CHANGE UP (ref 8.4–10.5)
CALCIUM SERPL-MCNC: 9 MG/DL — SIGNIFICANT CHANGE UP (ref 8.4–10.5)
CHLORIDE SERPL-SCNC: 94 MMOL/L — LOW (ref 96–108)
CHLORIDE SERPL-SCNC: 98 MMOL/L — SIGNIFICANT CHANGE UP (ref 96–108)
CO2 SERPL-SCNC: 23 MMOL/L — SIGNIFICANT CHANGE UP (ref 22–31)
CO2 SERPL-SCNC: 24 MMOL/L — SIGNIFICANT CHANGE UP (ref 22–31)
COLOR SPEC: SIGNIFICANT CHANGE UP
CREAT SERPL-MCNC: 0.98 MG/DL — SIGNIFICANT CHANGE UP (ref 0.5–1.3)
CREAT SERPL-MCNC: 1.07 MG/DL — SIGNIFICANT CHANGE UP (ref 0.5–1.3)
DIFF PNL FLD: ABNORMAL
EGFR: 68 ML/MIN/1.73M2 — SIGNIFICANT CHANGE UP
EGFR: 76 ML/MIN/1.73M2 — SIGNIFICANT CHANGE UP
EOSINOPHIL # BLD AUTO: 0.28 K/UL — SIGNIFICANT CHANGE UP (ref 0–0.5)
EOSINOPHIL NFR BLD AUTO: 2.3 % — SIGNIFICANT CHANGE UP (ref 0–6)
EPI CELLS # UR: 1 /HPF — SIGNIFICANT CHANGE UP
GLUCOSE BLDC GLUCOMTR-MCNC: 137 MG/DL — HIGH (ref 70–99)
GLUCOSE BLDC GLUCOMTR-MCNC: 145 MG/DL — HIGH (ref 70–99)
GLUCOSE BLDC GLUCOMTR-MCNC: 151 MG/DL — HIGH (ref 70–99)
GLUCOSE BLDC GLUCOMTR-MCNC: 153 MG/DL — HIGH (ref 70–99)
GLUCOSE SERPL-MCNC: 142 MG/DL — HIGH (ref 70–99)
GLUCOSE SERPL-MCNC: 156 MG/DL — HIGH (ref 70–99)
GLUCOSE UR QL: NEGATIVE — SIGNIFICANT CHANGE UP
HCT VFR BLD CALC: 39.3 % — SIGNIFICANT CHANGE UP (ref 39–50)
HGB BLD-MCNC: 13.5 G/DL — SIGNIFICANT CHANGE UP (ref 13–17)
HYALINE CASTS # UR AUTO: 0 /LPF — SIGNIFICANT CHANGE UP (ref 0–2)
IMM GRANULOCYTES NFR BLD AUTO: 0.7 % — SIGNIFICANT CHANGE UP (ref 0–0.9)
KETONES UR-MCNC: NEGATIVE — SIGNIFICANT CHANGE UP
LEUKOCYTE ESTERASE UR-ACNC: NEGATIVE — SIGNIFICANT CHANGE UP
LYMPHOCYTES # BLD AUTO: 1.71 K/UL — SIGNIFICANT CHANGE UP (ref 1–3.3)
LYMPHOCYTES # BLD AUTO: 14.2 % — SIGNIFICANT CHANGE UP (ref 13–44)
MCHC RBC-ENTMCNC: 30.2 PG — SIGNIFICANT CHANGE UP (ref 27–34)
MCHC RBC-ENTMCNC: 34.4 GM/DL — SIGNIFICANT CHANGE UP (ref 32–36)
MCV RBC AUTO: 87.9 FL — SIGNIFICANT CHANGE UP (ref 80–100)
MONOCYTES # BLD AUTO: 1.12 K/UL — HIGH (ref 0–0.9)
MONOCYTES NFR BLD AUTO: 9.3 % — SIGNIFICANT CHANGE UP (ref 2–14)
NEUTROPHILS # BLD AUTO: 8.82 K/UL — HIGH (ref 1.8–7.4)
NEUTROPHILS NFR BLD AUTO: 73.3 % — SIGNIFICANT CHANGE UP (ref 43–77)
NITRITE UR-MCNC: NEGATIVE — SIGNIFICANT CHANGE UP
NRBC # BLD: 0 /100 WBCS — SIGNIFICANT CHANGE UP (ref 0–0)
PH UR: 7 — SIGNIFICANT CHANGE UP (ref 5–8)
PLATELET # BLD AUTO: 307 K/UL — SIGNIFICANT CHANGE UP (ref 150–400)
POTASSIUM SERPL-MCNC: 4 MMOL/L — SIGNIFICANT CHANGE UP (ref 3.5–5.3)
POTASSIUM SERPL-MCNC: 4.2 MMOL/L — SIGNIFICANT CHANGE UP (ref 3.5–5.3)
POTASSIUM SERPL-SCNC: 4 MMOL/L — SIGNIFICANT CHANGE UP (ref 3.5–5.3)
POTASSIUM SERPL-SCNC: 4.2 MMOL/L — SIGNIFICANT CHANGE UP (ref 3.5–5.3)
PROT UR-MCNC: ABNORMAL
RBC # BLD: 4.47 M/UL — SIGNIFICANT CHANGE UP (ref 4.2–5.8)
RBC # FLD: 12.6 % — SIGNIFICANT CHANGE UP (ref 10.3–14.5)
RBC CASTS # UR COMP ASSIST: 17 /HPF — HIGH (ref 0–4)
SODIUM SERPL-SCNC: 132 MMOL/L — LOW (ref 135–145)
SODIUM SERPL-SCNC: 134 MMOL/L — LOW (ref 135–145)
SP GR SPEC: 1.01 — SIGNIFICANT CHANGE UP (ref 1.01–1.02)
UROBILINOGEN FLD QL: NEGATIVE — SIGNIFICANT CHANGE UP
WBC # BLD: 12.04 K/UL — HIGH (ref 3.8–10.5)
WBC # FLD AUTO: 12.04 K/UL — HIGH (ref 3.8–10.5)
WBC UR QL: 2 /HPF — SIGNIFICANT CHANGE UP (ref 0–5)

## 2022-10-26 PROCEDURE — 72156 MRI NECK SPINE W/O & W/DYE: CPT | Mod: 26

## 2022-10-26 RX ORDER — DIAZEPAM 5 MG
2 TABLET ORAL ONCE
Refills: 0 | Status: DISCONTINUED | OUTPATIENT
Start: 2022-10-26 | End: 2022-10-26

## 2022-10-26 RX ORDER — TRAMADOL HYDROCHLORIDE 50 MG/1
50 TABLET ORAL EVERY 4 HOURS
Refills: 0 | Status: DISCONTINUED | OUTPATIENT
Start: 2022-10-26 | End: 2022-10-26

## 2022-10-26 RX ORDER — OXYCODONE HYDROCHLORIDE 5 MG/1
10 TABLET ORAL EVERY 4 HOURS
Refills: 0 | Status: DISCONTINUED | OUTPATIENT
Start: 2022-10-26 | End: 2022-10-27

## 2022-10-26 RX ORDER — OXYCODONE HYDROCHLORIDE 5 MG/1
5 TABLET ORAL EVERY 4 HOURS
Refills: 0 | Status: DISCONTINUED | OUTPATIENT
Start: 2022-10-26 | End: 2022-10-27

## 2022-10-26 RX ADMIN — METHOCARBAMOL 500 MILLIGRAM(S): 500 TABLET, FILM COATED ORAL at 08:54

## 2022-10-26 RX ADMIN — OXYCODONE HYDROCHLORIDE 10 MILLIGRAM(S): 5 TABLET ORAL at 05:40

## 2022-10-26 RX ADMIN — Medication 650 MILLIGRAM(S): at 06:15

## 2022-10-26 RX ADMIN — OXYCODONE HYDROCHLORIDE 10 MILLIGRAM(S): 5 TABLET ORAL at 10:27

## 2022-10-26 RX ADMIN — TRAMADOL HYDROCHLORIDE 50 MILLIGRAM(S): 50 TABLET ORAL at 16:57

## 2022-10-26 RX ADMIN — Medication 650 MILLIGRAM(S): at 21:57

## 2022-10-26 RX ADMIN — OXYCODONE HYDROCHLORIDE 10 MILLIGRAM(S): 5 TABLET ORAL at 10:59

## 2022-10-26 RX ADMIN — Medication 3 MILLILITER(S): at 17:44

## 2022-10-26 RX ADMIN — AMLODIPINE BESYLATE 10 MILLIGRAM(S): 2.5 TABLET ORAL at 21:55

## 2022-10-26 RX ADMIN — TRAMADOL HYDROCHLORIDE 50 MILLIGRAM(S): 50 TABLET ORAL at 17:30

## 2022-10-26 RX ADMIN — MAGNESIUM HYDROXIDE 30 MILLILITER(S): 400 TABLET, CHEWABLE ORAL at 17:44

## 2022-10-26 RX ADMIN — SODIUM CHLORIDE 1 GRAM(S): 9 INJECTION INTRAMUSCULAR; INTRAVENOUS; SUBCUTANEOUS at 16:57

## 2022-10-26 RX ADMIN — Medication 650 MILLIGRAM(S): at 22:57

## 2022-10-26 RX ADMIN — ATORVASTATIN CALCIUM 40 MILLIGRAM(S): 80 TABLET, FILM COATED ORAL at 21:56

## 2022-10-26 RX ADMIN — POLYETHYLENE GLYCOL 3350 17 GRAM(S): 17 POWDER, FOR SOLUTION ORAL at 12:29

## 2022-10-26 RX ADMIN — LOSARTAN POTASSIUM 50 MILLIGRAM(S): 100 TABLET, FILM COATED ORAL at 06:15

## 2022-10-26 RX ADMIN — CYCLOBENZAPRINE HYDROCHLORIDE 5 MILLIGRAM(S): 10 TABLET, FILM COATED ORAL at 18:30

## 2022-10-26 RX ADMIN — Medication 3 MILLILITER(S): at 06:27

## 2022-10-26 RX ADMIN — Medication 2 MILLIGRAM(S): at 17:58

## 2022-10-26 RX ADMIN — SODIUM CHLORIDE 1 GRAM(S): 9 INJECTION INTRAMUSCULAR; INTRAVENOUS; SUBCUTANEOUS at 21:56

## 2022-10-26 RX ADMIN — MAGNESIUM HYDROXIDE 30 MILLILITER(S): 400 TABLET, CHEWABLE ORAL at 06:30

## 2022-10-26 RX ADMIN — CYCLOBENZAPRINE HYDROCHLORIDE 5 MILLIGRAM(S): 10 TABLET, FILM COATED ORAL at 06:27

## 2022-10-26 RX ADMIN — Medication 650 MILLIGRAM(S): at 16:57

## 2022-10-26 RX ADMIN — Medication 650 MILLIGRAM(S): at 17:30

## 2022-10-26 RX ADMIN — SODIUM CHLORIDE 1 GRAM(S): 9 INJECTION INTRAMUSCULAR; INTRAVENOUS; SUBCUTANEOUS at 06:16

## 2022-10-26 RX ADMIN — Medication 650 MILLIGRAM(S): at 07:15

## 2022-10-26 RX ADMIN — OXYCODONE HYDROCHLORIDE 10 MILLIGRAM(S): 5 TABLET ORAL at 04:40

## 2022-10-26 RX ADMIN — Medication 1: at 12:31

## 2022-10-26 RX ADMIN — ENOXAPARIN SODIUM 40 MILLIGRAM(S): 100 INJECTION SUBCUTANEOUS at 17:44

## 2022-10-26 NOTE — PROGRESS NOTE ADULT - ASSESSMENT
82 year old male with PMH of HTN, HLD, pre-diabetes, BPH with complaint of neck pain s/p C1-6 laminectomies with C1-7 posterior instrumented fusion and plastics closure on 10/18. Course complicated by severe constipation now resolved, hyponatremia, fall.       Plan     Neuro- MR C spine pending this afternoon  Vitals stable  Restlessness/ confusion ? related to urinary retention Straight cath UA & CBC    Hyponatremia On Sal tabs Na 134 today   DVT ppx   PT/OT- acute rehab

## 2022-10-26 NOTE — PROGRESS NOTE ADULT - SUBJECTIVE AND OBJECTIVE BOX
SUBJECTIVE:   Patient seen examined. OOB to chair   OVERNIGHT EVENTS: none    Vital Signs Last 24 Hrs  T(C): 36.7 (26 Oct 2022 12:14), Max: 37 (26 Oct 2022 04:40)  T(F): 98.1 (26 Oct 2022 12:14), Max: 98.6 (26 Oct 2022 04:40)  HR: 94 (26 Oct 2022 12:14) (94 - 101)  BP: 136/77 (26 Oct 2022 12:14) (126/71 - 157/87)  RR: 20 (26 Oct 2022 12:14) (16 - 20)  SpO2: 95% (26 Oct 2022 12:14) (90% - 96%)    Parameters below as of 26 Oct 2022 12:14  Patient On (Oxygen Delivery Method): room air        PHYSICAL EXAM:    Constitutional: No Acute Distress     Neurological: Awake alert Ox3, Following Commands, Moving all Extremities         Pulmonary: Clear to Auscultation, No rales, No rhonchi, No wheezes     Cardiovascular: S1, S2, Regular rate and rhythm     Gastrointestinal: Soft, Non-tender, Non-distended     Extremities: No calf tenderness     Incision:   DRAINS:     LABS:   10-26    134<L>  |  98  |  22  ----------------------------<  156<H>  4.2   |  23  |  1.07    Ca    9.0      26 Oct 2022 08:35  Phos  3.5     10-25  Mg     2.2     10-25        IMAGING:         MEDICATIONS:    acetaminophen     Tablet .. 650 milliGRAM(s) Oral every 8 hours  cyclobenzaprine 5 milliGRAM(s) Oral three times a day  methocarbamol 500 milliGRAM(s) Oral three times a day PRN Muscle Spasm  ondansetron Injectable 4 milliGRAM(s) IV Push every 6 hours PRN Nausea and/or Vomiting  oxyCODONE    IR 5 milliGRAM(s) Oral every 6 hours PRN Moderate Pain (4 - 6)  oxyCODONE    IR 10 milliGRAM(s) Oral every 6 hours PRN Severe Pain (7 - 10)  amLODIPine   Tablet 10 milliGRAM(s) Oral at bedtime  losartan 50 milliGRAM(s) Oral daily  albuterol/ipratropium for Nebulization 3 milliLiter(s) Nebulizer every 12 hours  bisacodyl Suppository 10 milliGRAM(s) Rectal daily PRN Constipation  magnesium hydroxide Suspension 30 milliLiter(s) Oral every 12 hours  polyethylene glycol 3350 17 Gram(s) Oral daily  senna 2 Tablet(s) Oral at bedtime  atorvastatin 40 milliGRAM(s) Oral at bedtime  enoxaparin Injectable 40 milliGRAM(s) SubCutaneous <User Schedule>  glucagon  Injectable 1 milliGRAM(s) IntraMuscular once  insulin lispro (ADMELOG) corrective regimen sliding scale   SubCutaneous three times a day before meals  insulin lispro (ADMELOG) corrective regimen sliding scale   SubCutaneous at bedtime  sodium chloride 1 Gram(s) Oral every 8 hours      DIET:        SUBJECTIVE:   Patient seen examined. OOB to chair . Congolese speaking. Not cooperative with  services . Forgetful. needs reorienting with call bell, OOB with assistance only. Restless this afternoon   OVERNIGHT EVENTS: none    Vital Signs Last 24 Hrs  T(C): 36.7 (26 Oct 2022 12:14), Max: 37 (26 Oct 2022 04:40)  T(F): 98.1 (26 Oct 2022 12:14), Max: 98.6 (26 Oct 2022 04:40)  HR: 94 (26 Oct 2022 12:14) (94 - 101)  BP: 136/77 (26 Oct 2022 12:14) (126/71 - 157/87)  RR: 20 (26 Oct 2022 12:14) (16 - 20)  SpO2: 95% (26 Oct 2022 12:14) (90% - 96%)    Parameters below as of 26 Oct 2022 12:14  Patient On (Oxygen Delivery Method): room air        PHYSICAL EXAM:    Constitutional: No Acute Distress     Neurological: Awake alert Ox 2. Speech clear Following Commands, Moving all Extremities RUE 5/5 LUE deltoid 4/5 Biceps/ triceps 4+/5. b/l LE 4+/5 . Posterior neck / upper back incision sutures C/D/I     Pulmonary: Clear to Auscultation,    Cardiovascular: S1, S2, Regular rate and rhythm     Gastrointestinal: Soft, Non-tender, Non-distended     Extremities: No calf tenderness       LABS:   10-26    134<L>  |  98  |  22  ----------------------------<  156<H>  4.2   |  23  |  1.07    Ca    9.0      26 Oct 2022 08:35  Phos  3.5     10-25  Mg     2.2     10-25        IMAGING:         MEDICATIONS:    acetaminophen     Tablet .. 650 milliGRAM(s) Oral every 8 hours  cyclobenzaprine 5 milliGRAM(s) Oral three times a day  methocarbamol 500 milliGRAM(s) Oral three times a day PRN Muscle Spasm  ondansetron Injectable 4 milliGRAM(s) IV Push every 6 hours PRN Nausea and/or Vomiting  oxyCODONE    IR 5 milliGRAM(s) Oral every 6 hours PRN Moderate Pain (4 - 6)  oxyCODONE    IR 10 milliGRAM(s) Oral every 6 hours PRN Severe Pain (7 - 10)  amLODIPine   Tablet 10 milliGRAM(s) Oral at bedtime  losartan 50 milliGRAM(s) Oral daily  albuterol/ipratropium for Nebulization 3 milliLiter(s) Nebulizer every 12 hours  bisacodyl Suppository 10 milliGRAM(s) Rectal daily PRN Constipation  magnesium hydroxide Suspension 30 milliLiter(s) Oral every 12 hours  polyethylene glycol 3350 17 Gram(s) Oral daily  senna 2 Tablet(s) Oral at bedtime  atorvastatin 40 milliGRAM(s) Oral at bedtime  enoxaparin Injectable 40 milliGRAM(s) SubCutaneous <User Schedule>  glucagon  Injectable 1 milliGRAM(s) IntraMuscular once  insulin lispro (ADMELOG) corrective regimen sliding scale   SubCutaneous three times a day before meals  insulin lispro (ADMELOG) corrective regimen sliding scale   SubCutaneous at bedtime  sodium chloride 1 Gram(s) Oral every 8 hours      DIET:

## 2022-10-27 ENCOUNTER — INPATIENT (INPATIENT)
Facility: HOSPITAL | Age: 82
LOS: 14 days | Discharge: HOME HEALTH | DRG: 949 | End: 2022-11-11
Attending: PSYCHIATRY & NEUROLOGY | Admitting: PHYSICAL MEDICINE & REHABILITATION
Payer: COMMERCIAL

## 2022-10-27 ENCOUNTER — TRANSCRIPTION ENCOUNTER (OUTPATIENT)
Age: 82
End: 2022-10-27

## 2022-10-27 VITALS
OXYGEN SATURATION: 93 % | HEART RATE: 108 BPM | TEMPERATURE: 97 F | RESPIRATION RATE: 18 BRPM | SYSTOLIC BLOOD PRESSURE: 142 MMHG | DIASTOLIC BLOOD PRESSURE: 82 MMHG

## 2022-10-27 VITALS
HEART RATE: 99 BPM | OXYGEN SATURATION: 93 % | WEIGHT: 186.51 LBS | TEMPERATURE: 98 F | DIASTOLIC BLOOD PRESSURE: 72 MMHG | SYSTOLIC BLOOD PRESSURE: 145 MMHG | RESPIRATION RATE: 15 BRPM | HEIGHT: 65 IN

## 2022-10-27 DIAGNOSIS — Z98.49 CATARACT EXTRACTION STATUS, UNSPECIFIED EYE: Chronic | ICD-10-CM

## 2022-10-27 DIAGNOSIS — Z98.890 OTHER SPECIFIED POSTPROCEDURAL STATES: Chronic | ICD-10-CM

## 2022-10-27 DIAGNOSIS — Z98.1 ARTHRODESIS STATUS: ICD-10-CM

## 2022-10-27 PROBLEM — U07.1 COVID-19: Chronic | Status: ACTIVE | Noted: 2022-09-27

## 2022-10-27 LAB
GLUCOSE BLDC GLUCOMTR-MCNC: 161 MG/DL — HIGH (ref 70–99)
GLUCOSE BLDC GLUCOMTR-MCNC: 191 MG/DL — HIGH (ref 70–99)

## 2022-10-27 PROCEDURE — 99233 SBSQ HOSP IP/OBS HIGH 50: CPT

## 2022-10-27 PROCEDURE — 99232 SBSQ HOSP IP/OBS MODERATE 35: CPT

## 2022-10-27 RX ORDER — OXYCODONE HYDROCHLORIDE 5 MG/1
5 TABLET ORAL EVERY 4 HOURS
Refills: 0 | Status: DISCONTINUED | OUTPATIENT
Start: 2022-10-27 | End: 2022-10-28

## 2022-10-27 RX ORDER — DEXTROSE 50 % IN WATER 50 %
15 SYRINGE (ML) INTRAVENOUS ONCE
Refills: 0 | Status: DISCONTINUED | OUTPATIENT
Start: 2022-10-27 | End: 2022-11-11

## 2022-10-27 RX ORDER — SENNA PLUS 8.6 MG/1
2 TABLET ORAL
Qty: 0 | Refills: 0 | DISCHARGE
Start: 2022-10-27

## 2022-10-27 RX ORDER — CYCLOBENZAPRINE HYDROCHLORIDE 10 MG/1
1 TABLET, FILM COATED ORAL
Qty: 0 | Refills: 0 | DISCHARGE
Start: 2022-10-27

## 2022-10-27 RX ORDER — INSULIN LISPRO 100/ML
VIAL (ML) SUBCUTANEOUS AT BEDTIME
Refills: 0 | Status: DISCONTINUED | OUTPATIENT
Start: 2022-10-27 | End: 2022-11-09

## 2022-10-27 RX ORDER — POLYETHYLENE GLYCOL 3350 17 G/17G
17 POWDER, FOR SOLUTION ORAL DAILY
Refills: 0 | Status: ACTIVE | OUTPATIENT
Start: 2022-10-27 | End: 2023-09-25

## 2022-10-27 RX ORDER — GLUCAGON INJECTION, SOLUTION 0.5 MG/.1ML
1 INJECTION, SOLUTION SUBCUTANEOUS ONCE
Refills: 0 | Status: DISCONTINUED | OUTPATIENT
Start: 2022-10-27 | End: 2022-11-11

## 2022-10-27 RX ORDER — PREGABALIN 225 MG/1
1 CAPSULE ORAL
Qty: 0 | Refills: 0 | DISCHARGE

## 2022-10-27 RX ORDER — DEXTROSE 50 % IN WATER 50 %
12.5 SYRINGE (ML) INTRAVENOUS ONCE
Refills: 0 | Status: DISCONTINUED | OUTPATIENT
Start: 2022-10-27 | End: 2022-11-11

## 2022-10-27 RX ORDER — INSULIN LISPRO 100/ML
VIAL (ML) SUBCUTANEOUS
Refills: 0 | Status: DISCONTINUED | OUTPATIENT
Start: 2022-10-27 | End: 2022-11-09

## 2022-10-27 RX ORDER — SODIUM CHLORIDE 9 MG/ML
1000 INJECTION, SOLUTION INTRAVENOUS
Refills: 0 | Status: DISCONTINUED | OUTPATIENT
Start: 2022-10-27 | End: 2022-11-11

## 2022-10-27 RX ORDER — AMLODIPINE BESYLATE 2.5 MG/1
10 TABLET ORAL AT BEDTIME
Refills: 0 | Status: DISCONTINUED | OUTPATIENT
Start: 2022-10-27 | End: 2022-11-11

## 2022-10-27 RX ORDER — POLYETHYLENE GLYCOL 3350 17 G/17G
17 POWDER, FOR SOLUTION ORAL
Qty: 0 | Refills: 0 | DISCHARGE
Start: 2022-10-27

## 2022-10-27 RX ORDER — OXYCODONE HYDROCHLORIDE 5 MG/1
10 TABLET ORAL EVERY 4 HOURS
Refills: 0 | Status: DISCONTINUED | OUTPATIENT
Start: 2022-10-27 | End: 2022-10-28

## 2022-10-27 RX ORDER — ASPIRIN/CALCIUM CARB/MAGNESIUM 324 MG
81 TABLET ORAL DAILY
Refills: 0 | Status: DISCONTINUED | OUTPATIENT
Start: 2022-10-28 | End: 2022-11-11

## 2022-10-27 RX ORDER — ENOXAPARIN SODIUM 100 MG/ML
40 INJECTION SUBCUTANEOUS
Qty: 0 | Refills: 0 | DISCHARGE
Start: 2022-10-27

## 2022-10-27 RX ORDER — CYCLOBENZAPRINE HYDROCHLORIDE 10 MG/1
10 TABLET, FILM COATED ORAL EVERY 12 HOURS
Refills: 0 | Status: DISCONTINUED | OUTPATIENT
Start: 2022-10-27 | End: 2022-10-28

## 2022-10-27 RX ORDER — ENOXAPARIN SODIUM 100 MG/ML
40 INJECTION SUBCUTANEOUS
Refills: 0 | Status: DISCONTINUED | OUTPATIENT
Start: 2022-10-27 | End: 2022-11-11

## 2022-10-27 RX ORDER — AMLODIPINE BESYLATE 2.5 MG/1
1 TABLET ORAL
Qty: 0 | Refills: 0 | DISCHARGE
Start: 2022-10-27

## 2022-10-27 RX ORDER — SENNA PLUS 8.6 MG/1
2 TABLET ORAL AT BEDTIME
Refills: 0 | Status: ACTIVE | OUTPATIENT
Start: 2022-10-27 | End: 2023-09-25

## 2022-10-27 RX ORDER — SODIUM CHLORIDE 9 MG/ML
1 INJECTION INTRAMUSCULAR; INTRAVENOUS; SUBCUTANEOUS EVERY 8 HOURS
Refills: 0 | Status: DISCONTINUED | OUTPATIENT
Start: 2022-10-27 | End: 2022-10-30

## 2022-10-27 RX ORDER — LOSARTAN POTASSIUM 100 MG/1
1 TABLET, FILM COATED ORAL
Qty: 0 | Refills: 0 | DISCHARGE
Start: 2022-10-27

## 2022-10-27 RX ORDER — OMEGA-3 ACID ETHYL ESTERS 1 G
1 CAPSULE ORAL
Qty: 0 | Refills: 0 | DISCHARGE

## 2022-10-27 RX ORDER — CYCLOBENZAPRINE HYDROCHLORIDE 10 MG/1
10 TABLET, FILM COATED ORAL EVERY 12 HOURS
Refills: 0 | Status: DISCONTINUED | OUTPATIENT
Start: 2022-10-27 | End: 2022-10-27

## 2022-10-27 RX ORDER — DEXTROSE 50 % IN WATER 50 %
25 SYRINGE (ML) INTRAVENOUS ONCE
Refills: 0 | Status: DISCONTINUED | OUTPATIENT
Start: 2022-10-27 | End: 2022-11-11

## 2022-10-27 RX ORDER — LOSARTAN POTASSIUM 100 MG/1
50 TABLET, FILM COATED ORAL DAILY
Refills: 0 | Status: DISCONTINUED | OUTPATIENT
Start: 2022-10-28 | End: 2022-11-11

## 2022-10-27 RX ORDER — OXYCODONE HYDROCHLORIDE 5 MG/1
1 TABLET ORAL
Qty: 0 | Refills: 0 | DISCHARGE
Start: 2022-10-27

## 2022-10-27 RX ORDER — METHOCARBAMOL 500 MG/1
500 TABLET, FILM COATED ORAL THREE TIMES A DAY
Refills: 0 | Status: DISCONTINUED | OUTPATIENT
Start: 2022-10-27 | End: 2022-10-28

## 2022-10-27 RX ORDER — ATORVASTATIN CALCIUM 80 MG/1
40 TABLET, FILM COATED ORAL AT BEDTIME
Refills: 0 | Status: DISCONTINUED | OUTPATIENT
Start: 2022-10-27 | End: 2022-11-11

## 2022-10-27 RX ORDER — ACETAMINOPHEN 500 MG
650 TABLET ORAL EVERY 6 HOURS
Refills: 0 | Status: DISCONTINUED | OUTPATIENT
Start: 2022-10-27 | End: 2022-10-28

## 2022-10-27 RX ORDER — MAGNESIUM HYDROXIDE 400 MG/1
30 TABLET, CHEWABLE ORAL EVERY 12 HOURS
Refills: 0 | Status: DISCONTINUED | OUTPATIENT
Start: 2022-10-27 | End: 2022-11-10

## 2022-10-27 RX ORDER — LOSARTAN POTASSIUM 100 MG/1
1 TABLET, FILM COATED ORAL
Qty: 0 | Refills: 0 | DISCHARGE

## 2022-10-27 RX ORDER — ACETAMINOPHEN 500 MG
2 TABLET ORAL
Qty: 0 | Refills: 0 | DISCHARGE

## 2022-10-27 RX ORDER — ASPIRIN/CALCIUM CARB/MAGNESIUM 324 MG
0 TABLET ORAL
Qty: 0 | Refills: 0 | DISCHARGE

## 2022-10-27 RX ORDER — AMLODIPINE BESYLATE 2.5 MG/1
1 TABLET ORAL
Qty: 0 | Refills: 0 | DISCHARGE

## 2022-10-27 RX ORDER — SODIUM CHLORIDE 9 MG/ML
1 INJECTION INTRAMUSCULAR; INTRAVENOUS; SUBCUTANEOUS
Qty: 0 | Refills: 0 | DISCHARGE
Start: 2022-10-27

## 2022-10-27 RX ADMIN — ENOXAPARIN SODIUM 40 MILLIGRAM(S): 100 INJECTION SUBCUTANEOUS at 19:05

## 2022-10-27 RX ADMIN — Medication 650 MILLIGRAM(S): at 06:53

## 2022-10-27 RX ADMIN — MAGNESIUM HYDROXIDE 30 MILLILITER(S): 400 TABLET, CHEWABLE ORAL at 19:05

## 2022-10-27 RX ADMIN — POLYETHYLENE GLYCOL 3350 17 GRAM(S): 17 POWDER, FOR SOLUTION ORAL at 13:05

## 2022-10-27 RX ADMIN — SENNA PLUS 2 TABLET(S): 8.6 TABLET ORAL at 21:04

## 2022-10-27 RX ADMIN — LOSARTAN POTASSIUM 50 MILLIGRAM(S): 100 TABLET, FILM COATED ORAL at 05:53

## 2022-10-27 RX ADMIN — OXYCODONE HYDROCHLORIDE 10 MILLIGRAM(S): 5 TABLET ORAL at 21:05

## 2022-10-27 RX ADMIN — OXYCODONE HYDROCHLORIDE 10 MILLIGRAM(S): 5 TABLET ORAL at 22:05

## 2022-10-27 RX ADMIN — Medication 3 MILLILITER(S): at 06:28

## 2022-10-27 RX ADMIN — OXYCODONE HYDROCHLORIDE 5 MILLIGRAM(S): 5 TABLET ORAL at 00:46

## 2022-10-27 RX ADMIN — CYCLOBENZAPRINE HYDROCHLORIDE 10 MILLIGRAM(S): 10 TABLET, FILM COATED ORAL at 19:42

## 2022-10-27 RX ADMIN — Medication 650 MILLIGRAM(S): at 05:53

## 2022-10-27 RX ADMIN — OXYCODONE HYDROCHLORIDE 10 MILLIGRAM(S): 5 TABLET ORAL at 06:18

## 2022-10-27 RX ADMIN — MAGNESIUM HYDROXIDE 30 MILLILITER(S): 400 TABLET, CHEWABLE ORAL at 05:53

## 2022-10-27 RX ADMIN — AMLODIPINE BESYLATE 10 MILLIGRAM(S): 2.5 TABLET ORAL at 21:04

## 2022-10-27 RX ADMIN — OXYCODONE HYDROCHLORIDE 5 MILLIGRAM(S): 5 TABLET ORAL at 01:46

## 2022-10-27 RX ADMIN — CYCLOBENZAPRINE HYDROCHLORIDE 5 MILLIGRAM(S): 10 TABLET, FILM COATED ORAL at 01:50

## 2022-10-27 RX ADMIN — CYCLOBENZAPRINE HYDROCHLORIDE 10 MILLIGRAM(S): 10 TABLET, FILM COATED ORAL at 10:13

## 2022-10-27 RX ADMIN — SODIUM CHLORIDE 1 GRAM(S): 9 INJECTION INTRAMUSCULAR; INTRAVENOUS; SUBCUTANEOUS at 05:53

## 2022-10-27 RX ADMIN — SODIUM CHLORIDE 1 GRAM(S): 9 INJECTION INTRAMUSCULAR; INTRAVENOUS; SUBCUTANEOUS at 21:04

## 2022-10-27 RX ADMIN — ATORVASTATIN CALCIUM 40 MILLIGRAM(S): 80 TABLET, FILM COATED ORAL at 21:05

## 2022-10-27 RX ADMIN — SODIUM CHLORIDE 1 GRAM(S): 9 INJECTION INTRAMUSCULAR; INTRAVENOUS; SUBCUTANEOUS at 13:05

## 2022-10-27 NOTE — PROGRESS NOTE ADULT - PROBLEM SELECTOR PLAN 7
A1c 7.0  cw carb controlled diet   insulin scale
continue home statin.
A1c 7.0  cw carb controlled diet   insulin scale
DVT ppx: lovenox   Still constipated - ordered enema. On miralax, senna, dulcolax     Patient does not remember all home medications but knows he takes asa, something for BP and cholesterol. Verified via surescripts.
A1c 7.0  cw carb controlled diet   insulin scale

## 2022-10-27 NOTE — H&P ADULT - HISTORY OF PRESENT ILLNESS
This is a 83 YO male with PMH of HTN, HLD, DM II, BPH with complaint of neck pain. Patient endorses increasing neck pain, gait instability and clumsiness of the hands. He ambulates with a cane. He denies fever, chills, trauma or injury. He presents to [facility] on 10/18 for scheduled Posterior C1-6 Decompression, C4-5 Posterior Column Osteotomy, C1-C7 Fusion, Complex Plastics Closure. Course complicated by severe constipation now resolved, leukocytosis, hyponatremia- stable on salt tablet, fall, urinary retention requiring straight catheterization, intermittent confusion likely related to pain medication and hospital setting. Noted with  Left deltoid weakness. MR Cervical spine 10/26/22 without cord compression. Surgical drain removed 10/27/22. Patient was evaluated by PM&R and therapy for functional deficits, gait/ADL impairments and acute rehabilitation was recommended. Patient was medically optimized for discharge to [facility] IRU on 10/27/22.

## 2022-10-27 NOTE — PROGRESS NOTE ADULT - PROBLEM SELECTOR PLAN 9
DVT ppx: lovenox     Dispo: eventual Acute rehab    Will follow periodically.
DVT ppx: lovenox     Dispo: eventual Acute rehab vs [facility]. PM&R consult pending.\
DVT ppx: lovenox     Dispo: eventual Acute rehab

## 2022-10-27 NOTE — PROGRESS NOTE ADULT - PROBLEM SELECTOR PLAN 8
DVT ppx: lovenox     Patient does not remember all home medications but knows he takes asa, something for BP and cholesterol. Verified via surescripts.    Dispo: eventual Acute rehab vs [facility]
A1c 7.0  c/w carb controlled diet, ISS
DVT ppx: lovenox     Patient does not remember all home medications but knows he takes asa, something for BP and cholesterol. Verified via surescripts.
DVT ppx: lovenox     Patient does not remember all home medications but knows he takes asa, something for BP and cholesterol. Verified via surescripts.
A1c 7.0  c/w carb controlled diet  off ISS
A1c 7.0  c/w carb controlled diet, ISS

## 2022-10-27 NOTE — DISCHARGE NOTE PROVIDER - NSDCCPCAREPLAN_GEN_ALL_CORE_FT
PRINCIPAL DISCHARGE DIAGNOSIS  Diagnosis: Cervical cord compression with myelopathy  Assessment and Plan of Treatment:       SECONDARY DISCHARGE DIAGNOSES  Diagnosis: Hyponatremia  Assessment and Plan of Treatment:     Diagnosis: HLD (hyperlipidemia)  Assessment and Plan of Treatment:     Diagnosis: HTN (hypertension)  Assessment and Plan of Treatment:     Diagnosis: Prediabetes  Assessment and Plan of Treatment:      PRINCIPAL DISCHARGE DIAGNOSIS  Diagnosis: Cervical cord compression with myelopathy  Assessment and Plan of Treatment: s/p C1-6 laminectomies with C1-7 posterior instrumented fusion and plastics closure on 10/18/22  Keep Incision Clean and Dry. May shower.  pat dry after. no creams or lotions on incision. No immersion baths..  Incision evaluation and suture removal around 11/7/22 at plastics [facility] [place] office   No bending back. No heavy lifting. No twisting back..  No strenous activity. No heavy lifting. Do not return to work until cleared by physician. No driving until cleared by physician.        SECONDARY DISCHARGE DIAGNOSES  Diagnosis: Hyponatremia  Assessment and Plan of Treatment: Continue salt tabs. BMP in 3-5 days. Wean salt tabs off    Diagnosis: HLD (hyperlipidemia)  Assessment and Plan of Treatment: Continue statin    Diagnosis: HTN (hypertension)  Assessment and Plan of Treatment: continue BP meds    Diagnosis: Prediabetes  Assessment and Plan of Treatment: fasting blood sugar in am May do low dose sliding scale if fasting bllo sugar >150

## 2022-10-27 NOTE — PROGRESS NOTE ADULT - PROBLEM SELECTOR PROBLEM 1
Cervical spinal stenosis

## 2022-10-27 NOTE — H&P ADULT - NSICDXPASTMEDICALHX_GEN_ALL_CORE_FT
PAST MEDICAL HISTORY:  Benign prostate hyperplasia     Chronic kidney disease     COVID-19 virus infection -dec 2021 (cough, fever, malaise)    Diabetes mellitus -patient states prediabetes, not on medication    Hyperlipidemia     Hypertension

## 2022-10-27 NOTE — PROGRESS NOTE ADULT - PROVIDER SPECIALTY LIST ADULT
Neurosurgery
Internal Medicine
NSICU
NSICU
Neurosurgery
Neurosurgery
Rehab Medicine
Rehab Medicine
NSICU
Neurosurgery
Internal Medicine
Hospitalist
Hospitalist
Internal Medicine
Hospitalist
Hospitalist

## 2022-10-27 NOTE — PROGRESS NOTE ADULT - PROBLEM SELECTOR PROBLEM 3
Constipation
Constipation
Leukocytosis
Constipation
Leukocytosis
Acute respiratory failure with hypoxia
Leukocytosis

## 2022-10-27 NOTE — PROGRESS NOTE ADULT - PROBLEM SELECTOR PROBLEM 8
Diabetes mellitus
Need for prophylactic measure
Diabetes mellitus
Diabetes mellitus
Need for prophylactic measure
Need for prophylactic measure

## 2022-10-27 NOTE — H&P ADULT - ATTENDING COMMENTS
Chart reviewed. History from chart   I have reviewed the H and P and edited as needed  82 year old male with history as stated above admitted to rehab after recent Posterior C1-6 Decompression, C4-5 Posterior Column Osteotomy, C1-C7 Fusion, Complex Plastics Closure on 10/18 for cervical myeoplathy - Patient with impaired gait and incoordination of hands  Since admission to rehab patient - code gray overnight for agitation of unclear etiology - Patient got haldol IM , oxycodone Po, cyclobenzaprine and methocarbamol   This am again with code gray as patient attempting to tie cord around wrist . Zyprexa 2.5mg IM given.   Patient placed on 1:1 and Psychiatry consult requested - Case d/w psych   Patient with urinary retention and garay replaced     Patient seen late morning - 1: 1at bedside. Appears sleepy, and speech dysarthric  Seen later this afternoon during PT session . Portuguese speaking CNA doing 1:1 assisted with translation   Patient appears more calm and cooperative with exam   He reports left shoulder pain and has limited active shoulder abduction   Vital Signs Last 24 Hrs  T(C): 36.5 (28 Oct 2022 13:58), Max: 36.8 (28 Oct 2022 03:00)  T(F): 97.7 (28 Oct 2022 13:58), Max: 98.2 (28 Oct 2022 03:00)  HR: 102 (28 Oct 2022 13:58) (99 - 122)  BP: 132/77 (28 Oct 2022 13:58) (132/77 - 176/79)  BP(mean): --  RR: 16 (28 Oct 2022 13:58) (15 - 18)  SpO2: 93% (28 Oct 2022 13:58) (93% - 93%)    Parameters below as of 28 Oct 2022 13:58  Patient On (Oxygen Delivery Method): room air    NAD,   Pulm: Clear  Heart: S1S2  Abd: soft  Ext: no edema, no calf tenderness  motor 5/5 except left shoulder with limited abduction   fine motor impaired both hands  Skin: neck incision with sutures, some erythema noted  no drainage                           12.7   12.87 )-----------( 303      ( 28 Oct 2022 05:50 )             36.6     10-28    135  |  98  |  20  ----------------------------<  139<H>  4.0   |  31  |  1.20    Ca    8.9      28 Oct 2022 05:50    TPro  7.3  /  Alb  3.0<L>  /  TBili  0.6  /  DBili  x   /  AST  26  /  ALT  35  /  AlkPhos  103  10-28    Begin full rehab program     Possible delirium - multifactorial - pain, bladder, narcotics, muscle relaxants , new environment -  Pain regimen changed to tylenol 975mg q8h and tramadol prn . DC muscle relaxants, oxy   Olanzapine low dose in evening per psych recs and prn IM   1:1 supervision     2. DVT prophylaxis: on Lovenox    3. HTN: on amlodipine, losartan     4, Bladder program: garay for now     5. Bowel program : senna, miralax     6. Labs with mild leucocytosis, Sodium 135  On salt tabs TID - taper as sodium improves     7. Hospitalist and psych consult noted Chart reviewed. History from chart   I have reviewed the H and P and edited as needed  82 year old male with history as stated above admitted to rehab after recent Posterior C1-6 Decompression, C4-5 Posterior Column Osteotomy, C1-C7 Fusion, Complex Plastics Closure on 10/18 for cervical myeoplathy - Patient with impaired gait and incoordination of hands  Since admission to rehab patient - code gray overnight for agitation of unclear etiology - Patient got haldol IM , oxycodone Po, cyclobenzaprine and methocarbamol   This am again with code gray as patient attempting to tie cord around wrist . Zyprexa 2.5mg IM given.   Patient placed on 1:1 and Psychiatry consult requested - Case d/w psych   Patient with urinary retention and garay replaced     Patient seen late morning - 1: 1at bedside. Appears sleepy, and speech dysarthric  Seen later this afternoon during PT session . Guyanese speaking CNA doing 1:1 assisted with translation   Patient appears more calm and cooperative with exam   He reports left shoulder pain and has limited active shoulder abduction   Vital Signs Last 24 Hrs  T(C): 36.5 (28 Oct 2022 13:58), Max: 36.8 (28 Oct 2022 03:00)  T(F): 97.7 (28 Oct 2022 13:58), Max: 98.2 (28 Oct 2022 03:00)  HR: 102 (28 Oct 2022 13:58) (99 - 122)  BP: 132/77 (28 Oct 2022 13:58) (132/77 - 176/79)  BP(mean): --  RR: 16 (28 Oct 2022 13:58) (15 - 18)  SpO2: 93% (28 Oct 2022 13:58) (93% - 93%)    Parameters below as of 28 Oct 2022 13:58  Patient On (Oxygen Delivery Method): room air    NAD,   Pulm: Clear  Heart: S1S2  Abd: soft  Ext: no edema, no calf tenderness  motor 5/5 except left shoulder with limited abduction - difficulty with initiation of abduction   fine motor impaired both hands  Skin: neck incision with sutures, some erythema noted  no drainage                           12.7   12.87 )-----------( 303      ( 28 Oct 2022 05:50 )             36.6     10-28    135  |  98  |  20  ----------------------------<  139<H>  4.0   |  31  |  1.20    Ca    8.9      28 Oct 2022 05:50    TPro  7.3  /  Alb  3.0<L>  /  TBili  0.6  /  DBili  x   /  AST  26  /  ALT  35  /  AlkPhos  103  10-28    Begin full rehab program     Possible delirium - multifactorial - pain, bladder, narcotics, muscle relaxants , new environment -  Pain regimen changed to tylenol 975mg q8h and tramadol prn . DC muscle relaxants, oxycodone  Olanzapine low dose in evening per psych recs and prn IM   1:1 supervision     2. DVT prophylaxis: on Lovenox    3. HTN: on amlodipine, losartan     4, Bladder program: garay for now     5. Bowel program : senna, miralax     6. Labs with mild leucocytosis, Sodium 135  On salt tabs TID - taper as sodium improves     7. Left shoulder - possible rotator cuff injury - ? after fall - Patient unable to recall exact onset.     7. Hospitalist and psych consult noted Chart reviewed. History from chart   I have reviewed the H and P and edited as needed  82 year old male with history as stated above admitted to rehab after recent Posterior C1-6 Decompression, C4-5 Posterior Column Osteotomy, C1-C7 Fusion, Complex Plastics Closure on 10/18 for cervical myeoplathy - Patient with impaired gait and incoordination of hands  Since admission to rehab patient - code gray overnight for agitation of unclear etiology - Patient got haldol IM , oxycodone Po, cyclobenzaprine and methocarbamol   This am again with code gray as patient attempting to tie cord around wrist . Zyprexa 2.5mg IM given.   Patient placed on 1:1 and Psychiatry consult requested - Case d/w psych   Patient with urinary retention and garay replaced     Patient seen late morning - 1: 1at bedside. Appears sleepy, and speech dysarthric  Seen later this afternoon during PT session . Kuwaiti speaking CNA doing 1:1 assisted with translation   Patient appears more calm and cooperative with exam   He reports left shoulder pain and has limited active shoulder abduction   Vital Signs Last 24 Hrs  T(C): 36.5 (28 Oct 2022 13:58), Max: 36.8 (28 Oct 2022 03:00)  T(F): 97.7 (28 Oct 2022 13:58), Max: 98.2 (28 Oct 2022 03:00)  HR: 102 (28 Oct 2022 13:58) (99 - 122)  BP: 132/77 (28 Oct 2022 13:58) (132/77 - 176/79)  BP(mean): --  RR: 16 (28 Oct 2022 13:58) (15 - 18)  SpO2: 93% (28 Oct 2022 13:58) (93% - 93%)    Parameters below as of 28 Oct 2022 13:58  Patient On (Oxygen Delivery Method): room air    NAD, Speech appears dysarthric  Pulm: Clear  Heart: S1S2  Abd: soft  Ext: no edema, no calf tenderness  motor 5/5 except left shoulder with limited abduction - difficulty with initiation of abduction   fine motor impaired both hands  Skin: neck incision with sutures, some erythema noted  no drainage                           12.7   12.87 )-----------( 303      ( 28 Oct 2022 05:50 )             36.6     10-28    135  |  98  |  20  ----------------------------<  139<H>  4.0   |  31  |  1.20    Ca    8.9      28 Oct 2022 05:50    TPro  7.3  /  Alb  3.0<L>  /  TBili  0.6  /  DBili  x   /  AST  26  /  ALT  35  /  AlkPhos  103  10-28    Begin full rehab program     Possible delirium - multifactorial - pain, bladder, narcotics, muscle relaxants , new environment -  Pain regimen changed to tylenol 975mg q8h and tramadol prn . DC muscle relaxants, oxycodone  Olanzapine low dose in evening per psych recs and prn IM   1:1 supervision     2. DVT prophylaxis: on Lovenox    3. HTN: on amlodipine, losartan     4, Bladder program: garay for now     5. Bowel program : senna, miralax     6. Labs with mild leucocytosis, Sodium 135  On salt tabs TID - taper as sodium improves     7. Left shoulder - possible rotator cuff injury - ? after fall - Patient unable to recall exact onset.     7. Hospitalist and psych consult noted    Addendum:  Patient seen again this evening- appears more calm , oriented x3 with cues for day and date. not aware of situation and exact reason for hospitalisation. Monitor clinically for improvement. If persistent, consider further work up

## 2022-10-27 NOTE — H&P ADULT - ASSESSMENT
ASSESSMENT/PLAN  This is a 81 YO male with PMH of HTN, HLD, DM II, BPH with complaint of neck pain. Patient endorses increasing neck pain, gait instability and clumsiness of the hands. He presents to [facility] on 10/18 for scheduled Posterior C1-6 Decompression, C4-5 Posterior Column Osteotomy, C1-C7 Fusion, Complex Plastics Closure. Course complicated by severe constipation now resolved, leukocytosis, hyponatremia, fall, urinary retention requiring straight catheterization. Patient now with gait Instability, ADL impairments and Functional impairments.    #Cervical cord compression with myelopathy  - Posterior C1-6 Decompression, C4-5 Posterior Column Osteotomy, C1-C7 Fusion, Complex Plastics Closure on 10/18  - Start Comprehensive Rehab Program: PT/OT, 3hours daily and 5 days weekly  - PT: Focused on improving strength, endurance, coordination, balance, functional mobility, and transfers  - OT: Focused on improving strength, fine motor skills, coordination, posture and ADLs.      #HTN  - Amlodipine 10mg daily   - Losartan 50mg daily    #HLD  - Lipitor 40mg daily    #Hyponatremia  - Sodium Tabs 1mg  TID    #Pain management  - Tylenol PRN, Oxycodone PRN, Flexeril    #DVT ppx  - Lovenox, SCD, TEDs    #Bowel Regimen  - Senna, miralax, Dulcolax supp    #Bladder management  - BS on admission, and q 8 hours (SC if > 400)  - Monitor UO    #FEN   - Diet: Consistent Carb, 1000ml fluid restriction    #Skin:  - Skin on admission: ***  - Pressure injury/Skin: Turn Q2hrs while in bed, OOB to Chair, PT/OT     #Sleep:   - Maintain quiet hours and low stim environment.  - Melatonin PRN to maximize participation in therapy during the day.     #Precaution  - Fall, Aspiration, Spinal    #GOC  CODE STATUS: FULL CODE    Outpatient Follow-up (Specialty/Name of physician):        MEDICAL PROGNOSIS: GOOD            REHAB POTENTIAL: GOOD             ESTIMATED DISPOSITION: HOME WITH HOME CARE            ELOS: 10-14 Days   EXPECTED THERAPY:     P.T. 1hr/day       O.T. 1hr/day      S.L.P. 1hr/day     P&O Unnecessary     EXP FREQUENCY: 5 days per 7 day period     PRESCREEN COMPARISON:   I have reviewed the prescreen information and I have found no relevant changes between the preadmission screening and my post admission evaluation     RATIONALE FOR INPATIENT ADMISSION - Patient demonstrates the following: (check all that apply)  [X] Medically appropriate for rehabilitation admission  [X] Has attainable rehab goals with an appropriate initial discharge plan  [X] Has rehabilitation potential (expected to make a significant improvement within a reasonable period of time)   [X] Requires close medical management by a rehab physician, rehab nursing care, Hospitalist and comprehensive interdisciplinary team (including PT, OT, & or SLP, Prosthetics and Orthotics)   ASSESSMENT/PLAN  This is a 81 YO male with PMH of HTN, HLD, DM II, BPH with complaint of neck pain. Patient endorses increasing neck pain, gait instability and clumsiness of the hands. He presents to [facility] on 10/18 for scheduled Posterior C1-6 Decompression, C4-5 Posterior Column Osteotomy, C1-C7 Fusion, Complex Plastics Closure. Course complicated by severe constipation now resolved, leukocytosis, hyponatremia, fall, urinary retention requiring straight catheterization. Patient now with gait Instability, ADL impairments and Functional impairments.    #Cervical cord compression with myelopathy  - Posterior C1-6 Decompression, C4-5 Posterior Column Osteotomy, C1-C7 Fusion, Complex Plastics Closure on 10/18  - Start Comprehensive Rehab Program: PT/OT, 3hours daily and 5 days weekly  - PT: Focused on improving strength, endurance, coordination, balance, functional mobility, and transfers  - OT: Focused on improving strength, fine motor skills, coordination, posture and ADLs.      #HTN  - Amlodipine 10mg daily   - Losartan 50mg daily    #HLD  - Lipitor 40mg daily    #Hyponatremia  - Sodium Tabs 1mg  TID    #Pain management  - Tylenol PRN, Oxycodone PRN, Flexeril    #DVT ppx  - Lovenox, SCD, TEDs    #Bowel Regimen  - Senna, miralax, Dulcolax supp    #Bladder management  - BS on admission, and q 8 hours (SC if > 400)  - Monitor UO    #FEN   - Diet: Consistent Carb, 1000ml fluid restriction    #Skin:  - Skin on admission: ***  - Pressure injury/Skin: Turn Q2hrs while in bed, OOB to Chair, PT/OT     #Sleep:   - Maintain quiet hours and low stim environment.  - Melatonin PRN to maximize participation in therapy during the day.     #Precaution  - Fall, Aspiration, Spinal    #GOC  CODE STATUS: FULL CODE    Outpatient Follow-up (Specialty/Name of physician):    Caleb Bond)  Neurosurgery  [address] [facility], [address]  [place] [address]  Phone: [phone]  Fax: [phone]    Jose Raul Bay)  Plastic Surgery  [address] [place] [address]  Phone: [phone]  Fax: [phone]    MEDICAL PROGNOSIS: GOOD            REHAB POTENTIAL: GOOD             ESTIMATED DISPOSITION: HOME WITH HOME CARE            ELOS: 10-14 Days   EXPECTED THERAPY:     P.T. 1hr/day       O.T. 1hr/day      S.L.P. 1hr/day     P&O Unnecessary     EXP FREQUENCY: 5 days per 7 day period     PRESCREEN COMPARISON:   I have reviewed the prescreen information and I have found no relevant changes between the preadmission screening and my post admission evaluation     RATIONALE FOR INPATIENT ADMISSION - Patient demonstrates the following: (check all that apply)  [X] Medically appropriate for rehabilitation admission  [X] Has attainable rehab goals with an appropriate initial discharge plan  [X] Has rehabilitation potential (expected to make a significant improvement within a reasonable period of time)   [X] Requires close medical management by a rehab physician, rehab nursing care, Hospitalist and comprehensive interdisciplinary team (including PT, OT, & or SLP, Prosthetics and Orthotics)   ASSESSMENT/PLAN  This is a 81 YO male with PMH of HTN, HLD, DM II, BPH with complaint of neck pain. Patient endorses increasing neck pain, gait instability and clumsiness of the hands. He presents to [facility] on 10/18 for scheduled Posterior C1-6 Decompression, C4-5 Posterior Column Osteotomy, C1-C7 Fusion, Complex Plastics Closure. Course complicated by severe constipation now resolved, leukocytosis, hyponatremia, fall, urinary retention requiring straight catheterization. Patient now with gait Instability, ADL impairments and Functional impairments.    #Cervical cord compression with myelopathy  - Posterior C1-6 Decompression, C4-5 Posterior Column Osteotomy, C1-C7 Fusion, Complex Plastics Closure on 10/18  - Start Comprehensive Rehab Program: PT/OT, 3hours daily and 5 days weekly  - PT: Focused on improving strength, endurance, coordination, balance, functional mobility, and transfers  - OT: Focused on improving strength, fine motor skills, coordination, posture and ADLs.      #HTN  - Amlodipine 10mg daily   - Losartan 50mg daily  - ASA 81mg to be resumed on 10/28    #HLD  - Lipitor 40mg daily    #Hyponatremia  - Sodium Tabs 1mg  TID    #Pain management  - Tylenol PRN, Oxycodone PRN, Flexeril    #DVT ppx  - Lovenox, SCD, TEDs    #Bowel Regimen  - Senna, miralax, Dulcolax supp    #Bladder management  #Urinary Retention  - BS on admission, and q 8 hours (SC if > 400)  - Monitor UO    #FEN   - Diet: Consistent Carb, 1000ml fluid restriction    #Skin:  - Skin on admission: posterior cervical spine incision with sutures MEETA   - Pressure injury/Skin: Turn Q2hrs while in bed, OOB to Chair, PT/OT     #Sleep:   - Maintain quiet hours and low stim environment.  - Melatonin PRN to maximize participation in therapy during the day.     #Precaution  - Fall, Aspiration, Spinal    #GOC  CODE STATUS: FULL CODE    Outpatient Follow-up (Specialty/Name of physician):    Caleb Bond)  Neurosurgery  [address] [facility], [address]  [place] [address]  Phone: [phone]  Fax: [phone]    Jose Raul Bay)  Plastic Surgery  [address] [place] [address]  Phone: [phone]  Fax: [phone]    MEDICAL PROGNOSIS: GOOD            REHAB POTENTIAL: GOOD             ESTIMATED DISPOSITION: HOME WITH HOME CARE            ELOS: 10-14 Days   EXPECTED THERAPY:     P.T. 1hr/day       O.T. 1hr/day        P&O Unnecessary     EXP FREQUENCY: 5 days per 7 day period     PRESCREEN COMPARISON:   I have reviewed the prescreen information and I have found no relevant changes between the preadmission screening and my post admission evaluation     RATIONALE FOR INPATIENT ADMISSION - Patient demonstrates the following: (check all that apply)  [X] Medically appropriate for rehabilitation admission  [X] Has attainable rehab goals with an appropriate initial discharge plan  [X] Has rehabilitation potential (expected to make a significant improvement within a reasonable period of time)   [X] Requires close medical management by a rehab physician, rehab nursing care, Hospitalist and comprehensive interdisciplinary team (including PT, OT, & or SLP, Prosthetics and Orthotics)   ASSESSMENT/PLAN  This is a 81 YO male with PMH of HTN, HLD, DM II, BPH with complaint of neck pain. cervical myelopathy  admitted to rehab after scheduled Posterior C1-6 Decompression, C4-5 Posterior Column Osteotomy, C1-C7 Fusion, Complex Plastics Closure on 10/18  Course complicated by severe constipation now resolved, leukocytosis, hyponatremia, fall, urinary retention requiring straight catheterization. Patient now with gait Instability, ADL impairments and Functional impairments.    #Cervical cord compression with myelopathy  - Posterior C1-6 Decompression, C4-5 Posterior Column Osteotomy, C1-C7 Fusion, Complex Plastics Closure on 10/18  - Start Comprehensive Rehab Program: PT/OT, 3hours daily and 5 days weekly  - PT: Focused on improving strength, endurance, coordination, balance, functional mobility, and transfers  - OT: Focused on improving strength, fine motor skills, coordination, posture and ADLs.      #HTN  - Amlodipine 10mg daily   - Losartan 50mg daily  - ASA 81mg to be resumed on 10/28    #HLD  - Lipitor 40mg daily    #Hyponatremia  - Sodium Tabs 1mg  TID    #Pain management  - Tylenol PRN, Oxycodone PRN, Flexeril    #DVT ppx  - Lovenox, SCD, TEDs    #Bowel Regimen  - Senna, miralax, Dulcolax supp    #Bladder management  #Urinary Retention  - BS on admission, and q 8 hours (SC if > 400)  - Monitor UO    #FEN   - Diet: Consistent Carb, 1000ml fluid restriction    #Skin:  - Skin on admission: posterior cervical spine incision with sutures MEETA   - Pressure injury/Skin: Turn Q2hrs while in bed, OOB to Chair, PT/OT     #Sleep:   - Maintain quiet hours and low stim environment.  - Melatonin PRN to maximize participation in therapy during the day.     #Precaution  - Fall, Aspiration, Spinal    #GOC  CODE STATUS: FULL CODE    Outpatient Follow-up (Specialty/Name of physician):    Caleb Bond)  Neurosurgery  [address] [facility], [address]  [place] [address]  Phone: [phone]  Fax: [phone]    Jose Raul Bay)  Plastic Surgery  [address] [place] [address]  Phone: [phone]  Fax: [phone]    MEDICAL PROGNOSIS: GOOD            REHAB POTENTIAL: GOOD             ESTIMATED DISPOSITION: HOME WITH HOME CARE            ELOS: 10-14 Days   EXPECTED THERAPY:     P.T. 2hr/day       O.T. 1hr/day        P&O Unnecessary     EXP FREQUENCY: 5 days per 7 day period     PRESCREEN COMPARISON:   I have reviewed the prescreen information and I have found no relevant changes between the preadmission screening and my post admission evaluation     RATIONALE FOR INPATIENT ADMISSION - Patient demonstrates the following: (check all that apply)  [X] Medically appropriate for rehabilitation admission  [X] Has attainable rehab goals with an appropriate initial discharge plan  [X] Has rehabilitation potential (expected to make a significant improvement within a reasonable period of time)   [X] Requires close medical management by a rehab physician, rehab nursing care, Hospitalist and comprehensive interdisciplinary team (including PT, OT, & or SLP, Prosthetics and Orthotics)

## 2022-10-27 NOTE — PROGRESS NOTE ADULT - SUBJECTIVE AND OBJECTIVE BOX
pain controlled  family at bedside     REVIEW OF SYSTEMS  Constitutional - No fever,  No fatigue  HEENT - No vertigo, No neck pain  Neurological - No headaches, No memory loss, No loss of strength, No numbness, No tremors  Skin - No rashes, No lesions   Musculoskeletal - No joint pain, No joint swelling, No muscle pain  Psychiatric - No depression, No anxiety    FUNCTIONAL PROGRESS  10/25 PT  bed mobility min assist  transfers min to mod assist with RW  gait min to mod assist with RW, bed to chair    10/25 OT  transfers min to mod assist     VITALS  T(C): 36.7 (10-27-22 @ 08:44), Max: 37.3 (10-27-22 @ 04:44)  HR: 107 (10-27-22 @ 08:44) (94 - 107)  BP: 134/76 (10-27-22 @ 08:44) (134/76 - 161/73)  RR: 18 (10-27-22 @ 08:44) (18 - 20)  SpO2: 96% (10-27-22 @ 08:44) (93% - 96%)  Wt(kg): --    MEDICATIONS   albuterol/ipratropium for Nebulization 3 milliLiter(s) every 12 hours  amLODIPine   Tablet 10 milliGRAM(s) at bedtime  atorvastatin 40 milliGRAM(s) at bedtime  bisacodyl Suppository 10 milliGRAM(s) daily PRN  cyclobenzaprine 10 milliGRAM(s) every 12 hours  enoxaparin Injectable 40 milliGRAM(s) <User Schedule>  losartan 50 milliGRAM(s) daily  magnesium hydroxide Suspension 30 milliLiter(s) every 12 hours  methocarbamol 500 milliGRAM(s) three times a day PRN  ondansetron Injectable 4 milliGRAM(s) every 6 hours PRN  oxyCODONE    IR 10 milliGRAM(s) every 4 hours PRN  oxyCODONE    IR 5 milliGRAM(s) every 4 hours PRN  polyethylene glycol 3350 17 Gram(s) daily  senna 2 Tablet(s) at bedtime  sodium chloride 1 Gram(s) every 8 hours      RECENT LABS - Reviewed                        13.5   12.04 )-----------( 307      ( 26 Oct 2022 17:46 )             39.3     10-26    132<L>  |  94<L>  |  20  ----------------------------<  142<H>  4.0   |  24  |  0.98    Ca    9.0      26 Oct 2022 17:46        Urinalysis Basic - ( 26 Oct 2022 20:45 )    Color: Light Yellow / Appearance: Clear / S.015 / pH: x  Gluc: x / Ketone: Negative  / Bili: Negative / Urobili: Negative   Blood: x / Protein: Trace / Nitrite: Negative   Leuk Esterase: Negative / RBC: 17 /hpf / WBC 2 /HPF   Sq Epi: x / Non Sq Epi: 1 /hpf / Bacteria: Negative      ---------------------------------------------------------------------------  PHYSICAL EXAM  Constitutional - NAD, Comfortable, in chair    Chest - Breathing comfortably  Cardiovascular - S1S2   Abdomen - Soft   Extremities - No C/C/E, No calf tenderness   Neurologic Exam -                    Cognitive - Awake, Alert, AAO to self, place, date, year, situation     Communication - Fluent, No dysarthria        Motor -                     LEFT    UE - ShAB 3/5, EF 5/5, EE 5/5, WE 5/5,  5/5                    RIGHT UE - ShAB 5/5, EF 5/5, EE 5/5, WE 5/5,  5/5                    LEFT    LE - HF 5/5, KE 5/5, DF 5/5, PF 5/5                    RIGHT LE - HF 5/5, KE 5/5, DF 5/5, PF 5/5        Sensory - Intact to LT     Psychiatric - Mood stable, Affect WNL  ----------------------------------------------------------------------------------------  ASSESSMENT/PLAN  82yMale with functional deficits after cervical laminectomy, fusion  pain, left sided weakness, MRI completed yesterday  urinary retention, st cath as needed   hyponatremia, fluid restriction   Pain - Tylenol, oxycodone, flexeril, robaxin  DVT PPX - SCDs, lovenox   Rehab - Will continue to follow for ongoing rehab needs and recommendations.   patient required mod assist with transfers/gait   continue bedside therapy  out of bed to chair daily    Recommend ACUTE inpatient rehabilitation for the functional deficits consisting of 3 hours of therapy/day & 24 hour RN/daily PMR physician for comorbid medical management. Patient will be able to tolerate 3 hours a day.

## 2022-10-27 NOTE — PROGRESS NOTE ADULT - SUBJECTIVE AND OBJECTIVE BOX
SUBJECTIVE:     OVERNIGHT EVENTS: none    Vital Signs Last 24 Hrs  T(C): 36.7 (27 Oct 2022 08:44), Max: 37.3 (27 Oct 2022 04:44)  T(F): 98.1 (27 Oct 2022 08:44), Max: 99.1 (27 Oct 2022 04:44)  HR: 107 (27 Oct 2022 08:44) (100 - 107)  BP: 134/76 (27 Oct 2022 08:44) (134/76 - 161/73)  BP(mean): --  RR: 18 (27 Oct 2022 08:44) (18 - 20)  SpO2: 96% (27 Oct 2022 08:44) (93% - 96%)    Parameters below as of 27 Oct 2022 08:44  Patient On (Oxygen Delivery Method): room air        PHYSICAL EXAM:    Neurological: Awake alert Ox 2. Speech clear Following Commands, Moving all Extremities RUE 5/5 LUE deltoid 4/5 Biceps/ triceps 4+/5. b/l LE 4+/5 . Posterior neck / upper back incision sutures C/D/I KIRT drain 40cc/ 24 hrs     Pulmonary: Clear to Auscultation,    Cardiovascular: S1, S2, Regular rate and rhythm     Gastrointestinal: Soft, Non-tender, Non-distended     Extremities: No calf tenderness       LABS:                        13.5   12.04 )-----------( 307      ( 26 Oct 2022 17:46 )             39.3    10-26    132<L>  |  94<L>  |  20  ----------------------------<  142<H>  4.0   |  24  |  0.98    Ca    9.0      26 Oct 2022 17:46        IMAGING:     10/26 MR ANGELES spine - f/u report     MEDICATIONS:    cyclobenzaprine 10 milliGRAM(s) Oral every 12 hours  methocarbamol 500 milliGRAM(s) Oral three times a day PRN Muscle Spasm  ondansetron Injectable 4 milliGRAM(s) IV Push every 6 hours PRN Nausea and/or Vomiting  oxyCODONE    IR 10 milliGRAM(s) Oral every 4 hours PRN Severe Pain (7 - 10)  oxyCODONE    IR 5 milliGRAM(s) Oral every 4 hours PRN Moderate Pain (4 - 6)  amLODIPine   Tablet 10 milliGRAM(s) Oral at bedtime  losartan 50 milliGRAM(s) Oral daily  albuterol/ipratropium for Nebulization 3 milliLiter(s) Nebulizer every 12 hour  bisacodyl Suppository 10 milliGRAM(s) Rectal daily PRN Constipation  magnesium hydroxide Suspension 30 milliLiter(s) Oral every 12 hours  polyethylene glycol 3350 17 Gram(s) Oral daily  senna 2 Tablet(s) Oral at bedtime  atorvastatin 40 milliGRAM(s) Oral at bedtime  enoxaparin Injectable 40 milliGRAM(s) SubCutaneous <User Schedule>  sodium chloride 1 Gram(s) Oral every 8 hours      DIET:

## 2022-10-27 NOTE — PROGRESS NOTE ADULT - PROBLEM SELECTOR PLAN 1
Pain control - completed tylenol 650mg q8h ATC for a couple of days   patient with pain radiating down left arm associated with weakness- MRI cspine done(f/u official report but per neurosurgery, it appeared ok)  s/p fall while getting out of bed on 10/23- CT head/C-spine xray neg for fracture. CXR also negative  monitor left sided rib abrasion- appears to be healing Pain control - completed tylenol 650mg q8h ATC for a couple of days   patient with pain radiating down left arm associated with weakness- MRI c-spine result noted above  s/p fall while getting out of bed on 10/23- CT head/C-spine xray neg for fracture. CXR also negative  monitor left sided rib abrasion- appears to be healing

## 2022-10-27 NOTE — DISCHARGE NOTE PROVIDER - NSDCMRMEDTOKEN_GEN_ALL_CORE_FT
amLODIPine 5 mg oral tablet: 1 tab(s) orally once a day (at bedtime)  aspirin 81 mg oral tablet: orally once a day  atorvastatin 40 mg oral tablet: 1 tab(s) orally once a day  losartan 25 mg oral tablet: 1 tab(s) orally once a day  Omega-3 1000 mg oral capsule: 1 cap(s) orally once a day  rolling walker s/p C1-6 lami and C1-7 fusion:   Tylenol 500 mg oral tablet: 2 tab(s) orally every 6 hours, As Needed  Vitamin B-12 1000 mcg oral tablet: 1 tab(s) orally once a day   amLODIPine 10 mg oral tablet: 1 tab(s) orally once a day (at bedtime)  aspirin 81 mg oral tablet: 1 tab(s) orally once a day  atorvastatin 40 mg oral tablet: 1 tab(s) orally once a day  bisacodyl 10 mg rectal suppository: 1 suppository(ies) rectal once a day, As needed, Constipation  cyclobenzaprine 10 mg oral tablet: 1 tab(s) orally every 12 hours  enoxaparin: 40 milligram(s) subcutaneously once a day  losartan 50 mg oral tablet: 1 tab(s) orally once a day  oxyCODONE 10 mg oral tablet: 1 tab(s) orally every 6 hours, As Needed - for severe pain  polyethylene glycol 3350 oral powder for reconstitution: 17 gram(s) orally once a day  senna leaf extract oral tablet: 2 tab(s) orally once a day (at bedtime)  sodium chloride 1 g oral tablet: 1 tab(s) orally every 12 hours  Tylenol 500 mg oral tablet: 2 tab(s) orally every 6 hours, As Needed - for moderate pain

## 2022-10-27 NOTE — DISCHARGE NOTE PROVIDER - HOSPITAL COURSE
82 year old male with PMH of HTN, HLD, pre-diabetes, BPH with complaint of neck pain s/p C1-6 laminectomies with C1-7 posterior instrumented fusion and plastics closure on 10/18/22 . Course complicated by severe constipation now resolved, hyponatremia- stable on salt tabs , fall, intermittent confusion likely related to pain medication/ hospital setting.  Left deltoid weakness . MR Cervical spine 10/26/22 without cord compression. Evaluated by Pt/OT & PM&R with recommendation for acute rehab. Surgical drain removed 10/27/22 . Seen & followed by medicine.  Discharged to rehab in stable condition 82 year old male with PMH of HTN, HLD, pre-diabetes, BPH with complaint of neck pain s/p C1-6 laminectomies with C1-7 posterior instrumented fusion and plastics closure on 10/18/22 . Course complicated by severe constipation now resolved, hyponatremia- stable on salt tabs , post fall, intermittent confusion likely related to pain medication/ hospital setting.  Left deltoid weakness . MR Cervical spine 10/26/22 without cord compression. Evaluated by PT/OT & PM&R with recommendation for acute rehab. Surgical drain removed 10/27/22 . Seen & followed by medicine.  Discharged to rehab in stable condition

## 2022-10-27 NOTE — PATIENT PROFILE ADULT - FALL HARM RISK - UNIVERSAL INTERVENTIONS
Bed in lowest position, wheels locked, appropriate side rails in place/Call bell, personal items and telephone in reach/Instruct patient to call for assistance before getting out of bed or chair/Non-slip footwear when patient is out of bed/[place] to call system/Physically safe environment - no spills, clutter or unnecessary equipment/Purposeful Proactive Rounding/Room/bathroom lighting operational, light cord in reach

## 2022-10-27 NOTE — PROGRESS NOTE ADULT - PROBLEM SELECTOR PROBLEM 7
HLD (hyperlipidemia)
Need for prophylactic measure
Diabetes mellitus
HLD (hyperlipidemia)
Diabetes mellitus
HLD (hyperlipidemia)
Diabetes mellitus

## 2022-10-27 NOTE — PROGRESS NOTE ADULT - PROBLEM SELECTOR PROBLEM 6
Diabetes mellitus
HTN (hypertension)
HLD (hyperlipidemia)
HTN (hypertension)
HLD (hyperlipidemia)
HTN (hypertension)
HLD (hyperlipidemia)

## 2022-10-27 NOTE — PROGRESS NOTE ADULT - ASSESSMENT
82 year old male with PMH of HTN, HLD, pre-diabetes, BPH with complaint of neck pain s/p C1-6 laminectomies with C1-7 posterior instrumented fusion and plastics closure on 10/18/22 . Course complicated by severe constipation now resolved, hyponatremia, fall, intermittent confusion likely related to medication/ hospital setting & left deltoid weakness    Plan    Neuro stable. MR C spine reviewed by dr Bond. No cord compression KIRT drain removed wo difficulty. Site c/D   HTN- On Losartan 50mg & Norvasc 10mg  Pain control- Increased Flexeril to 10mg oxycodone 5/10mg  prn   prediabetes- fingersticks 130-150mg/ dl. d/c fingersticks   hyponatremia - stable on salt tabs   Bowel regimen   DVT ppx   d/c to rehab today, seen with Dr Bond at bedside

## 2022-10-27 NOTE — PROGRESS NOTE ADULT - PROBLEM SELECTOR PLAN 6
better controlled after adjusting meds- amlodipine 10mg and losartan 50mg  in general, BP within acceptable range while inpatient. occasionally increases likely due to pain.
continue home statin.
continue home statin.
better controlled after adjusting meds- amlodipine 10mg and losartan 50mg  in general, BP within acceptable range while inpatient. occasionally increases likely due to pain.
A1c 7.0  cw carb controlled diet   insulin scale
continue home statin.
better controlled after adjusting meds- amlodipine 10mg and losartan 50mg  in general, BP within acceptable range while inpatient. occasionally increases likely due to pain.

## 2022-10-27 NOTE — DISCHARGE NOTE NURSING/CASE MANAGEMENT/SOCIAL WORK - PATIENT PORTAL LINK FT
You can access the FollowMyHealth Patient Portal offered by [facility] by registering at the following website: [email or web address]. By joining [employer]’s FollowMyHealth portal, you will also be able to view your health information using other applications (apps) compatible with our system.

## 2022-10-27 NOTE — PROGRESS NOTE ADULT - PROBLEM SELECTOR PROBLEM 5
HTN (hypertension)
HTN (hypertension)
Hypoxia
HLD (hyperlipidemia)
HTN (hypertension)
Hypoxia
Hypoxia

## 2022-10-27 NOTE — H&P ADULT - NSHPPHYSICALEXAM_GEN_ALL_CORE
PHYSICAL EXAM  VITALS  T(C): 36.3 (10-27-22 @ 13:31), Max: 37.3 (10-27-22 @ 04:44)  HR: 108 (10-27-22 @ 13:31) (100 - 108)  BP: 142/82 (10-27-22 @ 13:31) (134/76 - 142/82)  RR: 18 (10-27-22 @ 13:31) (18 - 18)  SpO2: 93% (10-27-22 @ 13:31) (93% - 96%)    Gen - NAD, Comfortable  HEENT - NCAT, EOMI, MMM  Neck - Supple, No limited ROM  Pulm - CTAB, No wheeze, No rhonchi, No crackles  Cardiovascular - RRR, S1S2, No murmurs  Chest - good chest expansion, good respiratory effort  Abdomen - Soft, NT/ND, +BS  Extremities - No Cyanosis, no clubbing, no edema, no calf tenderness  Neuro-     Cognitive - awake, alert, oriented to person, place, date, year, and situation.  Able to follow command     Communication - Fluent, Comprehensible     Attention: Intact     Cranial Nerves - CN 2-12 intact. No facial asymmetry, Tongue midline, EOMI, Shoulder shrug intact     Motor -                     LEFT    UE - ShAB 3/5, EF 4/5, EE 4/5,  5/5                    RIGHT UE - ShAB 5/5, EF 5/5, EE 5/5,   5/5                    LEFT    LE - HF 5/5, KE 5/5, DF 5/5, PF 5/5                    RIGHT LE - HF 5/5, KE 5/5, DF 5/5, PF 5/5        Sensory - Intact to LT      Reflexes - DTR Intact     Coordination - + dysmetria to left arm     Tone - normal  MSK: left arm weakness  Psychiatric - Mood stable, Affect WNL  Skin: posterior cervical spine incision with sutures PHYSICAL EXAM  VITALS  T(C): 36.3 (10-27-22 @ 13:31), Max: 37.3 (10-27-22 @ 04:44)  HR: 108 (10-27-22 @ 13:31) (100 - 108)  BP: 142/82 (10-27-22 @ 13:31) (134/76 - 142/82)  RR: 18 (10-27-22 @ 13:31) (18 - 18)  SpO2: 93% (10-27-22 @ 13:31) (93% - 96%)    Gen - NAD, Comfortable  HEENT - NCAT, EOMI, MMM  Neck - Supple, No limited ROM  Pulm - CTAB, No wheeze, No rhonchi, No crackles  Cardiovascular - RRR, S1S2, No murmurs  Chest - good chest expansion, good respiratory effort  Abdomen - Soft, NT/ND, +BS  Extremities - No Cyanosis, no clubbing, no edema, no calf tenderness  Neuro-     Cognitive - awake, alert, oriented to person, place, date, year, and situation. Able to follow command     Communication - Fluent, Comprehensible     Attention: Intact     Cranial Nerves - CN 2-12 intact. No facial asymmetry, Tongue midline, EOMI, Shoulder shrug intact     Motor -                     LEFT    UE - ShAB 3/5, EF 4/5, EE 4/5,  4/5                    RIGHT UE - ShAB 5/5, EF 5/5, EE 5/5,  5/5                    LEFT    LE - HF 5/5, KE 5/5, DF 5/5, PF 5/5                    RIGHT LE - HF 5/5, KE 5/5, DF 5/5, PF 5/5        Sensory - Intact to LT      Reflexes - DTR Intact     Coordination - + dysmetria to left arm     Tone - normal  MSK: left arm weakness  Psychiatric - Mood stable, Affect WNL  Skin: posterior cervical spine incision with sutures

## 2022-10-27 NOTE — PROGRESS NOTE ADULT - PROBLEM SELECTOR PLAN 5
reportedly was on 3L NC - now weaned to RA and not hypoxic  encouraged incentive spirometry   add duoneb q12h  LE dopplers neg 10/18
Uncontrolled, increased amlodipine to 10mg and losartan to 50mg  Monitor BP, now around 140s in general. occasionally increases i/s/o pain
continue home statin.
reportedly was on 3L NC - now weaned to RA and not hypoxic  encouraged incentive spirometry   continue duoneb q12h  LE dopplers neg 10/18
Uncontrolled, increased amlodipine to 10mg and losartan to 50mg  Monitor BP, now around 140s in general. occasionally increases i/s/o pain
better controlled after adjusting meds- amlodipine 10mg and losartan 50mg  Monitor BP, now around 130-140s in general, acceptable while inpatient. occasionally increases i/s/o pain
reportedly was on 3L NC - now weaned to RA and not hypoxic  encouraged incentive spirometry   continue duoneb q12h  LE dopplers neg 10/18

## 2022-10-27 NOTE — PROGRESS NOTE ADULT - PROBLEM SELECTOR PLAN 4
resolved and patient is having regular bowel movement now  continue bowel regimen
Now resolved  Patient does not use oxygen at home   was on 3L NC - now weaned to RA  Incentive spirometry   LE dopplers neg 10/18.
resolved and patient is having regular bowel movement  continue bowel regimen but scale back (discontinued dulcolax and changed miralax to daily)
patient is having regular bowel movement per family  continue bowel regimen
Uncontrolled, increased amlodipine to 10mg and losartan to 50mg  Monitor BP
Now resolved  Patient does not use oxygen at home   was on 3L NC - now weaned to RA  Incentive spirometry   LE dopplers neg 10/18.
Now resolved  Patient does not use oxygen at home   was on 3L NC - now weaned to RA  Incentive spirometry   LE dopplers neg 10/18.

## 2022-10-27 NOTE — PROGRESS NOTE ADULT - PROBLEM SELECTOR PLAN 2
per daughter, patient has been drinking a lot of water  advised to fluid restrict to < 1L daily  check urinelytes, urine osm  repeat BMP later
Patient feels well, no infectious symptoms  Possibly reactive after surgery   repeat CBC tomorrow   Monitor for fever
Patient feels well, no infectious symptoms, leukocytosis is improving  Possibly reactive after surgery   cbc qD  Monitor for fever
per daughter, patient has been drinking a lot of water  improved overall but fluctuating  continue with fluid restriction and salt tab (consider increasing to 2tab TID if worsening)  monitor BMP
Patient feels well, no infectious symptoms, leukocytosis is RESOLVED  cbc qD  Monitor for fever (has remained afebrile)
Patient feels well, no infectious symptoms, leukocytosis is RESOLVED as of 10/22  Possibly reactive after surgery   cbc qD  Monitor for fever (has remained afebrile)
per daughter, patient has been drinking a lot of water  improving with fluid restriction and salt tab  monitor BMP periodically

## 2022-10-27 NOTE — H&P ADULT - NSHPSOCIALHISTORY_GEN_ALL_CORE
Smoking -  EtOH -   Drugs -     Marital status:     Patient lives daughter and spouse in a PH. Pt will be residing in basement after discharge, with 2 steps to enter. Pt states there is an additional 2 floors with 8-10 steps each.  PTA: Independent in ADLs and ambulation     CURRENT FUNCTIONAL STATUS  Date: 10/25  Bed Mobility: Min A, 1 person  Transfers: Mod A, 1 person  Gait: Min A, 1 person. 5ft from bed to chair Smoking - Denies  EtOH - Denies  Drugs - Denies    Marital status:     Patient lives daughter and spouse in a PH. Pt will be residing in basement after discharge, with 2 steps to enter. Pt states there is an additional 2 floors with 8-10 steps each.  PTA: Independent in ADLs and ambulation     CURRENT FUNCTIONAL STATUS  Date: 10/25  Bed Mobility: Min A, 1 person  Transfers: Mod A, 1 person  Gait: Min A, 1 person. 5ft from bed to chair

## 2022-10-27 NOTE — DISCHARGE NOTE PROVIDER - CARE PROVIDER_API CALL
Caleb Bond)  Neurosurgery  [address], [address]  [place] [address]  Phone: [phone]  Fax: [phone]  Follow Up Time:     Jose Raul Bay)  Plastic Surgery  [address] [place] [id number]  Phone: [phone]  Fax: [phone]  Follow Up Time:

## 2022-10-27 NOTE — H&P ADULT - NSHPREVIEWOFSYSTEMS_GEN_ALL_CORE
REVIEW OF SYSTEMS  Constitutional: No fever, No Chills, No fatigue  HEENT: No eye pain, No visual disturbances, No difficulty hearing  Pulm: No cough,  No shortness of breath  Cardio: No chest pain, No palpitations  GI:  No abdominal pain, No nausea, No vomiting, No diarrhea, No constipation  : No dysuria, No frequency, No hematuria  Neuro: No headaches, No memory loss, + loss of strength, + numbness, No tremors  Skin: No itching, No rashes, No lesions   Endo: No temperature intolerance  MSK: No joint pain, No joint swelling, No muscle pain, No Neck pain,  No back pain  Psych:  No depression, No anxiety

## 2022-10-27 NOTE — PROGRESS NOTE ADULT - PROBLEM SELECTOR PLAN 3
received enema, senna/miralaax/dulcolax, but still has yet to have a bm  -PT/OT/OOBTC/Frequent Mobilization   -obtain abdominal x-ray to see if there is any obstruction. may need Ct a/p  -aggressive bowel regimen
slight leukocytosis without focal symptom/sign of infection  UA on 10/26 neg  monitor CBC periodically
Now resolved  Patient does not use oxygen at home   was on 3L NC - now weaned to RA  CXR personally reviewed - no consolidation or effusion. f/u official read   Incentive spirometry   LE dopplers neg 10/18.
resolved now
received enema, senna/miralaax/dulcolax and finally had 2 BMs 10/21  -PT/OT/OOBTC/Frequent Mobilization   -aggressive bowel regimen
received enema, senna/miralaax/dulcolax and finally had 2 BMs 10/21 and yet again 10/22 and 10/23  -PT/OT/OOBTC/Frequent Mobilization   -aggressive bowel regimen
resolved now

## 2022-10-27 NOTE — PROGRESS NOTE ADULT - PROBLEM SELECTOR PROBLEM 4
Constipation
Acute respiratory failure with hypoxia
Constipation
HTN (hypertension)
Constipation

## 2022-10-27 NOTE — DISCHARGE NOTE NURSING/CASE MANAGEMENT/SOCIAL WORK - NSDCPEFALRISK_GEN_ALL_CORE
For information on Fall & Injury Prevention, visit: [email or web address] OR  [email or web address] OR  https://www.cdc.gov/steadi/patient.html

## 2022-10-27 NOTE — H&P ADULT - NSHPLABSRESULTS_GEN_ALL_CORE
RECENT LABS/IMAGING                        13.5   12.04 )-----------( 307      ( 26 Oct 2022 17:46 )             39.3     10-26    132<L>  |  94<L>  |  20  ----------------------------<  142<H>  4.0   |  24  |  0.98    Ca    9.0      26 Oct 2022 17:46      Urinalysis Basic - ( 26 Oct 2022 20:45 )    Color: Light Yellow / Appearance: Clear / S.015 / pH: x  Gluc: x / Ketone: Negative  / Bili: Negative / Urobili: Negative   Blood: x / Protein: Trace / Nitrite: Negative   Leuk Esterase: Negative / RBC: 17 /hpf / WBC 2 /HPF   Sq Epi: x / Non Sq Epi: 1 /hpf / Bacteria: Negative      CT Head No Cont (10.23.22 @ 19:02)     IMPRESSION:  No acute intracranial hemorrhage, hydrocephalus or extra-axial fluid collection.  Mild parenchymal volume loss and mild chronic microvascular ischemic changes.  No CT evidence for acute transcortical infarction. Please note that MR imaging is a more sensitive imaging modality for detection of acute infarction and may be obtained as clinically warranted.    X-ray Cervical Spine AP and Lateral Only (10.23.22 @ 17:28)     IMPRESSION: C2-C7 posterior spinal fusion hardware consisting of pedicle   screws with interconnecting rods which is better visualized on CT   cervical spine. No hardware fracture.    VA Duplex Lower Ext Vein Scan, Bilat (10.18.22 @ 21:05)     IMPRESSION:  No evidence of deep venous thrombosis in either lower extremity. RECENT LABS/IMAGING                        13.5   12.04 )-----------( 307      ( 26 Oct 2022 17:46 )             39.3     10-26    132<L>  |  94<L>  |  20  ----------------------------<  142<H>  4.0   |  24  |  0.98    Ca    9.0      26 Oct 2022 17:46      Urinalysis Basic - ( 26 Oct 2022 20:45 )    Color: Light Yellow / Appearance: Clear / S.015 / pH: x  Gluc: x / Ketone: Negative  / Bili: Negative / Urobili: Negative   Blood: x / Protein: Trace / Nitrite: Negative   Leuk Esterase: Negative / RBC: 17 /hpf / WBC 2 /HPF   Sq Epi: x / Non Sq Epi: 1 /hpf / Bacteria: Negative      CT Head No Cont (10.23.22 @ 19:02)     IMPRESSION:  No acute intracranial hemorrhage, hydrocephalus or extra-axial fluid collection.  Mild parenchymal volume loss and mild chronic microvascular ischemic changes.  No CT evidence for acute transcortical infarction. Please note that MR imaging is a more sensitive imaging modality for detection of acute infarction and may be obtained as clinically warranted.    X-ray Cervical Spine AP and Lateral Only (10.23.22 @ 17:28)     IMPRESSION: C2-C7 posterior spinal fusion hardware consisting of pedicle   screws with interconnecting rods which is better visualized on CT   cervical spine. No hardware fracture.    VA Duplex Lower Ext Vein Scan, Bilat (10.18.22 @ 21:05)     IMPRESSION:  No evidence of deep venous thrombosis in either lower extremity.    MR Cervical Spine w/wo IV Cont (10.26.22 @ 20:45) >  IMPRESSION:    No ever spinal cord compression. No gross evidence for abnormal   intrinsic cord signal.    No appreciable abnormal enhancement    Redemonstration of large pannus about the dens contacts thespinal cord   at the C1 level, flattening its ventral surface. Minimal CSF posterior to   the cord at this level.    Remaining spinal levels demonstrate no significant spinal canal stenosis.    Evaluation of the neural foramina are significantly limited by   susceptibility artifact and are better evaluated on prior CT of the   cervical spine.

## 2022-10-27 NOTE — PROGRESS NOTE ADULT - SUBJECTIVE AND OBJECTIVE BOX
[facility] Division of Hospital Medicine  Arianne Rivera MD  Available via MS Teams  Spectra [phone]    SUBJECTIVE / OVERNIGHT EVENTS: patient seen and examined earlier today. family at bedside. Daughter, Beba, helped to translate for the patient per patient's preference. Patient was reportedly retaining urine requiring straight cath yesterday and seemed disoriented early this morning which daughter feels may have been due to "medication" he received for MRI yesterday. He is now back to baseline. He reports to be feeling better physically, has no difficulty with urination or bowel movement. No SOB.     ADDITIONAL REVIEW OF SYSTEMS:    MEDICATIONS  (STANDING):  albuterol/ipratropium for Nebulization 3 milliLiter(s) Nebulizer every 12 hours  amLODIPine   Tablet 10 milliGRAM(s) Oral at bedtime  atorvastatin 40 milliGRAM(s) Oral at bedtime  cyclobenzaprine 10 milliGRAM(s) Oral every 12 hours  enoxaparin Injectable 40 milliGRAM(s) SubCutaneous <User Schedule>  losartan 50 milliGRAM(s) Oral daily  magnesium hydroxide Suspension 30 milliLiter(s) Oral every 12 hours  polyethylene glycol 3350 17 Gram(s) Oral daily  senna 2 Tablet(s) Oral at bedtime  sodium chloride 1 Gram(s) Oral every 8 hours    MEDICATIONS  (PRN):  bisacodyl Suppository 10 milliGRAM(s) Rectal daily PRN Constipation  methocarbamol 500 milliGRAM(s) Oral three times a day PRN Muscle Spasm  ondansetron Injectable 4 milliGRAM(s) IV Push every 6 hours PRN Nausea and/or Vomiting  oxyCODONE    IR 10 milliGRAM(s) Oral every 4 hours PRN Severe Pain (7 - 10)  oxyCODONE    IR 5 milliGRAM(s) Oral every 4 hours PRN Moderate Pain (4 - 6)      I&O's Summary    26 Oct 2022 07:  -  27 Oct 2022 07:00  --------------------------------------------------------  IN: 1900 mL / OUT: 2590 mL / NET: -690 mL    27 Oct 2022 07:  -  27 Oct 2022 11:36  --------------------------------------------------------  IN: 120 mL / OUT: 0 mL / NET: 120 mL        PHYSICAL EXAM:  Vital Signs Last 24 Hrs  T(C): 36.7 (27 Oct 2022 08:44), Max: 37.3 (27 Oct 2022 04:44)  T(F): 98.1 (27 Oct 2022 08:44), Max: 99.1 (27 Oct 2022 04:44)  HR: 107 (27 Oct 2022 08:44) (94 - 107)  BP: 134/76 (27 Oct 2022 08:44) (134/76 - 161/73)  BP(mean): --  RR: 18 (27 Oct 2022 08:44) (18 - 20)  SpO2: 96% (27 Oct 2022 08:44) (93% - 96%)    Parameters below as of 27 Oct 2022 08:44  Patient On (Oxygen Delivery Method): room air      CONSTITUTIONAL: NAD, well-groomed, sitting in chair  EYES: conjunctiva and sclera clear  NECK: limited ROM  RESPIRATORY: Normal respiratory effort; lungs are clear to auscultation bilaterally  CARDIOVASCULAR: normal S1 and S2, no murmur/rub/gallop; No lower extremity edema  ABDOMEN: Nontender to palpation, normoactive bowel sounds, no rebound/guarding; No hepatosplenomegaly  MUSCULOSKELETAL: no clubbing or cyanosis of digits; no joint swelling or tenderness to palpation  PSYCH: A+O to person, place, and time; affect appropriate  NEUROLOGY: moves all extremities but slight weakness in left arm, no gross sensory deficits, follows commands  SKIN: + healing erythematous skin abrasion in left rib area, incision appears C/D/I    LABS:                        13.5   12.04 )-----------( 307      ( 26 Oct 2022 17:46 )             39.3     10-    132<L>  |  94<L>  |  20  ----------------------------<  142<H>  4.0   |  24  |  0.98    Ca    9.0      26 Oct 2022 17:46            Urinalysis Basic - ( 26 Oct 2022 20:45 )    Color: Light Yellow / Appearance: Clear / S.015 / pH: x  Gluc: x / Ketone: Negative  / Bili: Negative / Urobili: Negative   Blood: x / Protein: Trace / Nitrite: Negative   Leuk Esterase: Negative / RBC: 17 /hpf / WBC 2 /HPF   Sq Epi: x / Non Sq Epi: 1 /hpf / Bacteria: Negative        COVID-19 PCR: NotDetec (24 Oct 2022 07:47)  COVID-19 PCR: NotDetec (15 Oct 2022 13:40)            COMMUNICATION:  Care Discussed with Consultants/Other Providers and Details of Discussion: neurosurgery PA(Pushpa)  Discussions with Patient/Family: yes       [facility] Division of Hospital Medicine  Arianne Rivera MD  Available via MS Teams  Spectra [phone]    SUBJECTIVE / OVERNIGHT EVENTS: patient seen and examined earlier today. family at bedside. Daughter, Beba, helped to translate for the patient per patient's preference. Patient was reportedly retaining urine requiring straight cath yesterday and seemed disoriented early this morning which daughter feels may have been due to "medication" he received for MRI yesterday. He is now back to baseline. He reports to be feeling better physically, has no difficulty with urination or bowel movement. No SOB.     ADDITIONAL REVIEW OF SYSTEMS:    MEDICATIONS  (STANDING):  albuterol/ipratropium for Nebulization 3 milliLiter(s) Nebulizer every 12 hours  amLODIPine   Tablet 10 milliGRAM(s) Oral at bedtime  atorvastatin 40 milliGRAM(s) Oral at bedtime  cyclobenzaprine 10 milliGRAM(s) Oral every 12 hours  enoxaparin Injectable 40 milliGRAM(s) SubCutaneous <User Schedule>  losartan 50 milliGRAM(s) Oral daily  magnesium hydroxide Suspension 30 milliLiter(s) Oral every 12 hours  polyethylene glycol 3350 17 Gram(s) Oral daily  senna 2 Tablet(s) Oral at bedtime  sodium chloride 1 Gram(s) Oral every 8 hours    MEDICATIONS  (PRN):  bisacodyl Suppository 10 milliGRAM(s) Rectal daily PRN Constipation  methocarbamol 500 milliGRAM(s) Oral three times a day PRN Muscle Spasm  ondansetron Injectable 4 milliGRAM(s) IV Push every 6 hours PRN Nausea and/or Vomiting  oxyCODONE    IR 10 milliGRAM(s) Oral every 4 hours PRN Severe Pain (7 - 10)  oxyCODONE    IR 5 milliGRAM(s) Oral every 4 hours PRN Moderate Pain (4 - 6)      I&O's Summary    26 Oct 2022 07:  -  27 Oct 2022 07:00  --------------------------------------------------------  IN: 1900 mL / OUT: 2590 mL / NET: -690 mL    27 Oct 2022 07:  -  27 Oct 2022 11:36  --------------------------------------------------------  IN: 120 mL / OUT: 0 mL / NET: 120 mL        PHYSICAL EXAM:  Vital Signs Last 24 Hrs  T(C): 36.7 (27 Oct 2022 08:44), Max: 37.3 (27 Oct 2022 04:44)  T(F): 98.1 (27 Oct 2022 08:44), Max: 99.1 (27 Oct 2022 04:44)  HR: 107 (27 Oct 2022 08:44) (94 - 107)  BP: 134/76 (27 Oct 2022 08:44) (134/76 - 161/73)  BP(mean): --  RR: 18 (27 Oct 2022 08:44) (18 - 20)  SpO2: 96% (27 Oct 2022 08:44) (93% - 96%)    Parameters below as of 27 Oct 2022 08:44  Patient On (Oxygen Delivery Method): room air      CONSTITUTIONAL: NAD, well-groomed, sitting in chair  EYES: conjunctiva and sclera clear  NECK: limited ROM  RESPIRATORY: Normal respiratory effort; lungs are clear to auscultation bilaterally  CARDIOVASCULAR: normal S1 and S2, no murmur/rub/gallop; No lower extremity edema  ABDOMEN: Nontender to palpation, normoactive bowel sounds, no rebound/guarding; No hepatosplenomegaly  MUSCULOSKELETAL: no clubbing or cyanosis of digits; no joint swelling or tenderness to palpation  PSYCH: A+O to person, place, and time; affect appropriate  NEUROLOGY: moves all extremities but slight weakness in left arm, no gross sensory deficits, follows commands  SKIN: + healing erythematous skin abrasion in left rib area, incision appears C/D/I    LABS:                        13.5   12.04 )-----------( 307      ( 26 Oct 2022 17:46 )             39.3     10-    132<L>  |  94<L>  |  20  ----------------------------<  142<H>  4.0   |  24  |  0.98    Ca    9.0      26 Oct 2022 17:46            Urinalysis Basic - ( 26 Oct 2022 20:45 )    Color: Light Yellow / Appearance: Clear / S.015 / pH: x  Gluc: x / Ketone: Negative  / Bili: Negative / Urobili: Negative   Blood: x / Protein: Trace / Nitrite: Negative   Leuk Esterase: Negative / RBC: 17 /hpf / WBC 2 /HPF   Sq Epi: x / Non Sq Epi: 1 /hpf / Bacteria: Negative        COVID-19 PCR: NotDetec (24 Oct 2022 07:47)  COVID-19 PCR: NotDetec (15 Oct 2022 13:40)      < from: MR Cervical Spine w/wo IV Cont (10.26. @ 20:45) >  IMPRESSION:    No ever spinal cord compression. No gross evidence for abnormal   intrinsic cord signal.    No appreciable abnormal enhancement    Redemonstration of large pannus about the dens contacts thespinal cord   at the C1 level, flattening its ventral surface. Minimal CSF posterior to   the cord at this level.    Remaining spinal levels demonstrate no significant spinal canal stenosis.    Evaluation of the neural foramina are significantly limited by   susceptibility artifact and are better evaluated on prior CT of the   cervical spine..    < end of copied text >        COMMUNICATION:  Care Discussed with Consultants/Other Providers and Details of Discussion: neurosurgery PA(Pushpa)  Discussions with Patient/Family: yes

## 2022-10-28 DIAGNOSIS — N40.1 BENIGN PROSTATIC HYPERPLASIA WITH LOWER URINARY TRACT SYMPTOMS: ICD-10-CM

## 2022-10-28 DIAGNOSIS — R33.9 RETENTION OF URINE, UNSPECIFIED: ICD-10-CM

## 2022-10-28 DIAGNOSIS — Z98.1 ARTHRODESIS STATUS: ICD-10-CM

## 2022-10-28 DIAGNOSIS — Z98.890 OTHER SPECIFIED POSTPROCEDURAL STATES: ICD-10-CM

## 2022-10-28 LAB
ALBUMIN SERPL ELPH-MCNC: 3 G/DL — LOW (ref 3.3–5)
ALP SERPL-CCNC: 103 U/L — SIGNIFICANT CHANGE UP (ref 40–120)
ALT FLD-CCNC: 35 U/L — SIGNIFICANT CHANGE UP (ref 10–45)
ANION GAP SERPL CALC-SCNC: 6 MMOL/L — SIGNIFICANT CHANGE UP (ref 5–17)
AST SERPL-CCNC: 26 U/L — SIGNIFICANT CHANGE UP (ref 10–40)
BASOPHILS # BLD AUTO: 0.02 K/UL — SIGNIFICANT CHANGE UP (ref 0–0.2)
BASOPHILS NFR BLD AUTO: 0.2 % — SIGNIFICANT CHANGE UP (ref 0–2)
BILIRUB SERPL-MCNC: 0.6 MG/DL — SIGNIFICANT CHANGE UP (ref 0.2–1.2)
BUN SERPL-MCNC: 20 MG/DL — SIGNIFICANT CHANGE UP (ref 7–23)
CALCIUM SERPL-MCNC: 8.9 MG/DL — SIGNIFICANT CHANGE UP (ref 8.4–10.5)
CHLORIDE SERPL-SCNC: 98 MMOL/L — SIGNIFICANT CHANGE UP (ref 96–108)
CO2 SERPL-SCNC: 31 MMOL/L — SIGNIFICANT CHANGE UP (ref 22–31)
CREAT SERPL-MCNC: 1.2 MG/DL — SIGNIFICANT CHANGE UP (ref 0.5–1.3)
EGFR: 60 ML/MIN/1.73M2 — SIGNIFICANT CHANGE UP
EOSINOPHIL # BLD AUTO: 0.2 K/UL — SIGNIFICANT CHANGE UP (ref 0–0.5)
EOSINOPHIL NFR BLD AUTO: 1.6 % — SIGNIFICANT CHANGE UP (ref 0–6)
GLUCOSE BLDC GLUCOMTR-MCNC: 153 MG/DL — HIGH (ref 70–99)
GLUCOSE BLDC GLUCOMTR-MCNC: 199 MG/DL — HIGH (ref 70–99)
GLUCOSE BLDC GLUCOMTR-MCNC: 248 MG/DL — HIGH (ref 70–99)
GLUCOSE SERPL-MCNC: 139 MG/DL — HIGH (ref 70–99)
HCT VFR BLD CALC: 36.6 % — LOW (ref 39–50)
HGB BLD-MCNC: 12.7 G/DL — LOW (ref 13–17)
IMM GRANULOCYTES NFR BLD AUTO: 0.3 % — SIGNIFICANT CHANGE UP (ref 0–0.9)
LYMPHOCYTES # BLD AUTO: 1.85 K/UL — SIGNIFICANT CHANGE UP (ref 1–3.3)
LYMPHOCYTES # BLD AUTO: 14.4 % — SIGNIFICANT CHANGE UP (ref 13–44)
MCHC RBC-ENTMCNC: 30.3 PG — SIGNIFICANT CHANGE UP (ref 27–34)
MCHC RBC-ENTMCNC: 34.7 GM/DL — SIGNIFICANT CHANGE UP (ref 32–36)
MCV RBC AUTO: 87.4 FL — SIGNIFICANT CHANGE UP (ref 80–100)
MONOCYTES # BLD AUTO: 1.03 K/UL — HIGH (ref 0–0.9)
MONOCYTES NFR BLD AUTO: 8 % — SIGNIFICANT CHANGE UP (ref 2–14)
NEUTROPHILS # BLD AUTO: 9.73 K/UL — HIGH (ref 1.8–7.4)
NEUTROPHILS NFR BLD AUTO: 75.5 % — SIGNIFICANT CHANGE UP (ref 43–77)
NRBC # BLD: 0 /100 WBCS — SIGNIFICANT CHANGE UP (ref 0–0)
PLATELET # BLD AUTO: 303 K/UL — SIGNIFICANT CHANGE UP (ref 150–400)
POTASSIUM SERPL-MCNC: 4 MMOL/L — SIGNIFICANT CHANGE UP (ref 3.5–5.3)
POTASSIUM SERPL-SCNC: 4 MMOL/L — SIGNIFICANT CHANGE UP (ref 3.5–5.3)
PROT SERPL-MCNC: 7.3 G/DL — SIGNIFICANT CHANGE UP (ref 6–8.3)
RBC # BLD: 4.19 M/UL — LOW (ref 4.2–5.8)
RBC # FLD: 12.8 % — SIGNIFICANT CHANGE UP (ref 10.3–14.5)
SARS-COV-2 RNA SPEC QL NAA+PROBE: SIGNIFICANT CHANGE UP
SODIUM SERPL-SCNC: 135 MMOL/L — SIGNIFICANT CHANGE UP (ref 135–145)
WBC # BLD: 12.87 K/UL — HIGH (ref 3.8–10.5)
WBC # FLD AUTO: 12.87 K/UL — HIGH (ref 3.8–10.5)

## 2022-10-28 PROCEDURE — 99223 1ST HOSP IP/OBS HIGH 75: CPT

## 2022-10-28 PROCEDURE — 90792 PSYCH DIAG EVAL W/MED SRVCS: CPT

## 2022-10-28 PROCEDURE — 99223 1ST HOSP IP/OBS HIGH 75: CPT | Mod: GC

## 2022-10-28 PROCEDURE — 71045 X-RAY EXAM CHEST 1 VIEW: CPT | Mod: 26

## 2022-10-28 PROCEDURE — 93010 ELECTROCARDIOGRAM REPORT: CPT

## 2022-10-28 RX ORDER — ASPIRIN/CALCIUM CARB/MAGNESIUM 324 MG
1 TABLET ORAL
Qty: 0 | Refills: 0 | DISCHARGE
Start: 2022-10-28

## 2022-10-28 RX ORDER — CEFEPIME 1 G/1
1000 INJECTION, POWDER, FOR SOLUTION INTRAMUSCULAR; INTRAVENOUS ONCE
Refills: 0 | Status: COMPLETED | OUTPATIENT
Start: 2022-10-28 | End: 2022-10-29

## 2022-10-28 RX ORDER — TRAMADOL HYDROCHLORIDE 50 MG/1
50 TABLET ORAL EVERY 8 HOURS
Refills: 0 | Status: DISCONTINUED | OUTPATIENT
Start: 2022-10-28 | End: 2022-10-31

## 2022-10-28 RX ORDER — HALOPERIDOL DECANOATE 100 MG/ML
2 INJECTION INTRAMUSCULAR ONCE
Refills: 0 | Status: COMPLETED | OUTPATIENT
Start: 2022-10-28 | End: 2022-10-28

## 2022-10-28 RX ORDER — CEFEPIME 1 G/1
INJECTION, POWDER, FOR SOLUTION INTRAMUSCULAR; INTRAVENOUS
Refills: 0 | Status: DISCONTINUED | OUTPATIENT
Start: 2022-10-29 | End: 2022-11-02

## 2022-10-28 RX ORDER — ACETAMINOPHEN 500 MG
975 TABLET ORAL EVERY 8 HOURS
Refills: 0 | Status: DISCONTINUED | OUTPATIENT
Start: 2022-10-28 | End: 2022-11-10

## 2022-10-28 RX ORDER — OLANZAPINE 15 MG/1
2.5 TABLET, FILM COATED ORAL ONCE
Refills: 0 | Status: COMPLETED | OUTPATIENT
Start: 2022-10-28 | End: 2022-10-28

## 2022-10-28 RX ORDER — OLANZAPINE 15 MG/1
2.5 TABLET, FILM COATED ORAL
Refills: 0 | Status: DISCONTINUED | OUTPATIENT
Start: 2022-10-28 | End: 2022-11-02

## 2022-10-28 RX ORDER — CEFEPIME 1 G/1
1000 INJECTION, POWDER, FOR SOLUTION INTRAMUSCULAR; INTRAVENOUS EVERY 12 HOURS
Refills: 0 | Status: DISCONTINUED | OUTPATIENT
Start: 2022-10-29 | End: 2022-11-02

## 2022-10-28 RX ORDER — OLANZAPINE 15 MG/1
2.5 TABLET, FILM COATED ORAL EVERY 12 HOURS
Refills: 0 | Status: DISCONTINUED | OUTPATIENT
Start: 2022-10-28 | End: 2022-11-10

## 2022-10-28 RX ADMIN — Medication 2: at 12:53

## 2022-10-28 RX ADMIN — METHOCARBAMOL 500 MILLIGRAM(S): 500 TABLET, FILM COATED ORAL at 00:17

## 2022-10-28 RX ADMIN — MAGNESIUM HYDROXIDE 30 MILLILITER(S): 400 TABLET, CHEWABLE ORAL at 05:31

## 2022-10-28 RX ADMIN — TRAMADOL HYDROCHLORIDE 50 MILLIGRAM(S): 50 TABLET ORAL at 15:23

## 2022-10-28 RX ADMIN — ATORVASTATIN CALCIUM 40 MILLIGRAM(S): 80 TABLET, FILM COATED ORAL at 21:41

## 2022-10-28 RX ADMIN — ENOXAPARIN SODIUM 40 MILLIGRAM(S): 100 INJECTION SUBCUTANEOUS at 17:51

## 2022-10-28 RX ADMIN — Medication 975 MILLIGRAM(S): at 21:31

## 2022-10-28 RX ADMIN — LOSARTAN POTASSIUM 50 MILLIGRAM(S): 100 TABLET, FILM COATED ORAL at 05:29

## 2022-10-28 RX ADMIN — Medication 81 MILLIGRAM(S): at 12:54

## 2022-10-28 RX ADMIN — MAGNESIUM HYDROXIDE 30 MILLILITER(S): 400 TABLET, CHEWABLE ORAL at 17:52

## 2022-10-28 RX ADMIN — Medication 975 MILLIGRAM(S): at 10:41

## 2022-10-28 RX ADMIN — OLANZAPINE 2.5 MILLIGRAM(S): 15 TABLET, FILM COATED ORAL at 09:32

## 2022-10-28 RX ADMIN — Medication 1: at 17:52

## 2022-10-28 RX ADMIN — CYCLOBENZAPRINE HYDROCHLORIDE 10 MILLIGRAM(S): 10 TABLET, FILM COATED ORAL at 05:29

## 2022-10-28 RX ADMIN — HALOPERIDOL DECANOATE 2 MILLIGRAM(S): 100 INJECTION INTRAMUSCULAR at 03:00

## 2022-10-28 RX ADMIN — AMLODIPINE BESYLATE 10 MILLIGRAM(S): 2.5 TABLET ORAL at 21:41

## 2022-10-28 RX ADMIN — OLANZAPINE 2.5 MILLIGRAM(S): 15 TABLET, FILM COATED ORAL at 21:41

## 2022-10-28 RX ADMIN — OXYCODONE HYDROCHLORIDE 5 MILLIGRAM(S): 5 TABLET ORAL at 03:22

## 2022-10-28 RX ADMIN — SODIUM CHLORIDE 1 GRAM(S): 9 INJECTION INTRAMUSCULAR; INTRAVENOUS; SUBCUTANEOUS at 05:30

## 2022-10-28 RX ADMIN — TRAMADOL HYDROCHLORIDE 50 MILLIGRAM(S): 50 TABLET ORAL at 14:23

## 2022-10-28 RX ADMIN — OXYCODONE HYDROCHLORIDE 5 MILLIGRAM(S): 5 TABLET ORAL at 04:22

## 2022-10-28 RX ADMIN — Medication 975 MILLIGRAM(S): at 20:31

## 2022-10-28 RX ADMIN — SODIUM CHLORIDE 1 GRAM(S): 9 INJECTION INTRAMUSCULAR; INTRAVENOUS; SUBCUTANEOUS at 14:24

## 2022-10-28 NOTE — DIETITIAN INITIAL EVALUATION ADULT - PERTINENT LABORATORY DATA
10-28    135  |  98  |  20  ----------------------------<  139<H>  4.0   |  31  |  1.20    Ca    8.9      28 Oct 2022 05:50    TPro  7.3  /  Alb  3.0<L>  /  TBili  0.6  /  DBili  x   /  AST  26  /  ALT  35  /  AlkPhos  103  10-28  POCT Blood Glucose.: 161 mg/dL (10-27-22 @ 18:39)  A1C with Estimated Average Glucose Result: 7.0 % (09-27-22 @ 19:46)  A1C with Estimated Average Glucose Result: 6.5 % (03-20-22 @ 13:25)  A1C with Estimated Average Glucose Result: 6.4 % (12-12-21 @ 07:11)

## 2022-10-28 NOTE — PROVIDER CONTACT NOTE (OTHER) - ASSESSMENT
Pt is confused , agitated and aggressive. vitals as charted.  NP was notified and code grey was called
vitals as charted. Pt is confused , agitated, Trying to get out of bed c/o pain but refusing calming measurements
Pt is confused , restless, appears to be uncomfortable.  stated that wants to go home. Trying to get out of bed
VS=temp 101.2, HR-109, 132/73, 02=92%.

## 2022-10-28 NOTE — BH CONSULTATION LIAISON ASSESSMENT NOTE - RISK ASSESSMENT
Pt seen as a low acute suicide risk  Risk factors: voiced suicidal statement in the context of delirium, threaten to use BP cord to choke himself, delirium  Protective factors: pt placed on constant observation at this time.

## 2022-10-28 NOTE — BH CONSULTATION LIAISON ASSESSMENT NOTE - SUMMARY
82 year old year old  male presents to [facility] for acute rehab services following. Pt is s/o scheduled Posterior C1-6 Decompression, C4-5 Posterior Column Osteotomy, C1-C7 Fusion, Complex Plastics Closure on 10/18/2022 at [facility].  Pt now presents with acute delirium likely multifactorial (urinary retention requiring straight cath and now Cordova in place, surgical procedure last week on 10/18 requiring general aesthesia, fall on 10/23 with + head strike, started on Narcotics for pain management- Oxycodone and Flexeril).  Psychiatry consulted to evaluate and assist with agitation.   See recs below.       82 year old year old  male presents to [facility] for acute rehab services following scheduled Posterior C1-6 Decompression, C4-5 Posterior Column Osteotomy, C1-C7 Fusion, Complex Plastics Closure on 10/18/2022 at [facility].  Pt now presents with acute delirium likely multifactorial (urinary retention requiring straight cath and now Cordova in place, surgical procedure last week on 10/18 requiring general aesthesia, fall on 10/23 with + head strike, started on Narcotics for pain management- Oxycodone and Flexeril).  Psychiatry consulted to evaluate and assist with agitation.   See recs below.

## 2022-10-28 NOTE — PROVIDER CONTACT NOTE (OTHER) - ACTION/TREATMENT ORDERED:
MD to order CXR, cbc-d, cmp and lactate. No IVF at this time. Will wait for results of labs.
MD notified, vitals obtained  MD assessed Pt and ordered to administered flexeril. if pain does not change to administer PRN oxycodone. reassess vitals as per routine
NP notified/ code grey called  ordered to administered haldol
NP notified  ordered to administered Robaxin

## 2022-10-28 NOTE — PROVIDER CONTACT NOTE (OTHER) - BACKGROUND
Pt s/p lami with rectal temp 101.2 1hour after receiving tylenol 975mg.
s/p 10/18 C1-C6 decompression C1-C7 fusion

## 2022-10-28 NOTE — CHART NOTE - NSCHARTNOTEFT_GEN_A_CORE
About 9 AM patient seen sitting in wheelchair in hallway when staff member saw him take heavy wire cord from automatic BP machine next to him and wrap the cord tightly around his right wrist.  I was present in the [place] at the time on round and observed that patient's right wrist and proximal hand had turned dusky gray from constriction.   Patient kicked at NP's leg as she tried to remove the cord from patient's wrist.  Patient's floor RN asked pt in Estonian to release the cord but patient was not redirectable.  Family called by Dr. Cullen tried to calm patient and request he let go of the cord, to no avail. She requested family visit patient to provide reassurance and comfort.    When a second attempt to remove the cord was made, patient became more agitated, and rocked the wheelchair backward. Staff was able to prevent a fall.  IM Zyprexa 2.5 mg ordered and family informed. Hospitalist, residents restrained patient for 30 seconds for RN to administer Zyprexa in RUE.  During this time patient expressed SI, motioning that he wanted to wrap the cord around his neck.  Code gray called. Security guards successfully removed cord from patient's wrist.    9:30 AM-  Chart reviewed - patient received IV Haldol overnight at 3 am during code gray. He was given scheduled flexeril and PRN oxycodone and robaxin before the code gray was called.  Dr. Ferrer assessed patient. Constant observation ordered.  All narcotic medications and muscle relaxers discontinued. Tylenol ordered ATC for pain control.    Patient had agitation and urinary retention prior to rehab admission and fall on 10/23. Last BM 10/25.  Bladder scan w/ urinary retention after void. Garay placed.  Patient tolerated bladder scan and garay placement without incident.  Pt agreed to suppository later in day.  Psych consult requested.  Covering attending physician Dr. Steele aware. Will see patient.    Radha Schaffer, DO  PM&R PGY4

## 2022-10-28 NOTE — BH CONSULTATION LIAISON ASSESSMENT NOTE - NSBHCHARTREVIEWVS_PSY_A_CORE FT
Vital Signs Last 24 Hrs  T(C): 36.7 (28 Oct 2022 03:40), Max: 36.8 (28 Oct 2022 03:00)  T(F): 98 (28 Oct 2022 03:40), Max: 98.2 (28 Oct 2022 03:00)  HR: 105 (28 Oct 2022 05:30) (99 - 122)  BP: 146/81 (28 Oct 2022 05:30) (142/82 - 176/79)  BP(mean): --  RR: 15 (28 Oct 2022 03:40) (15 - 18)  SpO2: 93% (28 Oct 2022 03:40) (93% - 93%)    Parameters below as of 28 Oct 2022 03:40  Patient On (Oxygen Delivery Method): room air

## 2022-10-28 NOTE — BH CONSULTATION LIAISON ASSESSMENT NOTE - NSICDXBHSECONDARYDX_PSY_ALL_CORE
Urinary retention   R33.9  S/P spinal fusion   Z98.1  Status post lumbar spine surgery for decompression of spinal cord   Z98.890  BPH with urinary obstruction   N40.1

## 2022-10-28 NOTE — PROVIDER CONTACT NOTE (OTHER) - RECOMMENDATIONS
JULIUS already made aware of above and ordered UA and blood cx x2.  Please order lactate, CXR, CBC-d and CMP. Start IVF?

## 2022-10-28 NOTE — CHART NOTE - NSCHARTNOTEFT_GEN_A_CORE
Called by RN for episode of fever to 101.2, tachy, diaphoretic. Called by RN for episode of fever to 101.2, tachy, diaphoretic.  Blood pressure stable, mentating appropriately.   Pt recently received tylenol 975 2 hours prior to temp.   Additional Tylenol given, UA sent, Blood cultures X2 ordered.   Repeat CBC, CXR, and lactate for sepsis workup.   WBC has been rising on CBC, pt found to have urinary retention, will start on epimeric Cefepime. Called by RN for episode of fever to 101.2, tachy, diaphoretic.  Blood pressure stable, mentating appropriately.   Pt recently received tylenol 975 2 hours prior to temp.   Additional Tylenol given, UA sent, Blood cultures X2 ordered.   Repeat CBC, CXR, and lactate for sepsis workup.   WBC has been rising on CBC, pt found to have urinary retention, will start on epimeric Cefepime   Will hold on IVF as pt BP is currently stable, and concern for fluid overload.

## 2022-10-28 NOTE — PROVIDER CONTACT NOTE (OTHER) - SITUATION
Pt is confused and unable to stay asleep. Trying to get out of bed
Pt was admitted today . Pt is confused , agitated, Trying to get out of bed c/o pain but refusing calming measurements. Pt stated that he is tired of the injections, refuses POCT.   request to see MD
Pt is confused , agitated and aggressive.
Pt with temp 101.8 rectal

## 2022-10-28 NOTE — BH CONSULTATION LIAISON ASSESSMENT NOTE - CURRENT MEDICATION
MEDICATIONS  (STANDING):  acetaminophen     Tablet .. 975 milliGRAM(s) Oral every 8 hours  amLODIPine   Tablet 10 milliGRAM(s) Oral at bedtime  aspirin  chewable 81 milliGRAM(s) Oral daily  atorvastatin 40 milliGRAM(s) Oral at bedtime  dextrose 5%. 1000 milliLiter(s) (100 mL/Hr) IV Continuous <Continuous>  dextrose 5%. 1000 milliLiter(s) (50 mL/Hr) IV Continuous <Continuous>  dextrose 50% Injectable 25 Gram(s) IV Push once  dextrose 50% Injectable 12.5 Gram(s) IV Push once  dextrose 50% Injectable 25 Gram(s) IV Push once  enoxaparin Injectable 40 milliGRAM(s) SubCutaneous <User Schedule>  glucagon  Injectable 1 milliGRAM(s) IntraMuscular once  insulin lispro (ADMELOG) corrective regimen sliding scale   SubCutaneous three times a day before meals  insulin lispro (ADMELOG) corrective regimen sliding scale   SubCutaneous at bedtime  losartan 50 milliGRAM(s) Oral daily  magnesium hydroxide Suspension 30 milliLiter(s) Oral every 12 hours  polyethylene glycol 3350 17 Gram(s) Oral daily  senna 2 Tablet(s) Oral at bedtime  sodium chloride 1 Gram(s) Oral every 8 hours    MEDICATIONS  (PRN):  bisacodyl Suppository 10 milliGRAM(s) Rectal daily PRN Constipation  dextrose Oral Gel 15 Gram(s) Oral once PRN Blood Glucose LESS THAN 70 milliGRAM(s)/deciliter

## 2022-10-28 NOTE — BH CONSULTATION LIAISON ASSESSMENT NOTE - MSE UNSTRUCTURED FT
Pt is a 82 year old  male, properly groomed, appears younger than given age, dressed in hospital gown, found sitting upright in the wheelchair, on constant observation, drowsy, confused, falling asleep on interview but tells writer in Faroese, "i'll see you in the cemetery because they are going to kill me here". Per sitter at bedside he has been "calm" since receiving the Olanzapine IM this morning.   Rest of psychiatric ROS unable to be assessed.

## 2022-10-28 NOTE — BH CONSULTATION LIAISON ASSESSMENT NOTE - NSBHCONSULTRECOMMENDOTHER_PSY_A_CORE FT
Agree to DC Narcotics and Flexeril which can contribute to delirium.    Start Olanzapine 2.5mg PO Q HS  Agree to DC Narcotics and Flexeril which can contribute to delirium.    Start Olanzapine 2.5mg PO Q HS    Perform safety room check, remove all wires, loopable objects. Provide safety utensil food tray.

## 2022-10-28 NOTE — DIETITIAN INITIAL EVALUATION ADULT - NS FNS DIET ORDER
Diet, Consistent Carbohydrate w/Evening Snack:   1000mL Fluid Restriction ([id number]) (10-27-22 @ 13:34)

## 2022-10-28 NOTE — CHART NOTE - NSCHARTNOTEFT_GEN_A_CORE
REHABILITATION DIAGNOSIS/IMPAIRMENT GROUP CODE:  Cervical myelopathy    COMORBIDITIES/COMPLICATING CONDITIONS IMPACTING REHABILITATION:  HEALTH ISSUES - PROBLEM Dx:  Status post lumbar spine surgery for decompression of spinal cord  BPH with urinary obstruction  Delirium            PAST MEDICAL & SURGICAL HISTORY:  Hypertension  Hyperlipidemia  Chronic kidney disease  Diabetes mellitus  COVID-19 virus infection -dec 2021 (cough, fever, malaise)  History of carpal tunnel surgery -bilateral hands  History of cataract surgery -bilateral              Based upon consideration of the patient's impairments, functional status, complicating conditions and any other contributing factors and after information garnered from the assessments of all therapy disciplines involved in treating the patient and other pertinent clinicians:    INTERDISCIPLINARY REHABILITATION INTERVENTIONS:    [ x  ] Transfer Training  [ x  ] Bed Mobility  [ x  ] Therapeutic Exercise  [ x  ] Balance/Coordination Exercises  [ x  ] Locomotion retraining  [ x  ] Stairs  [ x  ] Functional Transfer Training  [ x  ] Bowel/Bladder program  [ x  ] Pain Management  [ x  ] Skin/Wound Care  [ x  ] Visual/Perceptual Training  [ x  ] Therapeutic Recreation Activities  [ x  ] Neuromuscular Re-education  [ x  ] Activities of Daily Living  [   ] Speech Exercise  [   ] Swallowing Exercises  [   ] Vital Stim  [   ] Dietary Supplements  [   ] Calorie Count  [   ] Cognitive Exercises  [   ] Cognitive/Linguistic Treatment  [   ] Behavior Program  [   ] Neuropsych Therapy  [   ] Patient/Family Counseling  [ x  ] Family Training  [   ] Community Re-entry  [   ] Orthotic Evaluation  [   ] Prosthetic Eval/Training    MEDICAL PROGNOSIS:  fair    REHAB POTENTIAL:  fair  EXPECTED DAILY THERAPY:         PT: 2hrs/day       OT:1 hr/day        ST:may consider eval       P&O:na    EXPECTED INTENSITY OF PROGRAM:  3 hrs/day    EXPECTED FREQUENCY OF PROGRAM:  5 days/week    ESTIMATED LOS:  14-16 days    ESTIMATED DISPOSITION:  home     INTERDISCIPLINARY FUNCTIONAL OUTCOMES/GOALS:         Gait/Mobility:Supervision       Transfers:Supervision       ADLs:Supervision       Functional Transfers:Supervision       Medication Management:Min assist       Communication:na       Cognitive:Min assist       Dysphagia:regular diet       Bladder:Supervision, Trial of void        Bowel:Supervision

## 2022-10-28 NOTE — BH CONSULTATION LIAISON ASSESSMENT NOTE - HPI (INCLUDE ILLNESS QUALITY, SEVERITY, DURATION, TIMING, CONTEXT, MODIFYING FACTORS, ASSOCIATED SIGNS AND SYMPTOMS)
This is a 83 YO male with PMH of HTN, HLD, DM II, BPH with complaint of neck pain. Patient endorses increasing neck pain, gait instability and clumsiness of the hands. He ambulates with a cane. He denies fever, chills, trauma or injury. He presents to [facility] on 10/18 for scheduled Posterior C1-6 Decompression, C4-5 Posterior Column Osteotomy, C1-C7 Fusion, Complex Plastics Closure. Course complicated by severe constipation now resolved, leukocytosis, hyponatremia- stable on salt tablet, fall, urinary retention requiring straight catheterization, intermittent confusion likely related to pain medication and hospital setting. Noted with  Left deltoid weakness. MR Cervical spine 10/26/22 without cord compression. Surgical drain removed 10/27/22. Patient was evaluated by PM&R and therapy for functional deficits, gait/ADL impairments and acute rehabilitation was recommended. Patient was medically optimized for discharge to [facility] IRU on 10/27/22.    Psychiatry C/L note: Psychiatry asked to evaluate pt emergently due to confusion. Per staff, pt was confused last night, needed to be redirected and this morning was increasingly delirious / agitated, combative with staff, threatening to wrap the electronic BP cord around his neck to choke himself. [facility] was called and pt had been given Haldol 2mg IM around 0300 AM and Olanzapine 2.5mg IM this morning around 0930 AM this morning with fair results.   Pt seen emergently at bedside by writer, found sitting upright in the wheelchair, on constant observation, drowsy, confused, falling asleep on interview but tells writer in Armenian, "i'll see you in the cemetery because they are going to kill me here". Per sitter at bedside he has been "calm" since receiving the Olanzapine IM this morning.   Per chart review, pt had fallen at hospital of transfer on 10/23 with + head strike, CT of head was done and negative, on 10/26 found to be becoming forgetful and restless, was retaining urine which required straight cath, on 10/27 found to be disoriented at [facility], he was transferred to [facility] around 01:00 pm yesterday and was confused on arrival. Writer called his daughter Fabiola Cunningham at [phone] to obtain collateral, no answer, VM left to call writer back.   Psychiatry will continue to follow pt and provide recommendations.   Rest of psychiatric ROS unable to be assess at this time.  See recs below.  This is a 83 YO male with PMH of HTN, HLD, DM II, BPH with complaint of neck pain. Patient endorses increasing neck pain, gait instability and clumsiness of the hands. He ambulates with a cane. He denies fever, chills, trauma or injury. He presents to [facility] on 10/18 for scheduled Posterior C1-6 Decompression, C4-5 Posterior Column Osteotomy, C1-C7 Fusion, Complex Plastics Closure. Course complicated by severe constipation now resolved, leukocytosis, hyponatremia- stable on salt tablet, fall, urinary retention requiring straight catheterization, intermittent confusion likely related to pain medication and hospital setting. Noted with  Left deltoid weakness. MR Cervical spine 10/26/22 without cord compression. Surgical drain removed 10/27/22. Patient was evaluated by PM&R and therapy for functional deficits, gait/ADL impairments and acute rehabilitation was recommended. Patient was medically optimized for discharge to [facility] IRU on 10/27/22.    Psychiatry C/L note: Psychiatry asked to evaluate pt emergently due to confusion. Per staff, pt was confused last night, needed to be redirected and this morning was increasingly delirious / agitated, combative with staff, threatening to wrap the electronic BP cord around his neck to choke himself. [facility] was called and pt had been given Haldol 2mg IM around 0300 AM and Olanzapine 2.5mg IM this morning around 0930 AM this morning with fair results.   Pt seen emergently at bedside by writer, found sitting upright in the wheelchair, on constant observation, drowsy, confused, falling asleep on interview mumbles to writer in Slovak, "i'll see you in the cemetery because they are going to kill me here". Per sitter at bedside he is "calmer" since receiving Olanzapine IM this morning.   Chart notes from hospital of transfer reviewed- pt had fallen at hospital of transfer on 10/23 with + head strike, CT of head was done and negative, on 10/26 found to be becoming forgetful and restless, was retaining urine which required straight cath, on 10/27 found to be disoriented at [facility], he was transferred to [facility] around 01:00 pm yesterday and was confused on arrival. Writer called his daughter Fabiola Cunningham at [phone] to obtain collateral, no answer, VM left to call writer back.   Psychiatry will continue to follow pt and provide recommendations.   Rest of psychiatric ROS unable to be assessed at this time.  See recs below.

## 2022-10-28 NOTE — CONSULT NOTE ADULT - ASSESSMENT
81 YO male with PMH of HTN, HLD, DM II, BPH with complaint of neck pain, presented to [facility] on 10/18 for scheduled Posterior C1-6 Decompression, C4-5 Posterior Column Osteotomy, C1-C7 Fusion, Complex Plastics Closure. Course complicated by severe constipation, leukocytosis, hyponatremia, fall, urinary retention requiring straight catheterization, intermittent confusion likely related to pain medication and hospital setting.     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   - s/p Posterior C1-6 Decompression, C4-5 Posterior Column Osteotomy, C1-C7 Fusion, Complex Plastics Closure on 10/18  - pain control and bowel regimen  - ASA can be resumed on 10/28    # acute metabolic enephalopathy  - received IM haldol o/n    # Leukocytosis  WBC ~13k    # acute urinary retention    # Hyponatremia  - NaCl tabs 1g q8h  - on fluid restriction    # HTN  - amlodipine 10 mg qhs and losartan 50 mg qd    #HLD  - lipitor 40 mg qd    # DM2  - controlled, [id number] 6.0%  - ISS    dvt ppx: lovenox 83 YO male with PMH of HTN, HLD, DM II, BPH with complaint of neck pain, presented to [facility] on 10/18 for scheduled Posterior C1-6 Decompression, C4-5 Posterior Column Osteotomy, C1-C7 Fusion, Complex Plastics Closure. Course complicated by severe constipation, leukocytosis, hyponatremia, fall, urinary retention requiring straight catheterization, intermittent confusion likely related to pain medication and hospital setting.     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 Continues to be altered today.  - received IM haldol o/n. Ordered zyprexa IM now.   - Psychiatry consulted, discussed case w/ Bryce  - 1:1 observation  - will eval for infectious etiology if needed. Recent UA negative.   - will minimize narcotics, treat pain, will hold scheduled flexeril and other muscle relaxers.    # Leukocytosis  WBC ~13k    # acute urinary retention  - does not have a garay catheter  - recommend regular bladder scans.    # Hyponatremia - improving.  - NaCl tabs 1g q8h  - on fluid restriction  - trend BMP    # HTN  - amlodipine 10 mg qhs and losartan 50 mg qd    #HLD  - lipitor 40 mg qd    # DM2  - controlled, [id number] 6.0%  - ISS    dvt ppx: lovenox

## 2022-10-28 NOTE — DIETITIAN INITIAL EVALUATION ADULT - ENERGY INTAKE
Fair (50-75%) Patient has only received one meal since admission. Only consumed muffin with breakfast. Will F/U to evaluate rest of meals consumed.

## 2022-10-28 NOTE — CHART NOTE - NSCHARTNOTEFT_GEN_A_CORE
CC: Code grey activated for agitation    Patient is a 82y old  Male who presents with a chief complaint of Cervical Laminectomy (27 Oct 2022 13:00)      INTERVAL HPI/OVERNIGHT EVENTS:  Bedside RN activated code grey due to severe agitation  Pt seen and examined in the room  Pt noted to be confused and agitated  Afebrile tachycardiac         MEDICATIONS  (STANDING):  amLODIPine   Tablet 10 milliGRAM(s) Oral at bedtime  aspirin  chewable 81 milliGRAM(s) Oral daily  atorvastatin 40 milliGRAM(s) Oral at bedtime  cyclobenzaprine 10 milliGRAM(s) Oral every 12 hours  dextrose 5%. 1000 milliLiter(s) (100 mL/Hr) IV Continuous <Continuous>  dextrose 5%. 1000 milliLiter(s) (50 mL/Hr) IV Continuous <Continuous>  dextrose 50% Injectable 25 Gram(s) IV Push once  dextrose 50% Injectable 12.5 Gram(s) IV Push once  dextrose 50% Injectable 25 Gram(s) IV Push once  enoxaparin Injectable 40 milliGRAM(s) SubCutaneous <User Schedule>  glucagon  Injectable 1 milliGRAM(s) IntraMuscular once  insulin lispro (ADMELOG) corrective regimen sliding scale   SubCutaneous three times a day before meals  insulin lispro (ADMELOG) corrective regimen sliding scale   SubCutaneous at bedtime  losartan 50 milliGRAM(s) Oral daily  magnesium hydroxide Suspension 30 milliLiter(s) Oral every 12 hours  polyethylene glycol 3350 17 Gram(s) Oral daily  senna 2 Tablet(s) Oral at bedtime  sodium chloride 1 Gram(s) Oral every 8 hours    MEDICATIONS  (PRN):  acetaminophen     Tablet .. 650 milliGRAM(s) Oral every 6 hours PRN Temp greater or equal to 38C (100.4F), Mild Pain (1 - 3)  bisacodyl Suppository 10 milliGRAM(s) Rectal daily PRN Constipation  dextrose Oral Gel 15 Gram(s) Oral once PRN Blood Glucose LESS THAN 70 milliGRAM(s)/deciliter  methocarbamol 500 milliGRAM(s) Oral three times a day PRN Muscle Spasm  oxyCODONE    IR 10 milliGRAM(s) Oral every 4 hours PRN Severe Pain (7 - 10)  oxyCODONE    IR 5 milliGRAM(s) Oral every 4 hours PRN Moderate Pain (4 - 6)      Allergies    penicillin (Rash)    Intolerances        REVIEW OF SYSTEMS:  Unable to obtain due to AMS    Vital Signs Last 24 Hrs  T(C): 36.7 (28 Oct 2022 03:40), Max: 36.8 (28 Oct 2022 03:00)  T(F): 98 (28 Oct 2022 03:40), Max: 98.2 (28 Oct 2022 03:00)  HR: 105 (28 Oct 2022 05:30) (99 - 122)  BP: 146/81 (28 Oct 2022 05:30) (134/76 - 176/79)  BP(mean): --  RR: 15 (28 Oct 2022 03:40) (15 - 18)  SpO2: 93% (28 Oct 2022 03:40) (93% - 96%)    Parameters below as of 28 Oct 2022 03:40  Patient On (Oxygen Delivery Method): room air        PHYSICAL EXAM:  GENERAL: NAD, well-groomed, well-developed  HEAD:  Atraumatic, Normocephalic  EYES: EOMI, PERRLA, conjunctiva and sclera clear  ENMT: Moist mucous membranes, Good dentition, No lesions; No tonsillar erythema, exudates, or enlargement  NECK: Supple, No JVD, Normal thyroid  NERVOUS SYSTEM:  awake confused. All 4 extremities mobile, no gross sensory deficits.   CHEST/LUNG: Clear to auscultation bilaterally; No rales, rhonchi, wheezing, or rubs  HEART: Regular rate and rhythm; No murmurs, rubs, or gallops  ABDOMEN: Soft, Nontender, Nondistended; Bowel sounds present  EXTREMITIES:  2+ Peripheral Pulses, No clubbing, cyanosis, or edema  LYMPH: No lymphadenopathy noted  SKIN: No rashes or lesions    LABS:                        12.7   12.87 )-----------( 303      ( 28 Oct 2022 05:50 )             36.6       Ca    9.0        26 Oct 2022 17:46        Urinalysis Basic - ( 26 Oct 2022 20:45 )    Color: Light Yellow / Appearance: Clear / S.015 / pH: x  Gluc: x / Ketone: Negative  / Bili: Negative / Urobili: Negative   Blood: x / Protein: Trace / Nitrite: Negative   Leuk Esterase: Negative / RBC: 17 /hpf / WBC 2 /HPF   Sq Epi: x / Non Sq Epi: 1 /hpf / Bacteria: Negative      CAPILLARY BLOOD GLUCOSE      POCT Blood Glucose.: 161 mg/dL (27 Oct 2022 18:39)  POCT Blood Glucose.: 191 mg/dL (27 Oct 2022 08:57)      Assessment:    81 YO male with PMH of HTN, HLD, DM II, BPH with complaint of neck pain. Patient endorses increasing neck pain, gait instability and clumsiness of the hands. He presents to [facility] on 10/18 for scheduled Posterior C1-6 Decompression, C4-5 Posterior Column Osteotomy, C1-C7 Fusion, Complex Plastics Closure. Course complicated by severe constipation now resolved, leukocytosis, hyponatremia, fall, urinary retention requiring straight catheterization. Patient now with gait Instability, ADL impairments and Functional impairments.      Agitation:  Frequent assessment  Haldol 2mg IM x1   Continue PRN roboxin and percocet  Monitor VS  If pt becomes more agitated will consider constant observation    #Cervical cord compression with myelopathy  - Posterior C1-6 Decompression, C4-5 Posterior Column Osteotomy, C1-C7 Fusion, Complex Plastics Closure on 10/18  - Continue PT/OT

## 2022-10-28 NOTE — CONSULT NOTE ADULT - SUBJECTIVE AND OBJECTIVE BOX
Patient is a 82y old  Male who presents with a chief complaint of Cervical Laminectomy (27 Oct 2022 13:00)    HPI:  This is a 81 YO male with PMH of HTN, HLD, DM II, BPH with complaint of neck pain. Patient endorses increasing neck pain, gait instability and clumsiness of the hands. He ambulates with a cane. He denies fever, chills, trauma or injury. He presents to [facility] on 10/18 for scheduled Posterior C1-6 Decompression, C4-5 Posterior Column Osteotomy, C1-C7 Fusion, Complex Plastics Closure. Course complicated by severe constipation now resolved, leukocytosis, hyponatremia- stable on salt tablet, fall, urinary retention requiring straight catheterization, intermittent confusion likely related to pain medication and hospital setting. Noted with  Left deltoid weakness. MR Cervical spine 10/26/22 without cord compression. Surgical drain removed 10/27/22. Patient was evaluated by PM&R and therapy for functional deficits, gait/ADL impairments and acute rehabilitation was recommended. Patient was medically optimized for discharge to [facility] IRU on 10/27/22.   (27 Oct 2022 13:00)    24 hr events: received IM haldol overnight.    PAST MEDICAL & SURGICAL HISTORY:  Hypertension      Hyperlipidemia      Chronic kidney disease      Diabetes mellitus  -patient states prediabetes, not on medication      Benign prostate hyperplasia      COVID-19 virus infection  -dec 2021 (cough, fever, malaise)      History of carpal tunnel surgery  -bilateral hands      History of cataract surgery  -bilateral      H/O colonoscopy        SOCIAL HISTORY:  FAMILY HISTORY:    ALLERGIES:  penicillin (Rash)    MEDICATIONS  (STANDING):  amLODIPine   Tablet 10 milliGRAM(s) Oral at bedtime  aspirin  chewable 81 milliGRAM(s) Oral daily  atorvastatin 40 milliGRAM(s) Oral at bedtime  cyclobenzaprine 10 milliGRAM(s) Oral every 12 hours  dextrose 5%. 1000 milliLiter(s) (100 mL/Hr) IV Continuous <Continuous>  dextrose 5%. 1000 milliLiter(s) (50 mL/Hr) IV Continuous <Continuous>  dextrose 50% Injectable 25 Gram(s) IV Push once  dextrose 50% Injectable 12.5 Gram(s) IV Push once  dextrose 50% Injectable 25 Gram(s) IV Push once  enoxaparin Injectable 40 milliGRAM(s) SubCutaneous <User Schedule>  glucagon  Injectable 1 milliGRAM(s) IntraMuscular once  insulin lispro (ADMELOG) corrective regimen sliding scale   SubCutaneous three times a day before meals  insulin lispro (ADMELOG) corrective regimen sliding scale   SubCutaneous at bedtime  losartan 50 milliGRAM(s) Oral daily  magnesium hydroxide Suspension 30 milliLiter(s) Oral every 12 hours  polyethylene glycol 3350 17 Gram(s) Oral daily  senna 2 Tablet(s) Oral at bedtime  sodium chloride 1 Gram(s) Oral every 8 hours    MEDICATIONS  (PRN):  acetaminophen     Tablet .. 650 milliGRAM(s) Oral every 6 hours PRN Temp greater or equal to 38C (100.4F), Mild Pain (1 - 3)  bisacodyl Suppository 10 milliGRAM(s) Rectal daily PRN Constipation  dextrose Oral Gel 15 Gram(s) Oral once PRN Blood Glucose LESS THAN 70 milliGRAM(s)/deciliter  methocarbamol 500 milliGRAM(s) Oral three times a day PRN Muscle Spasm  oxyCODONE    IR 10 milliGRAM(s) Oral every 4 hours PRN Severe Pain (7 - 10)  oxyCODONE    IR 5 milliGRAM(s) Oral every 4 hours PRN Moderate Pain (4 - 6)    Review of Systems: Refer to HPI for pertinent positives and negatives. All other ROS reviewed and negative except as otherwise stated above.    Vital Signs Last 24 Hrs  T(F): 98 (28 Oct 2022 03:40), Max: 98.2 (28 Oct 2022 03:00)  HR: 105 (28 Oct 2022 05:30) (99 - 122)  BP: 146/81 (28 Oct 2022 05:30) (134/76 - 176/79)  RR: 15 (28 Oct 2022 03:40) (15 - 18)  SpO2: 93% (28 Oct 2022 03:40) (93% - 96%)  I&O's Summary    27 Oct 2022 07:01  -  28 Oct 2022 07:00  --------------------------------------------------------  IN: 470 mL / OUT: 950 mL / NET: -480 mL      PHYSICAL EXAM:  GENERAL: NAD, well-groomed, well-developed  HEAD:  Atraumatic, Normocephalic  EYES: EOMI, PERRL, conjunctiva and sclera clear  ENMT: Moist mucous membranes, Good dentition  NECK: Supple, No JVD  CHEST/LUNG: Clear to auscultation bilaterally, non-labored breathing, good air entry  HEART: RRR; S1/S2, No murmur  ABDOMEN: Soft, Nontender, Nondistended; Bowel sounds present  VASCULAR: Normal pulses, Normal capillary refill  EXTREMITIES: No cyanosis, No edema  LYMPH: No lymphadenopathy noted  SKIN: Warm, Intact  PSYCH: Normal mood and affect  NERVOUS SYSTEM:  A/O x3, Good concentration; CN 2-12 intact, No focal deficits, moves all extremities    LABS:                        12.7   12.87 )-----------( 303      ( 28 Oct 2022 05:50 )             36.6     10-28    135  |  98  |  20  ----------------------------<  139  4.0   |  31  |  1.20    Ca    8.9      28 Oct 2022 05:50  Phos  3.5     10-  Mg     2.2     10-25    TPro  7.3  /  Alb  3.0  /  TBili  0.6  /  DBili  x   /  AST  26  /  ALT  35  /  AlkPhos  103  10-28    POCT Blood Glucose.: 161 mg/dL (27 Oct 2022 18:39)  POCT Blood Glucose.: 191 mg/dL (27 Oct 2022 08:57)      Urinalysis Basic - ( 26 Oct 2022 20:45 )    Color: Light Yellow / Appearance: Clear / S.015 / pH: x  Gluc: x / Ketone: Negative  / Bili: Negative / Urobili: Negative   Blood: x / Protein: Trace / Nitrite: Negative   Leuk Esterase: Negative / RBC: 17 /hpf / WBC 2 /HPF   Sq Epi: x / Non Sq Epi: 1 /hpf / Bacteria: Negative        COVID-19 PCR: NotDetec (10-27-22 @ 23:11)  COVID-19 PCR: NotDetec (10-24-22 @ 07:47)  COVID-19 PCR: NotDetec (10-15-22 @ 13:40)    RADIOLOGY & ADDITIONAL TESTS:  < from: MR Cervical Spine w/wo IV Cont (10.26.22 @ 20:45) >  IMPRESSION:    No ever spinal cord compression. No gross evidence for abnormal   intrinsic cord signal.    No appreciable abnormal enhancement    Redemonstration of large pannus about the dens contacts thespinal cord   at the C1 level, flattening its ventral surface. Minimal CSF posterior to   the cord at this level.    Remaining spinal levels demonstrate no significant spinal canal stenosis.    Evaluation of the neural foramina are significantly limited by   susceptibility artifact and are better evaluated on prior CT of the   cervical spine..    < end of copied text >    < from: CT Head No Cont (10.23.22 @ 19:02) >  IMPRESSION:    No acute intracranial hemorrhage, hydrocephalus or extra-axial fluid   collection.  Mild parenchymal volume loss and mild chronic microvascular ischemic   changes.  No CT evidence for acute transcortical infarction. Please note that MR   imaging is a more sensitive imaging modality for detection of acute   infarction and may be obtained as clinically warranted.    < end of copied text >    < from: Transthoracic Echocardiogram (22 @ 07:06) >  CONCLUSIONS: EF 60%  1. Calcified aortic valve with normal opening.  2. Normal left ventricular systolic function. No segmental  wall motion abnormalities.  3. Normal right ventricular size and function.  4. Agitated saline injection is inconclusive regarding the  presence  of a patent foramen ovale.    < end of copied text >      Care Discussed with Consultants/Other Providers: Patient is a 82y old  Male who presents with a chief complaint of Cervical Laminectomy (27 Oct 2022 13:00)    HPI:  This is a 83 YO male with PMH of HTN, HLD, DM II, BPH with complaint of neck pain. Patient endorses increasing neck pain, gait instability and clumsiness of the hands. He ambulates with a cane. He denies fever, chills, trauma or injury. He presents to [facility] on 10/18 for scheduled Posterior C1-6 Decompression, C4-5 Posterior Column Osteotomy, C1-C7 Fusion, Complex Plastics Closure. Course complicated by severe constipation now resolved, leukocytosis, hyponatremia- stable on salt tablet, fall, urinary retention requiring straight catheterization, intermittent confusion likely related to pain medication and hospital setting. Noted with  Left deltoid weakness. MR Cervical spine 10/26/22 without cord compression. Surgical drain removed 10/27/22. Patient was evaluated by PM&R and therapy for functional deficits, gait/ADL impairments and acute rehabilitation was recommended. Patient was medically optimized for discharge to [facility] IRU on 10/27/22.   (27 Oct 2022 13:00)    24 hr events: received IM haldol overnight. Continued to be agitated/delirious/combative today. Luis Alberto [facility] called. Had to give IM zyprexa. Psych consulted. During therapy, pt had Suicidal ideation and said he wanted to choke himself with a cord. 1:1 ordered  Unable to obtain history - pt not cooperative, not answering questions, even when got , [place] in person interpretation (w/ help of PCA), and when got daughter on the phone.    PAST MEDICAL & SURGICAL HISTORY:  Hypertension      Hyperlipidemia      Chronic kidney disease      Diabetes mellitus  -patient states prediabetes, not on medication      Benign prostate hyperplasia      COVID-19 virus infection  -dec 2021 (cough, fever, malaise)      History of carpal tunnel surgery  -bilateral hands      History of cataract surgery  -bilateral      H/O colonoscopy        SOCIAL HISTORY: unable to obtain due to encephalopathy  FAMILY HISTORY: unable to obtain due to encephalopathy      ALLERGIES:  penicillin (Rash)    MEDICATIONS  (STANDING):  amLODIPine   Tablet 10 milliGRAM(s) Oral at bedtime  aspirin  chewable 81 milliGRAM(s) Oral daily  atorvastatin 40 milliGRAM(s) Oral at bedtime  cyclobenzaprine 10 milliGRAM(s) Oral every 12 hours  dextrose 5%. 1000 milliLiter(s) (100 mL/Hr) IV Continuous <Continuous>  dextrose 5%. 1000 milliLiter(s) (50 mL/Hr) IV Continuous <Continuous>  dextrose 50% Injectable 25 Gram(s) IV Push once  dextrose 50% Injectable 12.5 Gram(s) IV Push once  dextrose 50% Injectable 25 Gram(s) IV Push once  enoxaparin Injectable 40 milliGRAM(s) SubCutaneous <User Schedule>  glucagon  Injectable 1 milliGRAM(s) IntraMuscular once  insulin lispro (ADMELOG) corrective regimen sliding scale   SubCutaneous three times a day before meals  insulin lispro (ADMELOG) corrective regimen sliding scale   SubCutaneous at bedtime  losartan 50 milliGRAM(s) Oral daily  magnesium hydroxide Suspension 30 milliLiter(s) Oral every 12 hours  polyethylene glycol 3350 17 Gram(s) Oral daily  senna 2 Tablet(s) Oral at bedtime  sodium chloride 1 Gram(s) Oral every 8 hours    MEDICATIONS  (PRN):  acetaminophen     Tablet .. 650 milliGRAM(s) Oral every 6 hours PRN Temp greater or equal to 38C (100.4F), Mild Pain (1 - 3)  bisacodyl Suppository 10 milliGRAM(s) Rectal daily PRN Constipation  dextrose Oral Gel 15 Gram(s) Oral once PRN Blood Glucose LESS THAN 70 milliGRAM(s)/deciliter  methocarbamol 500 milliGRAM(s) Oral three times a day PRN Muscle Spasm  oxyCODONE    IR 10 milliGRAM(s) Oral every 4 hours PRN Severe Pain (7 - 10)  oxyCODONE    IR 5 milliGRAM(s) Oral every 4 hours PRN Moderate Pain (4 - 6)    Review of Systems: Refer to HPI for pertinent positives and negatives. All other ROS reviewed and negative except as otherwise stated above.    Vital Signs Last 24 Hrs  T(F): 98 (28 Oct 2022 03:40), Max: 98.2 (28 Oct 2022 03:00)  HR: 105 (28 Oct 2022 05:30) (99 - 122)  BP: 146/81 (28 Oct 2022 05:30) (134/76 - 176/79)  RR: 15 (28 Oct 2022 03:40) (15 - 18)  SpO2: 93% (28 Oct 2022 03:40) (93% - 96%)  I&O's Summary    27 Oct 2022 07:01  -  28 Oct 2022 07:00  --------------------------------------------------------  IN: 470 mL / OUT: 950 mL / NET: -480 mL      PHYSICAL EXAM:  GENERAL: NAD, well-groomed, developed  HEAD:  Atraumatic, Normocephalic  EYES: EOMI, PERRL, conjunctiva and sclera clear  ENMT: Moist mucous membranes, Good dentition  NECK: Supple, No JVD, longitudinal site at back of neck c/d/i without purulence or erythema.  CHEST/LUNG: Clear to auscultation bilaterally, non-labored breathing, good air entry  HEART: RRR; S1/S2, No murmur  ABDOMEN: Soft, Nontender, Nondistended; Bowel sounds present  VASCULAR: Normal pulses, Normal capillary refill  EXTREMITIES: No cyanosis, No edema  LYMPH: No lymphadenopathy noted  SKIN: Warm, Intact  PSYCH: agitated, combative, non cooperative.  NERVOUS SYSTEM:  awake, alert, unsteady gait, moves all extremities. does not answer questions.    LABS:                        12.7   12.87 )-----------( 303      ( 28 Oct 2022 05:50 )             36.6     10-    135  |  98  |  20  ----------------------------<  139  4.0   |  31  |  1.20    Ca    8.9      28 Oct 2022 05:50  Phos  3.5     10-25  Mg     2.2     10-25    TPro  7.3  /  Alb  3.0  /  TBili  0.6  /  DBili  x   /  AST  26  /  ALT  35  /  AlkPhos  103  10-28    POCT Blood Glucose.: 161 mg/dL (27 Oct 2022 18:39)  POCT Blood Glucose.: 191 mg/dL (27 Oct 2022 08:57)      Urinalysis Basic - ( 26 Oct 2022 20:45 )    Color: Light Yellow / Appearance: Clear / S.015 / pH: x  Gluc: x / Ketone: Negative  / Bili: Negative / Urobili: Negative   Blood: x / Protein: Trace / Nitrite: Negative   Leuk Esterase: Negative / RBC: 17 /hpf / WBC 2 /HPF   Sq Epi: x / Non Sq Epi: 1 /hpf / Bacteria: Negative        COVID-19 PCR: NotDetec (10-27-22 @ 23:11)  COVID-19 PCR: NotDetec (10-24-22 @ 07:47)  COVID-19 PCR: NotDetec (10-15-22 @ 13:40)    RADIOLOGY & ADDITIONAL TESTS:  < from: MR Cervical Spine w/wo IV Cont (10.26.22 @ 20:45) >  IMPRESSION:    No ever spinal cord compression. No gross evidence for abnormal   intrinsic cord signal.    No appreciable abnormal enhancement    Redemonstration of large pannus about the dens contacts thespinal cord   at the C1 level, flattening its ventral surface. Minimal CSF posterior to   the cord at this level.    Remaining spinal levels demonstrate no significant spinal canal stenosis.    Evaluation of the neural foramina are significantly limited by   susceptibility artifact and are better evaluated on prior CT of the   cervical spine..    < end of copied text >    < from: CT Head No Cont (10.23.22 @ 19:02) >  IMPRESSION:    No acute intracranial hemorrhage, hydrocephalus or extra-axial fluid   collection.  Mild parenchymal volume loss and mild chronic microvascular ischemic   changes.  No CT evidence for acute transcortical infarction. Please note that MR   imaging is a more sensitive imaging modality for detection of acute   infarction and may be obtained as clinically warranted.    < end of copied text >    < from: Transthoracic Echocardiogram (22 @ 07:06) >  CONCLUSIONS: EF 60%  1. Calcified aortic valve with normal opening.  2. Normal left ventricular systolic function. No segmental  wall motion abnormalities.  3. Normal right ventricular size and function.  4. Agitated saline injection is inconclusive regarding the  presence  of a patent foramen ovale.    < end of copied text >      Care Discussed with Consultants/Other Providers:

## 2022-10-28 NOTE — BH CONSULTATION LIAISON ASSESSMENT NOTE - NSBHCHARTREVIEWLAB_PSY_A_CORE FT
12.7   12.87 )-----------( 303      ( 28 Oct 2022 05:50 )             36.6   10-28    135  |  98  |  20  ----------------------------<  139<H>  4.0   |  31  |  1.20    Ca    8.9      28 Oct 2022 05:50    TPro  7.3  /  Alb  3.0<L>  /  TBili  0.6  /  DBili  x   /  AST  26  /  ALT  35  /  AlkPhos  103  10-28

## 2022-10-29 LAB
APPEARANCE UR: CLEAR — SIGNIFICANT CHANGE UP
BACTERIA # UR AUTO: ABNORMAL /HPF
BILIRUB UR-MCNC: NEGATIVE — SIGNIFICANT CHANGE UP
COLOR SPEC: YELLOW — SIGNIFICANT CHANGE UP
DIFF PNL FLD: ABNORMAL
EPI CELLS # UR: SIGNIFICANT CHANGE UP
GLUCOSE BLDC GLUCOMTR-MCNC: 151 MG/DL — HIGH (ref 70–99)
GLUCOSE BLDC GLUCOMTR-MCNC: 159 MG/DL — HIGH (ref 70–99)
GLUCOSE BLDC GLUCOMTR-MCNC: 177 MG/DL — HIGH (ref 70–99)
GLUCOSE BLDC GLUCOMTR-MCNC: 226 MG/DL — HIGH (ref 70–99)
GLUCOSE UR QL: NEGATIVE — SIGNIFICANT CHANGE UP
KETONES UR-MCNC: NEGATIVE — SIGNIFICANT CHANGE UP
LACTATE SERPL-SCNC: 0.8 MMOL/L — SIGNIFICANT CHANGE UP (ref 0.7–2)
LEUKOCYTE ESTERASE UR-ACNC: ABNORMAL
NITRITE UR-MCNC: NEGATIVE — SIGNIFICANT CHANGE UP
PH UR: 6 — SIGNIFICANT CHANGE UP (ref 5–8)
PROT UR-MCNC: 30 MG/DL
RBC CASTS # UR COMP ASSIST: ABNORMAL /HPF (ref 0–4)
SP GR SPEC: 1.01 — SIGNIFICANT CHANGE UP (ref 1.01–1.02)
UROBILINOGEN FLD QL: NEGATIVE — SIGNIFICANT CHANGE UP
WBC UR QL: ABNORMAL /HPF (ref 0–5)

## 2022-10-29 PROCEDURE — 99232 SBSQ HOSP IP/OBS MODERATE 35: CPT

## 2022-10-29 PROCEDURE — 99233 SBSQ HOSP IP/OBS HIGH 50: CPT

## 2022-10-29 RX ORDER — SODIUM CHLORIDE 9 MG/ML
1000 INJECTION, SOLUTION INTRAVENOUS
Refills: 0 | Status: DISCONTINUED | OUTPATIENT
Start: 2022-10-29 | End: 2022-10-30

## 2022-10-29 RX ORDER — SODIUM CHLORIDE 9 MG/ML
1000 INJECTION, SOLUTION INTRAVENOUS ONCE
Refills: 0 | Status: COMPLETED | OUTPATIENT
Start: 2022-10-29 | End: 2022-10-29

## 2022-10-29 RX ORDER — ACETAMINOPHEN 500 MG
975 TABLET ORAL ONCE
Refills: 0 | Status: COMPLETED | OUTPATIENT
Start: 2022-10-29 | End: 2022-10-29

## 2022-10-29 RX ADMIN — OLANZAPINE 2.5 MILLIGRAM(S): 15 TABLET, FILM COATED ORAL at 17:09

## 2022-10-29 RX ADMIN — SENNA PLUS 2 TABLET(S): 8.6 TABLET ORAL at 22:31

## 2022-10-29 RX ADMIN — Medication 1: at 08:25

## 2022-10-29 RX ADMIN — CEFEPIME 100 MILLIGRAM(S): 1 INJECTION, POWDER, FOR SOLUTION INTRAMUSCULAR; INTRAVENOUS at 17:10

## 2022-10-29 RX ADMIN — POLYETHYLENE GLYCOL 3350 17 GRAM(S): 17 POWDER, FOR SOLUTION ORAL at 12:42

## 2022-10-29 RX ADMIN — CEFEPIME 100 MILLIGRAM(S): 1 INJECTION, POWDER, FOR SOLUTION INTRAMUSCULAR; INTRAVENOUS at 12:22

## 2022-10-29 RX ADMIN — SODIUM CHLORIDE 1 GRAM(S): 9 INJECTION INTRAMUSCULAR; INTRAVENOUS; SUBCUTANEOUS at 06:05

## 2022-10-29 RX ADMIN — ENOXAPARIN SODIUM 40 MILLIGRAM(S): 100 INJECTION SUBCUTANEOUS at 17:09

## 2022-10-29 RX ADMIN — Medication 975 MILLIGRAM(S): at 22:30

## 2022-10-29 RX ADMIN — Medication 975 MILLIGRAM(S): at 19:20

## 2022-10-29 RX ADMIN — SODIUM CHLORIDE 60 MILLILITER(S): 9 INJECTION, SOLUTION INTRAVENOUS at 08:52

## 2022-10-29 RX ADMIN — Medication 1: at 12:24

## 2022-10-29 RX ADMIN — CEFEPIME 100 MILLIGRAM(S): 1 INJECTION, POWDER, FOR SOLUTION INTRAMUSCULAR; INTRAVENOUS at 01:36

## 2022-10-29 RX ADMIN — AMLODIPINE BESYLATE 10 MILLIGRAM(S): 2.5 TABLET ORAL at 22:32

## 2022-10-29 RX ADMIN — Medication 975 MILLIGRAM(S): at 07:05

## 2022-10-29 RX ADMIN — Medication 2: at 16:42

## 2022-10-29 RX ADMIN — Medication 975 MILLIGRAM(S): at 06:05

## 2022-10-29 RX ADMIN — SODIUM CHLORIDE 1000 MILLILITER(S): 9 INJECTION, SOLUTION INTRAVENOUS at 00:35

## 2022-10-29 RX ADMIN — SODIUM CHLORIDE 1 GRAM(S): 9 INJECTION INTRAMUSCULAR; INTRAVENOUS; SUBCUTANEOUS at 15:58

## 2022-10-29 RX ADMIN — LOSARTAN POTASSIUM 50 MILLIGRAM(S): 100 TABLET, FILM COATED ORAL at 06:05

## 2022-10-29 RX ADMIN — Medication 81 MILLIGRAM(S): at 12:22

## 2022-10-29 RX ADMIN — SODIUM CHLORIDE 1 GRAM(S): 9 INJECTION INTRAMUSCULAR; INTRAVENOUS; SUBCUTANEOUS at 22:32

## 2022-10-29 RX ADMIN — ATORVASTATIN CALCIUM 40 MILLIGRAM(S): 80 TABLET, FILM COATED ORAL at 22:31

## 2022-10-29 NOTE — CONSULT NOTE ADULT - ASSESSMENT
Patient is a 82y male with a past history of HTN,T2DM,HLD,BPH, and chronic neck pain who was admitted to [facility] on 10/18 for elective neck surgery.  He underwent  a posterior C1-C6 decompression, C4-5 posterior column osteotomy, and C10C7 fusion with PRS closure.  His post op course was complicated  by constipation, intermittent confusion felt to be narcotic related, and urinary retention requiring ISC.He was felt to have left deltoid weakness, his drain was removed 10/27, and he was sent to rehab on 10/27.  He had a fever to 101.2 last night(10/28), required placement of a garay catheter, and was started on cefepime.He was up all night and is sleepy. History is via an , he has no specific complaints but nods off to sleep.  Etiology of fever not clear. It could be  as in setting of retention can develop systemic infection even if urine is not purulent.  His spine incision appears to be without evidence of infection.  His CXR seems to show elevated diaphragm on the left, ? atelectasis vs artifact.Await radiology read.  Suggest;  1.Blood cultures pending  2.No objection to cefepime  3.I would send a urine culture even with minimal pyuria as he has required a garay for retention.  4.Additional w/u pending course.

## 2022-10-29 NOTE — PROGRESS NOTE ADULT - SUBJECTIVE AND OBJECTIVE BOX
Cc: Gait dysfunction 2/2 cervical laminectomy     HPI: Patient reports back pain, seen by hospitalist and ID for fever, tachycardia overnight.   [place] placed, on cefepime.  Pain controlled, no chest pain, no N/V, no Fevers/Chills. No other new ROS  Has been tolerating rehabilitation program.    acetaminophen     Tablet .. 975 milliGRAM(s) Oral every 8 hours  amLODIPine   Tablet 10 milliGRAM(s) Oral at bedtime  aspirin  chewable 81 milliGRAM(s) Oral daily  atorvastatin 40 milliGRAM(s) Oral at bedtime  bisacodyl Suppository 10 milliGRAM(s) Rectal daily PRN  cefepime   IVPB      cefepime   IVPB 1000 milliGRAM(s) IV Intermittent every 12 hours  dextrose 5%. 1000 milliLiter(s) IV Continuous <Continuous>  dextrose 5%. 1000 milliLiter(s) IV Continuous <Continuous>  dextrose 50% Injectable 25 Gram(s) IV Push once  dextrose 50% Injectable 12.5 Gram(s) IV Push once  dextrose 50% Injectable 25 Gram(s) IV Push once  dextrose Oral Gel 15 Gram(s) Oral once PRN  enoxaparin Injectable 40 milliGRAM(s) SubCutaneous <User Schedule>  glucagon  Injectable 1 milliGRAM(s) IntraMuscular once  insulin lispro (ADMELOG) corrective regimen sliding scale   SubCutaneous three times a day before meals  insulin lispro (ADMELOG) corrective regimen sliding scale   SubCutaneous at bedtime  losartan 50 milliGRAM(s) Oral daily  magnesium hydroxide Suspension 30 milliLiter(s) Oral every 12 hours  OLANZapine 2.5 milliGRAM(s) Oral <User Schedule>  OLANZapine Injectable 2.5 milliGRAM(s) IntraMuscular every 12 hours PRN  polyethylene glycol 3350 17 Gram(s) Oral daily  senna 2 Tablet(s) Oral at bedtime  sodium chloride 1 Gram(s) Oral every 8 hours  sodium chloride 0.45%. 1000 milliLiter(s) IV Continuous <Continuous>  traMADol 50 milliGRAM(s) Oral every 8 hours PRN      T(C): 36.9 (10-29-22 @ 08:48), Max: 38.4 (10-28-22 @ 21:36)  HR: 106 (10-29-22 @ 08:48) (102 - 115)  BP: 122/78 (10-29-22 @ 08:48) (122/78 - 163/73)  RR: 16 (10-29-22 @ 08:48) (16 - 16)  SpO2: 95% (10-29-22 @ 08:48) (92% - 95%)    In NAD  HEENT- EOMI  Heart- RRR, S1S2  Lungs- no respiratory distress  Abd- + BS, NT  Ext- No calf pain  Neuro- Exam unchanged       Imp: Patient is a 82y male with a past history of HTN,T2DM,HLD,BPH, and chronic neck pain who was admitted to [facility] on 10/18 for elective neck surgery. He underwent  a posterior C1-C6 decompression, C4-5 posterior column osteotomy, and C10C7 fusion with PRS closure.  His post op course was complicated  by constipation, intermittent confusion felt to be narcotic related, and urinary retention requiring ISC    Plan:  - on iv cefepime  cxr done, read pending  - Continue therapies  - DVT prophylaxis- lovenox  - Skin- Turn q2h, check skin daily  - Continue current medications; patient medically stable.   - Patient is stable to continue current rehabilitation program.

## 2022-10-29 NOTE — CONSULT NOTE ADULT - SUBJECTIVE AND OBJECTIVE BOX
HPI:   Patient is a 82y male with a past history of HTN,T2DM,HLD,BPH, and chronic neck pain who was admitted to [facility] on 10/18 for elective neck surgery.  He underwent  a posterior C1-C6 decompression, C4-5 posterior column osteotomy, and C10C7 fusion with PRS closure.  His post op course was complicated  by constipation, intermittent confusion felt to be narcotic related, and urinary retention requiring ISC.He was felt to have left deltoid weakness, his drain was removed 10/27, and he was sent to rehab on 10/27.  He had a fever to 101.2 last night(10/28), required placement of a garay catheter, and was started on cefepime.He was up all night and is sleepy. History is via an , he has no specific complaints but nods off to sleep.    REVIEW OF SYSTEMS:  All other review of systems negative (Comprehensive ROS)    PAST MEDICAL & SURGICAL HISTORY:  Hypertension      Hyperlipidemia      Chronic kidney disease      Diabetes mellitus  -patient states prediabetes, not on medication      Benign prostate hyperplasia      COVID-19 virus infection  -dec 2021 (cough, fever, malaise)      History of carpal tunnel surgery  -bilateral hands      History of cataract surgery  -bilateral      H/O colonoscopy          Allergies    penicillin (Rash)    Intolerances        Antimicrobials Day #  :day 1  cefepime   IVPB      cefepime   IVPB 1000 milliGRAM(s) IV Intermittent every 12 hours    Other Medications:  acetaminophen     Tablet .. 975 milliGRAM(s) Oral every 8 hours  amLODIPine   Tablet 10 milliGRAM(s) Oral at bedtime  aspirin  chewable 81 milliGRAM(s) Oral daily  atorvastatin 40 milliGRAM(s) Oral at bedtime  bisacodyl Suppository 10 milliGRAM(s) Rectal daily PRN  dextrose 5%. 1000 milliLiter(s) IV Continuous <Continuous>  dextrose 5%. 1000 milliLiter(s) IV Continuous <Continuous>  dextrose 50% Injectable 25 Gram(s) IV Push once  dextrose 50% Injectable 12.5 Gram(s) IV Push once  dextrose 50% Injectable 25 Gram(s) IV Push once  dextrose Oral Gel 15 Gram(s) Oral once PRN  enoxaparin Injectable 40 milliGRAM(s) SubCutaneous <User Schedule>  glucagon  Injectable 1 milliGRAM(s) IntraMuscular once  insulin lispro (ADMELOG) corrective regimen sliding scale   SubCutaneous three times a day before meals  insulin lispro (ADMELOG) corrective regimen sliding scale   SubCutaneous at bedtime  losartan 50 milliGRAM(s) Oral daily  magnesium hydroxide Suspension 30 milliLiter(s) Oral every 12 hours  OLANZapine 2.5 milliGRAM(s) Oral <User Schedule>  OLANZapine Injectable 2.5 milliGRAM(s) IntraMuscular every 12 hours PRN  polyethylene glycol 3350 17 Gram(s) Oral daily  senna 2 Tablet(s) Oral at bedtime  sodium chloride 1 Gram(s) Oral every 8 hours  sodium chloride 0.45%. 1000 milliLiter(s) IV Continuous <Continuous>  traMADol 50 milliGRAM(s) Oral every 8 hours PRN      FAMILY HISTORY: NC      SOCIAL HISTORY:  Smoking: x    ETOH:x     Drug Use: x         T(F): 98.5 (10-29-22 @ 08:48), Max: 101.2 (10-28-22 @ 21:36)  HR: 106 (10-29-22 @ 08:48)  BP: 122/78 (10-29-22 @ 08:48)  RR: 16 (10-29-22 @ 08:48)  SpO2: 95% (10-29-22 @ 08:48)  Wt(kg): --    PHYSICAL EXAM:  General: sleepy, no acute distress  Eyes:  anicteric, no conjunctival injection, no discharge  Oropharynx: no lesions or injection 	  Neck: supple, without adenopathy, incision is C/D/I  Lungs: clear to auscultation  Heart: regular rate and rhythm; no murmur, rubs or gallops  Abdomen: soft, nondistended, nontender, without mass or organomegaly  Skin: no lesions  Extremities: no clubbing, cyanosis, or edema  Neurologic: sleepy, oriented, moves all extremities  : garay, normal external genitalia  LAB RESULTS:                        12.7   12.87 )-----------( 303      ( 28 Oct 2022 05:50 )             36.6     10-28    135  |  98  |  20  ----------------------------<  139<H>  4.0   |  31  |  1.20    Ca    8.9      28 Oct 2022 05:50    TPro  7.3  /  Alb  3.0<L>  /  TBili  0.6  /  DBili  x   /  AST  26  /  ALT  35  /  AlkPhos  103  10-28    LIVER FUNCTIONS - ( 28 Oct 2022 05:50 )  Alb: 3.0 g/dL / Pro: 7.3 g/dL / ALK PHOS: 103 U/L / ALT: 35 U/L / AST: 26 U/L / GGT: x           Urinalysis Basic - ( 29 Oct 2022 01:06 )    Color: Yellow / Appearance: Clear / S.010 / pH: x  Gluc: x / Ketone: Negative  / Bili: Negative / Urobili: Negative   Blood: x / Protein: 30 mg/dL / Nitrite: Negative   Leuk Esterase: Small / RBC: 5-10 /HPF / WBC 6-10 /HPF   Sq Epi: x / Non Sq Epi: Neg.-Few / Bacteria: Moderate /HPF        MICROBIOLOGY:  RECENT CULTURES:        RADIOLOGY REVIEWED:  < from: MR Cervical Spine w/wo IV Cont (10.26.22 @ 20:45) >  IMPRESSION:    No ever spinal cord compression. No gross evidence for abnormal   intrinsic cord signal.    No appreciable abnormal enhancement    Redemonstration of large pannus about the dens contacts thespinal cord   at the C1 level, flattening its ventral surface. Minimal CSF posterior to   the cord at this level.    Remaining spinal levels demonstrate no significant spinal canal stenosis.    Evaluation of the neural foramina are significantly limited by   susceptibility artifact and are better evaluated on prior CT of the   cervical spine..    < end of copied text >  < from: CT Head No Cont (10.23.22 @ 19:02) >  IMPRESSION:    No acute intracranial hemorrhage, hydrocephalus or extra-axial fluid   collection.  Mild parenchymal volume loss and mild chronic microvascular ischemic   changes.  No CT evidence for acute transcortical infarction. Please note that MR   imaging is a more sensitive imaging modality for detection of acute   infarction and may be obtained as clinically warranted.    If clinicians have any questions/need for clarification regarding this   report, Dr. Rajinder Hirsch can be best reached via the patient secure   hospital email system    < end of copied text >  < from: Xray Chest 1 View- PORTABLE-Urgent (Xray Chest 1 View- PORTABLE-Urgent .) (10.23.22 @ 17:28) >  IMPRESSION:  Clear lungs.    CXR 10/28: await official read , LLL atelectasis per my view

## 2022-10-29 NOTE — BH CONSULTATION LIAISON PROGRESS NOTE - NSBHCONSULTMEDSEVERE_PSY_A_CORE FT
Zyprexa 2.5 mg IM - prn's already in MAR- please use for combativeness only due to risks of worsening urinary retention, pt with prolonged QT due to tachycardia.

## 2022-10-29 NOTE — BH CONSULTATION LIAISON PROGRESS NOTE - ADDITIONAL DETAILS / COMMENTS
Pt denied any suicidal thoughts, and states " Life is important", denied hopelessness,  he had concern about his Cordova catheter and length of stay.

## 2022-10-29 NOTE — BH CONSULTATION LIAISON PROGRESS NOTE - NSBHINDICATION_PSY_ALL_CORE
due to impulsivity and confusion, more so than aggression or suicidality.   Pt is a falls risk , and if he is sundowning will get out of bed and over estimate his ability to ambulate on his own.

## 2022-10-29 NOTE — PROGRESS NOTE ADULT - SUBJECTIVE AND OBJECTIVE BOX
Patient is a 82y old  Male who presents with a chief complaint of Cervical Laminectomy (29 Oct 2022 10:23)    [place] telephone  Sigrid ID#[id number]  Patient seen and examined at bedside. Febrile and tachycardic overnight, bc, UA, labs sent, started on empiric Cefepime.   Patient unable to give meaningful history, is mumbling according to . Denies pain, cough, shortness of breath. Is bothered by catheter    ALLERGIES:  penicillin (Rash)    MEDICATIONS  (STANDING):  acetaminophen     Tablet .. 975 milliGRAM(s) Oral every 8 hours  amLODIPine   Tablet 10 milliGRAM(s) Oral at bedtime  aspirin  chewable 81 milliGRAM(s) Oral daily  atorvastatin 40 milliGRAM(s) Oral at bedtime  cefepime   IVPB      cefepime   IVPB 1000 milliGRAM(s) IV Intermittent every 12 hours  dextrose 5%. 1000 milliLiter(s) (100 mL/Hr) IV Continuous <Continuous>  dextrose 5%. 1000 milliLiter(s) (50 mL/Hr) IV Continuous <Continuous>  dextrose 50% Injectable 25 Gram(s) IV Push once  dextrose 50% Injectable 12.5 Gram(s) IV Push once  dextrose 50% Injectable 25 Gram(s) IV Push once  enoxaparin Injectable 40 milliGRAM(s) SubCutaneous <User Schedule>  glucagon  Injectable 1 milliGRAM(s) IntraMuscular once  insulin lispro (ADMELOG) corrective regimen sliding scale   SubCutaneous three times a day before meals  insulin lispro (ADMELOG) corrective regimen sliding scale   SubCutaneous at bedtime  losartan 50 milliGRAM(s) Oral daily  magnesium hydroxide Suspension 30 milliLiter(s) Oral every 12 hours  OLANZapine 2.5 milliGRAM(s) Oral <User Schedule>  polyethylene glycol 3350 17 Gram(s) Oral daily  senna 2 Tablet(s) Oral at bedtime  sodium chloride 1 Gram(s) Oral every 8 hours  sodium chloride 0.45%. 1000 milliLiter(s) (60 mL/Hr) IV Continuous <Continuous>    MEDICATIONS  (PRN):  bisacodyl Suppository 10 milliGRAM(s) Rectal daily PRN Constipation  dextrose Oral Gel 15 Gram(s) Oral once PRN Blood Glucose LESS THAN 70 milliGRAM(s)/deciliter  OLANZapine Injectable 2.5 milliGRAM(s) IntraMuscular every 12 hours PRN severe agitation  traMADol 50 milliGRAM(s) Oral every 8 hours PRN Severe Pain (7 - 10)    Vital Signs Last 24 Hrs  T(F): 98.5 (29 Oct 2022 08:48), Max: 101.2 (28 Oct 2022 21:36)  HR: 106 (29 Oct 2022 08:48) (102 - 115)  BP: 122/78 (29 Oct 2022 08:48) (122/78 - 163/73)  RR: 16 (29 Oct 2022 08:48) (16 - 16)  SpO2: 95% (29 Oct 2022 08:48) (92% - 95%)  I&O's Summary    28 Oct 2022 07:01  -  29 Oct 2022 07:00  --------------------------------------------------------  IN: 0 mL / OUT: 600 mL / NET: -600 mL    29 Oct 2022 07:01  -  29 Oct 2022 11:02  --------------------------------------------------------  IN: 0 mL / OUT: 500 mL / NET: -500 mL    BMI (kg/m2): 31 (10-27-22 @ 17:06)    PHYSICAL EXAM:  General: NAD, Awake but drowsy  ENT: MMM, no tonsilar exudate  Neck: Supple, No JVD. posterior incision c/d/i  Lungs: Clear to auscultation bilaterally, no wheezes. Good air entry bilaterally   Cardio: RRR, S1/S2, No murmurs  Abdomen: Soft, Nontender, Nondistended; Bowel sounds present  : Cordova in place, yellow urine in bag  Extremities: No calf tenderness, No pitting edema    LABS:                        12.7   12.87 )-----------( 303      ( 28 Oct 2022 05:50 )             36.6       10-28    135  |  98  |  20  ----------------------------<  139  4.0   |  31  |  1.20    Ca    8.9      28 Oct 2022 05:50    TPro  7.3  /  Alb  3.0  /  TBili  0.6  /  DBili  x   /  AST  26  /  ALT  35  /  AlkPhos  103  10-28      Lactate, Blood: 0.8 mmol/L (10-29 @ 01:06)    POCT Blood Glucose.: 159 mg/dL (29 Oct 2022 07:43)  POCT Blood Glucose.: 199 mg/dL (28 Oct 2022 21:39)  POCT Blood Glucose.: 153 mg/dL (28 Oct 2022 17:05)  POCT Blood Glucose.: 248 mg/dL (28 Oct 2022 12:44)    Urinalysis Basic - ( 29 Oct 2022 01:06 )    Color: Yellow / Appearance: Clear / S.010 / pH: x  Gluc: x / Ketone: Negative  / Bili: Negative / Urobili: Negative   Blood: x / Protein: 30 mg/dL / Nitrite: Negative   Leuk Esterase: Small / RBC: 5-10 /HPF / WBC 6-10 /HPF   Sq Epi: x / Non Sq Epi: Neg.-Few / Bacteria: Moderate /HPF    COVID-19 PCR: NotDetec (10-27-22 @ 23:11)  COVID-19 PCR: NotDetec (10-24-22 @ 07:47)  COVID-19 PCR: NotDetec (10-15-22 @ 13:40)    RADIOLOGY & ADDITIONAL TESTS:     Care Discussed with Consultants/Other Providers: d/w Dr. Morales

## 2022-10-29 NOTE — PROGRESS NOTE ADULT - ASSESSMENT
83 YO male with PMH of HTN, HLD, DM II, BPH with complaint of neck pain, presented to [facility] on 10/18 for scheduled Posterior C1-6 Decompression, C4-5 Posterior Column Osteotomy, C1-C7 Fusion, Complex Plastics Closure. Course complicated by severe constipation, leukocytosis, hyponatremia, fall, urinary retention requiring straight catheterization, intermittent confusion likely related to pain medication and hospital setting.     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 Will inquire if urine culture sent last night   - Infectious Diseases input noted  - Continue with Cefepime for now  - Gentle IVF, TTE reviewed     # acute metabolic encephalopathy, likely due to delirium, w/ agitation/combativeness  # Suicidal ideation  pt noted to have intermittent confusion at OSH, thought to be related to pain meds, delirium, and urinary retention. Continues to be altered today.  - received IM haldol o/n. Ordered zyprexa IM now.   - Psychiatry consulted, discussed case w/ Bryce  - 1:1 observation  - will eval for infectious etiology if needed. Recent UA negative.   - will minimize narcotics, treat pain, will hold scheduled flexeril and other muscle relaxers.    # acute urinary retention  - does not have a garay catheter  - recommend regular bladder scans.    # Hyponatremia - improving.  - NaCl tabs 1g q8h  - on fluid restriction  - trend BMP    # HTN  - amlodipine 10 mg qhs and losartan 50 mg qd    #HLD  - lipitor 40 mg qd    # DM2  - controlled, [id number] 6.0%  - ISS    dvt ppx: lovenox

## 2022-10-30 LAB
ALBUMIN SERPL ELPH-MCNC: 2.4 G/DL — LOW (ref 3.3–5)
ALP SERPL-CCNC: 110 U/L — SIGNIFICANT CHANGE UP (ref 40–120)
ALT FLD-CCNC: 30 U/L — SIGNIFICANT CHANGE UP (ref 10–45)
ANION GAP SERPL CALC-SCNC: 7 MMOL/L — SIGNIFICANT CHANGE UP (ref 5–17)
AST SERPL-CCNC: 22 U/L — SIGNIFICANT CHANGE UP (ref 10–40)
BILIRUB SERPL-MCNC: 0.7 MG/DL — SIGNIFICANT CHANGE UP (ref 0.2–1.2)
BUN SERPL-MCNC: 14 MG/DL — SIGNIFICANT CHANGE UP (ref 7–23)
CALCIUM SERPL-MCNC: 8.5 MG/DL — SIGNIFICANT CHANGE UP (ref 8.4–10.5)
CHLORIDE SERPL-SCNC: 103 MMOL/L — SIGNIFICANT CHANGE UP (ref 96–108)
CO2 SERPL-SCNC: 28 MMOL/L — SIGNIFICANT CHANGE UP (ref 22–31)
CREAT SERPL-MCNC: 1.15 MG/DL — SIGNIFICANT CHANGE UP (ref 0.5–1.3)
EGFR: 63 ML/MIN/1.73M2 — SIGNIFICANT CHANGE UP
GLUCOSE BLDC GLUCOMTR-MCNC: 128 MG/DL — HIGH (ref 70–99)
GLUCOSE BLDC GLUCOMTR-MCNC: 145 MG/DL — HIGH (ref 70–99)
GLUCOSE BLDC GLUCOMTR-MCNC: 157 MG/DL — HIGH (ref 70–99)
GLUCOSE BLDC GLUCOMTR-MCNC: 198 MG/DL — HIGH (ref 70–99)
GLUCOSE SERPL-MCNC: 144 MG/DL — HIGH (ref 70–99)
HCT VFR BLD CALC: 33.7 % — LOW (ref 39–50)
HGB BLD-MCNC: 11.5 G/DL — LOW (ref 13–17)
MCHC RBC-ENTMCNC: 29.9 PG — SIGNIFICANT CHANGE UP (ref 27–34)
MCHC RBC-ENTMCNC: 34.1 GM/DL — SIGNIFICANT CHANGE UP (ref 32–36)
MCV RBC AUTO: 87.8 FL — SIGNIFICANT CHANGE UP (ref 80–100)
MRSA PCR RESULT.: SIGNIFICANT CHANGE UP
NRBC # BLD: 0 /100 WBCS — SIGNIFICANT CHANGE UP (ref 0–0)
PLATELET # BLD AUTO: 286 K/UL — SIGNIFICANT CHANGE UP (ref 150–400)
POTASSIUM SERPL-MCNC: 3.6 MMOL/L — SIGNIFICANT CHANGE UP (ref 3.5–5.3)
POTASSIUM SERPL-SCNC: 3.6 MMOL/L — SIGNIFICANT CHANGE UP (ref 3.5–5.3)
PROT SERPL-MCNC: 6.5 G/DL — SIGNIFICANT CHANGE UP (ref 6–8.3)
RBC # BLD: 3.84 M/UL — LOW (ref 4.2–5.8)
RBC # FLD: 13.2 % — SIGNIFICANT CHANGE UP (ref 10.3–14.5)
S AUREUS DNA NOSE QL NAA+PROBE: SIGNIFICANT CHANGE UP
SARS-COV-2 RNA SPEC QL NAA+PROBE: SIGNIFICANT CHANGE UP
SODIUM SERPL-SCNC: 138 MMOL/L — SIGNIFICANT CHANGE UP (ref 135–145)
WBC # BLD: 22.97 K/UL — HIGH (ref 3.8–10.5)
WBC # FLD AUTO: 22.97 K/UL — HIGH (ref 3.8–10.5)

## 2022-10-30 PROCEDURE — 71250 CT THORAX DX C-: CPT | Mod: 26

## 2022-10-30 PROCEDURE — 70491 CT SOFT TISSUE NECK W/DYE: CPT | Mod: 26

## 2022-10-30 PROCEDURE — 99233 SBSQ HOSP IP/OBS HIGH 50: CPT

## 2022-10-30 PROCEDURE — 99232 SBSQ HOSP IP/OBS MODERATE 35: CPT

## 2022-10-30 PROCEDURE — 74176 CT ABD & PELVIS W/O CONTRAST: CPT | Mod: 26

## 2022-10-30 RX ORDER — VANCOMYCIN HCL 1 G
1000 VIAL (EA) INTRAVENOUS EVERY 12 HOURS
Refills: 0 | Status: DISCONTINUED | OUTPATIENT
Start: 2022-10-30 | End: 2022-11-01

## 2022-10-30 RX ORDER — VANCOMYCIN HCL 1 G
1000 VIAL (EA) INTRAVENOUS ONCE
Refills: 0 | Status: COMPLETED | OUTPATIENT
Start: 2022-10-30 | End: 2022-10-30

## 2022-10-30 RX ORDER — VANCOMYCIN HCL 1 G
VIAL (EA) INTRAVENOUS
Refills: 0 | Status: DISCONTINUED | OUTPATIENT
Start: 2022-10-30 | End: 2022-11-01

## 2022-10-30 RX ORDER — ACETAMINOPHEN 500 MG
1000 TABLET ORAL ONCE
Refills: 0 | Status: COMPLETED | OUTPATIENT
Start: 2022-10-30 | End: 2022-10-30

## 2022-10-30 RX ORDER — SODIUM CHLORIDE 9 MG/ML
1000 INJECTION, SOLUTION INTRAVENOUS
Refills: 0 | Status: DISCONTINUED | OUTPATIENT
Start: 2022-10-30 | End: 2022-10-31

## 2022-10-30 RX ADMIN — Medication 975 MILLIGRAM(S): at 13:39

## 2022-10-30 RX ADMIN — LOSARTAN POTASSIUM 50 MILLIGRAM(S): 100 TABLET, FILM COATED ORAL at 06:11

## 2022-10-30 RX ADMIN — Medication 975 MILLIGRAM(S): at 21:16

## 2022-10-30 RX ADMIN — SODIUM CHLORIDE 1 GRAM(S): 9 INJECTION INTRAMUSCULAR; INTRAVENOUS; SUBCUTANEOUS at 06:11

## 2022-10-30 RX ADMIN — Medication 975 MILLIGRAM(S): at 12:39

## 2022-10-30 RX ADMIN — OLANZAPINE 2.5 MILLIGRAM(S): 15 TABLET, FILM COATED ORAL at 17:02

## 2022-10-30 RX ADMIN — Medication 1: at 08:16

## 2022-10-30 RX ADMIN — CEFEPIME 100 MILLIGRAM(S): 1 INJECTION, POWDER, FOR SOLUTION INTRAMUSCULAR; INTRAVENOUS at 17:02

## 2022-10-30 RX ADMIN — Medication 400 MILLIGRAM(S): at 03:20

## 2022-10-30 RX ADMIN — ATORVASTATIN CALCIUM 40 MILLIGRAM(S): 80 TABLET, FILM COATED ORAL at 20:16

## 2022-10-30 RX ADMIN — ENOXAPARIN SODIUM 40 MILLIGRAM(S): 100 INJECTION SUBCUTANEOUS at 17:02

## 2022-10-30 RX ADMIN — Medication 975 MILLIGRAM(S): at 22:08

## 2022-10-30 RX ADMIN — Medication 975 MILLIGRAM(S): at 06:51

## 2022-10-30 RX ADMIN — TRAMADOL HYDROCHLORIDE 50 MILLIGRAM(S): 50 TABLET ORAL at 03:20

## 2022-10-30 RX ADMIN — SENNA PLUS 2 TABLET(S): 8.6 TABLET ORAL at 20:16

## 2022-10-30 RX ADMIN — Medication 250 MILLIGRAM(S): at 17:53

## 2022-10-30 RX ADMIN — Medication 250 MILLIGRAM(S): at 08:45

## 2022-10-30 RX ADMIN — Medication 975 MILLIGRAM(S): at 20:16

## 2022-10-30 RX ADMIN — CEFEPIME 100 MILLIGRAM(S): 1 INJECTION, POWDER, FOR SOLUTION INTRAMUSCULAR; INTRAVENOUS at 06:11

## 2022-10-30 RX ADMIN — AMLODIPINE BESYLATE 10 MILLIGRAM(S): 2.5 TABLET ORAL at 20:16

## 2022-10-30 RX ADMIN — Medication 81 MILLIGRAM(S): at 11:51

## 2022-10-30 RX ADMIN — Medication 975 MILLIGRAM(S): at 06:11

## 2022-10-30 RX ADMIN — SODIUM CHLORIDE 75 MILLILITER(S): 9 INJECTION, SOLUTION INTRAVENOUS at 10:28

## 2022-10-30 RX ADMIN — Medication 1000 MILLIGRAM(S): at 03:20

## 2022-10-30 RX ADMIN — TRAMADOL HYDROCHLORIDE 50 MILLIGRAM(S): 50 TABLET ORAL at 02:24

## 2022-10-30 NOTE — CONSULT NOTE ADULT - SUBJECTIVE AND OBJECTIVE BOX
PULMONARY CONSULT  Location of Patient : [facility] [address] [facility] ([facility])  Attending requesting Consult:Luan Mares  Chief Complaint :     Reason For consult :      Initial HPI on admission:  HPI:  This is a 83 YO male with PMH of HTN, HLD, DM II, BPH with complaint of neck pain. Patient endorses increasing neck pain, gait instability and clumsiness of the hands. He ambulates with a cane. He denies fever, chills, trauma or injury. He presents to [facility] on 10/18 for scheduled Posterior C1-6 Decompression, C4-5 Posterior Column Osteotomy, C1-C7 Fusion, Complex Plastics Closure. Course complicated by severe constipation now resolved, leukocytosis, hyponatremia- stable on salt tablet, fall, urinary retention requiring straight catheterization, intermittent confusion likely related to pain medication and hospital setting. Noted with  Left deltoid weakness. MR Cervical spine 10/26/22 without cord compression. Surgical drain removed 10/27/22. Patient was evaluated by PM&R and therapy for functional deficits, gait/ADL impairments and acute rehabilitation was recommended. Patient was medically optimized for discharge to [facility] IRU on 10/27/22.   (27 Oct 2022 13:00)      BRIEF HOSPITAL COURSE: ***    PAST MEDICAL & SURGICAL HISTORY:  Hypertension      Hyperlipidemia      Chronic kidney disease      Diabetes mellitus  -patient states prediabetes, not on medication      Benign prostate hyperplasia      COVID-19 virus infection  -dec 2021 (cough, fever, malaise)      History of carpal tunnel surgery  -bilateral hands      History of cataract surgery  -bilateral      H/O colonoscopy        Allergies    penicillin (Rash)    Intolerances      FAMILY HISTORY:    Social history: Social History:  Smoking - Denies  EtOH - Denies  Drugs - Denies    Marital status:     Patient lives daughter and spouse in a . Pt will be residing in basement after discharge, with 2 steps to enter. Pt states there is an additional 2 floors with 8-10 steps each.  PTA: Independent in ADLs and ambulation     CURRENT FUNCTIONAL STATUS  Date: 10/25  Bed Mobility: Min A, 1 person  Transfers: Mod A, 1 person  Gait: Min A, 1 person. 5ft from bed to chair (27 Oct 2022 13:00)       Smoking:     Drinking:     Drug use:    Review of Systems: as stated above    CONSTITUTIONAL: No fever, No chills, No fatigue  EYES: No eye pain, No visual disturbances, No discharge  ENMT:  No difficulty hearing, No tinnitus, No vertigo; No sinus or throat pain  NECK: No pain, No stiffness  RESPIRATORY: No Cough, No SOB, No Secretions  CARDIOVASCULAR: No chest pain, No palpitations, No dizziness, or No leg swelling  GASTROINTESTINAL: No abdominal or epigastric pain. No nausea, No vomiting, No hematemesis; No diarrhea, No constipation. No melena, No hematochezia.  GENITOURINARY: No dysuria, No frequency, No hematuria, No incontinence  NEUROLOGICAL: No headaches, No memory loss, No loss of strength, No numbness, No tremors  SKIN: No itching, No burning, No rashes, No lesions   MUSCULOSKELETAL: No joint pain or swelling; No muscle, back, No extremity pain  PSYCHIATRIC: No depression, No anxiety, No mood swings, No difficulty sleeping      Medications:  MEDICATIONS  (STANDING):  acetaminophen     Tablet .. 975 milliGRAM(s) Oral every 8 hours  amLODIPine   Tablet 10 milliGRAM(s) Oral at bedtime  aspirin  chewable 81 milliGRAM(s) Oral daily  atorvastatin 40 milliGRAM(s) Oral at bedtime  cefepime   IVPB      cefepime   IVPB 1000 milliGRAM(s) IV Intermittent every 12 hours  dextrose 5%. 1000 milliLiter(s) (50 mL/Hr) IV Continuous <Continuous>  dextrose 5%. 1000 milliLiter(s) (100 mL/Hr) IV Continuous <Continuous>  dextrose 50% Injectable 12.5 Gram(s) IV Push once  dextrose 50% Injectable 25 Gram(s) IV Push once  dextrose 50% Injectable 25 Gram(s) IV Push once  enoxaparin Injectable 40 milliGRAM(s) SubCutaneous <User Schedule>  glucagon  Injectable 1 milliGRAM(s) IntraMuscular once  insulin lispro (ADMELOG) corrective regimen sliding scale   SubCutaneous three times a day before meals  insulin lispro (ADMELOG) corrective regimen sliding scale   SubCutaneous at bedtime  losartan 50 milliGRAM(s) Oral daily  magnesium hydroxide Suspension 30 milliLiter(s) Oral every 12 hours  OLANZapine 2.5 milliGRAM(s) Oral <User Schedule>  polyethylene glycol 3350 17 Gram(s) Oral daily  senna 2 Tablet(s) Oral at bedtime  sodium chloride 0.45%. 1000 milliLiter(s) (75 mL/Hr) IV Continuous <Continuous>  vancomycin  IVPB      vancomycin  IVPB 1000 milliGRAM(s) IV Intermittent every 12 hours    MEDICATIONS  (PRN):  bisacodyl Suppository 10 milliGRAM(s) Rectal daily PRN Constipation  dextrose Oral Gel 15 Gram(s) Oral once PRN Blood Glucose LESS THAN 70 milliGRAM(s)/deciliter  OLANZapine Injectable 2.5 milliGRAM(s) IntraMuscular every 12 hours PRN severe agitation  traMADol 50 milliGRAM(s) Oral every 8 hours PRN Severe Pain (7 - 10)      Antibiotics History  cefepime   IVPB    , 10-29-22 @ 01:37  cefepime   IVPB 1000 milliGRAM(s) IV Intermittent once, 10-28-22 @ 22:58  cefepime   IVPB 1000 milliGRAM(s) IV Intermittent every 12 hours, 10-29-22 @ 06:00  vancomycin  IVPB    , 10-30-22 @ 08:45  vancomycin  IVPB 1000 milliGRAM(s) IV Intermittent once, 10-30-22 @ 07:38, Stop order after: 1 Doses  vancomycin  IVPB 1000 milliGRAM(s) IV Intermittent every 12 hours, 10-30-22 @ 18:00, Stop order after: 7 Days      Heme Medications   aspirin  chewable 81 milliGRAM(s) Oral daily, 10-28-22 @ 00:00  enoxaparin Injectable 40 milliGRAM(s) SubCutaneous <User Schedule>, 10-27-22 @ 17:14      GI Medications  bisacodyl Suppository 10 milliGRAM(s) Rectal daily, 10-27-22 @ 17:14, Routine PRN  magnesium hydroxide Suspension 30 milliLiter(s) Oral every 12 hours, 10-27-22 @ 17:14,   polyethylene glycol 3350 17 Gram(s) Oral daily, 10-27-22 @ 17:14,   senna 2 Tablet(s) Oral at bedtime, 10-27-22 @ 17:14, Routine        Home Medications:  Last Order Reconciliation Date: 10-27-22 @ 13:14 (Admission Reconciliation)  amLODIPine 10 mg oral tablet: 1 tab(s) orally once a day (at bedtime) (10-27-22 @ 13:39)  aspirin 81 mg oral tablet: 1 tab(s) orally once a day (10-27-22 @ 13:39)  atorvastatin 40 mg oral tablet: 1 tab(s) orally once a day (10-18-22 @ 07:26)  bisacodyl 10 mg rectal suppository: 1 suppository(ies) rectal once a day, As needed, Constipation (10-27-22 @ 13:39)  cyclobenzaprine 10 mg oral tablet: 1 tab(s) orally every 12 hours (10-27-22 @ 13:39)  enoxaparin: 40 milligram(s) subcutaneously once a day (10-27-22 @ 13:39)  losartan 50 mg oral tablet: 1 tab(s) orally once a day (10-27-22 @ 13:39)  oxyCODONE 10 mg oral tablet: 1 tab(s) orally every 6 hours, As Needed - for severe pain (10-27-22 @ 13:39)  polyethylene glycol 3350 oral powder for reconstitution: 17 gram(s) orally once a day (10-27-22 @ 13:39)  senna leaf extract oral tablet: 2 tab(s) orally once a day (at bedtime) (10-27-22 @ 13:39)  sodium chloride 1 g oral tablet: 1 tab(s) orally every 12 hours (10-27-22 @ 13:39)  Tylenol 500 mg oral tablet: 2 tab(s) orally every 6 hours, As Needed - for moderate pain (10-27-22 @ 13:39)      LABS:                        11.5   22.97 )-----------( 286      ( 30 Oct 2022 06:15 )             33.7     10-30    138  |  103  |  14  ----------------------------<  144<H>  3.6   |  28  |  1.15    Ca    8.5      30 Oct 2022 06:15    TPro  6.5  /  Alb  2.4<L>  /  TBili  0.7  /  DBili  x   /  AST  22  /  ALT  30  /  AlkPhos  110  10-30              Urinalysis Basic - ( 29 Oct 2022 01:06 )    Color: Yellow / Appearance: Clear / S.010 / pH: x  Gluc: x / Ketone: Negative  / Bili: Negative / Urobili: Negative   Blood: x / Protein: 30 mg/dL / Nitrite: Negative   Leuk Esterase: Small / RBC: 5-10 /HPF / WBC 6-10 /HPF   Sq Epi: x / Non Sq Epi: Neg.-Few / Bacteria: Moderate /HPF              CULTURES: (if applicable)    Culture - Blood (collected 10-29-22 @ 01:06)  Source: .Blood Blood  Preliminary Report (10-30-22 @ 05:00):    No growth to date.    Culture - Blood (collected 10-29-22 @ 01:06)  Source: .Blood Blood  Preliminary Report (10-30-22 @ 05:00):    No growth to date.            CAPILLARY BLOOD GLUCOSE      POCT Blood Glucose.: 145 mg/dL (30 Oct 2022 11:50)      RADIOLOGY  CXR:      CT:  < from: CT Chest No Cont (10.30.22 @ 10:05) >    ACC: [id number] EXAM:  CT ABDOMEN AND PELVIS                        ACC: [id number] EXAM:  CT CHEST                          PROCEDURE DATE:  10/30/2022          INTERPRETATION:  CLINICAL INFORMATION: 82-year-old male patient with   fevers    COMPARISON: CT chest abdomen pelvis 3/21/2022.    CONTRAST/COMPLICATIONS:  IV Contrast: NONE  90 cc administered   10 cc discarded  Oral Contrast: NONE  Complications: None reported at time of study completion    PROCEDURE:  CT of the Chest, Abdomen and Pelviswas performed.  Sagittal and coronal reformats were performed.    FINDINGS:  CHEST:  LUNGS AND LARGE AIRWAYS: Patent central airways. No pulmonary nodules. Left lower lobe consolidative airspace opacity with persistent air bronchogram. Additionallinear atelectasis in the left lingula segment. Subsegmental groundglass opacity in the right posterior medial upper lobe.  PLEURA: No pleural effusion.  VESSELS: Normal caliber thoracic aorta and pulmonary trunk. Moderate atherosclerotic changesin the aortic arch. Coronary artery calcifications.  HEART: Heart size is normal. No pericardial effusion.  MEDIASTINUM AND LUIS M: No lymphadenopathy. Subcentimeter mediastinal nodes.  CHEST WALL AND LOWER NECK: Normal appearance of the thyroid gland.    ABDOMEN AND PELVIS:  LIVER: Within normal limits.  BILE DUCTS: Normal caliber.  GALLBLADDER: Cholelithiasis.  SPLEEN: Within normal limits.  PANCREAS: Mild pancreatic atrophy. No ductal dilatation. Periampullary diverticulum..  ADRENALS: Within normal limits.  KIDNEYS/URETERS: No renal stones or hydronephrosis.    BLADDER: The bladder is decompressed around a Cordova catheter.  REPRODUCTIVE ORGANS: Prostate within normal limits.    BOWEL: Stomach, small and large bowel are grossly within normal limits given the lack of IV and oral contrast. No bowel obstruction. Appendix is normal. Extensive sigmoid colonic diverticulosis without acute diverticulitis.  PERITONEUM: No ascites.  VESSELS: Atherosclerotic changes.  RETROPERITONEUM/LYMPH NODES: No lymphadenopathy.  ABDOMINAL WALL: Left fat-containing inguinal hernia.  BONES: Degenerative changes. No vertebral body compression deformity.    IMPRESSION:  Airspace opacity and consolidation in the left lower lobe and partially lingulasegment concerning for multifocal pneumonia in the appropriate clinical context. No pleural effusions.  No acute abdominopelvic pathology.  Noncomplicated colonic diverticulosis.  --- End of Report ---    < end of copied text >    ECHO:  < from: Transthoracic Echocardiogram (22 @ 07:06) >  CONCLUSIONS:  1. Calcified aortic valve with normal opening.  2. Normal left ventricular systolic function. No segmental  wall motion abnormalities.  3. Normal right ventricular size and function.  4. Agitated saline injection is inconclusive regarding the  presence  of a patent foramen ovale.    < end of copied text >      VITALS:  T(C): 36.4 (10-30-22 @ 12:42), Max: 39.6 (10-29-22 @ 18:39)  T(F): 97.6 (10-30-22 @ 12:42), Max: 103.3 (10-29-22 @ 18:39)  HR: 99 (10-30-22 @ 12:42) (80 - 114)  BP: 131/80 (10-30-22 @ 12:42) (131/80 - 153/72)  BP(mean): --  ABP: --  ABP(mean): --  RR: 16 (10-30-22 @ 12:42) (16 - 16)  SpO2: 92% (10-30-22 @ 12:42) (92% - 92%)  CVP(mm Hg): --  CVP(cm H2O): --    Ins and Outs     10-29-22 @ 07:01  -  10-30-22 @ 07:00  --------------------------------------------------------  IN: 660 mL / OUT: 1300 mL / NET: -640 mL    10-30-22 @ 07:01  -  10-30-22 @ 13:50  --------------------------------------------------------  IN: 0 mL / OUT: 1480 mL / NET: -1480 mL        Height (cm): 165.1 (10-27-22 @ 17:06)  Weight (kg): 84.6 (10-27-22 @ 17:06)  BMI (kg/m2): 31 (10-27-22 @ 17:06)        I&O's Detail    29 Oct 2022 07:01  -  30 Oct 2022 07:00  --------------------------------------------------------  IN:    sodium chloride 0.45%: 660 mL  Total IN: 660 mL    OUT:    Voided (mL): 1300 mL  Total OUT: 1300 mL    Total NET: -640 mL      30 Oct 2022 07:01  -  30 Oct 2022 13:50  --------------------------------------------------------  IN:  Total IN: 0 mL    OUT:    Indwelling Catheter - Urethral (mL): 1480 mL  Total OUT: 1480 mL    Total NET: -1480 mL          Physical Examination:  GENERAL:               Lethargic ,  No acute distress.    HEENT:                     No JVD, Moist MM  PULM:                     Bilateral air entry, Clear to auscultation bilaterally, no significant sputum production, trace Rales, No Rhonchi, No Wheezing  CVS:                         S1, S2,  No Murmur  ABD:                        Soft, nondistended, nontender, normoactive bowel sounds,   EXT:                         No edema, nontender, No Cyanosis or Clubbing   Vascular:                Warm Extremities, Normal Capillary refill, Normal Distal Pulses  SKIN:                       Warm and well perfused, no rashes noted.   NEURO:                  Lethargic, Confused, interactive, nonfocal, follows commands  PSYC:                      Calm, limited  Insight.     PULMONARY CONSULT  Location of Patient : [facility] [address] ([facility])  Attending requesting Consult: Dr Martinez    Initial HPI on admission:  HPI:  This is a 83 YO male with PMH of HTN, HLD, DM II, BPH with complaint of neck pain. Patient endorses increasing neck pain, gait instability and clumsiness of the hands. He ambulates with a cane. He denies fever, chills, trauma or injury. He presents to [facility] on 10/18 for scheduled Posterior C1-6 Decompression, C4-5 Posterior Column Osteotomy, C1-C7 Fusion, Complex Plastics Closure. Course complicated by severe constipation now resolved, leukocytosis, hyponatremia- stable on salt tablet, fall, urinary retention requiring straight catheterization, intermittent confusion likely related to pain medication and hospital setting. Noted with  Left deltoid weakness. MR Cervical spine 10/26/22 without cord compression. Surgical drain removed 10/27/22. Patient was evaluated by PM&R and therapy for functional deficits, gait/ADL impairments and acute rehabilitation was recommended. Patient was medically optimized for discharge to [facility]U on 10/27/22.   (27 Oct 2022 13:00)      BRIEF HOSPITAL COURSE:   While in rehab Had Fever up to 103  CT chest shows potential pneumonia and pulmonary consult called       patient is sleepy and answers simple questions, but unable to provide full history or ROS>     PAST MEDICAL & SURGICAL HISTORY:  Hypertension  Hyperlipidemia  Chronic kidney disease  Diabetes mellitus-patient states prediabetes, not on medication  Benign prostate hyperplasia  COVID-19 virus infection-dec 2021 (cough, fever, malaise)  History of carpal tunnel surgery-bilateral hands  History of cataract surgery-bilateral  H/O colonoscopy        Allergies    penicillin (Rash)    Intolerances      FAMILY HISTORY:    Social history: Social History:  Smoking - Denies  EtOH - Denies  Drugs - Denies    Marital status:     Patient lives daughter and spouse in a PH. Pt will be residing in basement after discharge, with 2 steps to enter. Pt states there is an additional 2 floors with 8-10 steps each.  PTA: Independent in ADLs and ambulation     CURRENT FUNCTIONAL STATUS  Date: 10/25  Bed Mobility: Min A, 1 person  Transfers: Mod A, 1 person  Gait: Min A, 1 person. 5ft from bed to chair (27 Oct 2022 13:00)    Due to current medical status patient unable to provide medical, social, family history.  History obtained from available medical record.     Medications:  MEDICATIONS  (STANDING):  acetaminophen     Tablet .. 975 milliGRAM(s) Oral every 8 hours  amLODIPine   Tablet 10 milliGRAM(s) Oral at bedtime  aspirin  chewable 81 milliGRAM(s) Oral daily  atorvastatin 40 milliGRAM(s) Oral at bedtime  cefepime   IVPB      cefepime   IVPB 1000 milliGRAM(s) IV Intermittent every 12 hours  dextrose 5%. 1000 milliLiter(s) (50 mL/Hr) IV Continuous <Continuous>  dextrose 5%. 1000 milliLiter(s) (100 mL/Hr) IV Continuous <Continuous>  dextrose 50% Injectable 12.5 Gram(s) IV Push once  dextrose 50% Injectable 25 Gram(s) IV Push once  dextrose 50% Injectable 25 Gram(s) IV Push once  enoxaparin Injectable 40 milliGRAM(s) SubCutaneous <User Schedule>  glucagon  Injectable 1 milliGRAM(s) IntraMuscular once  insulin lispro (ADMELOG) corrective regimen sliding scale   SubCutaneous three times a day before meals  insulin lispro (ADMELOG) corrective regimen sliding scale   SubCutaneous at bedtime  losartan 50 milliGRAM(s) Oral daily  magnesium hydroxide Suspension 30 milliLiter(s) Oral every 12 hours  OLANZapine 2.5 milliGRAM(s) Oral <User Schedule>  polyethylene glycol 3350 17 Gram(s) Oral daily  senna 2 Tablet(s) Oral at bedtime  sodium chloride 0.45%. 1000 milliLiter(s) (75 mL/Hr) IV Continuous <Continuous>  vancomycin  IVPB      vancomycin  IVPB 1000 milliGRAM(s) IV Intermittent every 12 hours    MEDICATIONS  (PRN):  bisacodyl Suppository 10 milliGRAM(s) Rectal daily PRN Constipation  dextrose Oral Gel 15 Gram(s) Oral once PRN Blood Glucose LESS THAN 70 milliGRAM(s)/deciliter  OLANZapine Injectable 2.5 milliGRAM(s) IntraMuscular every 12 hours PRN severe agitation  traMADol 50 milliGRAM(s) Oral every 8 hours PRN Severe Pain (7 - 10)      Antibiotics History  cefepime   IVPB    , 10-29-22 @ 01:37  cefepime   IVPB 1000 milliGRAM(s) IV Intermittent once, 10-28-22 @ 22:58  cefepime   IVPB 1000 milliGRAM(s) IV Intermittent every 12 hours, 10-29-22 @ 06:00  vancomycin  IVPB    , 10-30-22 @ 08:45  vancomycin  IVPB 1000 milliGRAM(s) IV Intermittent once, 10-30-22 @ 07:38, Stop order after: 1 Doses  vancomycin  IVPB 1000 milliGRAM(s) IV Intermittent every 12 hours, 10-30-22 @ 18:00, Stop order after: 7 Days      Heme Medications   aspirin  chewable 81 milliGRAM(s) Oral daily, 10-28-22 @ 00:00  enoxaparin Injectable 40 milliGRAM(s) SubCutaneous <User Schedule>, 10-27-22 @ 17:14      GI Medications  bisacodyl Suppository 10 milliGRAM(s) Rectal daily, 10-27-22 @ 17:14, Routine PRN  magnesium hydroxide Suspension 30 milliLiter(s) Oral every 12 hours, 10-27-22 @ 17:14,   polyethylene glycol 3350 17 Gram(s) Oral daily, 10-27-22 @ 17:14,   senna 2 Tablet(s) Oral at bedtime, 10-27-22 @ 17:14, Routine        Home Medications:  Last Order Reconciliation Date: 10-27-22 @ 13:14 (Admission Reconciliation)  amLODIPine 10 mg oral tablet: 1 tab(s) orally once a day (at bedtime) (10-27-22 @ 13:39)  aspirin 81 mg oral tablet: 1 tab(s) orally once a day (10-27-22 @ 13:39)  atorvastatin 40 mg oral tablet: 1 tab(s) orally once a day (10-18-22 @ 07:26)  bisacodyl 10 mg rectal suppository: 1 suppository(ies) rectal once a day, As needed, Constipation (10-27-22 @ 13:39)  cyclobenzaprine 10 mg oral tablet: 1 tab(s) orally every 12 hours (10-27-22 @ 13:39)  enoxaparin: 40 milligram(s) subcutaneously once a day (10-27-22 @ 13:39)  losartan 50 mg oral tablet: 1 tab(s) orally once a day (10-27-22 @ 13:39)  oxyCODONE 10 mg oral tablet: 1 tab(s) orally every 6 hours, As Needed - for severe pain (10-27-22 @ 13:39)  polyethylene glycol 3350 oral powder for reconstitution: 17 gram(s) orally once a day (10-27-22 @ 13:39)  senna leaf extract oral tablet: 2 tab(s) orally once a day (at bedtime) (10-27-22 @ 13:39)  sodium chloride 1 g oral tablet: 1 tab(s) orally every 12 hours (10-27-22 @ 13:39)  Tylenol 500 mg oral tablet: 2 tab(s) orally every 6 hours, As Needed - for moderate pain (10-27-22 @ 13:39)      LABS:                        11.5   22.97 )-----------( 286      ( 30 Oct 2022 06:15 )             33.7     10-30    138  |  103  |  14  ----------------------------<  144<H>  3.6   |  28  |  1.15    Ca    8.5      30 Oct 2022 06:15    TPro  6.5  /  Alb  2.4<L>  /  TBili  0.7  /  DBili  x   /  AST  22  /  ALT  30  /  AlkPhos  110  1030              Urinalysis Basic - ( 29 Oct 2022 01:06 )    Color: Yellow / Appearance: Clear / S.010 / pH: x  Gluc: x / Ketone: Negative  / Bili: Negative / Urobili: Negative   Blood: x / Protein: 30 mg/dL / Nitrite: Negative   Leuk Esterase: Small / RBC: 5-10 /HPF / WBC 6-10 /HPF   Sq Epi: x / Non Sq Epi: Neg.-Few / Bacteria: Moderate /HPF              CULTURES: (if applicable)    Culture - Blood (collected 10-29-22 @ 01:06)  Source: .Blood Blood  Preliminary Report (10-30-22 @ 05:00):    No growth to date.    Culture - Blood (collected 10-29-22 @ 01:06)  Source: .Blood Blood  Preliminary Report (10-30-22 @ 05:00):    No growth to date.            CAPILLARY BLOOD GLUCOSE      POCT Blood Glucose.: 145 mg/dL (30 Oct 2022 11:50)      RADIOLOGY  CXR:      CT:  < from: CT Chest No Cont (10.30.22 @ 10:05) >    ACC: [id number] EXAM:  CT ABDOMEN AND PELVIS                        ACC: [id number] EXAM:  CT CHEST                          PROCEDURE DATE:  10/30/2022          INTERPRETATION:  CLINICAL INFORMATION: 82-year-old male patient with   fevers    COMPARISON: CT chest abdomen pelvis 3/21/2022.    CONTRAST/COMPLICATIONS:  IV Contrast: NONE  90 cc administered   10 cc discarded  Oral Contrast: NONE  Complications: None reported at time of study completion    PROCEDURE:  CT of the Chest, Abdomen and Pelviswas performed.  Sagittal and coronal reformats were performed.    FINDINGS:  CHEST:  LUNGS AND LARGE AIRWAYS: Patent central airways. No pulmonary nodules. Left lower lobe consolidative airspace opacity with persistent air bronchogram. Additionallinear atelectasis in the left lingula segment. Subsegmental groundglass opacity in the right posterior medial upper lobe.  PLEURA: No pleural effusion.  VESSELS: Normal caliber thoracic aorta and pulmonary trunk. Moderate atherosclerotic changesin the aortic arch. Coronary artery calcifications.  HEART: Heart size is normal. No pericardial effusion.  MEDIASTINUM AND LUIS M: No lymphadenopathy. Subcentimeter mediastinal nodes.  CHEST WALL AND LOWER NECK: Normal appearance of the thyroid gland.    ABDOMEN AND PELVIS:  LIVER: Within normal limits.  BILE DUCTS: Normal caliber.  GALLBLADDER: Cholelithiasis.  SPLEEN: Within normal limits.  PANCREAS: Mild pancreatic atrophy. No ductal dilatation. Periampullary diverticulum..  ADRENALS: Within normal limits.  KIDNEYS/URETERS: No renal stones or hydronephrosis.    BLADDER: The bladder is decompressed around a Cordova catheter.  REPRODUCTIVE ORGANS: Prostate within normal limits.    BOWEL: Stomach, small and large bowel are grossly within normal limits given the lack of IV and oral contrast. No bowel obstruction. Appendix is normal. Extensive sigmoid colonic diverticulosis without acute diverticulitis.  PERITONEUM: No ascites.  VESSELS: Atherosclerotic changes.  RETROPERITONEUM/LYMPH NODES: No lymphadenopathy.  ABDOMINAL WALL: Left fat-containing inguinal hernia.  BONES: Degenerative changes. No vertebral body compression deformity.    IMPRESSION:  Airspace opacity and consolidation in the left lower lobe and partially lingulasegment concerning for multifocal pneumonia in the appropriate clinical context. No pleural effusions.  No acute abdominopelvic pathology.  Noncomplicated colonic diverticulosis.  --- End of Report ---    < end of copied text >    ECHO:  < from: Transthoracic Echocardiogram (22 @ 07:06) >  CONCLUSIONS:  1. Calcified aortic valve with normal opening.  2. Normal left ventricular systolic function. No segmental  wall motion abnormalities.  3. Normal right ventricular size and function.  4. Agitated saline injection is inconclusive regarding the  presence  of a patent foramen ovale.    < end of copied text >      VITALS:  T(C): 36.4 (10-30-22 @ 12:42), Max: 39.6 (10-29-22 @ 18:39)  T(F): 97.6 (10-30-22 @ 12:42), Max: 103.3 (10-29-22 @ 18:39)  HR: 99 (10-30-22 @ 12:) (80 - 114)  BP: 131/80 (10-30-22 @ 12:42) (131/80 - 153/72)  BP(mean): --  ABP: --  ABP(mean): --  RR: 16 (10-30-22 @ 12:42) (16 - 16)  SpO2: 92% (10-30-22 @ 12:42) (92% - 92%)  CVP(mm Hg): --  CVP(cm H2O): --    Ins and Outs     10-29-22 @ 07:01  -  10-30-22 @ 07:00  --------------------------------------------------------  IN: 660 mL / OUT: 1300 mL / NET: -640 mL    10-30-22 @ 07:01  -  10-30-22 @ 13:50  --------------------------------------------------------  IN: 0 mL / OUT: 1480 mL / NET: -1480 mL        Height (cm): 165.1 (10-27-22 @ 17:06)  Weight (kg): 84.6 (10-27-22 @ 17:06)  BMI (kg/m2): 31 (10-27-22 @ 17:06)        I&O's Detail    29 Oct 2022 07:01  -  30 Oct 2022 07:00  --------------------------------------------------------  IN:    sodium chloride 0.45%: 660 mL  Total IN: 660 mL    OUT:    Voided (mL): 1300 mL  Total OUT: 1300 mL    Total NET: -640 mL      30 Oct 2022 07:01  -  30 Oct 2022 13:50  --------------------------------------------------------  IN:  Total IN: 0 mL    OUT:    Indwelling Catheter - Urethral (mL): 1480 mL  Total OUT: 1480 mL    Total NET: -1480 mL          Physical Examination:  GENERAL:               Lethargic ,  No acute distress.    HEENT:                     No JVD, Moist MM  PULM:                     Bilateral air entry, Clear to auscultation bilaterally, no significant sputum production, trace Rales, No Rhonchi, No Wheezing  CVS:                         S1, S2,  No Murmur  ABD:                        Soft, nondistended, nontender, normoactive bowel sounds,   EXT:                         No edema, nontender, No Cyanosis or Clubbing   Vascular:                Warm Extremities, Normal Capillary refill, Normal Distal Pulses  SKIN:                       Warm and well perfused, no rashes noted.   NEURO:                  Lethargic, Confused, interactive, nonfocal, follows commands  PSYC:                      Calm, limited  [employer].

## 2022-10-30 NOTE — PROGRESS NOTE ADULT - SUBJECTIVE AND OBJECTIVE BOX
CC: f/u for fever    Patient reports: he is sleepy at this time. Events reviewed with RNN.He was doing well yesterday, calm when family present and conversant.He was febrile to 103 last night, up all night, restless, just dozed off to sleep    REVIEW OF SYSTEMS:  All other review of systems negative (Comprehensive ROS)    Antimicrobials Day #  :day 2  cefepime   IVPB      cefepime   IVPB 1000 milliGRAM(s) IV Intermittent every 12 hours  vancomycin  IVPB      vancomycin  IVPB 1000 milliGRAM(s) IV Intermittent every 12 hours    Other Medications Reviewed  MEDICATIONS  (STANDING):  acetaminophen     Tablet .. 975 milliGRAM(s) Oral every 8 hours  amLODIPine   Tablet 10 milliGRAM(s) Oral at bedtime  aspirin  chewable 81 milliGRAM(s) Oral daily  atorvastatin 40 milliGRAM(s) Oral at bedtime  cefepime   IVPB      cefepime   IVPB 1000 milliGRAM(s) IV Intermittent every 12 hours  dextrose 5%. 1000 milliLiter(s) (100 mL/Hr) IV Continuous <Continuous>  dextrose 5%. 1000 milliLiter(s) (50 mL/Hr) IV Continuous <Continuous>  dextrose 50% Injectable 25 Gram(s) IV Push once  dextrose 50% Injectable 12.5 Gram(s) IV Push once  dextrose 50% Injectable 25 Gram(s) IV Push once  enoxaparin Injectable 40 milliGRAM(s) SubCutaneous <User Schedule>  glucagon  Injectable 1 milliGRAM(s) IntraMuscular once  insulin lispro (ADMELOG) corrective regimen sliding scale   SubCutaneous three times a day before meals  insulin lispro (ADMELOG) corrective regimen sliding scale   SubCutaneous at bedtime  losartan 50 milliGRAM(s) Oral daily  magnesium hydroxide Suspension 30 milliLiter(s) Oral every 12 hours  OLANZapine 2.5 milliGRAM(s) Oral <User Schedule>  polyethylene glycol 3350 17 Gram(s) Oral daily  senna 2 Tablet(s) Oral at bedtime  sodium chloride 0.45%. 1000 milliLiter(s) (75 mL/Hr) IV Continuous <Continuous>  vancomycin  IVPB      vancomycin  IVPB 1000 milliGRAM(s) IV Intermittent every 12 hours    T(F): 98.2 (10-30-22 @ 08:01), Max: 103.3 (10-29-22 @ 18:39)  HR: 80 (10-30-22 @ 08:01)  BP: 133/87 (10-30-22 @ 08:01)  RR: 16 (10-30-22 @ 08:01)  SpO2: 92% (10-30-22 @ 08:01)  Wt(kg): --    PHYSICAL EXAM:  General: sleepy, no acute distress  Eyes:  anicteric, no conjunctival injection, no discharge  Oropharynx: no lesions or injection 	  Neck: supple, posterior incision intact without erythema or drainage  Lungs: clear to auscultation  Heart: regular rate and rhythm; no murmur, rubs or gallops  Abdomen: soft, nondistended, nontender, without mass or organomegaly  Skin: no lesions  Extremities: no clubbing, cyanosis, or edema  Neurologic: sleepy, moves all extremities   garay  LAB RESULTS:                        11.5   22.97 )-----------( 286      ( 30 Oct 2022 06:15 )             33.7     10-30    138  |  103  |  14  ----------------------------<  144<H>  3.6   |  28  |  1.15    Ca    8.5      30 Oct 2022 06:15    TPro  6.5  /  Alb  2.4<L>  /  TBili  0.7  /  DBili  x   /  AST  22  /  ALT  30  /  AlkPhos  110  1030    LIVER FUNCTIONS - ( 30 Oct 2022 06:15 )  Alb: 2.4 g/dL / Pro: 6.5 g/dL / ALK PHOS: 110 U/L / ALT: 30 U/L / AST: 22 U/L / GGT: x           Urinalysis Basic - ( 29 Oct 2022 01:06 )    Color: Yellow / Appearance: Clear / S.010 / pH: x  Gluc: x / Ketone: Negative  / Bili: Negative / Urobili: Negative   Blood: x / Protein: 30 mg/dL / Nitrite: Negative   Leuk Esterase: Small / RBC: 5-10 /HPF / WBC 6-10 /HPF   Sq Epi: x / Non Sq Epi: Neg.-Few / Bacteria: Moderate /HPF      MICROBIOLOGY:  RECENT CULTURES:  10-29 @ 01:06 .Blood Blood     No growth to date.          RADIOLOGY REVIEWED:  < from: CT Neck Soft Tissue w/ IV Cont (10.30.22 @ 10:09) >  IMPRESSION:  Postop changes as described similar in appearance as   suggested on the prior MR study 10/26/2022 without definitive evidence of   abscess. Status post laminectomy with posterior fusion. No hardware   malalignment or loosening on no enhancing collection appreciated    --- End of Report -    < end of copied text >  < from: CT Abdomen and Pelvis No Cont (10.30.22 @ 10:08) >  IMPRESSION:  Airspace opacity and consolidation in the left lower lobe and partially   lingulasegment concerning for multifocal pneumonia in the appropriate   clinical context. No pleural effusions.    No acute abdominopelvic pathology.    Noncomplicated colonic diverticulosis.  < from: Xray Chest 1 View- PORTABLE-Urgent (Xray Chest 1 View- PORTABLE-Urgent .) (10.28.22 @ 23:20) >  INTERPRETATION:  HISTORY:  Admitting Dxs: Z98.1 ARTHRODESIS STATUS; fever;  TECHNIQUE: Portable frontal view of the chest, 1 view.  COMPARISON 2022.  FINDINGS/  IMPRESSION:    HEART:difficult to access in this projection  LUNGS: There is a new left basilar infiltrate. Unclear whether this   represents atelectasis or pneumonia, correlate clinically. The right lung   is clear  BONES: There is hardware in the cervical spine      --- End of Report ---    < end of copied text >

## 2022-10-30 NOTE — PROGRESS NOTE ADULT - SUBJECTIVE AND OBJECTIVE BOX
Patient is a 82y old  Male who presents with a chief complaint of Cervical Laminectomy (29 Oct 2022 11:13)    [place] telephone  Yessica ID#[id number]  Patient seen and examined at bedside with MARILEE Yen. Febrile to 103.3 rectally overnight, reported agitation overnight. Denies pain at surgical site. Reported 1 semi-formed BM yesterday. Denies cough, shortness of breath.     ALLERGIES:  penicillin (Rash)    MEDICATIONS  (STANDING):  acetaminophen     Tablet .. 975 milliGRAM(s) Oral every 8 hours  amLODIPine   Tablet 10 milliGRAM(s) Oral at bedtime  aspirin  chewable 81 milliGRAM(s) Oral daily  atorvastatin 40 milliGRAM(s) Oral at bedtime  cefepime   IVPB      cefepime   IVPB 1000 milliGRAM(s) IV Intermittent every 12 hours  dextrose 5%. 1000 milliLiter(s) (100 mL/Hr) IV Continuous <Continuous>  dextrose 5%. 1000 milliLiter(s) (50 mL/Hr) IV Continuous <Continuous>  dextrose 50% Injectable 25 Gram(s) IV Push once  dextrose 50% Injectable 12.5 Gram(s) IV Push once  dextrose 50% Injectable 25 Gram(s) IV Push once  enoxaparin Injectable 40 milliGRAM(s) SubCutaneous <User Schedule>  glucagon  Injectable 1 milliGRAM(s) IntraMuscular once  insulin lispro (ADMELOG) corrective regimen sliding scale   SubCutaneous three times a day before meals  insulin lispro (ADMELOG) corrective regimen sliding scale   SubCutaneous at bedtime  losartan 50 milliGRAM(s) Oral daily  magnesium hydroxide Suspension 30 milliLiter(s) Oral every 12 hours  OLANZapine 2.5 milliGRAM(s) Oral <User Schedule>  polyethylene glycol 3350 17 Gram(s) Oral daily  senna 2 Tablet(s) Oral at bedtime  sodium chloride 1 Gram(s) Oral every 8 hours  sodium chloride 0.45%. 1000 milliLiter(s) (75 mL/Hr) IV Continuous <Continuous>  vancomycin  IVPB      vancomycin  IVPB 1000 milliGRAM(s) IV Intermittent every 12 hours    MEDICATIONS  (PRN):  bisacodyl Suppository 10 milliGRAM(s) Rectal daily PRN Constipation  dextrose Oral Gel 15 Gram(s) Oral once PRN Blood Glucose LESS THAN 70 milliGRAM(s)/deciliter  OLANZapine Injectable 2.5 milliGRAM(s) IntraMuscular every 12 hours PRN severe agitation  traMADol 50 milliGRAM(s) Oral every 8 hours PRN Severe Pain (7 - 10)    Vital Signs Last 24 Hrs  T(F): 98.2 (30 Oct 2022 08:01), Max: 103.3 (29 Oct 2022 18:39)  HR: 80 (30 Oct 2022 08:01) (80 - 114)  BP: 133/87 (30 Oct 2022 08:01) (133/87 - 153/72)  RR: 16 (30 Oct 2022 08:01) (16 - 16)  SpO2: 92% (30 Oct 2022 08:01) (92% - 92%)  I&O's Summary    29 Oct 2022 07:01  -  30 Oct 2022 07:00  --------------------------------------------------------  IN: 660 mL / OUT: 1300 mL / NET: -640 mL    BMI (kg/m2): 31 (10-27- @ 17:06)    PHYSICAL EXAM:  General: NAD, awake, alert  ENT: MMM, no tonsilar exudate  Neck: Supple, No JVD. posterior surgical incision site sutures c/d/i   Lungs: Clear to auscultation bilaterally, no wheezes. Good air entry bilaterally   Cardio: RRR, S1/S2, No murmurs  Abdomen: Soft, Nontender, Nondistended; Bowel sounds present  : [place] in place, yellow urine   Extremities: No calf tenderness, No pitting edema    LABS:                        11.5   22.97 )-----------( 286      ( 30 Oct 2022 06:15 )             33.7       10-30    138  |  103  |  14  ----------------------------<  144  3.6   |  28  |  1.15    Ca    8.5      30 Oct 2022 06:15    TPro  6.5  /  Alb  2.4  /  TBili  0.7  /  DBili  x   /  AST  22  /  ALT  30  /  AlkPhos  110  10-30     Lactate, Blood: 0.8 mmol/L (10-29 @ 01:06)    POCT Blood Glucose.: 157 mg/dL (30 Oct 2022 08:05)  POCT Blood Glucose.: 151 mg/dL (29 Oct 2022 22:26)  POCT Blood Glucose.: 226 mg/dL (29 Oct 2022 16:38)  POCT Blood Glucose.: 177 mg/dL (29 Oct 2022 12:23)    Urinalysis Basic - ( 29 Oct 2022 01:06 )    Color: Yellow / Appearance: Clear / S.010 / pH: x  Gluc: x / Ketone: Negative  / Bili: Negative / Urobili: Negative   Blood: x / Protein: 30 mg/dL / Nitrite: Negative   Leuk Esterase: Small / RBC: 5-10 /HPF / WBC 6-10 /HPF   Sq Epi: x / Non Sq Epi: Neg.-Few / Bacteria: Moderate /HPF    Culture - Blood (collected 29 Oct 2022 01:06)  Source: .Blood Blood  Preliminary Report (30 Oct 2022 05:00):    No growth to date.    Culture - Blood (collected 29 Oct 2022 01:06)  Source: .Blood Blood  Preliminary Report (30 Oct 2022 05:00):    No growth to date.    COVID-19 PCR: NotDetec (10-27-22 @ 23:11)  COVID-19 PCR: NotDetec (10-24-22 @ 07:47)  COVID-19 PCR: NotDetec (10-15-22 @ 13:40)    RADIOLOGY & ADDITIONAL TESTS:     Care Discussed with Consultants/Other Providers: d/w Dr. Morales, RN Yovana

## 2022-10-30 NOTE — PROGRESS NOTE ADULT - ASSESSMENT
Patient is a 82y male with a past history of HTN,T2DM,HLD,BPH, and chronic neck pain who was admitted to [facility] on 10/18 for elective neck surgery.  He underwent  a posterior C1-C6 decompression, C4-5 posterior column osteotomy, and C10C7 fusion with PRS closure.  His post op course was complicated  by constipation, intermittent confusion felt to be narcotic related, and urinary retention requiring ISC.He was felt to have left deltoid weakness, his drain was removed 10/27, and he was sent to rehab on 10/27.  He had a fever to 101.2 last night(10/28), required placement of a garay catheter, and was started on cefepime.He was up all night and is sleepy. History is via an , he has no specific complaints but nods off to sleep.  Etiology of fever not clear. It could be  as in setting of retention can develop systemic infection even if urine is not purulent.  His spine incision appears to be without evidence of infection.  His CXR seems to show elevated diaphragm on the left, ? atelectasis vs artifact. CXR on 10/29 officially read as atelectasis vs infiltrate.  He spike to 103, vanco had been added,  CT of neck with post op changes, CT of C/A/P with left sided infiltrates.He has garay for retention. Blood cultures are negative so far, urine culture is pending.  Hence , pneumonia may be present along with possible  infection  Suggest;  1.Cefepime/vanco pending cultures  2 MRSA nasal swab and urine for legionella antigen  3.I would send a urine culture even with minimal pyuria as he has required a garay for retention.This is pending  4.Additional w/u pending course.

## 2022-10-30 NOTE — PROGRESS NOTE ADULT - SUBJECTIVE AND OBJECTIVE BOX
Cc: Gait dysfunction     HPI: febrile overnight, patient oriented x 3 but more sleepy, did not sleep well per nurse.  CT pending.  abx adjusted per ID,  elevated WBC.  1 bm overnight per nurse.  Pain controlled, no chest pain, no N/V. No other new ROS  Has been tolerating rehabilitation program.    acetaminophen     Tablet .. 975 milliGRAM(s) Oral every 8 hours  amLODIPine   Tablet 10 milliGRAM(s) Oral at bedtime  aspirin  chewable 81 milliGRAM(s) Oral daily  atorvastatin 40 milliGRAM(s) Oral at bedtime  bisacodyl Suppository 10 milliGRAM(s) Rectal daily PRN  cefepime   IVPB      cefepime   IVPB 1000 milliGRAM(s) IV Intermittent every 12 hours  dextrose 5%. 1000 milliLiter(s) IV Continuous <Continuous>  dextrose 5%. 1000 milliLiter(s) IV Continuous <Continuous>  dextrose 50% Injectable 25 Gram(s) IV Push once  dextrose 50% Injectable 12.5 Gram(s) IV Push once  dextrose 50% Injectable 25 Gram(s) IV Push once  dextrose Oral Gel 15 Gram(s) Oral once PRN  enoxaparin Injectable 40 milliGRAM(s) SubCutaneous <User Schedule>  glucagon  Injectable 1 milliGRAM(s) IntraMuscular once  insulin lispro (ADMELOG) corrective regimen sliding scale   SubCutaneous three times a day before meals  insulin lispro (ADMELOG) corrective regimen sliding scale   SubCutaneous at bedtime  losartan 50 milliGRAM(s) Oral daily  magnesium hydroxide Suspension 30 milliLiter(s) Oral every 12 hours  OLANZapine 2.5 milliGRAM(s) Oral <User Schedule>  OLANZapine Injectable 2.5 milliGRAM(s) IntraMuscular every 12 hours PRN  polyethylene glycol 3350 17 Gram(s) Oral daily  senna 2 Tablet(s) Oral at bedtime  sodium chloride 0.45%. 1000 milliLiter(s) IV Continuous <Continuous>  traMADol 50 milliGRAM(s) Oral every 8 hours PRN  vancomycin  IVPB      vancomycin  IVPB 1000 milliGRAM(s) IV Intermittent every 12 hours      T(C): 36.8 (10-30-22 @ 08:01), Max: 39.6 (10-29-22 @ 18:39)  HR: 80 (10-30-22 @ 08:01) (80 - 114)  BP: 133/87 (10-30-22 @ 08:01) (133/87 - 153/72)  RR: 16 (10-30-22 @ 08:01) (16 - 16)  SpO2: 92% (10-30-22 @ 08:01) (92% - 92%)         In NAD  HEENT- EOMI  Heart- RRR, S1S2  Lungs- no respiratory distress  Abd- mildly distended, NT  Ext- No calf pain  Neuro- Exam unchanged  oriented x 3      Imp: Patient is a 82y male with a past history of HTN,T2DM,HLD,BPH, and chronic neck pain who was admitted to [facility] on 10/18 for elective neck surgery. He underwent  a posterior C1-C6 decompression, C4-5 posterior column osteotomy, and C10C7 fusion with PRS closure.  His post op course was complicated  by constipation, intermittent confusion felt to be narcotic related, and urinary retention requiring ISC    Plan:  - on iv cefepime, vanco  - ID following, CT neck, chest, a/p pending. IV fluids  - Continue therapies  - DVT prophylaxis- lovenox  - Skin- Turn q2h, check skin daily  - Continue current medications; patient medically stable. continue to monitor

## 2022-10-30 NOTE — CONSULT NOTE ADULT - ASSESSMENT
Full note to follow  d/w dr. batista Assessment  1) Likely pneumonia with Fever leukocytosis and abnormal CT chest but   2) s/p Posterior cervical fusion with instrumentation  3) Underlying HTN, DM2, High chol, BPH      Plan  Continue antibiotics as per ID  if able check sputum cultures  Incentive spirometry if able to tolerate  suspect metabolic encephelopathy but if AMS/fever curve not improving may need to consider CNS infection as cause  Case d/w Dr. Martinez  Will follow

## 2022-10-30 NOTE — PROGRESS NOTE ADULT - ASSESSMENT
81 YO male with PMH of HTN, HLD, DM II, BPH with complaint of neck pain, presented to [facility] on 10/18 for scheduled Posterior C1-6 Decompression, C4-5 Posterior Column Osteotomy, C1-C7 Fusion, Complex Plastics Closure. Course complicated by severe constipation, leukocytosis, hyponatremia, fall, urinary retention requiring straight catheterization, intermittent confusion likely related to pain medication and hospital setting.     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 Continue Cefepime   - Gentle IVF     # acute metabolic encephalopathy, likely due to delirium, w/ agitation/combativeness  # Suicidal ideation  pt noted to have intermittent confusion at OSH, thought to be related to pain meds, delirium, and urinary retention. Continues to be altered today.  - Psychiatry following   - 1:1 observation  - will minimize narcotics, treat pain, will hold scheduled flexeril and other muscle relaxers.    # acute urinary retention  - does not have a garay catheter  - recommend regular bladder scans.    # HTN  - amlodipine 10 mg qhs and losartan 50 mg qd    #HLD  - lipitor 40 mg qd    # DM2  - controlled, [id number] 6.0%  - ISS    dvt ppx: lovenox

## 2022-10-30 NOTE — PHARMACOTHERAPY INTERVENTION NOTE - COMMENTS
Intravenous acetaminophen ordered for pain relief for a patient who is able to take meds orally.  I notified SUZANNA Ortiz that according to hospital policy intravenous acetaminophen is reserved for patients who cannot swallow and when the rectal route is challenging or not possible.  According to the patient's nurse, this patient has no problem swallowing.  I also informed him that the I.V. route offers no pharmacological advantage over the oral route, but the PA insisted on overriding policy, stating that "I have found that the I.V. route gives better pain relief than the oral route."

## 2022-10-31 LAB
ALBUMIN SERPL ELPH-MCNC: 2.3 G/DL — LOW (ref 3.3–5)
ALP SERPL-CCNC: 132 U/L — HIGH (ref 40–120)
ALT FLD-CCNC: 36 U/L — SIGNIFICANT CHANGE UP (ref 10–45)
ANION GAP SERPL CALC-SCNC: 13 MMOL/L — SIGNIFICANT CHANGE UP (ref 5–17)
AST SERPL-CCNC: 30 U/L — SIGNIFICANT CHANGE UP (ref 10–40)
BILIRUB SERPL-MCNC: 0.4 MG/DL — SIGNIFICANT CHANGE UP (ref 0.2–1.2)
BUN SERPL-MCNC: 15 MG/DL — SIGNIFICANT CHANGE UP (ref 7–23)
CALCIUM SERPL-MCNC: 8.8 MG/DL — SIGNIFICANT CHANGE UP (ref 8.4–10.5)
CHLORIDE SERPL-SCNC: 101 MMOL/L — SIGNIFICANT CHANGE UP (ref 96–108)
CO2 SERPL-SCNC: 22 MMOL/L — SIGNIFICANT CHANGE UP (ref 22–31)
CREAT SERPL-MCNC: 1.13 MG/DL — SIGNIFICANT CHANGE UP (ref 0.5–1.3)
EGFR: 64 ML/MIN/1.73M2 — SIGNIFICANT CHANGE UP
GLUCOSE BLDC GLUCOMTR-MCNC: 153 MG/DL — HIGH (ref 70–99)
GLUCOSE BLDC GLUCOMTR-MCNC: 157 MG/DL — HIGH (ref 70–99)
GLUCOSE BLDC GLUCOMTR-MCNC: 162 MG/DL — HIGH (ref 70–99)
GLUCOSE BLDC GLUCOMTR-MCNC: 188 MG/DL — HIGH (ref 70–99)
GLUCOSE SERPL-MCNC: 178 MG/DL — HIGH (ref 70–99)
HCT VFR BLD CALC: 34.8 % — LOW (ref 39–50)
HGB BLD-MCNC: 11.6 G/DL — LOW (ref 13–17)
LEGIONELLA AG UR QL: NEGATIVE — SIGNIFICANT CHANGE UP
MCHC RBC-ENTMCNC: 30 PG — SIGNIFICANT CHANGE UP (ref 27–34)
MCHC RBC-ENTMCNC: 33.3 GM/DL — SIGNIFICANT CHANGE UP (ref 32–36)
MCV RBC AUTO: 89.9 FL — SIGNIFICANT CHANGE UP (ref 80–100)
NRBC # BLD: 0 /100 WBCS — SIGNIFICANT CHANGE UP (ref 0–0)
PLATELET # BLD AUTO: 312 K/UL — SIGNIFICANT CHANGE UP (ref 150–400)
POTASSIUM SERPL-MCNC: 3.4 MMOL/L — LOW (ref 3.5–5.3)
POTASSIUM SERPL-SCNC: 3.4 MMOL/L — LOW (ref 3.5–5.3)
PROCALCITONIN SERPL-MCNC: 0.64 NG/ML — HIGH
PROT SERPL-MCNC: 6.8 G/DL — SIGNIFICANT CHANGE UP (ref 6–8.3)
RBC # BLD: 3.87 M/UL — LOW (ref 4.2–5.8)
RBC # FLD: 13.1 % — SIGNIFICANT CHANGE UP (ref 10.3–14.5)
SODIUM SERPL-SCNC: 136 MMOL/L — SIGNIFICANT CHANGE UP (ref 135–145)
VANCOMYCIN TROUGH SERPL-MCNC: 10 UG/ML — SIGNIFICANT CHANGE UP (ref 10–20)
WBC # BLD: 20.34 K/UL — HIGH (ref 3.8–10.5)
WBC # FLD AUTO: 20.34 K/UL — HIGH (ref 3.8–10.5)

## 2022-10-31 PROCEDURE — 99232 SBSQ HOSP IP/OBS MODERATE 35: CPT

## 2022-10-31 PROCEDURE — 99233 SBSQ HOSP IP/OBS HIGH 50: CPT

## 2022-10-31 PROCEDURE — 99232 SBSQ HOSP IP/OBS MODERATE 35: CPT | Mod: GC

## 2022-10-31 RX ORDER — METHOCARBAMOL 500 MG/1
500 TABLET, FILM COATED ORAL
Refills: 0 | Status: DISCONTINUED | OUTPATIENT
Start: 2022-10-31 | End: 2022-11-01

## 2022-10-31 RX ORDER — TRAMADOL HYDROCHLORIDE 50 MG/1
50 TABLET ORAL EVERY 6 HOURS
Refills: 0 | Status: DISCONTINUED | OUTPATIENT
Start: 2022-10-31 | End: 2022-11-01

## 2022-10-31 RX ORDER — SACCHAROMYCES BOULARDII 250 MG
250 POWDER IN PACKET (EA) ORAL
Refills: 0 | Status: ACTIVE | OUTPATIENT
Start: 2022-10-31 | End: 2023-09-29

## 2022-10-31 RX ADMIN — ATORVASTATIN CALCIUM 40 MILLIGRAM(S): 80 TABLET, FILM COATED ORAL at 21:08

## 2022-10-31 RX ADMIN — Medication 250 MILLIGRAM(S): at 17:20

## 2022-10-31 RX ADMIN — Medication 975 MILLIGRAM(S): at 06:41

## 2022-10-31 RX ADMIN — Medication 975 MILLIGRAM(S): at 21:08

## 2022-10-31 RX ADMIN — SENNA PLUS 2 TABLET(S): 8.6 TABLET ORAL at 21:08

## 2022-10-31 RX ADMIN — LOSARTAN POTASSIUM 50 MILLIGRAM(S): 100 TABLET, FILM COATED ORAL at 05:42

## 2022-10-31 RX ADMIN — ENOXAPARIN SODIUM 40 MILLIGRAM(S): 100 INJECTION SUBCUTANEOUS at 17:20

## 2022-10-31 RX ADMIN — Medication 975 MILLIGRAM(S): at 05:41

## 2022-10-31 RX ADMIN — AMLODIPINE BESYLATE 10 MILLIGRAM(S): 2.5 TABLET ORAL at 21:08

## 2022-10-31 RX ADMIN — Medication 250 MILLIGRAM(S): at 19:09

## 2022-10-31 RX ADMIN — METHOCARBAMOL 500 MILLIGRAM(S): 500 TABLET, FILM COATED ORAL at 17:19

## 2022-10-31 RX ADMIN — CEFEPIME 100 MILLIGRAM(S): 1 INJECTION, POWDER, FOR SOLUTION INTRAMUSCULAR; INTRAVENOUS at 05:41

## 2022-10-31 RX ADMIN — MAGNESIUM HYDROXIDE 30 MILLILITER(S): 400 TABLET, CHEWABLE ORAL at 05:42

## 2022-10-31 RX ADMIN — TRAMADOL HYDROCHLORIDE 50 MILLIGRAM(S): 50 TABLET ORAL at 03:49

## 2022-10-31 RX ADMIN — TRAMADOL HYDROCHLORIDE 50 MILLIGRAM(S): 50 TABLET ORAL at 02:49

## 2022-10-31 RX ADMIN — Medication 1: at 11:58

## 2022-10-31 RX ADMIN — CEFEPIME 100 MILLIGRAM(S): 1 INJECTION, POWDER, FOR SOLUTION INTRAMUSCULAR; INTRAVENOUS at 17:56

## 2022-10-31 RX ADMIN — OLANZAPINE 2.5 MILLIGRAM(S): 15 TABLET, FILM COATED ORAL at 17:20

## 2022-10-31 RX ADMIN — Medication 81 MILLIGRAM(S): at 11:58

## 2022-10-31 RX ADMIN — Medication 1: at 17:20

## 2022-10-31 RX ADMIN — Medication 250 MILLIGRAM(S): at 05:41

## 2022-10-31 NOTE — PROGRESS NOTE ADULT - SUBJECTIVE AND OBJECTIVE BOX
CC: f/u for    Patient reports    REVIEW OF SYSTEMS: no fevers, no chills, no nausea, no vomiting, no abd pain, no resp symptoms  All other review of systems negative (Comprehensive ROS)    Antimicrobials Day #    cefepime   IVPB      cefepime   IVPB 1000 milliGRAM(s) IV Intermittent every 12 hours  vancomycin  IVPB      vancomycin  IVPB 1000 milliGRAM(s) IV Intermittent every 12 hours    Other Medications Reviewed    T(F): 98.4 (10-31-22 @ 07:39), Max: 98.4 (10-31-22 @ 07:39)  HR: 101 (10-31-22 @ 07:39)  BP: 146/70 (10-31-22 @ 07:39)  RR: 16 (10-31-22 @ 07:39)  SpO2: 92% (10-31-22 @ 07:39)    PHYSICAL EXAM:  General: alert, no acute distress  Eyes:  anicteric, no conjunctival injection, no discharge  Oropharynx: no lesions or injection 	  Neck: supple, without adenopathy  Lungs: clear to auscultation  Heart: regular rate and rhythm; no murmur, rubs or gallops  Abdomen: soft, nondistended, nontender, without mass or organomegaly  Skin: no lesions  Extremities: no clubbing, cyanosis, or edema  Neurologic: alert, oriented, moves all extremities    LAB RESULTS:                        11.6   20.34 )-----------( 312      ( 31 Oct 2022 06:55 )             34.8     10-30    138  |  103  |  14  ----------------------------<  144<H>  3.6   |  28  |  1.15    Ca    8.5      30 Oct 2022 06:15    TPro  6.5  /  Alb  2.4<L>  /  TBili  0.7  /  DBili  x   /  AST  22  /  ALT  30  /  AlkPhos  110  10-30    LIVER FUNCTIONS - ( 30 Oct 2022 06:15 )  Alb: 2.4 g/dL / Pro: 6.5 g/dL / ALK PHOS: 110 U/L / ALT: 30 U/L / AST: 22 U/L / GGT: x               MICROBIOLOGY REVIEWED:  Culture - Blood (10.29.22 @ 01:06)   Specimen Source: .Blood Blood   Culture Results:   No growth to date.   RADIOLOGY REVIEWED:    < from: CT Neck Soft Tissue w/ IV Cont (10.30.22 @ 10:09) >  IMPRESSION:  Postop changes as described similar in appearance as   suggested on the prior MR study 10/26/2022 without definitive evidence of   abscess. Status post laminectomy with posterior fusion. No hardware   malalignment or loosening on no enhancing collection appreciated    --- End of Report ---    < end of copied text >  < from: CT Abdomen and Pelvis No Cont (10.30.22 @ 10:08) >  IMPRESSION:  Airspace opacity and consolidation in the left lower lobe and partially   lingulasegment concerning for multifocal pneumonia in the appropriate   clinical context. No pleural effusions.    No acute abdominopelvic pathology.    Noncomplicated colonic diverticulosis.    < end of copied text >   CC: f/u for fever    Patient is seating in a chair, poorly interactive, fevers seems moderating    REVIEW OF SYSTEMS: no fevers, no chills, no nausea, no vomiting, no abd pain, no resp symptoms  All other review of systems negative (Comprehensive ROS)    Antimicrobials Day #    cefepime   IVPB      cefepime   IVPB 1000 milliGRAM(s) IV Intermittent every 12 hours  vancomycin  IVPB      vancomycin  IVPB 1000 milliGRAM(s) IV Intermittent every 12 hours    Other Medications Reviewed    T(F): 98.4 (10-31-22 @ 07:39), Max: 98.4 (10-31-22 @ 07:39)  HR: 101 (10-31-22 @ 07:39)  BP: 146/70 (10-31-22 @ 07:39)  RR: 16 (10-31-22 @ 07:39)  SpO2: 92% (10-31-22 @ 07:39)    PHYSICAL EXAM:  General: alert, no acute distress  Eyes:  anicteric, no conjunctival injection, no discharge  Oropharynx: no lesions or injection 	  Neck: supple, without adenopathy  Lungs: clear to auscultation  Heart: regular rate and rhythm; no murmur, rubs or gallops  Abdomen: soft, nondistended, nontender, without mass or organomegaly  Skin: no lesions  Extremities: no clubbing, cyanosis, or edema  Neurologic: alert, poorly interactive    LAB RESULTS:                        11.6   20.34 )-----------( 312      ( 31 Oct 2022 06:55 )             34.8     10-30    138  |  103  |  14  ----------------------------<  144<H>  3.6   |  28  |  1.15    Ca    8.5      30 Oct 2022 06:15    TPro  6.5  /  Alb  2.4<L>  /  TBili  0.7  /  DBili  x   /  AST  22  /  ALT  30  /  AlkPhos  110  10-30    LIVER FUNCTIONS - ( 30 Oct 2022 06:15 )  Alb: 2.4 g/dL / Pro: 6.5 g/dL / ALK PHOS: 110 U/L / ALT: 30 U/L / AST: 22 U/L / GGT: x               MICROBIOLOGY REVIEWED:  Culture - Blood (10.29.22 @ 01:06)   Specimen Source: .Blood Blood   Culture Results:   No growth to date.   RADIOLOGY REVIEWED:    < from: CT Neck Soft Tissue w/ IV Cont (10.30.22 @ 10:09) >  IMPRESSION:  Postop changes as described similar in appearance as   suggested on the prior MR study 10/26/2022 without definitive evidence of   abscess. Status post laminectomy with posterior fusion. No hardware   malalignment or loosening on no enhancing collection appreciated    --- End of Report ---    < end of copied text >  < from: CT Abdomen and Pelvis No Cont (10.30.22 @ 10:08) >  IMPRESSION:  Airspace opacity and consolidation in the left lower lobe and partially   lingulasegment concerning for multifocal pneumonia in the appropriate   clinical context. No pleural effusions.    No acute abdominopelvic pathology.    Noncomplicated colonic diverticulosis.    < end of copied text >

## 2022-10-31 NOTE — PROGRESS NOTE ADULT - ASSESSMENT
ASSESSMENT/PLAN  This is a 83 YO male with PMH of HTN, HLD, DM II, BPH with complaint of neck pain. cervical myelopathy  admitted to rehab after scheduled Posterior C1-6 Decompression, C4-5 Posterior Column Osteotomy, C1-C7 Fusion, Complex Plastics Closure on 10/18  Course complicated by severe constipation now resolved, leukocytosis, hyponatremia, fall, urinary retention requiring straight catheterization. Patient now with gait Instability, ADL impairments and Functional impairments.    #Cervical cord compression with myelopathy  - Posterior C1-6 Decompression, C4-5 Posterior Column Osteotomy, C1-C7 Fusion, Complex Plastics Closure on 10/18  - Start Comprehensive Rehab Program: PT/OT, 3hours daily and 5 days weekly  - PT: Focused on improving strength, endurance, coordination, balance, functional mobility, and transfers  - OT: Focused on improving strength, fine motor skills, coordination, posture and ADLs.      #HTN  - Amlodipine 10mg daily   - Losartan 50mg daily  - ASA 81mg to be resumed on 10/28    #HLD  - Lipitor 40mg daily    #Hyponatremia  - Sodium Tabs 1mg  TID    #Pain management  - Tylenol PRN, Oxycodone PRN, Flexeril    #DVT ppx  - Lovenox, SCD, TEDs    #Bowel Regimen  - Senna, miralax, Dulcolax supp    #Bladder management  #Urinary Retention  - BS on admission, and q 8 hours (SC if > 400)  - Monitor UO    #FEN   - Diet: Consistent Carb, 1000ml fluid restriction    #Skin:  - Skin on admission: posterior cervical spine incision with sutures MEETA   - Pressure injury/Skin: Turn Q2hrs while in bed, OOB to Chair, PT/OT     #Sleep:   - Maintain quiet hours and low stim environment.  - Melatonin PRN to maximize participation in therapy during the day.     #Precaution  - Fall, Aspiration, Spinal    #GOC  CODE STATUS: FULL CODE    Outpatient Follow-up (Specialty/Name of physician):    Caleb Bond)  Neurosurgery  [address], [address]  [place] [address]  Phone: [phone]  Fax: [phone]    Jose Raul Bay)  Plastic Surgery  [address] [place] [address]  Phone: [phone]  Fax: [phone]    MEDICAL PROGNOSIS: GOOD            REHAB POTENTIAL: GOOD             ESTIMATED DISPOSITION: HOME WITH HOME CARE            ELOS: 10-14 Days   EXPECTED THERAPY:     P.T. 2hr/day       O.T. 1hr/day        P&O Unnecessary     EXP FREQUENCY: 5 days per 7 day period     PRESCREEN COMPARISON:   I have reviewed the prescreen information and I have found no relevant changes between the preadmission screening and my post admission evaluation     RATIONALE FOR INPATIENT ADMISSION - Patient demonstrates the following: (check all that apply)  [X] Medically appropriate for rehabilitation admission  [X] Has attainable rehab goals with an appropriate initial discharge plan  [X] Has rehabilitation potential (expected to make a significant improvement within a reasonable period of time)   [X] Requires close medical management by a rehab physician, rehab nursing care, Hospitalist and comprehensive interdisciplinary team (including PT, OT, & or SLP, Prosthetics and Orthotics)   ASSESSMENT/PLAN  This is a 81 YO male with PMH of HTN, HLD, DM II, BPH with complaint of neck pain. cervical myelopathy  admitted to rehab after scheduled Posterior C1-6 Decompression, C4-5 Posterior Column Osteotomy, C1-C7 Fusion, Complex Plastics Closure on 10/18  Course complicated by severe constipation now resolved, leukocytosis, hyponatremia, fall, urinary retention requiring straight catheterization. Patient now with gait Instability, ADL impairments and Functional impairments.    #Cervical cord compression with myelopathy  - Posterior C1-6 Decompression, C4-5 Posterior Column Osteotomy, C1-C7 Fusion, Complex Plastics Closure on 10/18  - Start Comprehensive Rehab Program: PT/OT, 3hours daily and 5 days weekly  - PT: Focused on improving strength, endurance, coordination, balance, functional mobility, and transfers  - OT: Focused on improving strength, fine motor skills, coordination, posture and ADLs.  - pain management as below  - precautions: fall, aspiration, spinal  - Incision evaluation and suture removal around 11/7/22 at plastics Dr [facility] office   - CT neck soft tissue 10/30 w/ post op changes w/o abscess  - soft collar while OOB    # SIRS, unclear source - PNA vs UTI vs both?  - Pt spiked Tmax 103.3, leukocytosis peaked @23k - now improving and afebrile last 24 hrs.  - Follow cultures - Urine Cx growing G- rods, sensitivities pending. Blood Cx NGTD  - Infectious Diseases following, will f/u recs  - CT Chest 10/29 most pertinent for some airspace opacity in LLL  - cont vanco and cefepime  - probiotics while on abx  - f/u MRSA nares    #Acute metabolic encephalopathy, likely due to delirium, w/ agitation/combativeness - improving  # Suicidal ideation  -Pt noted to have intermittent confusion at OSH, thought to be related to pain meds, delirium, and urinary retention. Very agitated and confused when first admitted here, more oriented today and calm.  - Psychiatry following , now on scheduled zyprexa at 1800 and PRN zyprexa for agitation.  - 1:1 observation for impulsivity and confusion at night. Chair and bed alarms on at all times.  - Oxycodone and flexeril discontinued on 10/29  - Pt reporting severe neck pain- tramadol increased to Q6h and robaxin added 10.31 (see pain mgmt) -- monitor    # acute urinary retention  #UTI  - garay catheter discontinued for TOV 10/31 due to improvement in patient's mentation and constipation  - follow up urine C&S- Culture growing G- Rods  - Bladder scans Q6h with ISC for PVR over 400cc.  - avoid anti-cholinergic medications  - bowel regimen    #HTN  - Amlodipine 10mg daily   - Losartan 50mg daily    #HLD  - Lipitor 40mg at qhs    #Hyponatremia  - Sodium Tabs 1mg  TID, fluid restriction 1000mL  - Na 136 on 10/31  - monitor, decrease as able    #Pain management  - Tylenol 975 mg Q8h -- monitor LFTS on ATC Tylenol  - Tramadol 50mg increased from Q8h to Q6h  - Robaxin 500mg BID added 10/31    #DVT ppx  - Lovenox, SCD, TEDs    #Bowel Regimen  - Senna 2 tabs at qhs, miralax, Dulcolax supp    #Bladder management  - see urinary retention above  - Monitor UO    #FEN   - Diet: Consistent Carb, 1000ml fluid restriction    #Skin:  - Skin on admission: posterior cervical spine incision with sutures MEETA   - Pressure injury/Skin: Turn Q2hrs while in bed, OOB to Chair, PT/OT     #Sleep:   - Maintain quiet hours and low stim environment.  - on Olanzapine 2.5 mg at bedtime for sundowning      #GOC  CODE STATUS: FULL CODE    Outpatient Follow-up (Specialty/Name of physician):    Caleb Bond)  Neurosurgery  [address], [address]  [place] [address]  Phone: [phone]  Fax: [phone]    Jose Raul Bay)  Plastic Surgery  [address] [place] [address]  Phone: [phone]  Fax: [phone]    MEDICAL PROGNOSIS: GOOD            REHAB POTENTIAL: GOOD             ESTIMATED DISPOSITION: HOME WITH HOME CARE            ELOS: 10-14 Days   EXPECTED THERAPY:     P.T. 2hr/day       O.T. 1hr/day        P&O Unnecessary     EXP FREQUENCY: 5 days per 7 day period     PRESCREEN COMPARISON:   I have reviewed the prescreen information and I have found no relevant changes between the preadmission screening and my post admission evaluation     RATIONALE FOR INPATIENT ADMISSION - Patient demonstrates the following: (check all that apply)  [X] Medically appropriate for rehabilitation admission  [X] Has attainable rehab goals with an appropriate initial discharge plan  [X] Has rehabilitation potential (expected to make a significant improvement within a reasonable period of time)   [X] Requires close medical management by a rehab physician, rehab nursing care, Hospitalist and comprehensive interdisciplinary team (including PT, OT, & or SLP, Prosthetics and Orthotics)

## 2022-10-31 NOTE — PROGRESS NOTE ADULT - SUBJECTIVE AND OBJECTIVE BOX
Patient is a 82y old  Male who presents with a chief complaint of Cervical Laminectomy (30 Oct 2022 13:49)      24 HOUR EVENTS:  No overnight events reported.     SUBJECTIVE:  Patient seen and examined at bedside.     ALLERGIES:  penicillin (Rash)    MEDICATIONS  (STANDING):  acetaminophen     Tablet .. 975 milliGRAM(s) Oral every 8 hours  amLODIPine   Tablet 10 milliGRAM(s) Oral at bedtime  aspirin  chewable 81 milliGRAM(s) Oral daily  atorvastatin 40 milliGRAM(s) Oral at bedtime  cefepime   IVPB      cefepime   IVPB 1000 milliGRAM(s) IV Intermittent every 12 hours  dextrose 5%. 1000 milliLiter(s) (100 mL/Hr) IV Continuous <Continuous>  dextrose 5%. 1000 milliLiter(s) (50 mL/Hr) IV Continuous <Continuous>  dextrose 50% Injectable 25 Gram(s) IV Push once  dextrose 50% Injectable 12.5 Gram(s) IV Push once  dextrose 50% Injectable 25 Gram(s) IV Push once  enoxaparin Injectable 40 milliGRAM(s) SubCutaneous <User Schedule>  glucagon  Injectable 1 milliGRAM(s) IntraMuscular once  insulin lispro (ADMELOG) corrective regimen sliding scale   SubCutaneous three times a day before meals  insulin lispro (ADMELOG) corrective regimen sliding scale   SubCutaneous at bedtime  losartan 50 milliGRAM(s) Oral daily  magnesium hydroxide Suspension 30 milliLiter(s) Oral every 12 hours  OLANZapine 2.5 milliGRAM(s) Oral <User Schedule>  polyethylene glycol 3350 17 Gram(s) Oral daily  senna 2 Tablet(s) Oral at bedtime  sodium chloride 0.45%. 1000 milliLiter(s) (75 mL/Hr) IV Continuous <Continuous>  vancomycin  IVPB      vancomycin  IVPB 1000 milliGRAM(s) IV Intermittent every 12 hours    MEDICATIONS  (PRN):  bisacodyl Suppository 10 milliGRAM(s) Rectal daily PRN Constipation  dextrose Oral Gel 15 Gram(s) Oral once PRN Blood Glucose LESS THAN 70 milliGRAM(s)/deciliter  OLANZapine Injectable 2.5 milliGRAM(s) IntraMuscular every 12 hours PRN severe agitation  traMADol 50 milliGRAM(s) Oral every 8 hours PRN Severe Pain (7 - 10)    Vital Signs Last 24 Hrs  T(F): 98.4 (31 Oct 2022 07:39), Max: 98.4 (31 Oct 2022 07:39)  HR: 101 (31 Oct 2022 07:39) (80 - 101)  BP: 146/70 (31 Oct 2022 07:39) (131/80 - 146/70)  RR: 16 (31 Oct 2022 07:39) (16 - 16)  SpO2: 92% (31 Oct 2022 07:39) (92% - 95%)  I&O's Summary    30 Oct 2022 07:01  -  31 Oct 2022 07:00  --------------------------------------------------------  IN: 0 mL / OUT: 1830 mL / NET: -1830 mL      PHYSICAL EXAM:  General: NAD, A/O x 3  ENT: Moist mucous membranes, no thrush  Neck: Supple, No JVD  Lungs: Clear to auscultation bilaterally, good air entry, non-labored breathing  Cardio: RRR, S1/S2, No murmur  Abdomen: Soft, Nontender, Nondistended; Bowel sounds present  Extremities: No calf tenderness, No pitting edema    LABS:                        11.6   20.34 )-----------( 312      ( 31 Oct 2022 06:55 )             34.8     10-30    138  |  103  |  14  ----------------------------<  144  3.6   |  28  |  1.15    Ca    8.5      30 Oct 2022 06:15    TPro  6.5  /  Alb  2.4  /  TBili  0.7  /  DBili  x   /  AST  22  /  ALT  30  /  AlkPhos  110  10-30            Lactate, Blood: 0.8 mmol/L (10-29 @ 01:06)                        POCT Blood Glucose.: 188 mg/dL (31 Oct 2022 07:28)  POCT Blood Glucose.: 198 mg/dL (30 Oct 2022 20:10)  POCT Blood Glucose.: 128 mg/dL (30 Oct 2022 16:59)  POCT Blood Glucose.: 145 mg/dL (30 Oct 2022 11:50)  POCT Blood Glucose.: 157 mg/dL (30 Oct 2022 08:05)      Urinalysis Basic - ( 29 Oct 2022 01:06 )    Color: Yellow / Appearance: Clear / S.010 / pH: x  Gluc: x / Ketone: Negative  / Bili: Negative / Urobili: Negative   Blood: x / Protein: 30 mg/dL / Nitrite: Negative   Leuk Esterase: Small / RBC: 5-10 /HPF / WBC 6-10 /HPF   Sq Epi: x / Non Sq Epi: Neg.-Few / Bacteria: Moderate /HPF        Culture - Blood (collected 29 Oct 2022 01:06)  Source: .Blood Blood  Preliminary Report (30 Oct 2022 05:00):    No growth to date.    Culture - Blood (collected 29 Oct 2022 01:06)  Source: .Blood Blood  Preliminary Report (30 Oct 2022 05:00):    No growth to date.      COVID-19 PCR: NotDetec (10-30-22 @ 17:48)  COVID-19 PCR: NotDetec (10-27-22 @ 23:11)  COVID-19 PCR: NotDetec (10-24-22 @ 07:47)  COVID-19 PCR: NotDetec (10-15-22 @ 13:40)    RADIOLOGY & ADDITIONAL TESTS:  < from: CT Neck Soft Tissue w/ IV Cont (10.30.22 @ 10:09) >  IMPRESSION:  Postop changes as described similar in appearance as   suggested on the prior MR study 10/26/2022 without definitive evidence of   abscess. Status post laminectomy with posterior fusion. No hardware   malalignment or loosening on no enhancing collection appreciated    < end of copied text >  < from: CT Abdomen and Pelvis No Cont (10.30.22 @ 10:08) >  IMPRESSION:  Airspace opacity and consolidation in the left lower lobe and partially   lingulasegment concerning for multifocal pneumonia in the appropriate   clinical context. No pleural effusions.    No acute abdominopelvic pathology.    Noncomplicated colonic diverticulosis.    < end of copied text >      Care Discussed with Consultants/Other Providers:    Patient is a 82y old  Male who presents with a chief complaint of Cervical Laminectomy (30 Oct 2022 13:49)      24 HOUR EVENTS:  No overnight events reported.     SUBJECTIVE:  Patient seen and examined at bedside. He c/o pain 5 out of 10, improved from this morning. He knows he is in the hospital and that he had surgery. He is more calm. Still seems confused.    ALLERGIES:  penicillin (Rash)    MEDICATIONS  (STANDING):  acetaminophen     Tablet .. 975 milliGRAM(s) Oral every 8 hours  amLODIPine   Tablet 10 milliGRAM(s) Oral at bedtime  aspirin  chewable 81 milliGRAM(s) Oral daily  atorvastatin 40 milliGRAM(s) Oral at bedtime  cefepime   IVPB      cefepime   IVPB 1000 milliGRAM(s) IV Intermittent every 12 hours  dextrose 5%. 1000 milliLiter(s) (100 mL/Hr) IV Continuous <Continuous>  dextrose 5%. 1000 milliLiter(s) (50 mL/Hr) IV Continuous <Continuous>  dextrose 50% Injectable 25 Gram(s) IV Push once  dextrose 50% Injectable 12.5 Gram(s) IV Push once  dextrose 50% Injectable 25 Gram(s) IV Push once  enoxaparin Injectable 40 milliGRAM(s) SubCutaneous <User Schedule>  glucagon  Injectable 1 milliGRAM(s) IntraMuscular once  insulin lispro (ADMELOG) corrective regimen sliding scale   SubCutaneous three times a day before meals  insulin lispro (ADMELOG) corrective regimen sliding scale   SubCutaneous at bedtime  losartan 50 milliGRAM(s) Oral daily  magnesium hydroxide Suspension 30 milliLiter(s) Oral every 12 hours  OLANZapine 2.5 milliGRAM(s) Oral <User Schedule>  polyethylene glycol 3350 17 Gram(s) Oral daily  senna 2 Tablet(s) Oral at bedtime  sodium chloride 0.45%. 1000 milliLiter(s) (75 mL/Hr) IV Continuous <Continuous>  vancomycin  IVPB      vancomycin  IVPB 1000 milliGRAM(s) IV Intermittent every 12 hours    MEDICATIONS  (PRN):  bisacodyl Suppository 10 milliGRAM(s) Rectal daily PRN Constipation  dextrose Oral Gel 15 Gram(s) Oral once PRN Blood Glucose LESS THAN 70 milliGRAM(s)/deciliter  OLANZapine Injectable 2.5 milliGRAM(s) IntraMuscular every 12 hours PRN severe agitation  traMADol 50 milliGRAM(s) Oral every 8 hours PRN Severe Pain (7 - 10)    Vital Signs Last 24 Hrs  T(F): 98.4 (31 Oct 2022 07:39), Max: 98.4 (31 Oct 2022 07:39)  HR: 101 (31 Oct 2022 07:39) (80 - 101)  BP: 146/70 (31 Oct 2022 07:39) (131/80 - 146/70)  RR: 16 (31 Oct 2022 07:39) (16 - 16)  SpO2: 92% (31 Oct 2022 07:39) (92% - 95%)  I&O's Summary    30 Oct 2022 07:01  -  31 Oct 2022 07:00  --------------------------------------------------------  IN: 0 mL / OUT: 1830 mL / NET: -1830 mL      PHYSICAL EXAM:  General: NAD, A/O x 3  ENT: Moist mucous membranes, no thrush  Neck: Supple, No JVD  Lungs: Clear to auscultation bilaterally, good air entry, non-labored breathing  Cardio: RRR, S1/S2, No murmur  Abdomen: Soft, Nontender, Nondistended; Bowel sounds present  Extremities: No calf tenderness, No pitting edema    LABS:                        11.6   20.34 )-----------( 312      ( 31 Oct 2022 06:55 )             34.8     10-30    138  |  103  |  14  ----------------------------<  144  3.6   |  28  |  1.15    Ca    8.5      30 Oct 2022 06:15    TPro  6.5  /  Alb  2.4  /  TBili  0.7  /  DBili  x   /  AST  22  /  ALT  30  /  AlkPhos  110  10-30            Lactate, Blood: 0.8 mmol/L (10-29 @ 01:06)                        POCT Blood Glucose.: 188 mg/dL (31 Oct 2022 07:28)  POCT Blood Glucose.: 198 mg/dL (30 Oct 2022 20:10)  POCT Blood Glucose.: 128 mg/dL (30 Oct 2022 16:59)  POCT Blood Glucose.: 145 mg/dL (30 Oct 2022 11:50)  POCT Blood Glucose.: 157 mg/dL (30 Oct 2022 08:05)      Urinalysis Basic - ( 29 Oct 2022 01:06 )    Color: Yellow / Appearance: Clear / S.010 / pH: x  Gluc: x / Ketone: Negative  / Bili: Negative / Urobili: Negative   Blood: x / Protein: 30 mg/dL / Nitrite: Negative   Leuk Esterase: Small / RBC: 5-10 /HPF / WBC 6-10 /HPF   Sq Epi: x / Non Sq Epi: Neg.-Few / Bacteria: Moderate /HPF        Culture - Blood (collected 29 Oct 2022 01:06)  Source: .Blood Blood  Preliminary Report (30 Oct 2022 05:00):    No growth to date.    Culture - Blood (collected 29 Oct 2022 01:06)  Source: .Blood Blood  Preliminary Report (30 Oct 2022 05:00):    No growth to date.      COVID-19 PCR: NotDetec (10-30-22 @ 17:48)  COVID-19 PCR: NotDetec (10-27-22 @ 23:11)  COVID-19 PCR: NotDetec (10-24-22 @ 07:47)  COVID-19 PCR: NotDetec (10-15-22 @ 13:40)    RADIOLOGY & ADDITIONAL TESTS:  < from: CT Neck Soft Tissue w/ IV Cont (10.30.22 @ 10:09) >  IMPRESSION:  Postop changes as described similar in appearance as   suggested on the prior MR study 10/26/2022 without definitive evidence of   abscess. Status post laminectomy with posterior fusion. No hardware   malalignment or loosening on no enhancing collection appreciated    < end of copied text >  < from: CT Abdomen and Pelvis No Cont (10.30.22 @ 10:08) >  IMPRESSION:  Airspace opacity and consolidation in the left lower lobe and partially   lingulasegment concerning for multifocal pneumonia in the appropriate   clinical context. No pleural effusions.    No acute abdominopelvic pathology.    Noncomplicated colonic diverticulosis.    < end of copied text >      Care Discussed with Consultants/Other Providers:

## 2022-10-31 NOTE — PROGRESS NOTE ADULT - ASSESSMENT
81 YO male with PMH of HTN, HLD, DM II, BPH with complaint of neck pain, presented to [facility] on 10/18 for scheduled Posterior C1-6 Decompression, C4-5 Posterior Column Osteotomy, C1-C7 Fusion, Complex Plastics Closure. Course complicated by severe constipation, leukocytosis, hyponatremia, fall, urinary retention requiring straight catheterization, intermittent confusion likely related to pain medication and hospital setting.     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  - Adding Vancomycin. Continue Cefepime     # acute metabolic encephalopathy, likely due to delirium, w/ agitation/combativeness  # Suicidal ideation  pt noted to have intermittent confusion at OSH, thought to be related to pain meds, delirium, and urinary retention.   - Psychiatry following   - 1:1 observation  - will minimize narcotics, treat pain, will hold scheduled flexeril and other muscle relaxers.    # acute urinary retention  - garay?  - recommend regular bladder scans.    # HTN  - amlodipine 10 mg qhs and losartan 50 mg qd    #HLD  - lipitor 40 mg qd    # DM2  - controlled, [id number] 6.0%  - ISS    dvt ppx: lovenox 81 YO male with PMH of HTN, HLD, DM II, BPH with complaint of neck pain, presented to [facility] on 10/18 for scheduled Posterior C1-6 Decompression, C4-5 Posterior Column Osteotomy, C1-C7 Fusion, Complex Plastics Closure. Course complicated by severe constipation, leukocytosis, hyponatremia, fall, urinary retention requiring straight catheterization, intermittent confusion likely related to pain medication and hospital setting.     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  - Follow cultures - so far NGTD  - Infectious Diseases following, will f/u recs.  - CT IMAGING: most pertinent for some airspace opacity in LLL, no evidence of hardware issues, CT neck w/ post op changes w/o abscess.  - cont vanco and cefepime  - f/u MRSA nares.    # acute metabolic encephalopathy, likely due to delirium, w/ agitation/combativeness - improvig.  # Suicidal ideation  pt noted to have intermittent confusion at OSH, thought to be related to pain meds, delirium, and urinary retention. Very agitated and confused when first admitted here, more oriented today and calm.  - Psychiatry following , now on scheduled zyprexa at 1800 and PRN zyprexa for agitation.  - 1:1 observation  - will minimize narcotics, treat pain, will hold scheduled flexeril and other muscle relaxers.    # acute urinary retention  - garay catheter TOV today.  - recommend regular bladder scans.  - avoid anti-cholinergic medications    # HTN  - amlodipine 10 mg qhs and losartan 50 mg qd    #HLD  - lipitor 40 mg qd    # DM2  - controlled, [id number] 6.0%  - ISS    dvt ppx: lovenox 81 YO male with PMH of HTN, HLD, DM II, BPH with complaint of neck pain, presented to [facility] on 10/18 for scheduled Posterior C1-6 Decompression, C4-5 Posterior Column Osteotomy, C1-C7 Fusion, Complex Plastics Closure. Course complicated by severe constipation, leukocytosis, hyponatremia, fall, urinary retention requiring straight catheterization, intermittent confusion likely related to pain medication and hospital setting.     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  - Follow cultures - so far NGTD  - Infectious Diseases following, will f/u recs.  - CT IMAGING: most pertinent for some airspace opacity in LLL, no evidence of hardware issues, CT neck w/ post op changes w/o abscess.  - cont vanco and cefepime  - f/u MRSA nares.    # acute metabolic encephalopathy, likely due to delirium, w/ agitation/combativeness - improvig.  # Suicidal ideation  pt noted to have intermittent confusion at OSH, thought to be related to pain meds, delirium, and urinary retention. Very agitated and confused when first admitted here, more oriented today and calm.  - Psychiatry following , now on scheduled zyprexa at 1800 and PRN zyprexa for agitation.  - 1:1 observation  - will minimize narcotics, treat pain, will hold scheduled flexeril and other muscle relaxers.  - discussed above plan w/Dr. fuller during IDR    # acute urinary retention  - garay catheter TOV today.  - recommend regular bladder scans.  - avoid anti-cholinergic medications    # HTN  - amlodipine 10 mg qhs and losartan 50 mg qd    #HLD  - lipitor 40 mg qd    # DM2  - controlled, [id number] 6.0%  - ISS    dvt ppx: lovenox

## 2022-10-31 NOTE — PROGRESS NOTE ADULT - ASSESSMENT
Full note pending 83yo M hx HTN,T2DM, HLD, BPH, and chronic neck pain who was admitted to [facility] on 10/18 for elective neck surgery.  He underwent  a posterior C1-C6 decompression, C4-5 posterior column osteotomy, and C10C7 fusion with PRS closure.  His post op course was complicated  by constipation, intermittent confusion felt to be narcotic related, and urinary retention requiring ISC.He was felt to have left deltoid weakness, his drain was removed 10/27, and he was sent to rehab on 10/27.  He had a fever to 101.2 on 10/28, required placement of a garay catheter, and was started on cefepime.  He was up all night and is sleepy. History is via an , he has no specific complaints but nods off to sleep.  Etiology of fever not clear. It could be  as in setting of retention can develop systemic infection even if urine is not purulent.  His spine incision appears to be without evidence of infection.  His CXR seems to show elevated diaphragm on the left, ? atelectasis vs artifact. CXR on 10/29 officially read as atelectasis vs infiltrate.  He spike to 103, vanco had been added,  CT of neck with post op changes, CT of C/A/P with left sided infiltrates.  He has garay for retention. Blood cultures are negative so far, urine culture with >100K GNR    pneumonia may be present along with possible  infection  Suggest;  Cont Cefepime/vanco pending additional cultures  MRSA nasal swab--negative and urine for legionella antigen--still pending  f/u sens of GNR from urine

## 2022-10-31 NOTE — PROGRESS NOTE ADULT - SUBJECTIVE AND OBJECTIVE BOX
Patient is a 82y old  Male who presents with a chief complaint of Cervical Laminectomy (31 Oct 2022 08:25)      HPI:  This is a 81 YO male with PMH of HTN, HLD, DM II, BPH with complaint of neck pain. Patient endorses increasing neck pain, gait instability and clumsiness of the hands. He ambulates with a cane. He denies fever, chills, trauma or injury. He presents to [facility] on 10/18 for scheduled Posterior C1-6 Decompression, C4-5 Posterior Column Osteotomy, C1-C7 Fusion, Complex Plastics Closure. Course complicated by severe constipation now resolved, leukocytosis, hyponatremia- stable on salt tablet, fall, urinary retention requiring straight catheterization, intermittent confusion likely related to pain medication and hospital setting. Noted with  Left deltoid weakness. MR Cervical spine 10/26/22 without cord compression. Surgical drain removed 10/27/22. Patient was evaluated by PM&R and therapy for functional deficits, gait/ADL impairments and acute rehabilitation was recommended. Patient was medically optimized for discharge to [facility] IRU on 10/27/22.   (27 Oct 2022 13:00)    TODAY's SUBJECTIVE:  Over the weekend patient had fevers over 103 F, leukocytosis and tachycardia. He was started on IV cefepime and Vanco for presumed PNA with abnormal CT.  He was seen by ID, pulmonology and psychiatry.  He is on contact precautions while MRSA testing is pending.    Today patient's mentation is improved, he is calm, cooperative and AAOx 3 (to self, month and year, hospital setting). He is participating in therapy.  He reports neck pain and headache.   He had a bowel movement. Decision made w/ hospitalist input to remove the indwelling garay and start trial of void this morning.  For now, patient continues on the 1:1 for fall risk due to impulsivity and confusion at night.      ROS:  No dizziness  No chest pain, no palpitations  No shortness of breath  No abdominal pain, no nausea    PAST MEDICAL & SURGICAL HISTORY:  Hypertension  Hyperlipidemia      Chronic kidney disease      Diabetes mellitus  -patient states prediabetes, not on medication      Benign prostate hyperplasia      COVID-19 virus infection  -dec 2021 (cough, fever, malaise)      History of carpal tunnel surgery  -bilateral hands      History of cataract surgery  -bilateral      H/O colonoscopy          MEDICATIONS  (STANDING):  acetaminophen     Tablet .. 975 milliGRAM(s) Oral every 8 hours  amLODIPine   Tablet 10 milliGRAM(s) Oral at bedtime  aspirin  chewable 81 milliGRAM(s) Oral daily  atorvastatin 40 milliGRAM(s) Oral at bedtime  cefepime   IVPB      cefepime   IVPB 1000 milliGRAM(s) IV Intermittent every 12 hours  dextrose 5%. 1000 milliLiter(s) (100 mL/Hr) IV Continuous <Continuous>  dextrose 5%. 1000 milliLiter(s) (50 mL/Hr) IV Continuous <Continuous>  dextrose 50% Injectable 25 Gram(s) IV Push once  dextrose 50% Injectable 12.5 Gram(s) IV Push once  dextrose 50% Injectable 25 Gram(s) IV Push once  enoxaparin Injectable 40 milliGRAM(s) SubCutaneous <User Schedule>  glucagon  Injectable 1 milliGRAM(s) IntraMuscular once  insulin lispro (ADMELOG) corrective regimen sliding scale   SubCutaneous three times a day before meals  insulin lispro (ADMELOG) corrective regimen sliding scale   SubCutaneous at bedtime  losartan 50 milliGRAM(s) Oral daily  magnesium hydroxide Suspension 30 milliLiter(s) Oral every 12 hours  methocarbamol 500 milliGRAM(s) Oral two times a day  OLANZapine 2.5 milliGRAM(s) Oral <User Schedule>  polyethylene glycol 3350 17 Gram(s) Oral daily  saccharomyces boulardii 250 milliGRAM(s) Oral two times a day  senna 2 Tablet(s) Oral at bedtime  vancomycin  IVPB      vancomycin  IVPB 1000 milliGRAM(s) IV Intermittent every 12 hours    MEDICATIONS  (PRN):  bisacodyl Suppository 10 milliGRAM(s) Rectal daily PRN Constipation  dextrose Oral Gel 15 Gram(s) Oral once PRN Blood Glucose LESS THAN 70 milliGRAM(s)/deciliter  OLANZapine Injectable 2.5 milliGRAM(s) IntraMuscular every 12 hours PRN severe agitation  traMADol 50 milliGRAM(s) Oral every 6 hours PRN Severe Pain (7 - 10)      Allergies    penicillin (Rash)    Intolerances          VITALS  82y  Vital Signs Last 24 Hrs  T(C): 36.9 (31 Oct 2022 07:39), Max: 36.9 (31 Oct 2022 07:39)  T(F): 98.4 (31 Oct 2022 07:39), Max: 98.4 (31 Oct 2022 07:39)  HR: 101 (31 Oct 2022 07:39) (98 - 101)  BP: 146/70 (31 Oct 2022 07:39) (136/84 - 146/70)  BP(mean): --  RR: 16 (31 Oct 2022 07:39) (16 - 16)  SpO2: 92% (31 Oct 2022 07:39) (92% - 95%)    Parameters below as of 31 Oct 2022 07:39  Patient On (Oxygen Delivery Method): room air      Daily     Daily         RECENT LABS:                          11.6   20.34 )-----------( 312      ( 31 Oct 2022 06:55 )             34.8     10-31    136  |  101  |  15  ----------------------------<  178<H>  3.4<L>   |  22  |  1.13    Ca    8.8      31 Oct 2022 06:55    TPro  6.8  /  Alb  2.3<L>  /  TBili  0.4  /  DBili  x   /  AST  30  /  ALT  36  /  AlkPhos  132<H>  10-31    LIVER FUNCTIONS - ( 31 Oct 2022 06:55 )  Alb: 2.3 g/dL / Pro: 6.8 g/dL / ALK PHOS: 132 U/L / ALT: 36 U/L / AST: 30 U/L / GGT: x                 Culture - Urine (collected 10-29-22 @ 13:00)  Source: Catheterized Catheterized  Preliminary Report (10-31-22 @ 11:57):    >100,000 CFU/ml Gram Negative Rods    Culture - Blood (collected 10-29-22 @ 01:06)  Source: .Blood Blood  Preliminary Report (10-30-22 @ 05:00):    No growth to date.    Culture - Blood (collected 10-29-22 @ 01:06)  Source: .Blood Blood  Preliminary Report (10-30-22 @ 05:00):    No growth to date.        CAPILLARY BLOOD GLUCOSE      POCT Blood Glucose.: 162 mg/dL (31 Oct 2022 11:57)  POCT Blood Glucose.: 188 mg/dL (31 Oct 2022 07:28)  POCT Blood Glucose.: 198 mg/dL (30 Oct 2022 20:10)  POCT Blood Glucose.: 128 mg/dL (30 Oct 2022 16:59)               Patient is a 82y old  Male who presents with a chief complaint of Cervical Laminectomy (31 Oct 2022 08:25)      HPI:  This is a 83 YO male with PMH of HTN, HLD, DM II, BPH with complaint of neck pain. Patient endorses increasing neck pain, gait instability and clumsiness of the hands. He ambulates with a cane. He denies fever, chills, trauma or injury. He presents to [facility] on 10/18 for scheduled Posterior C1-6 Decompression, C4-5 Posterior Column Osteotomy, C1-C7 Fusion, Complex Plastics Closure. Course complicated by severe constipation now resolved, leukocytosis, hyponatremia- stable on salt tablet, fall, urinary retention requiring straight catheterization, intermittent confusion likely related to pain medication and hospital setting. Noted with  Left deltoid weakness. MR Cervical spine 10/26/22 without cord compression. Surgical drain removed 10/27/22. Patient was evaluated by PM&R and therapy for functional deficits, gait/ADL impairments and acute rehabilitation was recommended. Patient was medically optimized for discharge to [facility] IRU on 10/27/22.   (27 Oct 2022 13:00)    TODAY's SUBJECTIVE:  Over the weekend patient had fevers over 103 F, leukocytosis and tachycardia.  He was started on IV cefepime and Vanco for presumed PNA with abnormal CT.  He was seen by ID, pulmonology and psychiatry.  He is on contact precautions while MRSA testing is pending.  Blood Cx 10/29 pending  Urine Cx growing G- rods. No sensitivities yet.  Today patient's mentation is improved, he is calm, cooperative and AAOx 3 (to self, month and year, hospital setting). He is participating in therapy.  No fevers overnight, WBCs down to 20K.  For now, patient continues on the 1:1 for fall risk due to impulsivity and confusion at night.       Adrienne, ID # [id number]  He reports neck pain and headache.   He had a bowel movement. Decision made w/ hospitalist input to remove the indwelling garay and start trial of void this morning.    ROS:  No dizziness  No chest pain, no palpitations  No shortness of breath  No abdominal pain, no nausea    PAST MEDICAL & SURGICAL HISTORY:  Hypertension  Hyperlipidemia      Chronic kidney disease      Diabetes mellitus  -patient states prediabetes, not on medication      Benign prostate hyperplasia      COVID-19 virus infection  -dec 2021 (cough, fever, malaise)      History of carpal tunnel surgery  -bilateral hands      History of cataract surgery  -bilateral      H/O colonoscopy          MEDICATIONS  (STANDING):  acetaminophen     Tablet .. 975 milliGRAM(s) Oral every 8 hours  amLODIPine   Tablet 10 milliGRAM(s) Oral at bedtime  aspirin  chewable 81 milliGRAM(s) Oral daily  atorvastatin 40 milliGRAM(s) Oral at bedtime  cefepime   IVPB      cefepime   IVPB 1000 milliGRAM(s) IV Intermittent every 12 hours  dextrose 5%. 1000 milliLiter(s) (100 mL/Hr) IV Continuous <Continuous>  dextrose 5%. 1000 milliLiter(s) (50 mL/Hr) IV Continuous <Continuous>  dextrose 50% Injectable 25 Gram(s) IV Push once  dextrose 50% Injectable 12.5 Gram(s) IV Push once  dextrose 50% Injectable 25 Gram(s) IV Push once  enoxaparin Injectable 40 milliGRAM(s) SubCutaneous <User Schedule>  glucagon  Injectable 1 milliGRAM(s) IntraMuscular once  insulin lispro (ADMELOG) corrective regimen sliding scale   SubCutaneous three times a day before meals  insulin lispro (ADMELOG) corrective regimen sliding scale   SubCutaneous at bedtime  losartan 50 milliGRAM(s) Oral daily  magnesium hydroxide Suspension 30 milliLiter(s) Oral every 12 hours  methocarbamol 500 milliGRAM(s) Oral two times a day  OLANZapine 2.5 milliGRAM(s) Oral <User Schedule>  polyethylene glycol 3350 17 Gram(s) Oral daily  saccharomyces boulardii 250 milliGRAM(s) Oral two times a day  senna 2 Tablet(s) Oral at bedtime  vancomycin  IVPB      vancomycin  IVPB 1000 milliGRAM(s) IV Intermittent every 12 hours    MEDICATIONS  (PRN):  bisacodyl Suppository 10 milliGRAM(s) Rectal daily PRN Constipation  dextrose Oral Gel 15 Gram(s) Oral once PRN Blood Glucose LESS THAN 70 milliGRAM(s)/deciliter  OLANZapine Injectable 2.5 milliGRAM(s) IntraMuscular every 12 hours PRN severe agitation  traMADol 50 milliGRAM(s) Oral every 6 hours PRN Severe Pain (7 - 10)      Allergies    penicillin (Rash)    Intolerances          VITALS  82y  Vital Signs Last 24 Hrs  T(C): 36.9 (31 Oct 2022 07:39), Max: 36.9 (31 Oct 2022 07:39)  T(F): 98.4 (31 Oct 2022 07:39), Max: 98.4 (31 Oct 2022 07:39)  HR: 101 (31 Oct 2022 07:39) (98 - 101)  BP: 146/70 (31 Oct 2022 07:39) (136/84 - 146/70)  BP(mean): --  RR: 16 (31 Oct 2022 07:39) (16 - 16)  SpO2: 92% (31 Oct 2022 07:39) (92% - 95%)    Parameters below as of 31 Oct 2022 07:39  Patient On (Oxygen Delivery Method): room air      PHYSICAL EXAM  Gen - NAD, Comfortable  HEENT - NCAT, EOMI   Neck - Supple, +posterior neck incision  Pulm - CTAB, No wheeze, No rhonchi, No crackles  Cardiovascular - RRR, S1S2, No murmurs  Chest - good chest expansion, good respiratory effort  Abdomen - Soft, NT/ND, +BS  Extremities - No Cyanosis, no clubbing, no edema, no calf tenderness  Neuro-     Cognitive - awake, alert, oriented to person, hospital setting, month/year. Able to follow commands     Communication - Fluent, Comprehensible     Attention: Intact     Cranial Nerves - CN 2-12 intact. No facial asymmetry, Tongue midline, EOMI, Shoulder shrug intact     Motor -                     LEFT    UE - ShAB 3/5, EF 4/5, EE 4/5,  4/5                    RIGHT UE - ShAB 5/5, EF 5/5, EE 5/5,  5/5                    LEFT    LE - HF 5/5, KE 5/5, DF 5/5, PF 5/5                    RIGHT LE - HF 5/5, KE 5/5, DF 5/5, PF 5/5        Sensory - Intact to LT      Reflexes - DTR Intact     Coordination - + dysmetria to left arm     Tone - normal  MSK: left arm weakness  Psychiatric - Mood stable, Affect WNL  Skin: posterior cervical spine incision with sutures    RECENT LABS:                          11.6   20.34 )-----------( 312      ( 31 Oct 2022 06:55 )             34.8     10-31    136  |  101  |  15  ----------------------------<  178<H>  3.4<L>   |  22  |  1.13    Ca    8.8      31 Oct 2022 06:55    TPro  6.8  /  Alb  2.3<L>  /  TBili  0.4  /  DBili  x   /  AST  30  /  ALT  36  /  AlkPhos  132<H>  10-31    LIVER FUNCTIONS - ( 31 Oct 2022 06:55 )  Alb: 2.3 g/dL / Pro: 6.8 g/dL / ALK PHOS: 132 U/L / ALT: 36 U/L / AST: 30 U/L / GGT: x                 Culture - Urine (collected 10-29-22 @ 13:00)  Source: Catheterized Catheterized  Preliminary Report (10-31-22 @ 11:57):    >100,000 CFU/ml Gram Negative Rods    Culture - Blood (collected 10-29-22 @ 01:06)  Source: .Blood Blood  Preliminary Report (10-30-22 @ 05:00):    No growth to date.    Culture - Blood (collected 10-29-22 @ 01:06)  Source: .Blood Blood  Preliminary Report (10-30-22 @ 05:00):    No growth to date.        CAPILLARY BLOOD GLUCOSE      POCT Blood Glucose.: 162 mg/dL (31 Oct 2022 11:57)  POCT Blood Glucose.: 188 mg/dL (31 Oct 2022 07:28)  POCT Blood Glucose.: 198 mg/dL (30 Oct 2022 20:10)  POCT Blood Glucose.: 128 mg/dL (30 Oct 2022 16:59)      RECENT IMAGING    CT Neck Soft Tissue w/ IV Cont (10.30.22 @ 10:09)   FINDINGS:  AERODIGESTIVE TRACT:  No retropharyngeal fluid collection.  LYMPH NODES:  No adenopathy.  PAROTID GLANDS:  Normal.  SUBMANDIBULAR GLANDS:  Normal.  THYROID GLAND:  Normal.  VISUALIZED PARANASAL SINUSES:  Clear.  VISUALIZED TYMPANOMASTOID CAVITIES: Clear.  BONES: Patient is status post laminectomy and fusion C1-C7 posteriorly.   Hardware appears in good anatomic position. No evidence of loosening.  MISCELLANEOUS:  Evidence of postoperative fluid-type collection in the   subcutaneous fat in the lower cervicalregion C4-5 through C7-T1 with a   paraspinal fluid collection extending more superiorly suggested as well   in the midline without rim enhancement. These are similar in appearance   compared with the prior MR study  IMPRESSION:  Postop changes as described similar in appearance as   suggested on the prior MR study 10/26/2022 without definitive evidence of   abscess. Status post laminectomy with posterior fusion. No hardware   malalignment or loosening on no enhancing collection appreciated      CT Chest, Abdomen and Pelvis No Cont (10.30.22 @ 10:08)  IMPRESSION:  Airspace opacity and consolidation in the left lower lobe and partially   lingula segment concerning for multifocal pneumonia in the appropriate   clinical context. No pleural effusions.  No acute abdominopelvic pathology.  Noncomplicated colonic diverticulosis.             Patient is a 82y old  Male who presents with a chief complaint of Cervical Laminectomy (31 Oct 2022 08:25)      HPI:  This is a 81 YO male with PMH of HTN, HLD, DM II, BPH with complaint of neck pain. Patient endorses increasing neck pain, gait instability and clumsiness of the hands. He ambulates with a cane. He denies fever, chills, trauma or injury. He presents to [facility] on 10/18 for scheduled Posterior C1-6 Decompression, C4-5 Posterior Column Osteotomy, C1-C7 Fusion, Complex Plastics Closure. Course complicated by severe constipation now resolved, leukocytosis, hyponatremia- stable on salt tablet, fall, urinary retention requiring straight catheterization, intermittent confusion likely related to pain medication and hospital setting. Noted with  Left deltoid weakness. MR Cervical spine 10/26/22 without cord compression. Surgical drain removed 10/27/22. Patient was evaluated by PM&R and therapy for functional deficits, gait/ADL impairments and acute rehabilitation was recommended. Patient was medically optimized for discharge to [facility] IRU on 10/27/22.   (27 Oct 2022 13:00)    TODAY's SUBJECTIVE:  Over the weekend patient had fevers over 103 F, leukocytosis and tachycardia.  He was started on IV cefepime and Vanco for presumed PNA with abnormal CT. Was on IVF overnight.  He was seen by ID, pulmonology and psychiatry.  He is on contact precautions while MRSA testing is pending.  Blood Cx 10/29 pending  Urine Cx growing G- rods. No sensitivities yet.  Today patient's mentation is improved, he is calm, cooperative and AAOx 3 (to self, month and year, hospital setting). He is participating in therapy.  No fevers overnight, WBCs down to 20K.  For now, patient continues on the 1:1 for fall risk due to impulsivity and confusion at night.       Adrienne, ID # [id number]  He reports neck pain and headache.   He had a bowel movement. Decision made w/ hospitalist input to remove the indwelling garay and start trial of void this morning.    ROS:  No dizziness  No chest pain, no palpitations  No shortness of breath  No abdominal pain, no nausea    PAST MEDICAL & SURGICAL HISTORY:  Hypertension  Hyperlipidemia      Chronic kidney disease      Diabetes mellitus  -patient states prediabetes, not on medication      Benign prostate hyperplasia      COVID-19 virus infection  -dec 2021 (cough, fever, malaise)      History of carpal tunnel surgery  -bilateral hands      History of cataract surgery  -bilateral      H/O colonoscopy          MEDICATIONS  (STANDING):  acetaminophen     Tablet .. 975 milliGRAM(s) Oral every 8 hours  amLODIPine   Tablet 10 milliGRAM(s) Oral at bedtime  aspirin  chewable 81 milliGRAM(s) Oral daily  atorvastatin 40 milliGRAM(s) Oral at bedtime  cefepime   IVPB      cefepime   IVPB 1000 milliGRAM(s) IV Intermittent every 12 hours  dextrose 5%. 1000 milliLiter(s) (100 mL/Hr) IV Continuous <Continuous>  dextrose 5%. 1000 milliLiter(s) (50 mL/Hr) IV Continuous <Continuous>  dextrose 50% Injectable 25 Gram(s) IV Push once  dextrose 50% Injectable 12.5 Gram(s) IV Push once  dextrose 50% Injectable 25 Gram(s) IV Push once  enoxaparin Injectable 40 milliGRAM(s) SubCutaneous <User Schedule>  glucagon  Injectable 1 milliGRAM(s) IntraMuscular once  insulin lispro (ADMELOG) corrective regimen sliding scale   SubCutaneous three times a day before meals  insulin lispro (ADMELOG) corrective regimen sliding scale   SubCutaneous at bedtime  losartan 50 milliGRAM(s) Oral daily  magnesium hydroxide Suspension 30 milliLiter(s) Oral every 12 hours  methocarbamol 500 milliGRAM(s) Oral two times a day  OLANZapine 2.5 milliGRAM(s) Oral <User Schedule>  polyethylene glycol 3350 17 Gram(s) Oral daily  saccharomyces boulardii 250 milliGRAM(s) Oral two times a day  senna 2 Tablet(s) Oral at bedtime  vancomycin  IVPB      vancomycin  IVPB 1000 milliGRAM(s) IV Intermittent every 12 hours    MEDICATIONS  (PRN):  bisacodyl Suppository 10 milliGRAM(s) Rectal daily PRN Constipation  dextrose Oral Gel 15 Gram(s) Oral once PRN Blood Glucose LESS THAN 70 milliGRAM(s)/deciliter  OLANZapine Injectable 2.5 milliGRAM(s) IntraMuscular every 12 hours PRN severe agitation  traMADol 50 milliGRAM(s) Oral every 6 hours PRN Severe Pain (7 - 10)      Allergies    penicillin (Rash)    Intolerances          VITALS  82y  Vital Signs Last 24 Hrs  T(C): 36.9 (31 Oct 2022 07:39), Max: 36.9 (31 Oct 2022 07:39)  T(F): 98.4 (31 Oct 2022 07:39), Max: 98.4 (31 Oct 2022 07:39)  HR: 101 (31 Oct 2022 07:39) (98 - 101)  BP: 146/70 (31 Oct 2022 07:39) (136/84 - 146/70)  BP(mean): --  RR: 16 (31 Oct 2022 07:39) (16 - 16)  SpO2: 92% (31 Oct 2022 07:39) (92% - 95%)    Parameters below as of 31 Oct 2022 07:39  Patient On (Oxygen Delivery Method): room air      PHYSICAL EXAM  Gen - NAD, Comfortable  HEENT - NCAT, EOMI   Neck - Supple, +posterior neck incision  Pulm - CTAB, No wheeze, No rhonchi, No crackles  Cardiovascular - RRR, S1S2, No murmurs  Chest - good chest expansion, good respiratory effort  Abdomen - Soft, NT/ND, +BS  Extremities - No Cyanosis, no clubbing, no edema, no calf tenderness  Neuro-     Cognitive - awake, alert, oriented to person, hospital setting, month/year. Able to follow commands     Communication - Fluent, Comprehensible     Attention: Intact     Cranial Nerves - CN 2-12 intact. No facial asymmetry, Tongue midline, EOMI, Shoulder shrug intact     Motor -                     LEFT    UE - ShAB 3/5, EF 4/5, EE 4/5,  4/5                    RIGHT UE - ShAB 5/5, EF 5/5, EE 5/5,  5/5                    LEFT    LE - HF 5/5, KE 5/5, DF 5/5, PF 5/5                    RIGHT LE - HF 5/5, KE 5/5, DF 5/5, PF 5/5        Sensory - Intact to LT      Reflexes - DTR Intact     Coordination - + dysmetria to left arm     Tone - normal  MSK: left arm weakness  Psychiatric - Mood stable, Affect WNL  Skin: posterior cervical spine incision with sutures    RECENT LABS:                          11.6   20.34 )-----------( 312      ( 31 Oct 2022 06:55 )             34.8     10-31    136  |  101  |  15  ----------------------------<  178<H>  3.4<L>   |  22  |  1.13    Ca    8.8      31 Oct 2022 06:55    TPro  6.8  /  Alb  2.3<L>  /  TBili  0.4  /  DBili  x   /  AST  30  /  ALT  36  /  AlkPhos  132<H>  10-31    LIVER FUNCTIONS - ( 31 Oct 2022 06:55 )  Alb: 2.3 g/dL / Pro: 6.8 g/dL / ALK PHOS: 132 U/L / ALT: 36 U/L / AST: 30 U/L / GGT: x                 Culture - Urine (collected 10-29-22 @ 13:00)  Source: Catheterized Catheterized  Preliminary Report (10-31-22 @ 11:57):    >100,000 CFU/ml Gram Negative Rods    Culture - Blood (collected 10-29-22 @ 01:06)  Source: .Blood Blood  Preliminary Report (10-30-22 @ 05:00):    No growth to date.    Culture - Blood (collected 10-29-22 @ 01:06)  Source: .Blood Blood  Preliminary Report (10-30-22 @ 05:00):    No growth to date.        CAPILLARY BLOOD GLUCOSE      POCT Blood Glucose.: 162 mg/dL (31 Oct 2022 11:57)  POCT Blood Glucose.: 188 mg/dL (31 Oct 2022 07:28)  POCT Blood Glucose.: 198 mg/dL (30 Oct 2022 20:10)  POCT Blood Glucose.: 128 mg/dL (30 Oct 2022 16:59)      RECENT IMAGING    CT Neck Soft Tissue w/ IV Cont (10.30.22 @ 10:09)   FINDINGS:  AERODIGESTIVE TRACT:  No retropharyngeal fluid collection.  LYMPH NODES:  No adenopathy.  PAROTID GLANDS:  Normal.  SUBMANDIBULAR GLANDS:  Normal.  THYROID GLAND:  Normal.  VISUALIZED PARANASAL SINUSES:  Clear.  VISUALIZED TYMPANOMASTOID CAVITIES: Clear.  BONES: Patient is status post laminectomy and fusion C1-C7 posteriorly.   Hardware appears in good anatomic position. No evidence of loosening.  MISCELLANEOUS:  Evidence of postoperative fluid-type collection in the   subcutaneous fat in the lower cervicalregion C4-5 through C7-T1 with a   paraspinal fluid collection extending more superiorly suggested as well   in the midline without rim enhancement. These are similar in appearance   compared with the prior MR study  IMPRESSION:  Postop changes as described similar in appearance as   suggested on the prior MR study 10/26/2022 without definitive evidence of   abscess. Status post laminectomy with posterior fusion. No hardware   malalignment or loosening on no enhancing collection appreciated      CT Chest, Abdomen and Pelvis No Cont (10.30.22 @ 10:08)  IMPRESSION:  Airspace opacity and consolidation in the left lower lobe and partially   lingula segment concerning for multifocal pneumonia in the appropriate   clinical context. No pleural effusions.  No acute abdominopelvic pathology.  Noncomplicated colonic diverticulosis.

## 2022-10-31 NOTE — PHARMACOTHERAPY INTERVENTION NOTE - COMMENTS
Modified penicillin allergy to state patient tolerated cefepime during this admission.    Gurinder Woods, PharmD  Clinical Pharmacy Specialist, Infectious Diseases  Tele-Antimicrobial Stewardship Program (Tele-ASP)  Tele-ASP Phone: [phone]

## 2022-10-31 NOTE — BH CONSULTATION LIAISON PROGRESS NOTE - OTHER
Adequate for interview  Wearing street clothing, sitting up right in hospital wheelchair.  themes of headache.

## 2022-10-31 NOTE — PROGRESS NOTE ADULT - SUBJECTIVE AND OBJECTIVE BOX
Patient is a 82y old  Male who presents with a chief complaint of Cervical Laminectomy (31 Oct 2022 08:25)      24 HOUR EVENTS:  No overnight events reported.     SUBJECTIVE:  Patient seen and examined at bedside.     ALLERGIES:  penicillin (Rash)    MEDICATIONS  (STANDING):  acetaminophen     Tablet .. 975 milliGRAM(s) Oral every 8 hours  amLODIPine   Tablet 10 milliGRAM(s) Oral at bedtime  aspirin  chewable 81 milliGRAM(s) Oral daily  atorvastatin 40 milliGRAM(s) Oral at bedtime  cefepime   IVPB      cefepime   IVPB 1000 milliGRAM(s) IV Intermittent every 12 hours  dextrose 5%. 1000 milliLiter(s) (100 mL/Hr) IV Continuous <Continuous>  dextrose 5%. 1000 milliLiter(s) (50 mL/Hr) IV Continuous <Continuous>  dextrose 50% Injectable 25 Gram(s) IV Push once  dextrose 50% Injectable 12.5 Gram(s) IV Push once  dextrose 50% Injectable 25 Gram(s) IV Push once  enoxaparin Injectable 40 milliGRAM(s) SubCutaneous <User Schedule>  glucagon  Injectable 1 milliGRAM(s) IntraMuscular once  insulin lispro (ADMELOG) corrective regimen sliding scale   SubCutaneous three times a day before meals  insulin lispro (ADMELOG) corrective regimen sliding scale   SubCutaneous at bedtime  losartan 50 milliGRAM(s) Oral daily  magnesium hydroxide Suspension 30 milliLiter(s) Oral every 12 hours  OLANZapine 2.5 milliGRAM(s) Oral <User Schedule>  polyethylene glycol 3350 17 Gram(s) Oral daily  senna 2 Tablet(s) Oral at bedtime  sodium chloride 0.45%. 1000 milliLiter(s) (75 mL/Hr) IV Continuous <Continuous>  vancomycin  IVPB      vancomycin  IVPB 1000 milliGRAM(s) IV Intermittent every 12 hours    MEDICATIONS  (PRN):  bisacodyl Suppository 10 milliGRAM(s) Rectal daily PRN Constipation  dextrose Oral Gel 15 Gram(s) Oral once PRN Blood Glucose LESS THAN 70 milliGRAM(s)/deciliter  OLANZapine Injectable 2.5 milliGRAM(s) IntraMuscular every 12 hours PRN severe agitation  traMADol 50 milliGRAM(s) Oral every 8 hours PRN Severe Pain (7 - 10)    Vital Signs Last 24 Hrs  T(F): 98.4 (31 Oct 2022 07:39), Max: 98.4 (31 Oct 2022 07:39)  HR: 101 (31 Oct 2022 07:39) (98 - 101)  BP: 146/70 (31 Oct 2022 07:39) (131/80 - 146/70)  RR: 16 (31 Oct 2022 07:39) (16 - 16)  SpO2: 92% (31 Oct 2022 07:39) (92% - 95%)  I&O's Summary    30 Oct 2022 07:01  -  31 Oct 2022 07:00  --------------------------------------------------------  IN: 0 mL / OUT: 1830 mL / NET: -1830 mL    31 Oct 2022 07:01  -  31 Oct 2022 10:11  --------------------------------------------------------  IN: 0 mL / OUT: 500 mL / NET: -500 mL      PHYSICAL EXAM:  General: NAD, A/O x 3  ENT: Moist mucous membranes, no thrush  Neck: Supple, No JVD  Lungs: Clear to auscultation bilaterally, good air entry, non-labored breathing  Cardio: RRR, S1/S2, No murmur  Abdomen: Soft, Nontender, Nondistended; Bowel sounds present  Extremities: No calf tenderness, No pitting edema    LABS:                        11.6   20.34 )-----------( 312      ( 31 Oct 2022 06:55 )             34.8     10-31    136  |  101  |  15  ----------------------------<  178  3.4   |  22  |  1.13    Ca    8.8      31 Oct 2022 06:55    TPro  6.8  /  Alb  2.3  /  TBili  0.4  /  DBili  x   /  AST  30  /  ALT  36  /  AlkPhos  132  10-31            Lactate, Blood: 0.8 mmol/L (10-29 @ 01:06)    Procalcitonin, Serum: 0.64 ng/mL (10-31-22 @ 06:55)                      POCT Blood Glucose.: 188 mg/dL (31 Oct 2022 07:28)  POCT Blood Glucose.: 198 mg/dL (30 Oct 2022 20:10)  POCT Blood Glucose.: 128 mg/dL (30 Oct 2022 16:59)  POCT Blood Glucose.: 145 mg/dL (30 Oct 2022 11:50)      Urinalysis Basic - ( 29 Oct 2022 01:06 )    Color: Yellow / Appearance: Clear / S.010 / pH: x  Gluc: x / Ketone: Negative  / Bili: Negative / Urobili: Negative   Blood: x / Protein: 30 mg/dL / Nitrite: Negative   Leuk Esterase: Small / RBC: 5-10 /HPF / WBC 6-10 /HPF   Sq Epi: x / Non Sq Epi: Neg.-Few / Bacteria: Moderate /HPF        Culture - Blood (collected 29 Oct 2022 01:06)  Source: .Blood Blood  Preliminary Report (30 Oct 2022 05:00):    No growth to date.    Culture - Blood (collected 29 Oct 2022 01:06)  Source: .Blood Blood  Preliminary Report (30 Oct 2022 05:00):    No growth to date.      COVID-19 PCR: NotDetec (10-30-22 @ 17:48)  COVID-19 PCR: NotDetec (10-27-22 @ 23:11)  COVID-19 PCR: NotDetec (10-24-22 @ 07:47)  COVID-19 PCR: NotDetec (10-15-22 @ 13:40)    RADIOLOGY & ADDITIONAL TESTS:    Care Discussed with Consultants/Other Providers:

## 2022-10-31 NOTE — PROGRESS NOTE ADULT - ASSESSMENT
81 YO male with PMH of HTN, HLD, DM II, BPH with complaint of neck pain, presented to [facility] on 10/18 for scheduled Posterior C1-6 Decompression, C4-5 Posterior Column Osteotomy, C1-C7 Fusion, Complex Plastics Closure. Course complicated by severe constipation, leukocytosis, hyponatremia, fall, urinary retention requiring straight catheterization, intermittent confusion likely related to pain medication and hospital setting.     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  - Follow cultures - so far NGTD  - Infectious Diseases following  - CT IMAGING: most pertinent for some airspace opacity in LLL, no evidence of hardware issues, CT neck w/ post op changes w/o abscess.  - cont vanco and cefepime  - f/u MRSA nares.    # acute metabolic encephalopathy, likely due to delirium, w/ agitation/combativeness - improvig.  # Suicidal ideation  pt noted to have intermittent confusion at OSH, thought to be related to pain meds, delirium, and urinary retention. Very agitated and confused when first admitted here, more oriented today and calm.  - Psychiatry following   - 1:1 observation  - will minimize narcotics, treat pain, will hold scheduled flexeril and other muscle relaxers.    # acute urinary retention  - garay?  - recommend regular bladder scans.    # HTN  - amlodipine 10 mg qhs and losartan 50 mg qd    #HLD  - lipitor 40 mg qd    # DM2  - controlled, [id number] 6.0%  - ISS    dvt ppx: lovenox

## 2022-11-01 LAB
-  AMIKACIN: SIGNIFICANT CHANGE UP
-  AMOXICILLIN/CLAVULANIC ACID: SIGNIFICANT CHANGE UP
-  AMPICILLIN/SULBACTAM: SIGNIFICANT CHANGE UP
-  AMPICILLIN: SIGNIFICANT CHANGE UP
-  AZTREONAM: SIGNIFICANT CHANGE UP
-  CEFAZOLIN: SIGNIFICANT CHANGE UP
-  CEFEPIME: SIGNIFICANT CHANGE UP
-  CEFOXITIN: SIGNIFICANT CHANGE UP
-  CEFTRIAXONE: SIGNIFICANT CHANGE UP
-  CIPROFLOXACIN: SIGNIFICANT CHANGE UP
-  ERTAPENEM: SIGNIFICANT CHANGE UP
-  GENTAMICIN: SIGNIFICANT CHANGE UP
-  LEVOFLOXACIN: SIGNIFICANT CHANGE UP
-  MEROPENEM: SIGNIFICANT CHANGE UP
-  NITROFURANTOIN: SIGNIFICANT CHANGE UP
-  PIPERACILLIN/TAZOBACTAM: SIGNIFICANT CHANGE UP
-  TIGECYCLINE: SIGNIFICANT CHANGE UP
-  TOBRAMYCIN: SIGNIFICANT CHANGE UP
-  TRIMETHOPRIM/SULFAMETHOXAZOLE: SIGNIFICANT CHANGE UP
CULTURE RESULTS: SIGNIFICANT CHANGE UP
GLUCOSE BLDC GLUCOMTR-MCNC: 138 MG/DL — HIGH (ref 70–99)
GLUCOSE BLDC GLUCOMTR-MCNC: 153 MG/DL — HIGH (ref 70–99)
GLUCOSE BLDC GLUCOMTR-MCNC: 175 MG/DL — HIGH (ref 70–99)
GLUCOSE BLDC GLUCOMTR-MCNC: 184 MG/DL — HIGH (ref 70–99)
METHOD TYPE: SIGNIFICANT CHANGE UP
ORGANISM # SPEC MICROSCOPIC CNT: SIGNIFICANT CHANGE UP
ORGANISM # SPEC MICROSCOPIC CNT: SIGNIFICANT CHANGE UP
SPECIMEN SOURCE: SIGNIFICANT CHANGE UP

## 2022-11-01 PROCEDURE — 99232 SBSQ HOSP IP/OBS MODERATE 35: CPT

## 2022-11-01 PROCEDURE — 99232 SBSQ HOSP IP/OBS MODERATE 35: CPT | Mod: GC

## 2022-11-01 RX ORDER — TRAMADOL HYDROCHLORIDE 50 MG/1
75 TABLET ORAL EVERY 6 HOURS
Refills: 0 | Status: DISCONTINUED | OUTPATIENT
Start: 2022-11-01 | End: 2022-11-07

## 2022-11-01 RX ORDER — CYCLOBENZAPRINE HYDROCHLORIDE 10 MG/1
5 TABLET, FILM COATED ORAL THREE TIMES A DAY
Refills: 0 | Status: DISCONTINUED | OUTPATIENT
Start: 2022-11-01 | End: 2022-11-10

## 2022-11-01 RX ORDER — LIDOCAINE 4 G/100G
2 CREAM TOPICAL
Refills: 0 | Status: DISCONTINUED | OUTPATIENT
Start: 2022-11-01 | End: 2022-11-11

## 2022-11-01 RX ADMIN — LIDOCAINE 2 PATCH: 4 CREAM TOPICAL at 19:00

## 2022-11-01 RX ADMIN — ATORVASTATIN CALCIUM 40 MILLIGRAM(S): 80 TABLET, FILM COATED ORAL at 20:48

## 2022-11-01 RX ADMIN — Medication 250 MILLIGRAM(S): at 06:13

## 2022-11-01 RX ADMIN — Medication 250 MILLIGRAM(S): at 17:36

## 2022-11-01 RX ADMIN — CEFEPIME 100 MILLIGRAM(S): 1 INJECTION, POWDER, FOR SOLUTION INTRAMUSCULAR; INTRAVENOUS at 17:35

## 2022-11-01 RX ADMIN — LIDOCAINE 2 PATCH: 4 CREAM TOPICAL at 19:46

## 2022-11-01 RX ADMIN — MAGNESIUM HYDROXIDE 30 MILLILITER(S): 400 TABLET, CHEWABLE ORAL at 17:36

## 2022-11-01 RX ADMIN — ENOXAPARIN SODIUM 40 MILLIGRAM(S): 100 INJECTION SUBCUTANEOUS at 17:36

## 2022-11-01 RX ADMIN — Medication 975 MILLIGRAM(S): at 07:14

## 2022-11-01 RX ADMIN — Medication 250 MILLIGRAM(S): at 06:14

## 2022-11-01 RX ADMIN — Medication 81 MILLIGRAM(S): at 12:13

## 2022-11-01 RX ADMIN — Medication 975 MILLIGRAM(S): at 20:48

## 2022-11-01 RX ADMIN — METHOCARBAMOL 500 MILLIGRAM(S): 500 TABLET, FILM COATED ORAL at 06:14

## 2022-11-01 RX ADMIN — CEFEPIME 100 MILLIGRAM(S): 1 INJECTION, POWDER, FOR SOLUTION INTRAMUSCULAR; INTRAVENOUS at 06:13

## 2022-11-01 RX ADMIN — Medication 1: at 16:49

## 2022-11-01 RX ADMIN — Medication 975 MILLIGRAM(S): at 21:44

## 2022-11-01 RX ADMIN — Medication 1: at 07:38

## 2022-11-01 RX ADMIN — Medication 975 MILLIGRAM(S): at 06:14

## 2022-11-01 RX ADMIN — Medication 975 MILLIGRAM(S): at 14:03

## 2022-11-01 RX ADMIN — POLYETHYLENE GLYCOL 3350 17 GRAM(S): 17 POWDER, FOR SOLUTION ORAL at 12:13

## 2022-11-01 RX ADMIN — LOSARTAN POTASSIUM 50 MILLIGRAM(S): 100 TABLET, FILM COATED ORAL at 06:14

## 2022-11-01 RX ADMIN — AMLODIPINE BESYLATE 10 MILLIGRAM(S): 2.5 TABLET ORAL at 20:47

## 2022-11-01 RX ADMIN — OLANZAPINE 2.5 MILLIGRAM(S): 15 TABLET, FILM COATED ORAL at 17:36

## 2022-11-01 RX ADMIN — SENNA PLUS 2 TABLET(S): 8.6 TABLET ORAL at 20:48

## 2022-11-01 RX ADMIN — Medication 975 MILLIGRAM(S): at 14:04

## 2022-11-01 NOTE — CHART NOTE - NSCHARTNOTEFT_GEN_A_CORE
IDT 11/1 - lead by Dr. Mares  lives with spouse in a basement apt, 5 TAHIR with no HR.  has cane and rollator at home    OT:  Eating Setup SV  grooming Setup SV  UBD setup SV  LBD min A  bathing mod A  Toilet transfer min A  toileting mod A  Shower transfer not assess  Goals: IDT 11/1 - lead by Dr. Mares  lives with spouse in a basement apt, 5 TAHIR with no HR.  has cane and rollator at home    OT:  Eating Setup SV  grooming Setup SV  UBD setup SV  LBD min A  bathing mod A  Toilet transfer min A  toileting mod A  Shower transfer not assess  Goals: close SV with transfer, CG toileting    PT:   bed mob min A  transfers min A  amb 40' RW min   Goals: SV with bed mob/transfers/amb 150' RW. CG 12 steps    SLP:  [place] uses amplifier  Diet: reg/thin - impulsive with food.  SV meals?  mild- mod comprehension, memory deficits, slurred speech    Barriers: impaired coordination, right shoulder pain/weakness, agitation slightly better    TDD: 11/16 home

## 2022-11-01 NOTE — BH CONSULTATION LIAISON PROGRESS NOTE - NSBHATTESTTYPEVISIT_PSY_A_CORE
Attending Only
SHEEBA without on-site Attending supervision
SHEEBA without on-site Attending supervision

## 2022-11-01 NOTE — BH CONSULTATION LIAISON PROGRESS NOTE - NSBHCHARTREVIEWLAB_PSY_A_CORE FT
CBC Full  -  ( 28 Oct 2022 05:50 )  WBC Count : 12.87 K/uL  RBC Count : 4.19 M/uL  Hemoglobin : 12.7 g/dL  Hematocrit : 36.6 %  Platelet Count - Automated : 303 K/uL  Mean Cell Volume : 87.4 fl  Mean Cell Hemoglobin : 30.3 pg  Mean Cell Hemoglobin Concentration : 34.7 gm/dL  Auto Neutrophil # : 9.73 K/uL  Auto Lymphocyte # : 1.85 K/uL  Auto Monocyte # : 1.03 K/uL  Auto Eosinophil # : 0.20 K/uL  Auto Basophil # : 0.02 K/uL  Auto Neutrophil % : 75.5 %  Auto Lymphocyte % : 14.4 %  Auto Monocyte % : 8.0 %  Auto Eosinophil % : 1.6 %  Auto Basophil % : 0.2 %    10-28    135  |  98  |  20  ----------------------------<  139<H>  4.0   |  31  |  1.20    Ca    8.9      28 Oct 2022 05:50    TPro  7.3  /  Alb  3.0<L>  /  TBili  0.6  /  DBili  x   /  AST  26  /  ALT  35  /  AlkPhos  103  10-28  
                      11.6   20.34 )-----------( 312      ( 31 Oct 2022 06:55 )             34.8   
                      11.6   20.34 )-----------( 312      ( 31 Oct 2022 06:55 )             34.8

## 2022-11-01 NOTE — PROGRESS NOTE ADULT - ASSESSMENT
83 YO male with PMH of HTN, HLD, DM II, BPH with complaint of neck pain, presented to [facility] on 10/18 for scheduled Posterior C1-6 Decompression, C4-5 Posterior Column Osteotomy, C1-C7 Fusion, Complex Plastics Closure. Course complicated by severe constipation, leukocytosis, hyponatremia, fall, urinary retention requiring straight catheterization, intermittent confusion likely related to pain medication and hospital setting.     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  - UCx w/ Serratia marcescens, sensitivities pending.   - Pt is on vancomycin and cefepime. Likely can d/c vancomycin, cefepime dosing 2 g IV q8h?  - Infectious Diseases following, will f/u recs.  - CT IMAGING: most pertinent for some airspace opacity in LLL, no evidence of hardware issues, CT neck w/ post op changes w/o abscess.  - MRSA nares neg, d/c isolation    # acute metabolic encephalopathy, likely due to infection, delirium, w/ agitation/combativeness - improving.  # Suicidal ideation  pt noted to have intermittent confusion at OSH, thought to be related to pain meds, delirium, and urinary retention. Very agitated and confused when first admitted here, now less confused and calm.   - treat infection above.  - Psychiatry following , now on scheduled zyprexa at 1800 and PRN zyprexa for agitation.  - discussed above plan w/Dr. fuller during IDR    # acute urinary retention  - passed TOV, garay catheter removed.  - recommend regular bladder scans.  - avoid anti-cholinergic medications    # HTN  - amlodipine 10 mg qhs and losartan 50 mg qd    #HLD  - lipitor 40 mg qd    # DM2  - controlled, [id number] 6.0%  - ISS    dvt ppx: lovenox 81 YO male with PMH of HTN, HLD, DM II, BPH with complaint of neck pain, presented to [facility] on 10/18 for scheduled Posterior C1-6 Decompression, C4-5 Posterior Column Osteotomy, C1-C7 Fusion, Complex Plastics Closure. Course complicated by severe constipation, leukocytosis, hyponatremia, fall, urinary retention requiring straight catheterization, intermittent confusion likely related to pain medication and hospital setting.     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  - UCx w/ Serratia marcescens, sensitivities pending.   - Pt is on vancomycin and cefepime. Likely can d/c vancomycin, cefepime dosing 2 g IV q8h?  - Infectious Diseases following, will f/u recs.  - CT IMAGING: most pertinent for some airspace opacity in LLL, no evidence of hardware issues, CT neck w/ post op changes w/o abscess.  - MRSA nares neg, d/c isolation    # acute metabolic encephalopathy, likely due to infection, delirium, w/ agitation/combativeness - improving.  # Suicidal ideation  pt noted to have intermittent confusion at OSH, thought to be related to pain meds, delirium, and urinary retention. Very agitated and confused when first admitted here, now less confused and calm.   - treat infection above.  - Psychiatry following , now on scheduled zyprexa at 1800 and PRN zyprexa for agitation.  - discussed above plan w/Dr. fuller during IDR    # acute urinary retention  - passed TOV, garay catheter removed.  - recommend regular bladder scans.  - avoid anti-cholinergic medications    # HTN  - amlodipine 10 mg qhs and losartan 50 mg qd    #HLD  - lipitor 40 mg qd    # DM2  - controlled, [id number] 6.0%  - ISS    dvt ppx: lovenox

## 2022-11-01 NOTE — BH CONSULTATION LIAISON PROGRESS NOTE - NSBHCONSULTFOLLOWAFTERCARE_PSY_A_CORE FT
Too soon to decide, pt newly admitted to acute rehab, would defer after care recs to primary rehab team. If transferred to [facility] pt can be followed by psychological services. 
Too soon to decide, pt newly admitted to acute rehab, would defer after care recs to primary rehab team. If transferred to [facility] pt can be followed by psychological services.

## 2022-11-01 NOTE — BH CONSULTATION LIAISON PROGRESS NOTE - NSBHFUPINTERVALHXFT_PSY_A_CORE
This is a 83 YO male with PMH of HTN, HLD, DM II, BPH with complaint of neck pain. Patient endorses increasing neck pain, gait instability and clumsiness of the hands. He ambulates with a cane. He denies fever, chills, trauma or injury. He presents to [facility] on 10/18 for scheduled Posterior C1-6 Decompression, C4-5 Posterior Column Osteotomy, C1-C7 Fusion, Complex Plastics Closure. Course complicated by severe constipation now resolved, leukocytosis, hyponatremia- stable on salt tablet, fall, urinary retention requiring straight catheterization, intermittent confusion likely related to pain medication and hospital setting. Noted with  Left deltoid weakness. MR Cervical spine 10/26/22 without cord compression. Surgical drain removed 10/27/22. Patient was evaluated by PM&R and therapy for functional deficits, gait/ADL impairments and acute rehabilitation was recommended. Patient was medically optimized for discharge to [facility] IRU on 10/27/22.    Psychiatry C/L note: See previous  notes leading up today's visit. Josephine Mcclendon ID #[id number]. Pt seen and evaluated. He is pleasant, calm, cooperative, reports having a headache which is relieved by pain medication. Pt reports his mood is fine today, sleep and appetite- WNL. Per primary RN, no behavioral issues overnight. Delirium appears resolved. No suicidal/homicidal ideations, intent or plan endorsed or obtained on interview. No stevo, hallucinations or psychosis observed on interview.
This is a 83 YO male with PMH of HTN, HLD, DM II, BPH with complaint of neck pain. Patient endorses increasing neck pain, gait instability and clumsiness of the hands. He ambulates with a cane. He denies fever, chills, trauma or injury. He presents to [facility] on 10/18 for scheduled Posterior C1-6 Decompression, C4-5 Posterior Column Osteotomy, C1-C7 Fusion, Complex Plastics Closure. Course complicated by severe constipation now resolved, leukocytosis, hyponatremia- stable on salt tablet, fall, urinary retention requiring straight catheterization, intermittent confusion likely related to pain medication and hospital setting. Noted with  Left deltoid weakness. MR Cervical spine 10/26/22 without cord compression. Surgical drain removed 10/27/22. Patient was evaluated by PM&R and therapy for functional deficits, gait/ADL impairments and acute rehabilitation was recommended. Patient was medically optimized for discharge to [facility] IRU on 10/27/22.    Psychiatry C/L note: Pt seen at bedside, he is calm, cooperative, pleasant, smiling. Per staff has been in good behavioral control. No events overnight. Pt tells writer he had a good day and offers no complaints. 
HPI:  This is a 83 YO male with PMH of HTN, HLD, DM II, BPH with complaint of neck pain. Patient endorses increasing neck pain, gait instability and clumsiness of the hands. He ambulates with a cane. He denies fever, chills, trauma or injury. He presents to [facility] on 10/18 for scheduled Posterior C1-6 Decompression, C4-5 Posterior Column Osteotomy, C1-C7 Fusion, Complex Plastics Closure. Course complicated by severe constipation now resolved, leukocytosis, hyponatremia- stable on salt tablet, fall, urinary retention requiring straight catheterization, intermittent confusion likely related to pain medication and hospital setting. Noted with  Left deltoid weakness. MR Cervical spine 10/26/22 without cord compression. Surgical drain removed 10/27/22. Patient was evaluated by PM&R and therapy for functional deficits, gait/ADL impairments and acute rehabilitation was recommended. Patient was medically optimized for discharge to [facility] IRU on 10/27/22.   (27 Oct 2022 13:00)    Psychiatry C/L Note: Pt had been placed on a 1:1 for agitation, combativeness, and voicing suicidal thoughts. Pt reports today, he has no suicidal thoughts and that every one had been doing a "good job", he thanked the writer for her care in stopping by to check on him.  service used- # [id number] and  #[id number]. Pt overnight  had been described as restless and impulsive , trying to get out of bed without assistance and required redirection numerous times, pt had also been febrile, appreciate ID consult, hospitalist  and PM& R notes. Pt reports he is sad, because he is here and not working, he reports he works in [employer] and that there is fall clean up to do , he is worried financially " how can I be happy about that?" He is noted to have poor appetite, but states, " I will eat whatever you give me  to get better". No aggression noted, no paranoid ideation, no auditory or visual hallucinations noted or elicited during exam. He reports normal sleep, energy, and memory. Cooperative and calmer, had not required rescue meds last night, rest of psychiatric ROS as noted below or unremarkable.

## 2022-11-01 NOTE — BH CONSULTATION LIAISON PROGRESS NOTE - CURRENT MEDICATION
MEDICATIONS  (STANDING):  acetaminophen     Tablet .. 975 milliGRAM(s) Oral every 8 hours  amLODIPine   Tablet 10 milliGRAM(s) Oral at bedtime  aspirin  chewable 81 milliGRAM(s) Oral daily  atorvastatin 40 milliGRAM(s) Oral at bedtime  cefepime   IVPB      cefepime   IVPB 1000 milliGRAM(s) IV Intermittent every 12 hours  dextrose 5%. 1000 milliLiter(s) (100 mL/Hr) IV Continuous <Continuous>  dextrose 5%. 1000 milliLiter(s) (50 mL/Hr) IV Continuous <Continuous>  dextrose 50% Injectable 25 Gram(s) IV Push once  dextrose 50% Injectable 12.5 Gram(s) IV Push once  dextrose 50% Injectable 25 Gram(s) IV Push once  enoxaparin Injectable 40 milliGRAM(s) SubCutaneous <User Schedule>  glucagon  Injectable 1 milliGRAM(s) IntraMuscular once  insulin lispro (ADMELOG) corrective regimen sliding scale   SubCutaneous three times a day before meals  insulin lispro (ADMELOG) corrective regimen sliding scale   SubCutaneous at bedtime  losartan 50 milliGRAM(s) Oral daily  magnesium hydroxide Suspension 30 milliLiter(s) Oral every 12 hours  OLANZapine 2.5 milliGRAM(s) Oral <User Schedule>  polyethylene glycol 3350 17 Gram(s) Oral daily  senna 2 Tablet(s) Oral at bedtime  sodium chloride 1 Gram(s) Oral every 8 hours  sodium chloride 0.45%. 1000 milliLiter(s) (60 mL/Hr) IV Continuous <Continuous>    MEDICATIONS  (PRN):  bisacodyl Suppository 10 milliGRAM(s) Rectal daily PRN Constipation  dextrose Oral Gel 15 Gram(s) Oral once PRN Blood Glucose LESS THAN 70 milliGRAM(s)/deciliter  OLANZapine Injectable 2.5 milliGRAM(s) IntraMuscular every 12 hours PRN severe agitation  traMADol 50 milliGRAM(s) Oral every 8 hours PRN Severe Pain (7 - 10)  
MEDICATIONS  (STANDING):  acetaminophen     Tablet .. 975 milliGRAM(s) Oral every 8 hours  amLODIPine   Tablet 10 milliGRAM(s) Oral at bedtime  aspirin  chewable 81 milliGRAM(s) Oral daily  atorvastatin 40 milliGRAM(s) Oral at bedtime  cefepime   IVPB      cefepime   IVPB 1000 milliGRAM(s) IV Intermittent every 12 hours  dextrose 5%. 1000 milliLiter(s) (100 mL/Hr) IV Continuous <Continuous>  dextrose 5%. 1000 milliLiter(s) (50 mL/Hr) IV Continuous <Continuous>  dextrose 50% Injectable 25 Gram(s) IV Push once  dextrose 50% Injectable 12.5 Gram(s) IV Push once  dextrose 50% Injectable 25 Gram(s) IV Push once  enoxaparin Injectable 40 milliGRAM(s) SubCutaneous <User Schedule>  glucagon  Injectable 1 milliGRAM(s) IntraMuscular once  insulin lispro (ADMELOG) corrective regimen sliding scale   SubCutaneous three times a day before meals  insulin lispro (ADMELOG) corrective regimen sliding scale   SubCutaneous at bedtime  lidocaine   4% Patch 2 Patch Transdermal <User Schedule>  losartan 50 milliGRAM(s) Oral daily  magnesium hydroxide Suspension 30 milliLiter(s) Oral every 12 hours  OLANZapine 2.5 milliGRAM(s) Oral <User Schedule>  polyethylene glycol 3350 17 Gram(s) Oral daily  saccharomyces boulardii 250 milliGRAM(s) Oral two times a day  senna 2 Tablet(s) Oral at bedtime  vancomycin  IVPB      vancomycin  IVPB 1000 milliGRAM(s) IV Intermittent every 12 hours    MEDICATIONS  (PRN):  bisacodyl Suppository 10 milliGRAM(s) Rectal daily PRN Constipation  cyclobenzaprine 5 milliGRAM(s) Oral three times a day PRN Muscle Spasm  dextrose Oral Gel 15 Gram(s) Oral once PRN Blood Glucose LESS THAN 70 milliGRAM(s)/deciliter  OLANZapine Injectable 2.5 milliGRAM(s) IntraMuscular every 12 hours PRN severe agitation  traMADol 75 milliGRAM(s) Oral every 6 hours PRN Severe Pain (7 - 10)  
MEDICATIONS  (STANDING):  acetaminophen     Tablet .. 975 milliGRAM(s) Oral every 8 hours  amLODIPine   Tablet 10 milliGRAM(s) Oral at bedtime  aspirin  chewable 81 milliGRAM(s) Oral daily  atorvastatin 40 milliGRAM(s) Oral at bedtime  cefepime   IVPB      cefepime   IVPB 1000 milliGRAM(s) IV Intermittent every 12 hours  dextrose 5%. 1000 milliLiter(s) (100 mL/Hr) IV Continuous <Continuous>  dextrose 5%. 1000 milliLiter(s) (50 mL/Hr) IV Continuous <Continuous>  dextrose 50% Injectable 25 Gram(s) IV Push once  dextrose 50% Injectable 12.5 Gram(s) IV Push once  dextrose 50% Injectable 25 Gram(s) IV Push once  enoxaparin Injectable 40 milliGRAM(s) SubCutaneous <User Schedule>  glucagon  Injectable 1 milliGRAM(s) IntraMuscular once  insulin lispro (ADMELOG) corrective regimen sliding scale   SubCutaneous three times a day before meals  insulin lispro (ADMELOG) corrective regimen sliding scale   SubCutaneous at bedtime  losartan 50 milliGRAM(s) Oral daily  magnesium hydroxide Suspension 30 milliLiter(s) Oral every 12 hours  OLANZapine 2.5 milliGRAM(s) Oral <User Schedule>  polyethylene glycol 3350 17 Gram(s) Oral daily  saccharomyces boulardii 250 milliGRAM(s) Oral two times a day  senna 2 Tablet(s) Oral at bedtime  vancomycin  IVPB      vancomycin  IVPB 1000 milliGRAM(s) IV Intermittent every 12 hours    MEDICATIONS  (PRN):  bisacodyl Suppository 10 milliGRAM(s) Rectal daily PRN Constipation  dextrose Oral Gel 15 Gram(s) Oral once PRN Blood Glucose LESS THAN 70 milliGRAM(s)/deciliter  OLANZapine Injectable 2.5 milliGRAM(s) IntraMuscular every 12 hours PRN severe agitation  traMADol 50 milliGRAM(s) Oral every 8 hours PRN Severe Pain (7 - 10)

## 2022-11-01 NOTE — BH CONSULTATION LIAISON PROGRESS NOTE - NSBHATTESTBILLINGAW_PSY_A_CORE
[id number] Inpatient care - moderate complexity - 25 minutes
[id number] Inpatient care - moderate complexity - 25 minutes
[id number] Inpatient care - moderate complexity - 25 minutes

## 2022-11-01 NOTE — PROGRESS NOTE ADULT - ASSESSMENT
83yo M hx HTN,T2DM, HLD, BPH, and chronic neck pain who [facility].  He underwent  a posterior C1-C6 decompression, C4-5 posterior column osteotomy, and C10C7 fusion with PRS closure.  His post op course was complicated  by constipation, intermittent confusion felt to be narcotic related, and urinary retention requiring ISC.He was felt to have left deltoid weakness, his drain was removed 10/27, and he was sent to rehab on 10/27.  He had a fever to 101.2 on 10/28, required placement of a garay catheter, and was started on cefepime.  He was up all night and is sleepy. History is via an , he has no specific complaints but nods off to sleep.  Etiology of fever not clear. It could be  as in setting of retention can develop systemic infection even if urine is not purulent.  His spine incision appears to be without evidence of infection.  His CXR seems to show elevated diaphragm on the left, ? atelectasis vs artifact. CXR on 10/29 officially read as atelectasis vs infiltrate.  He spike to 103, vanco had been added,  CT of neck with post op changes, CT of C/A/P with left sided infiltrates.  He has garay for retention. Blood cultures are negative so far, urine culture with >100K GNR-serratia    pneumonia may be present along with possible  infection  He appears to be responding to antibiotics although he still has a leukocytosis  MRSA screen negative.Legionella urine antigen is negative  Suggest;  Cont Cefepime, with no MRSA infection will advise stopping vanco  Await full sensitivities of serratia  With the  tract we should be able to sterilize urine with cefepime, If resistant we will consider alternative agent  Follow wbc  Case reviewed with Dr Mares and Dr Cullen.

## 2022-11-01 NOTE — BH CONSULTATION LIAISON PROGRESS NOTE - GENERAL APPEARANCE
Other
Other
Pt appears much younger than stated age, has large hands, and is wearing a T shirt./No deformities present

## 2022-11-01 NOTE — BH CONSULTATION LIAISON PROGRESS NOTE - NSBHINDICATION_PSY_ALL_CORE
Pt is a falls risk , and if he is sundowning will get out of bed and over estimate his ability to ambulate on his own.

## 2022-11-01 NOTE — BH CONSULTATION LIAISON PROGRESS NOTE - NSBHCONSULTRECOMMENDOTHER_PSY_A_CORE FT
Continue current management as per treatment team, delirium appears resolved.     If no events overnight, can DC 1:1 and would keep on bell and chair alarms, if family visits, pt may benefit from being out on the porch and being re oriented. 
Continue current management as per treatment team, most likely delirium with resolve after course of antibiotics.   Appreciate ID input.   If no events overnight and pt not impulsive in terms of getting out of bed, would agree with decreasing level of observation to enhanced and   keeping on bell and chair alarms, if family visits, pt may benefit from being out on the porch and being re oriented. 
Continue current management as per treatment team, delirium appears resolved.

## 2022-11-01 NOTE — BH CONSULTATION LIAISON PROGRESS NOTE - NSBHASSESSMENTFT_PSY_ALL_CORE
82 year old year old  male presents to [facility] for acute rehab services following scheduled Posterior C1-6 Decompression, C4-5 Posterior Column Osteotomy, C1-C7 Fusion, Complex Plastics Closure on 10/18/2022 at [facility].  Pt now presents with acute delirium likely multifactorial (urinary retention requiring straight cath and now Cordova in place, surgical procedure last week on 10/18 requiring general aesthesia, fall on 10/23 with + head strike, started on Narcotics for pain management- Oxycodone and Flexeril).  Psychiatry consulted to evaluate and assist with agitation.   Pt calmer after prn Olanzapine given, placed on standing dose, monitor for retention, no EPS or akathisia noted. 
82 year old year old  male presents to [facility] for acute rehab services following scheduled Posterior C1-6 Decompression, C4-5 Posterior Column Osteotomy, C1-C7 Fusion, Complex Plastics Closure on 10/18/2022 at [facility].  Pt initially presented with acute delirium likely multifactorial (urinary retention requiring straight cath and now Cordova in place, surgical procedure last week on 10/18 requiring general aesthesia, fall on 10/23 with + head strike, started on Narcotics for pain management- Oxycodone and Flexeril).  Psychiatry consulted to evaluate and assist with agitation.   Pt calmer after prn Olanzapine given, placed on standing dose, monitor for retention, no EPS or akathisia noted.  
82 year old year old  male presents to [facility] for acute rehab services following scheduled Posterior C1-6 Decompression, C4-5 Posterior Column Osteotomy, C1-C7 Fusion, Complex Plastics Closure on 10/18/2022 at [facility].  Pt initially presented with acute delirium likely multifactorial (urinary retention requiring straight cath and now Cordova in place, surgical procedure last week on 10/18 requiring general aesthesia, fall on 10/23 with + head strike, started on Narcotics for pain management- Oxycodone and Flexeril).  Psychiatry consulted to evaluate and assist with agitation.   Pt calmer after prn Olanzapine given, placed on standing dose, monitor for retention, no EPS or akathisia noted.

## 2022-11-01 NOTE — PROGRESS NOTE ADULT - SUBJECTIVE AND OBJECTIVE BOX
Patient is a 82y old  Male who presents with a chief complaint of Cervical Laminectomy (31 Oct 2022 08:25)      HPI:  This is a 83 YO male with PMH of HTN, HLD, DM II, BPH with complaint of neck pain. Patient endorses increasing neck pain, gait instability and clumsiness of the hands. He ambulates with a cane. He denies fever, chills, trauma or injury. He presents to [facility] on 10/18 for scheduled Posterior C1-6 Decompression, C4-5 Posterior Column Osteotomy, C1-C7 Fusion, Complex Plastics Closure. Course complicated by severe constipation now resolved, leukocytosis, hyponatremia- stable on salt tablet, fall, urinary retention requiring straight catheterization, intermittent confusion likely related to pain medication and hospital setting. Noted with  Left deltoid weakness. MR Cervical spine 10/26/22 without cord compression. Surgical drain removed 10/27/22. Patient was evaluated by PM&R and therapy for functional deficits, gait/ADL impairments and acute rehabilitation was recommended. Patient was medically optimized for discharge to [facility] IRU on 10/27/22.   (27 Oct 2022 13:00)    TODAY's SUBJECTIVE:  pain back of head  down neck to shoulder blades  spontaneously voiding- residuals low  Seen by ID- IV cefepime for presumed PNA/ UTI- off Vanco   patient much calmer- no need for 1:1 observation.   contact precautions Dcd  Blood Cx 10/29 neg X2, Urine C&S serratia sens to cefipime       He had a bowel movement    ROS:  No dizziness  No chest pain, no palpitations  No shortness of breath  No abdominal pain, no nausea    PAST MEDICAL & SURGICAL HISTORY:  Hypertension  Hyperlipidemia      Chronic kidney disease      Diabetes mellitus  -patient states prediabetes, not on medication      Benign prostate hyperplasia      COVID-19 virus infection  -dec 2021 (cough, fever, malaise)      History of carpal tunnel surgery  -bilateral hands      History of cataract surgery  -bilateral      H/O colonoscopy            t  MEDICATIONS  (STANDING):  acetaminophen     Tablet .. 975 milliGRAM(s) Oral every 8 hours  amLODIPine   Tablet 10 milliGRAM(s) Oral at bedtime  aspirin  chewable 81 milliGRAM(s) Oral daily  atorvastatin 40 milliGRAM(s) Oral at bedtime  cefepime   IVPB      cefepime   IVPB 1000 milliGRAM(s) IV Intermittent every 12 hours  dextrose 5%. 1000 milliLiter(s) (50 mL/Hr) IV Continuous <Continuous>  dextrose 5%. 1000 milliLiter(s) (100 mL/Hr) IV Continuous <Continuous>  dextrose 50% Injectable 25 Gram(s) IV Push once  dextrose 50% Injectable 12.5 Gram(s) IV Push once  dextrose 50% Injectable 25 Gram(s) IV Push once  enoxaparin Injectable 40 milliGRAM(s) SubCutaneous <User Schedule>  glucagon  Injectable 1 milliGRAM(s) IntraMuscular once  insulin lispro (ADMELOG) corrective regimen sliding scale   SubCutaneous three times a day before meals  insulin lispro (ADMELOG) corrective regimen sliding scale   SubCutaneous at bedtime  lidocaine   4% Patch 2 Patch Transdermal <User Schedule>  losartan 50 milliGRAM(s) Oral daily  magnesium hydroxide Suspension 30 milliLiter(s) Oral every 12 hours  OLANZapine 2.5 milliGRAM(s) Oral <User Schedule>  polyethylene glycol 3350 17 Gram(s) Oral daily  saccharomyces boulardii 250 milliGRAM(s) Oral two times a day  senna 2 Tablet(s) Oral at bedtime    MEDICATIONS  (PRN):  bisacodyl Suppository 10 milliGRAM(s) Rectal daily PRN Constipation  cyclobenzaprine 5 milliGRAM(s) Oral three times a day PRN Muscle Spasm  dextrose Oral Gel 15 Gram(s) Oral once PRN Blood Glucose LESS THAN 70 milliGRAM(s)/deciliter  OLANZapine Injectable 2.5 milliGRAM(s) IntraMuscular every 12 hours PRN severe agitation  traMADol 75 milliGRAM(s) Oral every 6 hours PRN Severe Pain (7 - 10)                            11.6   20.34 )-----------( 312      ( 31 Oct 2022 06:55 )             34.8     10-31    136  |  101  |  15  ----------------------------<  178<H>  3.4<L>   |  22  |  1.13    Ca    8.8      31 Oct 2022 06:55    TPro  6.8  /  Alb  2.3<L>  /  TBili  0.4  /  DBili  x   /  AST  30  /  ALT  36  /  AlkPhos  132<H>  10-31        CAPILLARY BLOOD GLUCOSE      POCT Blood Glucose.: 175 mg/dL (01 Nov 2022 16:48)  POCT Blood Glucose.: 138 mg/dL (01 Nov 2022 12:11)  POCT Blood Glucose.: 184 mg/dL (01 Nov 2022 07:36)  POCT Blood Glucose.: 153 mg/dL (31 Oct 2022 21:06)      Vital Signs Last 24 Hrs  T(C): 36.7 (01 Nov 2022 07:31), Max: 36.8 (31 Oct 2022 20:56)  T(F): 98.1 (01 Nov 2022 07:31), Max: 98.3 (31 Oct 2022 20:56)  HR: 103 (01 Nov 2022 07:31) (98 - 103)  BP: 147/81 (01 Nov 2022 07:31) (133/71 - 147/81)  BP(mean): --  RR: 16 (01 Nov 2022 07:31) (16 - 16)  SpO2: 94% (01 Nov 2022 07:31) (94% - 96%)    Parameters below as of 01 Nov 2022 07:31  Patient On (Oxygen Delivery Method): room air          PHYSICAL EXAM  Gen - NAD, Comfortable  HEENT - NCAT, EOMI   Neck - Supple, +posterior neck incision  Pulm - CTAB, No wheeze, No rhonchi, No crackles  Cardiovascular - RRR, S1S2, No murmurs  Chest - good chest expansion, good respiratory effor  Abdomen - Soft, NT/ND, +BS  Extremities - No Cyanosis, no clubbing, no edema, no calf tenderness  Neuro-     Cognitive - awake, alert, oriented to person, hospital setting, month/year. Able to follow commands     Communication - Fluent, Comprehensible     Attention: Intact     Cranial Nerves - CN 2-12 intact. No facial asymmetry, Tongue midline, EOMI, Shoulder shrug intact     Motor -                     LEFT    UE - ShAB 3/5, EF 4/5, EE 4/5,  4/5                    RIGHT UE - ShAB 4/5, EF 5/5, EE 5/5,  5/5                    LEFT    LE - HF 5/5, KE 5/5, DF 5/5, PF 5/5                    RIGHT LE - HF 5/5, KE 5/5, DF 5/5, PF 5/5        Sensory - Intact to LT      Reflexes - DTR Intact     Coordination - + dysmetria to left arm     Tone - normal  MSK: left arm weakness  Psychiatric - Mood stable, Affect WNL  Skin: posterior cervical spine incision with sutures    RECENT LABS:        RECENT IMAGING    CT Neck Soft Tissue w/ IV Cont (10.30.22 @ 10:09)   FINDINGS:  AERODIGESTIVE TRACT:  No retropharyngeal fluid collection.  LYMPH NODES:  No adenopathy.  PAROTID GLANDS:  Normal.  SUBMANDIBULAR GLANDS:  Normal.  THYROID GLAND:  Normal.  VISUALIZED PARANASAL SINUSES:  Clear.  VISUALIZED TYMPANOMASTOID CAVITIES: Clear.  BONES: Patient is status post laminectomy and fusion C1-C7 posteriorly.   Hardware appears in good anatomic position. No evidence of loosening.  MISCELLANEOUS:  Evidence of postoperative fluid-type collection in the   subcutaneous fat in the lower cervicalregion C4-5 through C7-T1 with a   paraspinal fluid collection extending more superiorly suggested as well   in the midline without rim enhancement. These are similar in appearance   compared with the prior MR study  IMPRESSION:  Postop changes as described similar in appearance as   suggested on the prior MR study 10/26/2022 without definitive evidence of   abscess. Status post laminectomy with posterior fusion. No hardware   malalignment or loosening on no enhancing collection appreciated      CT Chest, Abdomen and Pelvis No Cont (10.30.22 @ 10:08)  IMPRESSION:  Airspace opacity and consolidation in the left lower lobe and partially   lingula segment concerning for multifocal pneumonia in the appropriate   clinical context. No pleural effusions.  No acute abdominopelvic pathology.  Noncomplicated colonic diverticulosis.        IDT 11/1 - lead by Dr. Mares  lives with spouse in a basement apt, 5 TAHIR with no HR.  has cane and rollator at home    OT:  Eating Setup SV  grooming Setup SV  UBD setup SV  LBD min A  bathing mod A  Toilet transfer min A  toileting mod A  Shower transfer not assess  Goals: close SV with transfer, CG toileting    PT:   bed mob min A  transfers min A  amb 40' RW min   Goals: SV with bed mob/transfers/amb 150' RW. CG 12 steps    SLP:  [place] uses amplifier  Diet: reg/thin - impulsive with food.  SV meals?  mild- mod comprehension, memory deficits, slurred speech    Barriers: impaired coordination, right shoulder pain/weakness, agitation slightly better    TDD: 11/16 home.

## 2022-11-01 NOTE — CHART NOTE - NSCHARTNOTEFT_GEN_A_CORE
Nutrition Follow Up Note  Hospital Course   (Per Electronic Medical Record)    Source:  Patient [X]  Family Member [X]   Nursing Staff [X]   Medical Record [X]      Diet: Consistent Carbohydrate, F/R 1000 mL    Translation provided by attending PCA. Per patient w/ adequate appetite and intake at this time, consuming % of meal tray. No issues w/ chewing and swallowing. Denies N/V/C/D, last BM 10/31 per patient. Provided education on Consistent Carbohydrate Nutritional therapy. Noted patient recent hyponatremia managed w/ fluid restriction, NaCl tabs, recommend DASH/TLC diet when hyponatremia managed.        Enteral/Parenteral Nutrition: Not Applicable    Current Weight: 186.5 lb on 10/28    Pertinent Medications: MEDICATIONS  (STANDING):  acetaminophen     Tablet .. 975 milliGRAM(s) Oral every 8 hours  amLODIPine   Tablet 10 milliGRAM(s) Oral at bedtime  aspirin  chewable 81 milliGRAM(s) Oral daily  atorvastatin 40 milliGRAM(s) Oral at bedtime  cefepime   IVPB      cefepime   IVPB 1000 milliGRAM(s) IV Intermittent every 12 hours  dextrose 5%. 1000 milliLiter(s) (100 mL/Hr) IV Continuous <Continuous>  dextrose 5%. 1000 milliLiter(s) (50 mL/Hr) IV Continuous <Continuous>  dextrose 50% Injectable 25 Gram(s) IV Push once  dextrose 50% Injectable 12.5 Gram(s) IV Push once  dextrose 50% Injectable 25 Gram(s) IV Push once  enoxaparin Injectable 40 milliGRAM(s) SubCutaneous <User Schedule>  glucagon  Injectable 1 milliGRAM(s) IntraMuscular once  insulin lispro (ADMELOG) corrective regimen sliding scale   SubCutaneous three times a day before meals  insulin lispro (ADMELOG) corrective regimen sliding scale   SubCutaneous at bedtime  lidocaine   4% Patch 1 Patch Transdermal <User Schedule>  losartan 50 milliGRAM(s) Oral daily  magnesium hydroxide Suspension 30 milliLiter(s) Oral every 12 hours  OLANZapine 2.5 milliGRAM(s) Oral <User Schedule>  polyethylene glycol 3350 17 Gram(s) Oral daily  saccharomyces boulardii 250 milliGRAM(s) Oral two times a day  senna 2 Tablet(s) Oral at bedtime  vancomycin  IVPB      vancomycin  IVPB 1000 milliGRAM(s) IV Intermittent every 12 hours    MEDICATIONS  (PRN):  bisacodyl Suppository 10 milliGRAM(s) Rectal daily PRN Constipation  cyclobenzaprine 5 milliGRAM(s) Oral three times a day PRN Muscle Spasm  dextrose Oral Gel 15 Gram(s) Oral once PRN Blood Glucose LESS THAN 70 milliGRAM(s)/deciliter  OLANZapine Injectable 2.5 milliGRAM(s) IntraMuscular every 12 hours PRN severe agitation  traMADol 75 milliGRAM(s) Oral every 6 hours PRN Severe Pain (7 - 10)      Pertinent Labs:  10-31 Na136 mmol/L Glu 178 mg/dL<H> K+ 3.4 mmol/L<L> Cr  1.13 mg/dL BUN 15 mg/dL 10-31 Alb 2.3 g/dL<L>      Skin: WDL except bruised (ecchymotic)     Edema: No edema noted per nursing flowsheets     Last Bowel Movement: on 10/31    Estimated Needs:   [X] No Change Since Previous Assessment    Previous Nutrition Diagnosis:   Overweight/Obesity  excessive energy intake  BMI > 30    Nutrition Diagnosis is [X] Ongoing -     New Nutrition Diagnosis:   [X] Altered Nutrition Related Labs related to T2DM as evidenced by elevated POCT 153-184 mg/dL in last 24 hours and A1C 7.0      Interventions:   1) Continue Consistent Carbohydrate, F/R 1000ml diet.   2) Recommend recommend DASH/TLC diet when hyponatremia managed.  3) Monitor PO intake, GI tolerance, skin integrity, labs, weight, and bowel movement regularity.   4) Honor food preferences as feasible.   5) Provide ongoing diet education as needed  6) RD remains available upon request and will follow-up per protocol      Monitoring & Evaluation:   [X] Weights   [X] PO Intake   [X] Skin Integrity   [X] Follow Up (Per Protocol)  [X] Tolerance to Diet Prescription   [X] Other: Labs & POCT    Registered Dietitian/Nutritionist Remains Available.  Fabiola Castaneda RD, MS, CDN    Phone# [phone]

## 2022-11-01 NOTE — PROGRESS NOTE ADULT - SUBJECTIVE AND OBJECTIVE BOX
CC: f/u for pneumonia and serratia UTI    Patient reports: he is anxious and remains confused, now voiding on his own    REVIEW OF SYSTEMS:  All other review of systems negative (Comprehensive ROS)    Antimicrobials Day #  :day 4  cefepime   IVPB      cefepime   IVPB 1000 milliGRAM(s) IV Intermittent every 12 hours    Other Medications Reviewed  MEDICATIONS  (STANDING):  acetaminophen     Tablet .. 975 milliGRAM(s) Oral every 8 hours  amLODIPine   Tablet 10 milliGRAM(s) Oral at bedtime  aspirin  chewable 81 milliGRAM(s) Oral daily  atorvastatin 40 milliGRAM(s) Oral at bedtime  cefepime   IVPB      cefepime   IVPB 1000 milliGRAM(s) IV Intermittent every 12 hours  dextrose 5%. 1000 milliLiter(s) (100 mL/Hr) IV Continuous <Continuous>  dextrose 5%. 1000 milliLiter(s) (50 mL/Hr) IV Continuous <Continuous>  dextrose 50% Injectable 25 Gram(s) IV Push once  dextrose 50% Injectable 12.5 Gram(s) IV Push once  dextrose 50% Injectable 25 Gram(s) IV Push once  enoxaparin Injectable 40 milliGRAM(s) SubCutaneous <User Schedule>  glucagon  Injectable 1 milliGRAM(s) IntraMuscular once  insulin lispro (ADMELOG) corrective regimen sliding scale   SubCutaneous three times a day before meals  insulin lispro (ADMELOG) corrective regimen sliding scale   SubCutaneous at bedtime  lidocaine   4% Patch 2 Patch Transdermal <User Schedule>  losartan 50 milliGRAM(s) Oral daily  magnesium hydroxide Suspension 30 milliLiter(s) Oral every 12 hours  OLANZapine 2.5 milliGRAM(s) Oral <User Schedule>  polyethylene glycol 3350 17 Gram(s) Oral daily  saccharomyces boulardii 250 milliGRAM(s) Oral two times a day  senna 2 Tablet(s) Oral at bedtime    T(F): 98.1 (11-01-22 @ 07:31), Max: 98.3 (10-31-22 @ 20:56)  HR: 103 (11-01-22 @ 07:31)  BP: 147/81 (11-01-22 @ 07:31)  RR: 16 (11-01-22 @ 07:31)  SpO2: 94% (11-01-22 @ 07:31)  Wt(kg): --    PHYSICAL EXAM:  General: alert, no acute distress  Eyes:  anicteric, no conjunctival injection, no discharge  Oropharynx: no lesions or injection 	  Neck: supple, without adenopathy  Lungs: clear to auscultation  Heart: regular rate and rhythm; no murmur, rubs or gallops  Abdomen: soft, nondistended, nontender, without mass or organomegaly  Skin: no lesions, neck incision healing well  Extremities: no clubbing, cyanosis, or edema  Neurologic: alert, oriented, moves all extremities    LAB RESULTS:                        11.6   20.34 )-----------( 312      ( 31 Oct 2022 06:55 )             34.8     10-31    136  |  101  |  15  ----------------------------<  178<H>  3.4<L>   |  22  |  1.13    Ca    8.8      31 Oct 2022 06:55    TPro  6.8  /  Alb  2.3<L>  /  TBili  0.4  /  DBili  x   /  AST  30  /  ALT  36  /  AlkPhos  132<H>  10-31    LIVER FUNCTIONS - ( 31 Oct 2022 06:55 )  Alb: 2.3 g/dL / Pro: 6.8 g/dL / ALK PHOS: 132 U/L / ALT: 36 U/L / AST: 30 U/L / GGT: x             MICROBIOLOGY:  RECENT CULTURES:  10-29 @ 13:00 Catheterized Catheterized Serratia marcescens    >100,000 CFU/ml Serratia marcescens      10-29 @ 01:06 .Blood Blood     No growth to date.          RADIOLOGY REVIEWED:

## 2022-11-01 NOTE — BH CONSULTATION LIAISON PROGRESS NOTE - NSBHMSERELATED_PSY_A_CORE
Fair
Fair
laughs at times during the interview appropriately and attempts to answer in English, he is very hard of hearing./Good

## 2022-11-01 NOTE — BH CONSULTATION LIAISON PROGRESS NOTE - NSBHCHARTREVIEWINVESTIGATE_PSY_A_CORE FT
Urinalysis Basic - ( 29 Oct 2022 01:06 )    Color: Yellow / Appearance: Clear / S.010 / pH: x  Gluc: x / Ketone: Negative  / Bili: Negative / Urobili: Negative   Blood: x / Protein: 30 mg/dL / Nitrite: Negative   Leuk Esterase: Small / RBC: 5-10 /HPF / WBC 6-10 /HPF   Sq Epi: x / Non Sq Epi: Neg.-Few / Bacteria: Moderate /HPF    < from: 12 Lead ECG (10.28.22 @ 13:02) >    Ventricular Rate 106 BPM    Atrial Rate 106 BPM    P-R Interval 152 ms    QRS Duration 106 ms    Q-T Interval 356 ms    QTC Calculation(Bazett) 472 ms    P Axis 60 degrees    R Axis 47 degrees    T Axis 21 degrees    Diagnosis Line Sinus tachycardia  Otherwise normal ECG  No previous ECGs available  Confirmed by TARYN HERNÁNDEZ, EARLENE PINEDA () on 10/29/2022 8:16:20 AM    < end of copied text >    
< from: 12 Lead ECG (10.28.22 @ 13:02) >      Ventricular Rate 106 BPM    Atrial Rate 106 BPM    P-R Interval 152 ms    QRS Duration 106 ms    Q-T Interval 356 ms    QTC Calculation(Bazett) 472 ms    P Axis 60 degrees    R Axis 47 degrees    T Axis 21 degrees    Diagnosis Line Sinus tachycardia  Otherwise normal ECG  No previous ECGs available  Confirmed by TARYN HERNÁNDEZ, EARLENE PINEDA ([id number]) on 10/29/2022 8:16:20 AM    < end of copied text >    
< from: 12 Lead ECG (10.28.22 @ 13:02) >      Ventricular Rate 106 BPM    Atrial Rate 106 BPM    P-R Interval 152 ms    QRS Duration 106 ms    Q-T Interval 356 ms    QTC Calculation(Bazett) 472 ms    P Axis 60 degrees    R Axis 47 degrees    T Axis 21 degrees    Diagnosis Line Sinus tachycardia  Otherwise normal ECG  No previous ECGs available  Confirmed by TARYN HERNÁNDEZ, EARLENE PINEDA ([id number]) on 10/29/2022 8:16:20 AM    < end of copied text >

## 2022-11-01 NOTE — PROGRESS NOTE ADULT - SUBJECTIVE AND OBJECTIVE BOX
Patient is a 82y old  Male who presents with a chief complaint of Cervical Laminectomy and Posterior Fusion (31 Oct 2022 13:03)      24 HOUR EVENTS:  No overnight events reported.     SUBJECTIVE:  Patient seen and examined at bedside. He is feeling better today. He wants to participate more with PT. Feels  "uplifted."    ALLERGIES:  penicillin (Rash)    MEDICATIONS  (STANDING):  acetaminophen     Tablet .. 975 milliGRAM(s) Oral every 8 hours  amLODIPine   Tablet 10 milliGRAM(s) Oral at bedtime  aspirin  chewable 81 milliGRAM(s) Oral daily  atorvastatin 40 milliGRAM(s) Oral at bedtime  cefepime   IVPB      cefepime   IVPB 1000 milliGRAM(s) IV Intermittent every 12 hours  dextrose 5%. 1000 milliLiter(s) (100 mL/Hr) IV Continuous <Continuous>  dextrose 5%. 1000 milliLiter(s) (50 mL/Hr) IV Continuous <Continuous>  dextrose 50% Injectable 25 Gram(s) IV Push once  dextrose 50% Injectable 12.5 Gram(s) IV Push once  dextrose 50% Injectable 25 Gram(s) IV Push once  enoxaparin Injectable 40 milliGRAM(s) SubCutaneous <User Schedule>  glucagon  Injectable 1 milliGRAM(s) IntraMuscular once  insulin lispro (ADMELOG) corrective regimen sliding scale   SubCutaneous three times a day before meals  insulin lispro (ADMELOG) corrective regimen sliding scale   SubCutaneous at bedtime  lidocaine   4% Patch 2 Patch Transdermal <User Schedule>  losartan 50 milliGRAM(s) Oral daily  magnesium hydroxide Suspension 30 milliLiter(s) Oral every 12 hours  OLANZapine 2.5 milliGRAM(s) Oral <User Schedule>  polyethylene glycol 3350 17 Gram(s) Oral daily  saccharomyces boulardii 250 milliGRAM(s) Oral two times a day  senna 2 Tablet(s) Oral at bedtime  vancomycin  IVPB      vancomycin  IVPB 1000 milliGRAM(s) IV Intermittent every 12 hours    MEDICATIONS  (PRN):  bisacodyl Suppository 10 milliGRAM(s) Rectal daily PRN Constipation  cyclobenzaprine 5 milliGRAM(s) Oral three times a day PRN Muscle Spasm  dextrose Oral Gel 15 Gram(s) Oral once PRN Blood Glucose LESS THAN 70 milliGRAM(s)/deciliter  OLANZapine Injectable 2.5 milliGRAM(s) IntraMuscular every 12 hours PRN severe agitation  traMADol 75 milliGRAM(s) Oral every 6 hours PRN Severe Pain (7 - 10)    Vital Signs Last 24 Hrs  T(F): 98.1 (01 Nov 2022 07:31), Max: 98.3 (31 Oct 2022 20:56)  HR: 103 (01 Nov 2022 07:31) (98 - 103)  BP: 147/81 (01 Nov 2022 07:31) (133/71 - 147/81)  RR: 16 (01 Nov 2022 07:31) (16 - 16)  SpO2: 94% (01 Nov 2022 07:31) (94% - 96%)  I&O's Summary    31 Oct 2022 07:01  -  01 Nov 2022 07:00  --------------------------------------------------------  IN: 0 mL / OUT: 800 mL / NET: -800 mL    01 Nov 2022 07:01  -  01 Nov 2022 11:44  --------------------------------------------------------  IN: 0 mL / OUT: 200 mL / NET: -200 mL      PHYSICAL EXAM:  General: NAD, Alert, oriented to self and knows he is in a hospital. Redirectable. Does not know date.  ENT: Moist mucous membranes, no thrush  Neck: Supple, No JVD, wearing soft cervical collar later.  Lungs: Clear to auscultation bilaterally, good air entry, non-labored breathing  Cardio: RRR, S1/S2, No murmur  Abdomen: Soft, Nontender, Nondistended; Bowel sounds present  Extremities: No calf tenderness, No pitting edema    LABS:                        11.6   20.34 )-----------( 312      ( 31 Oct 2022 06:55 )             34.8     10-31    136  |  101  |  15  ----------------------------<  178  3.4   |  22  |  1.13    Ca    8.8      31 Oct 2022 06:55    TPro  6.8  /  Alb  2.3  /  TBili  0.4  /  DBili  x   /  AST  30  /  ALT  36  /  AlkPhos  132  10-31              Procalcitonin, Serum: 0.64 ng/mL (10-31-22 @ 06:55)                      POCT Blood Glucose.: 184 mg/dL (01 Nov 2022 07:36)  POCT Blood Glucose.: 153 mg/dL (31 Oct 2022 21:06)  POCT Blood Glucose.: 157 mg/dL (31 Oct 2022 17:19)  POCT Blood Glucose.: 162 mg/dL (31 Oct 2022 11:57)          Culture - Urine (collected 29 Oct 2022 13:00)  Source: Catheterized Catheterized  Preliminary Report (31 Oct 2022 16:05):    >100,000 CFU/ml Serratia marcescens    Culture - Blood (collected 29 Oct 2022 01:06)  Source: .Blood Blood  Preliminary Report (30 Oct 2022 05:00):    No growth to date.    Culture - Blood (collected 29 Oct 2022 01:06)  Source: .Blood Blood  Preliminary Report (30 Oct 2022 05:00):    No growth to date.      COVID-19 PCR: NotDetec (10-30-22 @ 17:48)  COVID-19 PCR: NotDetec (10-27-22 @ 23:11)  COVID-19 PCR: NotDetec (10-24-22 @ 07:47)  COVID-19 PCR: NotDetec (10-15-22 @ 13:40)    RADIOLOGY & ADDITIONAL TESTS:    Care Discussed with Consultants/Other Providers:

## 2022-11-01 NOTE — PROGRESS NOTE ADULT - ASSESSMENT
ASSESSMENT/PLAN  This is a 83 YO male with PMH of HTN, HLD, DM II, BPH with complaint of neck pain. cervical myelopathy  admitted to rehab after scheduled Posterior C1-6 Decompression, C4-5 Posterior Column Osteotomy, C1-C7 Fusion, Complex Plastics Closure on 10/18  Course complicated by severe constipation now resolved, leukocytosis, hyponatremia, fall, urinary retention requiring straight catheterization. Patient now with gait Instability, ADL impairments and Functional impairments.    #Cervical cord compression with myelopathy  - Posterior C1-6 Decompression, C4-5 Posterior Column Osteotomy, C1-C7 Fusion, Complex Plastics Closure on 10/18  - Start Comprehensive Rehab Program: PT/OT, 3hours daily and 5 days weekly  - PT: Focused on improving strength, endurance, coordination, balance, functional mobility, and transfers  - OT: Focused on improving strength, fine motor skills, coordination, posture and ADLs.  - pain management as below  - precautions: fall, aspiration, spinal  - Incision evaluation and suture removal around 11/7/22 at plastics Dr Bay office   - CT neck soft tissue 10/30 w/ post op changes w/o abscess  - soft collar while OOB    # SIRS, unclear source - PNA vs UTI vs both?  - Pt spiked Tmax 103.3, leukocytosis peaked @23k - now improving and afebrile last 24 hrs.  - Follow cultures - Urine Cx serratia sens to cefipime Blood Cx NGTD  - Infectious Diseases following- off vanco- off contact  - CT Chest 10/29 most pertinent for some airspace opacity in LLL  - cont  cefepime  - probiotics while on abx  - f/u MRSA nares    #Acute metabolic encephalopathy, likely due to delirium, w/ agitation/combativeness - improving  # Suicidal ideation  -Pt noted to have intermittent confusion at OSH, thought to be related to pain meds, delirium, and urinary retention. Very agitated and confused when first admitted here, more oriented today and calm.  - Psychiatry following , now on scheduled zyprexa at 1800 and PRN zyprexa for agitation.  - 1:1 observation for impulsivity and confusion at night. Chair and bed alarms on at all times.  - Oxycodone and flexeril discontinued on 10/29  - Pt reporting severe neck pain- tramadol increased to 75mg Q6h PRNand flexeril restarted secondary to persistent neck pain- lidoderm added    # acute urinary retention  #UTI  - garay catheter discontinued for TOV 10/31 due to improvement in patient's mentation and constipation  -PVRs currently low- will Dc PVRs if low overnight  - bowel regimen    #HTN  - Amlodipine 10mg daily   - Losartan 50mg daily    #HLD  - Lipitor 40mg at qhs    #Hyponatremia  - Sodium Tabs 1mg  TID, fluid restriction 1000mL  - Na 136 on 10/31  - monitor, decrease as able    #Pain management  - Tylenol 975 mg Q8h -- monitor LFTS on ATC Tylenol  - Tramadol 75mg increased from  Q6h PRN  - Robaxin DCd - Flexeril 5mg 3x/day PRN  lidoderm to shoulders/ ICE    #DVT ppx  - Lovenox, SCD, TEDs    #Bowel Regimen  - Senna 2 tabs at qhs, miralax, Dulcolax supp    #Bladder management  - see urinary retention above  - Monitor UO    #FEN   - Diet: Consistent Carb, 1000ml fluid restriction    #Skin:  - Skin on admission: posterior cervical spine incision with sutures MEETA   - Pressure injury/Skin: Turn Q2hrs while in bed, OOB to Chair, PT/OT     #Sleep:   - Maintain quiet hours and low stim environment.  - on Olanzapine 2.5 mg at bedtime for sundowning      #GOC  CODE STATUS: FULL CODE    Outpatient Follow-up (Specialty/Name of physician):    Caleb Bond)  Neurosurgery  [address] [place] [address], [address]  [place] [address]  Phone: [phone]  Fax: [phone]    Jose Raul Bay)  Plastic Surgery  [address] [place] [address]  Phone: [phone]  Fax: [phone]    MEDICAL PROGNOSIS: GOOD            REHAB POTENTIAL: GOOD             ESTIMATED DISPOSITION: HOME WITH HOME CARE            ELOS: 10-14 Days   EXPECTED THERAPY:     P.T. 2hr/day       O.T. 1hr/day        P&O Unnecessary     EXP FREQUENCY: 5 days per 7 day period     PRESCREEN COMPARISON:   I have reviewed the prescreen information and I have found no relevant changes between the preadmission screening and my post admission evaluation     RATIONALE FOR INPATIENT ADMISSION - Patient demonstrates the following: (check all that apply)  [X] Medically appropriate for rehabilitation admission  [X] Has attainable rehab goals with an appropriate initial discharge plan  [X] Has rehabilitation potential (expected to make a significant improvement within a reasonable period of time)   [X] Requires close medical management by a rehab physician, rehab nursing care, Hospitalist and comprehensive interdisciplinary team (including PT, OT, & or SLP, Prosthetics and Orthotics)

## 2022-11-01 NOTE — BH CONSULTATION LIAISON PROGRESS NOTE - NSBHCHARTREVIEWVS_PSY_A_CORE FT
Vital Signs Last 24 Hrs  T(C): 36.7 (01 Nov 2022 07:31), Max: 36.8 (31 Oct 2022 20:56)  T(F): 98.1 (01 Nov 2022 07:31), Max: 98.3 (31 Oct 2022 20:56)  HR: 103 (01 Nov 2022 07:31) (98 - 103)  BP: 147/81 (01 Nov 2022 07:31) (133/71 - 147/81)  BP(mean): --  RR: 16 (01 Nov 2022 07:31) (16 - 16)  SpO2: 94% (01 Nov 2022 07:31) (94% - 96%)    Parameters below as of 01 Nov 2022 07:31  Patient On (Oxygen Delivery Method): room air    
Vital Signs Last 24 Hrs  T(C): 36.9 (31 Oct 2022 07:39), Max: 36.9 (31 Oct 2022 07:39)  T(F): 98.4 (31 Oct 2022 07:39), Max: 98.4 (31 Oct 2022 07:39)  HR: 101 (31 Oct 2022 07:39) (98 - 101)  BP: 146/70 (31 Oct 2022 07:39) (131/80 - 146/70)  BP(mean): --  RR: 16 (31 Oct 2022 07:39) (16 - 16)  SpO2: 92% (31 Oct 2022 07:39) (92% - 95%)    Parameters below as of 31 Oct 2022 07:39  Patient On (Oxygen Delivery Method): room air    
Vital Signs Last 24 Hrs  T(C): 36.9 (29 Oct 2022 08:48), Max: 38.4 (28 Oct 2022 21:36)  T(F): 98.5 (29 Oct 2022 08:48), Max: 101.2 (28 Oct 2022 21:36)  HR: 106 (29 Oct 2022 08:48) (105 - 115)  BP: 122/78 (29 Oct 2022 08:48) (122/78 - 163/73)  BP(mean): --  RR: 16 (29 Oct 2022 08:48) (16 - 16)  SpO2: 95% (29 Oct 2022 08:48) (92% - 95%)    Parameters below as of 29 Oct 2022 08:48  Patient On (Oxygen Delivery Method): room air

## 2022-11-02 LAB
GLUCOSE BLDC GLUCOMTR-MCNC: 139 MG/DL — HIGH (ref 70–99)
GLUCOSE BLDC GLUCOMTR-MCNC: 142 MG/DL — HIGH (ref 70–99)
GLUCOSE BLDC GLUCOMTR-MCNC: 148 MG/DL — HIGH (ref 70–99)
GLUCOSE BLDC GLUCOMTR-MCNC: 155 MG/DL — HIGH (ref 70–99)

## 2022-11-02 PROCEDURE — 99232 SBSQ HOSP IP/OBS MODERATE 35: CPT

## 2022-11-02 PROCEDURE — 99232 SBSQ HOSP IP/OBS MODERATE 35: CPT | Mod: GC

## 2022-11-02 RX ORDER — CEFUROXIME AXETIL 250 MG
500 TABLET ORAL EVERY 12 HOURS
Refills: 0 | Status: DISCONTINUED | OUTPATIENT
Start: 2022-11-02 | End: 2022-11-08

## 2022-11-02 RX ADMIN — LIDOCAINE 2 PATCH: 4 CREAM TOPICAL at 18:23

## 2022-11-02 RX ADMIN — Medication 0: at 21:45

## 2022-11-02 RX ADMIN — POLYETHYLENE GLYCOL 3350 17 GRAM(S): 17 POWDER, FOR SOLUTION ORAL at 12:27

## 2022-11-02 RX ADMIN — SENNA PLUS 2 TABLET(S): 8.6 TABLET ORAL at 21:38

## 2022-11-02 RX ADMIN — LIDOCAINE 2 PATCH: 4 CREAM TOPICAL at 05:02

## 2022-11-02 RX ADMIN — LIDOCAINE 2 PATCH: 4 CREAM TOPICAL at 06:02

## 2022-11-02 RX ADMIN — Medication 975 MILLIGRAM(S): at 21:38

## 2022-11-02 RX ADMIN — Medication 975 MILLIGRAM(S): at 05:01

## 2022-11-02 RX ADMIN — Medication 500 MILLIGRAM(S): at 17:44

## 2022-11-02 RX ADMIN — CEFEPIME 100 MILLIGRAM(S): 1 INJECTION, POWDER, FOR SOLUTION INTRAMUSCULAR; INTRAVENOUS at 05:02

## 2022-11-02 RX ADMIN — LOSARTAN POTASSIUM 50 MILLIGRAM(S): 100 TABLET, FILM COATED ORAL at 05:01

## 2022-11-02 RX ADMIN — Medication 250 MILLIGRAM(S): at 05:01

## 2022-11-02 RX ADMIN — Medication 975 MILLIGRAM(S): at 22:30

## 2022-11-02 RX ADMIN — Medication 975 MILLIGRAM(S): at 13:57

## 2022-11-02 RX ADMIN — Medication 81 MILLIGRAM(S): at 12:27

## 2022-11-02 RX ADMIN — Medication 975 MILLIGRAM(S): at 05:40

## 2022-11-02 RX ADMIN — MAGNESIUM HYDROXIDE 30 MILLILITER(S): 400 TABLET, CHEWABLE ORAL at 05:01

## 2022-11-02 RX ADMIN — ENOXAPARIN SODIUM 40 MILLIGRAM(S): 100 INJECTION SUBCUTANEOUS at 17:44

## 2022-11-02 RX ADMIN — Medication 975 MILLIGRAM(S): at 14:00

## 2022-11-02 RX ADMIN — LIDOCAINE 2 PATCH: 4 CREAM TOPICAL at 07:39

## 2022-11-02 RX ADMIN — AMLODIPINE BESYLATE 10 MILLIGRAM(S): 2.5 TABLET ORAL at 21:38

## 2022-11-02 RX ADMIN — MAGNESIUM HYDROXIDE 30 MILLILITER(S): 400 TABLET, CHEWABLE ORAL at 17:44

## 2022-11-02 RX ADMIN — Medication 250 MILLIGRAM(S): at 17:44

## 2022-11-02 RX ADMIN — ATORVASTATIN CALCIUM 40 MILLIGRAM(S): 80 TABLET, FILM COATED ORAL at 21:38

## 2022-11-02 NOTE — CHART NOTE - NSCHARTNOTEFT_GEN_A_CORE
I spoke with the patient's daughter and emergency contact, Fabiola Sweet ([phone]), regarding Mr. Brandt's progress in rehab based on report from [facility] Meeting 11/1 and his medical status.  Since his UTI has been treated with IV and now oral antibiotics, the patient's mentation has improved and he is following commands and less impulsive and is no longer requiring a 1:1 for safety. His mood has improved and he is no longer expressing SI.  Patient's daughter states that he was impulsive and had memory deficits for up to 2 years leading up to this hospital admission. Family members noted that patient's impulsivity and agitation would worsen when he was in pain - she reports episodes of paranoia, and removing door handles believing he was locked inside against his will.    Per Mrs. Sweet, the patient has no formal diagnosis of dementia. She is concerned about her mother's well being if patient were to return home and become impulsive and fall, and how she would handle this situation without the assistance of a home health aide during the day when other family members are at work. I requested that our  Gregg Hagan call Mrs. Sweet tomorrow after 3 pm (she works as a teacher) regarding her questions about hiring a HHA and family training. We also discussed getting a neurology referral after rehab discharge for formal evaluation of the patient's cognitive state when a foreign setting and encephalopathy would not be affecting the assessment.    Radha Schaffer, DO  PM&R PGY4 Resident

## 2022-11-02 NOTE — PROGRESS NOTE ADULT - ASSESSMENT
83 YO male with PMH of HTN, HLD, DM II, BPH with complaint of neck pain, presented to [facility] on 10/18 for scheduled Posterior C1-6 Decompression, C4-5 Posterior Column Osteotomy, C1-C7 Fusion, Complex Plastics Closure. Course complicated by severe constipation, leukocytosis, hyponatremia, fall, urinary retention requiring straight catheterization, intermittent confusion likely related to pain medication and hospital setting.     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  - CT IMAGING: most pertinent for some airspace opacity in LLL, no evidence of hardware issues, CT neck w/ post op changes w/o abscess.  - UCx w/ Serratia marcescens, sensitivities now available.  - vancomycin discontinued, cont cefepime. Will d/w ID about narrowing abx.     # acute metabolic encephalopathy, likely due to infection, delirium, w/ agitation/combativeness - improving.  # Suicidal ideation  pt noted to have intermittent confusion at OSH, thought to be related to pain meds, delirium, and urinary retention. Very agitated and confused when first admitted here, now oriented and calm.  - treat infection above.  - Psychiatry following , now on scheduled zyprexa at 1800 and PRN zyprexa for agitation...may be able to wean off now that mood is stabilized. D/w Dr. Mares.    # acute urinary retention  - passed TOV, garay catheter removed.  - recommend regular bladder scans.  - avoid anti-cholinergic medications    # HTN  - amlodipine 10 mg qhs and losartan 50 mg qd    #HLD  - lipitor 40 mg qd    # DM2  - controlled, [id number] 6.0%  - ISS    dvt ppx: lovenox 81 YO male with PMH of HTN, HLD, DM II, BPH with complaint of neck pain, presented to [facility] on 10/18 for scheduled Posterior C1-6 Decompression, C4-5 Posterior Column Osteotomy, C1-C7 Fusion, Complex Plastics Closure. Course complicated by severe constipation, leukocytosis, hyponatremia, fall, urinary retention requiring straight catheterization, intermittent confusion likely related to pain medication and hospital setting.     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  - CT IMAGING: most pertinent for some airspace opacity in LLL, no evidence of hardware issues, CT neck w/ post op changes w/o abscess.  - UCx w/ Serratia marcescens, sensitivities now available.  - vancomycin discontinued, cont cefepime. Will d/w ID about narrowing abx.     # acute metabolic encephalopathy, likely due to infection, delirium, w/ agitation/combativeness - improving.  # Suicidal ideation  pt noted to have intermittent confusion at OSH, thought to be related to pain meds, delirium, and urinary retention. Very agitated and confused when first admitted here, now oriented and calm.  - treat infection above.  - Psychiatry following ,placed on scheduled zyprexa at 1800 and PRN zyprexa for agitation.. will discontinue scheduled zyprexa and monitor off. D/w Dr. Mares.    # acute urinary retention  - passed TOV, garay catheter removed.  - recommend regular bladder scans.  - avoid anti-cholinergic medications    # HTN  - amlodipine 10 mg qhs and losartan 50 mg qd    #HLD  - lipitor 40 mg qd    # DM2  - controlled, [id number] 6.0%  - ISS    dvt ppx: lovenox

## 2022-11-02 NOTE — PROGRESS NOTE ADULT - ASSESSMENT
83yo M hx HTN,T2DM, HLD, BPH, and chronic neck pain who was admitted to [facility] on 10/18 for elective neck surgery.  He underwent  a posterior C1-C6 decompression, C4-5 posterior column osteotomy, and C10C7 fusion with PRS closure.  His post op course was complicated  by constipation, intermittent confusion felt to be narcotic related, and urinary retention requiring ISC.He was felt to have left deltoid weakness, his drain was removed 10/27, and he was sent to rehab on 10/27.  He had a fever to 101.2 on 10/28, required placement of a garay catheter, and was started on cefepime.  He was up all night and is sleepy. History is via an , he has no specific complaints but nods off to sleep.  Etiology of fever not clear. It could be  as in setting of retention can develop systemic infection even if urine is not purulent.  His spine incision appears to be without evidence of infection.  His CXR seems to show elevated diaphragm on the left, ? atelectasis vs artifact. CXR on 10/29 officially read as atelectasis vs infiltrate.  He spike to 103, vanco had been added,  CT of neck with post op changes, CT of C/A/P with left sided infiltrates.  He has garay for retention. Blood cultures are negative so far, urine culture with >100K GNR-serratia    pneumonia may be present along with possible  infection  He appears to be responding to antibiotics although he still has a leukocytosis  MRSA screen negative.Legionella urine antigen is negative  Suggest;    change Cefepime to PO Ceftin for another 5 days  monitor WBC and fevers trend

## 2022-11-02 NOTE — PROGRESS NOTE ADULT - ASSESSMENT
ASSESSMENT/PLAN  This is a 83 YO male with PMH of HTN, HLD, DM II, BPH with complaint of neck pain. cervical myelopathy  admitted to rehab after scheduled Posterior C1-6 Decompression, C4-5 Posterior Column Osteotomy, C1-C7 Fusion, Complex Plastics Closure on 10/18  Course complicated by severe constipation now resolved, leukocytosis, hyponatremia, fall, urinary retention requiring straight catheterization. Patient now with gait Instability, ADL impairments and Functional impairments.    #Cervical cord compression with myelopathy  - Posterior C1-6 Decompression, C4-5 Posterior Column Osteotomy, C1-C7 Fusion, Complex Plastics Closure on 10/18  - Start Comprehensive Rehab Program: PT/OT, 3hours daily and 5 days weekly  - PT: Focused on improving strength, endurance, coordination, balance, functional mobility, and transfers  - OT: Focused on improving strength, fine motor skills, coordination, posture and ADLs.  - pain management as below  - precautions: fall, aspiration, spinal  - Incision evaluation and suture removal around 11/7/22 at plastics Dr Bay office   - CT neck soft tissue 10/30 w/ post op changes w/o abscess  - soft collar while OOB    # SIRS, unclear source - PNA vs UTI vs both?  - Pt spiked Tmax 103.3, leukocytosis peaked @23k - now improving and afebrile last 24 hrs.  - Follow cultures - Urine Cx serratia sens to cefipime Blood Cx NGTD  - Infectious Diseases following- off vanco- off contact  - CT Chest 10/29 most pertinent for some airspace opacity in LLL  - cont  cefepime  - probiotics while on abx  - f/u MRSA nares- negative- off contact  ID to [place] to PO ?    #Acute metabolic encephalopathy, likely due to delirium, w/ agitation/combativeness - improving  # Suicidal ideation  -Pt noted to have intermittent confusion at OSH, thought to be related to pain meds, delirium, and urinary retention. Very agitated and confused when first admitted here, more oriented today and calm.  - Psychiatry following , now on scheduled zyprexa at 1800 and PRN zyprexa for agitation.  - 1:1 observation for impulsivity and confusion at night. Chair and bed alarms on at all times.  - Oxycodone and flexeril discontinued on 10/29  - Pt reporting severe neck pain- tramadol increased to 75mg Q6h PRNand flexeril restarted secondary to persistent neck pain- lidoderm added  TO DC Zyprexa - DC intermittent supervision?    # acute urinary retention  #UTI  - garay catheter discontinued for TOV 10/31 due to improvement in patient's mentation and constipation  -PVRs currently low- will Dc PVRs   - bowel regimen    #HTN  - Amlodipine 10mg daily   - Losartan 50mg daily    #HLD  - Lipitor 40mg at qhs    #Hyponatremia  - Sodium Tabs 1mg  TID, fluid restriction 1000mL  - Na 136 on 10/31  -Off salt tabs- ? ease fluid restriction  Check BMP in AM    #Pain management  - Tylenol 975 mg Q8h -- monitor LFTS on ATC Tylenol  - Tramadol 75mg increased from  Q6h PRN  - Robaxin DCd - Flexeril 5mg 3x/day PRN  lidoderm to shoulders/ ICE    #DVT ppx  - Lovenox, SCD, TEDs    #Bowel Regimen  - Senna 2 tabs at qhs, miralax, Dulcolax supp    #Bladder management  - see urinary retention above  - Monitor UO    #FEN   - Diet: Consistent Carb, 1000ml fluid restriction    #Skin:  - Skin on admission: posterior cervical spine incision with sutures MEETA   - Pressure injury/Skin: Turn Q2hrs while in bed, OOB to Chair, PT/OT     #Sleep:   - Maintain quiet hours and low stim environment.  - on Olanzapine 2.5 mg at bedtime for sundowning      #GOC  CODE STATUS: FULL CODE    Outpatient Follow-up (Specialty/Name of physician):    Caleb Bond)  Neurosurgery  [address], [address]  [place] [address]  Phone: [phone]  Fax: [phone]    Jose Raul Bay)  Plastic Surgery  [address] [place] [address]  Phone: [phone]  Fax: [phone]    MEDICAL PROGNOSIS: GOOD            REHAB POTENTIAL: GOOD             ESTIMATED DISPOSITION: HOME WITH HOME CARE            ELOS: 10-14 Days   EXPECTED THERAPY:     P.T. 2hr/day       O.T. 1hr/day        P&O Unnecessary     EXP FREQUENCY: 5 days per 7 day period     PRESCREEN COMPARISON:   I have reviewed the prescreen information and I have found no relevant changes between the preadmission screening and my post admission evaluation     RATIONALE FOR INPATIENT ADMISSION - Patient demonstrates the following: (check all that apply)  [X] Medically appropriate for rehabilitation admission  [X] Has attainable rehab goals with an appropriate initial discharge plan  [X] Has rehabilitation potential (expected to make a significant improvement within a reasonable period of time)   [X] Requires close medical management by a rehab physician, rehab nursing care, Hospitalist and comprehensive interdisciplinary team (including PT, OT, & or SLP, Prosthetics and Orthotics)

## 2022-11-02 NOTE — PROGRESS NOTE ADULT - SUBJECTIVE AND OBJECTIVE BOX
CC: f/u for    Patient reports    REVIEW OF SYSTEMS: no fevers, no chills, no nausea, no vomiting, no abd pain, no resp symptoms  All other review of systems negative (Comprehensive ROS)    Antimicrobials Day #    cefepime   IVPB      cefepime   IVPB 1000 milliGRAM(s) IV Intermittent every 12 hours    Other Medications Reviewed    T(F): 98 (11-02-22 @ 07:38), Max: 98 (11-02-22 @ 07:38)  HR: 94 (11-02-22 @ 07:38)  BP: 136/82 (11-02-22 @ 07:38)  RR: 16 (11-02-22 @ 07:38)  SpO2: 94% (11-02-22 @ 07:38)    PHYSICAL EXAM:  General: alert, no acute distress  Eyes:  anicteric, no conjunctival injection, no discharge  Oropharynx: no lesions or injection 	  Neck: supple, without adenopathy  Lungs: clear to auscultation  Heart: regular rate and rhythm; no murmur, rubs or gallops  Abdomen: soft, nondistended, nontender, without mass or organomegaly  Skin: no lesions  Extremities: no clubbing, cyanosis, or edema  Neurologic: alert, oriented, moves all extremities    LAB RESULTS:                MICROBIOLOGY REVIEWED:    RADIOLOGY REVIEWED:     CC: f/u for pneumonia and serratia UTI    Patient reports no new c/o    REVIEW OF SYSTEMS: no fevers, no chills, no nausea, no vomiting, no abd pain, no resp symptoms  All other review of systems negative (Comprehensive ROS)    Antimicrobials Day # 5  cefepime   IVPB      cefepime   IVPB 1000 milliGRAM(s) IV Intermittent every 12 hours    Other Medications Reviewed    T(F): 98 (11-02-22 @ 07:38), Max: 98 (11-02-22 @ 07:38)  HR: 94 (11-02-22 @ 07:38)  BP: 136/82 (11-02-22 @ 07:38)  RR: 16 (11-02-22 @ 07:38)  SpO2: 94% (11-02-22 @ 07:38)    PHYSICAL EXAM:  General: alert, no acute distress  Eyes:  anicteric, no conjunctival injection, no discharge  Oropharynx: no lesions or injection 	  Neck: supple, without adenopathy  Lungs: clear to auscultation  Heart: regular rate and rhythm; no murmur, rubs or gallops  Abdomen: soft, nondistended, nontender, without mass or organomegaly  Skin: no lesions  Extremities: no clubbing, cyanosis, or edema  Neurologic: alert, oriented, moves all extremities    LAB RESULTS:                MICROBIOLOGY REVIEWED:  Culture - Urine (10.29.22 @ 13:00)   - Amikacin: S <=16   - Amoxicillin/Clavulanic Acid: R >16/8   - Ampicillin: R >16 These ampicillin results predict results for amoxicillin   - Ampicillin/Sulbactam: R >16/8 Enterobacter, Klebsiella aerogenes, Citrobacter, and Serratia may develop resistance during prolonged therapy (3-4 days)   - Aztreonam: S <=4   - Cefazolin: R >16   - Cefepime: S <=2   - Cefoxitin: R 16   - Ceftriaxone: S <=1 Enterobacter, Klebsiella aerogenes, Citrobacter, and Serratia may develop resistance during prolonged therapy   - Ciprofloxacin: S <=0.25   - Ertapenem: S <=0.5   - Gentamicin: S <=2   - Levofloxacin: S <=0.5   - Meropenem: S <=1   - Nitrofurantoin: R >64 Should not be used to treat pyelonephritis   - Piperacillin/Tazobactam: S <=8   - Tigecycline: S <=2   - Tobramycin: S <=2   - Trimethoprim/Sulfamethoxazole: S <=0.5/9.5   Specimen Source: Catheterized Catheterized   Culture Results:   >100,000 CFU/ml Serratia marcescens   Organism Identification: Serratia marcescens   Organism: Serratia marcescens   RADIOLOGY REVIEWED:

## 2022-11-02 NOTE — PROGRESS NOTE ADULT - SUBJECTIVE AND OBJECTIVE BOX
Patient is a 82y old  Male who presents with a chief complaint of Cervical Laminectomy (01 Nov 2022 18:41)      24 HOUR EVENTS:  No overnight events reported.     SUBJECTIVE:   ID [id number] Tarun  Patient seen and examined at bedside. Feels better today than yesterday.  He ha pain in his shoulders,  used to be bilateral, today it is just on the left side.  He denies urinary issues  still having difficulty lifting his arms up.    ALLERGIES:  penicillin (Rash)    MEDICATIONS  (STANDING):  acetaminophen     Tablet .. 975 milliGRAM(s) Oral every 8 hours  amLODIPine   Tablet 10 milliGRAM(s) Oral at bedtime  aspirin  chewable 81 milliGRAM(s) Oral daily  atorvastatin 40 milliGRAM(s) Oral at bedtime  cefepime   IVPB      cefepime   IVPB 1000 milliGRAM(s) IV Intermittent every 12 hours  dextrose 5%. 1000 milliLiter(s) (100 mL/Hr) IV Continuous <Continuous>  dextrose 5%. 1000 milliLiter(s) (50 mL/Hr) IV Continuous <Continuous>  dextrose 50% Injectable 25 Gram(s) IV Push once  dextrose 50% Injectable 25 Gram(s) IV Push once  dextrose 50% Injectable 12.5 Gram(s) IV Push once  enoxaparin Injectable 40 milliGRAM(s) SubCutaneous <User Schedule>  glucagon  Injectable 1 milliGRAM(s) IntraMuscular once  insulin lispro (ADMELOG) corrective regimen sliding scale   SubCutaneous three times a day before meals  insulin lispro (ADMELOG) corrective regimen sliding scale   SubCutaneous at bedtime  lidocaine   4% Patch 2 Patch Transdermal <User Schedule>  losartan 50 milliGRAM(s) Oral daily  magnesium hydroxide Suspension 30 milliLiter(s) Oral every 12 hours  OLANZapine 2.5 milliGRAM(s) Oral <User Schedule>  polyethylene glycol 3350 17 Gram(s) Oral daily  saccharomyces boulardii 250 milliGRAM(s) Oral two times a day  senna 2 Tablet(s) Oral at bedtime    MEDICATIONS  (PRN):  bisacodyl Suppository 10 milliGRAM(s) Rectal daily PRN Constipation  cyclobenzaprine 5 milliGRAM(s) Oral three times a day PRN Muscle Spasm  dextrose Oral Gel 15 Gram(s) Oral once PRN Blood Glucose LESS THAN 70 milliGRAM(s)/deciliter  OLANZapine Injectable 2.5 milliGRAM(s) IntraMuscular every 12 hours PRN severe agitation  traMADol 75 milliGRAM(s) Oral every 6 hours PRN Severe Pain (7 - 10)    Vital Signs Last 24 Hrs  T(F): 98 (02 Nov 2022 07:38), Max: 98 (02 Nov 2022 07:38)  HR: 94 (02 Nov 2022 07:38) (94 - 102)  BP: 136/82 (02 Nov 2022 07:38) (136/82 - 153/78)  RR: 16 (02 Nov 2022 07:38) (16 - 16)  SpO2: 94% (02 Nov 2022 07:38) (94% - 95%)  I&O's Summary    01 Nov 2022 07:01  -  02 Nov 2022 07:00  --------------------------------------------------------  IN: 0 mL / OUT: 350 mL / NET: -350 mL      PHYSICAL EXAM:  General: NAD, A/O x 3, person, place, and time.  ENT: Moist mucous membranes, no thrush  Neck: Supple, No JVD  Lungs: Clear to auscultation bilaterally, good air entry, non-labored breathing  Cardio: RRR, S1/S2, No murmur  Abdomen: Soft, Nontender, Nondistended; Bowel sounds present  Extremities: No calf tenderness, No pitting edema    LABS:                        11.6   20.34 )-----------( 312      ( 31 Oct 2022 06:55 )             34.8     10-31    136  |  101  |  15  ----------------------------<  178  3.4   |  22  |  1.13    Ca    8.8      31 Oct 2022 06:55    TPro  6.8  /  Alb  2.3  /  TBili  0.4  /  DBili  x   /  AST  30  /  ALT  36  /  AlkPhos  132  10-31              Procalcitonin, Serum: 0.64 ng/mL (10-31-22 @ 06:55)                      POCT Blood Glucose.: 148 mg/dL (02 Nov 2022 07:35)  POCT Blood Glucose.: 153 mg/dL (01 Nov 2022 20:45)  POCT Blood Glucose.: 175 mg/dL (01 Nov 2022 16:48)  POCT Blood Glucose.: 138 mg/dL (01 Nov 2022 12:11)          Culture - Urine (collected 29 Oct 2022 13:00)  Source: Catheterized Catheterized  Final Report (01 Nov 2022 16:56):    >100,000 CFU/ml Serratia marcescens  Organism: Serratia marcescens (01 Nov 2022 16:56)  Organism: Serratia marcescens (01 Nov 2022 16:56)      -  Amikacin: S <=16      -  Amoxicillin/Clavulanic Acid: R >16/8      -  Ampicillin: R >16 These ampicillin results predict results for amoxicillin      -  Ampicillin/Sulbactam: R >16/8 Enterobacter, Klebsiella aerogenes, Citrobacter, and Serratia may develop resistance during prolonged therapy (3-4 days)      -  Aztreonam: S <=4      -  Cefazolin: R >16      -  Cefepime: S <=2      -  Cefoxitin: R 16      -  Ceftriaxone: S <=1 Enterobacter, Klebsiella aerogenes, Citrobacter, and Serratia may develop resistance during prolonged therapy      -  Ciprofloxacin: S <=0.25      -  Ertapenem: S <=0.5      -  Gentamicin: S <=2      -  Levofloxacin: S <=0.5      -  Meropenem: S <=1      -  Nitrofurantoin: R >64 Should not be used to treat pyelonephritis      -  Piperacillin/Tazobactam: S <=8      -  Tigecycline: S <=2      -  Tobramycin: S <=2      -  Trimethoprim/Sulfamethoxazole: S <=0.5/9.5      Method Type: PROSPER    Culture - Blood (collected 29 Oct 2022 01:06)  Source: .Blood Blood  Preliminary Report (30 Oct 2022 05:00):    No growth to date.    Culture - Blood (collected 29 Oct 2022 01:06)  Source: .Blood Blood  Preliminary Report (30 Oct 2022 05:00):    No growth to date.      COVID-19 PCR: NotDetec (10-30-22 @ 17:48)  COVID-19 PCR: NotDetec (10-27-22 @ 23:11)  COVID-19 PCR: NotDetec (10-24-22 @ 07:47)  COVID-19 PCR: NotDetec (10-15-22 @ 13:40)    RADIOLOGY & ADDITIONAL TESTS:    Care Discussed with Consultants/Other Providers:    Patient is a 82y old  Male who presents with a chief complaint of Cervical Laminectomy (01 Nov 2022 18:41)      24 HOUR EVENTS:  No overnight events reported.     SUBJECTIVE:   ID [id number] Tarun  Patient seen and examined at bedside. Feels better today than yesterday.  He hax pain in his shoulders,  used to be bilateral, today it is just on the left side.  He denies urinary issues  still having difficulty lifting his arms up.    ALLERGIES:  penicillin (Rash)    MEDICATIONS  (STANDING):  acetaminophen     Tablet .. 975 milliGRAM(s) Oral every 8 hours  amLODIPine   Tablet 10 milliGRAM(s) Oral at bedtime  aspirin  chewable 81 milliGRAM(s) Oral daily  atorvastatin 40 milliGRAM(s) Oral at bedtime  cefepime   IVPB      cefepime   IVPB 1000 milliGRAM(s) IV Intermittent every 12 hours  dextrose 5%. 1000 milliLiter(s) (100 mL/Hr) IV Continuous <Continuous>  dextrose 5%. 1000 milliLiter(s) (50 mL/Hr) IV Continuous <Continuous>  dextrose 50% Injectable 25 Gram(s) IV Push once  dextrose 50% Injectable 25 Gram(s) IV Push once  dextrose 50% Injectable 12.5 Gram(s) IV Push once  enoxaparin Injectable 40 milliGRAM(s) SubCutaneous <User Schedule>  glucagon  Injectable 1 milliGRAM(s) IntraMuscular once  insulin lispro (ADMELOG) corrective regimen sliding scale   SubCutaneous three times a day before meals  insulin lispro (ADMELOG) corrective regimen sliding scale   SubCutaneous at bedtime  lidocaine   4% Patch 2 Patch Transdermal <User Schedule>  losartan 50 milliGRAM(s) Oral daily  magnesium hydroxide Suspension 30 milliLiter(s) Oral every 12 hours  OLANZapine 2.5 milliGRAM(s) Oral <User Schedule>  polyethylene glycol 3350 17 Gram(s) Oral daily  saccharomyces boulardii 250 milliGRAM(s) Oral two times a day  senna 2 Tablet(s) Oral at bedtime    MEDICATIONS  (PRN):  bisacodyl Suppository 10 milliGRAM(s) Rectal daily PRN Constipation  cyclobenzaprine 5 milliGRAM(s) Oral three times a day PRN Muscle Spasm  dextrose Oral Gel 15 Gram(s) Oral once PRN Blood Glucose LESS THAN 70 milliGRAM(s)/deciliter  OLANZapine Injectable 2.5 milliGRAM(s) IntraMuscular every 12 hours PRN severe agitation  traMADol 75 milliGRAM(s) Oral every 6 hours PRN Severe Pain (7 - 10)    Vital Signs Last 24 Hrs  T(F): 98 (02 Nov 2022 07:38), Max: 98 (02 Nov 2022 07:38)  HR: 94 (02 Nov 2022 07:38) (94 - 102)  BP: 136/82 (02 Nov 2022 07:38) (136/82 - 153/78)  RR: 16 (02 Nov 2022 07:38) (16 - 16)  SpO2: 94% (02 Nov 2022 07:38) (94% - 95%)  I&O's Summary    01 Nov 2022 07:01  -  02 Nov 2022 07:00  --------------------------------------------------------  IN: 0 mL / OUT: 350 mL / NET: -350 mL      PHYSICAL EXAM:  General: NAD, A/O x 3, person, place, and time.  ENT: Moist mucous membranes, no thrush  Neck: Supple, No JVD  Lungs: Clear to auscultation bilaterally, good air entry, non-labored breathing  Cardio: RRR, S1/S2, No murmur  Abdomen: Soft, Nontender, Nondistended; Bowel sounds present  Extremities: No calf tenderness, No pitting edema    LABS:                        11.6   20.34 )-----------( 312      ( 31 Oct 2022 06:55 )             34.8     10-31    136  |  101  |  15  ----------------------------<  178  3.4   |  22  |  1.13    Ca    8.8      31 Oct 2022 06:55    TPro  6.8  /  Alb  2.3  /  TBili  0.4  /  DBili  x   /  AST  30  /  ALT  36  /  AlkPhos  132  10-31              Procalcitonin, Serum: 0.64 ng/mL (10-31-22 @ 06:55)                      POCT Blood Glucose.: 148 mg/dL (02 Nov 2022 07:35)  POCT Blood Glucose.: 153 mg/dL (01 Nov 2022 20:45)  POCT Blood Glucose.: 175 mg/dL (01 Nov 2022 16:48)  POCT Blood Glucose.: 138 mg/dL (01 Nov 2022 12:11)          Culture - Urine (collected 29 Oct 2022 13:00)  Source: Catheterized Catheterized  Final Report (01 Nov 2022 16:56):    >100,000 CFU/ml Serratia marcescens  Organism: Serratia marcescens (01 Nov 2022 16:56)  Organism: Serratia marcescens (01 Nov 2022 16:56)      -  Amikacin: S <=16      -  Amoxicillin/Clavulanic Acid: R >16/8      -  Ampicillin: R >16 These ampicillin results predict results for amoxicillin      -  Ampicillin/Sulbactam: R >16/8 Enterobacter, Klebsiella aerogenes, Citrobacter, and Serratia may develop resistance during prolonged therapy (3-4 days)      -  Aztreonam: S <=4      -  Cefazolin: R >16      -  Cefepime: S <=2      -  Cefoxitin: R 16      -  Ceftriaxone: S <=1 Enterobacter, Klebsiella aerogenes, Citrobacter, and Serratia may develop resistance during prolonged therapy      -  Ciprofloxacin: S <=0.25      -  Ertapenem: S <=0.5      -  Gentamicin: S <=2      -  Levofloxacin: S <=0.5      -  Meropenem: S <=1      -  Nitrofurantoin: R >64 Should not be used to treat pyelonephritis      -  Piperacillin/Tazobactam: S <=8      -  Tigecycline: S <=2      -  Tobramycin: S <=2      -  Trimethoprim/Sulfamethoxazole: S <=0.5/9.5      Method Type: PROSPER    Culture - Blood (collected 29 Oct 2022 01:06)  Source: .Blood Blood  Preliminary Report (30 Oct 2022 05:00):    No growth to date.    Culture - Blood (collected 29 Oct 2022 01:06)  Source: .Blood Blood  Preliminary Report (30 Oct 2022 05:00):    No growth to date.      COVID-19 PCR: NotDetec (10-30-22 @ 17:48)  COVID-19 PCR: NotDetec (10-27-22 @ 23:11)  COVID-19 PCR: NotDetec (10-24-22 @ 07:47)  COVID-19 PCR: NotDetec (10-15-22 @ 13:40)    RADIOLOGY & ADDITIONAL TESTS:    Care Discussed with Consultants/Other Providers:

## 2022-11-02 NOTE — PROGRESS NOTE ADULT - SUBJECTIVE AND OBJECTIVE BOX
Patient is a 82y old  Male who presents with a chief complaint of Cervical Laminectomy (31 Oct 2022 08:25)      HPI:  This is a 81 YO male with PMH of HTN, HLD, DM II, BPH with complaint of neck pain. Patient endorses increasing neck pain, gait instability and clumsiness of the hands. He ambulates with a cane. He denies fever, chills, trauma or injury. He presents to [facility] on 10/18 for scheduled Posterior C1-6 Decompression, C4-5 Posterior Column Osteotomy, C1-C7 Fusion, Complex Plastics Closure. Course complicated by severe constipation now resolved, leukocytosis, hyponatremia- stable on salt tablet, fall, urinary retention requiring straight catheterization, intermittent confusion likely related to pain medication and hospital setting. Noted with  Left deltoid weakness. MR Cervical spine 10/26/22 without cord compression. Surgical drain removed 10/27/22. Patient was evaluated by PM&R and therapy for functional deficits, gait/ADL impairments and acute rehabilitation was recommended. Patient was medically optimized for discharge to [facility] IRU on 10/27/22.   (27 Oct 2022 13:00)    TODAY's SUBJECTIVE:  pain much improved - minimal Left shoulder pain  slept well  spontaneously voiding- residuals low- will DC  Seen by ID- IV cefepime for presumed PNA/ UTI- off Vanco   patient much calmer- no need for 1:1 observation.   contact precautions Dcd  Blood Cx 10/29 neg X2, Urine C&S serratia sens to cefipime        ROS:  No dizziness  No chest pain, no palpitations  No shortness of breath  No abdominal pain, no nausea    PAST MEDICAL & SURGICAL HISTORY:  Hypertension  Hyperlipidemia      Chronic kidney disease      Diabetes mellitus  -patient states prediabetes, not on medication      Benign prostate hyperplasia      COVID-19 virus infection  -dec 2021 (cough, fever, malaise)      History of carpal tunnel surgery  -bilateral hands      History of cataract surgery  -bilateral      H/O colonoscopy          MEDICATIONS  (STANDING):  acetaminophen     Tablet .. 975 milliGRAM(s) Oral every 8 hours  amLODIPine   Tablet 10 milliGRAM(s) Oral at bedtime  aspirin  chewable 81 milliGRAM(s) Oral daily  atorvastatin 40 milliGRAM(s) Oral at bedtime  cefepime   IVPB      cefepime   IVPB 1000 milliGRAM(s) IV Intermittent every 12 hours  dextrose 5%. 1000 milliLiter(s) (100 mL/Hr) IV Continuous <Continuous>  dextrose 5%. 1000 milliLiter(s) (50 mL/Hr) IV Continuous <Continuous>  dextrose 50% Injectable 25 Gram(s) IV Push once  dextrose 50% Injectable 12.5 Gram(s) IV Push once  dextrose 50% Injectable 25 Gram(s) IV Push once  enoxaparin Injectable 40 milliGRAM(s) SubCutaneous <User Schedule>  glucagon  Injectable 1 milliGRAM(s) IntraMuscular once  insulin lispro (ADMELOG) corrective regimen sliding scale   SubCutaneous three times a day before meals  insulin lispro (ADMELOG) corrective regimen sliding scale   SubCutaneous at bedtime  lidocaine   4% Patch 2 Patch Transdermal <User Schedule>  losartan 50 milliGRAM(s) Oral daily  magnesium hydroxide Suspension 30 milliLiter(s) Oral every 12 hours  polyethylene glycol 3350 17 Gram(s) Oral daily  saccharomyces boulardii 250 milliGRAM(s) Oral two times a day  senna 2 Tablet(s) Oral at bedtime    MEDICATIONS  (PRN):  bisacodyl Suppository 10 milliGRAM(s) Rectal daily PRN Constipation  cyclobenzaprine 5 milliGRAM(s) Oral three times a day PRN Muscle Spasm  dextrose Oral Gel 15 Gram(s) Oral once PRN Blood Glucose LESS THAN 70 milliGRAM(s)/deciliter  OLANZapine Injectable 2.5 milliGRAM(s) IntraMuscular every 12 hours PRN severe agitation  traMADol 75 milliGRAM(s) Oral every 6 hours PRN Severe Pain (7 - 10)                              11.6   20.34 )-----------( 312      ( 31 Oct 2022 06:55 )             34.8     10-31    136  |  101  |  15  ----------------------------<  178<H>  3.4<L>   |  22  |  1.13    Ca    8.8      31 Oct 2022 06:55    TPro  6.8  /  Alb  2.3<L>  /  TBili  0.4  /  DBili  x   /  AST  30  /  ALT  36  /  AlkPhos  132<H>  10-31        CAPILLARY BLOOD GLUCOSE      POCT Blood Glucose.: 139 mg/dL (02 Nov 2022 11:42)  POCT Blood Glucose.: 148 mg/dL (02 Nov 2022 07:35)  POCT Blood Glucose.: 153 mg/dL (01 Nov 2022 20:45)  POCT Blood Glucose.: 175 mg/dL (01 Nov 2022 16:48)        Vital Signs Last 24 Hrs  T(C): 36.7 (02 Nov 2022 07:38), Max: 36.7 (02 Nov 2022 07:38)  T(F): 98 (02 Nov 2022 07:38), Max: 98 (02 Nov 2022 07:38)  HR: 94 (02 Nov 2022 07:38) (94 - 102)  BP: 136/82 (02 Nov 2022 07:38) (136/82 - 153/78)  BP(mean): --  RR: 16 (02 Nov 2022 07:38) (16 - 16)  SpO2: 94% (02 Nov 2022 07:38) (94% - 95%)    Parameters below as of 02 Nov 2022 07:38  Patient On (Oxygen Delivery Method): room air        PHYSICAL EXAM  Gen - NAD, Comfortable  HEENT - NCAT, EOMI   Neck - Supple, +posterior neck incision intact  Pulm - CTAB, No wheeze, No rhonchi, No crackles  Cardiovascular - RRR, S1S2, No murmurs  Chest - good chest expansion, good respiratory effort  Abdomen - Soft, NT/ND, +BS  Extremities - No Cyanosis, no clubbing, no edema, no calf tenderness  Neuro-     Cognitive - awake, alert, oriented to person, hospital setting, month/year. Able to follow commands     Communication - Fluent, Comprehensible     Attention: Intact     Cranial Nerves - CN 2-12 intact. No facial asymmetry, Tongue midline, EOMI, Shoulder shrug intact     Motor -                     LEFT    UE - ShAB 3/5, EF 4/5, EE 4/5,  4/5                    RIGHT UE - ShAB 4/5, EF 5/5, EE 5/5,  5/5                    LEFT    LE - HF 5/5, KE 5/5, DF 5/5, PF 5/5                    RIGHT LE - HF 5/5, KE 5/5, DF 5/5, PF 5/5        Sensory - Intact to LT      Reflexes - DTR Intact     Coordination - + dysmetria to left arm     Tone - normal  MSK: left arm weakness  Psychiatric - Mood stable, Affect WNL  Skin: posterior cervical spine incision with sutures        CT Neck Soft Tissue w/ IV Cont (10.30.22 @ 10:09)   FINDINGS:  AERODIGESTIVE TRACT:  No retropharyngeal fluid collection.  LYMPH NODES:  No adenopathy.  PAROTID GLANDS:  Normal.  SUBMANDIBULAR GLANDS:  Normal.  THYROID GLAND:  Normal.  VISUALIZED PARANASAL SINUSES:  Clear.  VISUALIZED TYMPANOMASTOID CAVITIES: Clear.  BONES: Patient is status post laminectomy and fusion C1-C7 posteriorly.   Hardware appears in good anatomic position. No evidence of loosening.  MISCELLANEOUS:  Evidence of postoperative fluid-type collection in the   subcutaneous fat in the lower cervicalregion C4-5 through C7-T1 with a   paraspinal fluid collection extending more superiorly suggested as well   in the midline without rim enhancement. These are similar in appearance   compared with the prior MR study  IMPRESSION:  Postop changes as described similar in appearance as   suggested on the prior MR study 10/26/2022 without definitive evidence of   abscess. Status post laminectomy with posterior fusion. No hardware   malalignment or loosening on no enhancing collection appreciated      CT Chest, Abdomen and Pelvis No Cont (10.30.22 @ 10:08)  IMPRESSION:  Airspace opacity and consolidation in the left lower lobe and partially   lingula segment concerning for multifocal pneumonia in the appropriate   clinical context. No pleural effusions.  No acute abdominopelvic pathology.  Noncomplicated colonic diverticulosis.        IDT 11/1 - lead by Dr. Mares  lives with spouse in a basement apt, 5 TAHIR with no HR.  has cane and rollator at home    OT:  Eating Setup SV  grooming Setup SV  UBD setup SV  LBD min A  bathing mod A  Toilet transfer min A  toileting mod A  Shower transfer not assess  Goals: close SV with transfer, CG toileting    PT:   bed mob min A  transfers min A  amb 40' RW min   Goals: SV with bed mob/transfers/amb 150' RW. CG 12 steps    SLP:  [facility] uses amplifier  Diet: reg/thin - impulsive with food.  SV meals?  mild- mod comprehension, memory deficits, slurred speech    Barriers: impaired coordination, right shoulder pain/weakness, agitation slightly better    TDD: 11/16 home.

## 2022-11-03 LAB
ALBUMIN SERPL ELPH-MCNC: 2.6 G/DL — LOW (ref 3.3–5)
ALP SERPL-CCNC: 143 U/L — HIGH (ref 40–120)
ALT FLD-CCNC: 44 U/L — SIGNIFICANT CHANGE UP (ref 10–45)
ANION GAP SERPL CALC-SCNC: 8 MMOL/L — SIGNIFICANT CHANGE UP (ref 5–17)
AST SERPL-CCNC: 33 U/L — SIGNIFICANT CHANGE UP (ref 10–40)
BILIRUB SERPL-MCNC: 0.3 MG/DL — SIGNIFICANT CHANGE UP (ref 0.2–1.2)
BUN SERPL-MCNC: 26 MG/DL — HIGH (ref 7–23)
CALCIUM SERPL-MCNC: 9.3 MG/DL — SIGNIFICANT CHANGE UP (ref 8.4–10.5)
CHLORIDE SERPL-SCNC: 102 MMOL/L — SIGNIFICANT CHANGE UP (ref 96–108)
CO2 SERPL-SCNC: 29 MMOL/L — SIGNIFICANT CHANGE UP (ref 22–31)
CREAT SERPL-MCNC: 1.24 MG/DL — SIGNIFICANT CHANGE UP (ref 0.5–1.3)
CULTURE RESULTS: SIGNIFICANT CHANGE UP
CULTURE RESULTS: SIGNIFICANT CHANGE UP
EGFR: 57 ML/MIN/1.73M2 — LOW
GLUCOSE BLDC GLUCOMTR-MCNC: 125 MG/DL — HIGH (ref 70–99)
GLUCOSE BLDC GLUCOMTR-MCNC: 136 MG/DL — HIGH (ref 70–99)
GLUCOSE BLDC GLUCOMTR-MCNC: 155 MG/DL — HIGH (ref 70–99)
GLUCOSE BLDC GLUCOMTR-MCNC: 163 MG/DL — HIGH (ref 70–99)
GLUCOSE SERPL-MCNC: 133 MG/DL — HIGH (ref 70–99)
HCT VFR BLD CALC: 35.4 % — LOW (ref 39–50)
HGB BLD-MCNC: 12 G/DL — LOW (ref 13–17)
MCHC RBC-ENTMCNC: 30.2 PG — SIGNIFICANT CHANGE UP (ref 27–34)
MCHC RBC-ENTMCNC: 33.9 GM/DL — SIGNIFICANT CHANGE UP (ref 32–36)
MCV RBC AUTO: 88.9 FL — SIGNIFICANT CHANGE UP (ref 80–100)
NRBC # BLD: 0 /100 WBCS — SIGNIFICANT CHANGE UP (ref 0–0)
PLATELET # BLD AUTO: 437 K/UL — HIGH (ref 150–400)
POTASSIUM SERPL-MCNC: 3.6 MMOL/L — SIGNIFICANT CHANGE UP (ref 3.5–5.3)
POTASSIUM SERPL-SCNC: 3.6 MMOL/L — SIGNIFICANT CHANGE UP (ref 3.5–5.3)
PROT SERPL-MCNC: 7.1 G/DL — SIGNIFICANT CHANGE UP (ref 6–8.3)
RBC # BLD: 3.98 M/UL — LOW (ref 4.2–5.8)
RBC # FLD: 12.8 % — SIGNIFICANT CHANGE UP (ref 10.3–14.5)
SODIUM SERPL-SCNC: 139 MMOL/L — SIGNIFICANT CHANGE UP (ref 135–145)
SPECIMEN SOURCE: SIGNIFICANT CHANGE UP
SPECIMEN SOURCE: SIGNIFICANT CHANGE UP
WBC # BLD: 8.29 K/UL — SIGNIFICANT CHANGE UP (ref 3.8–10.5)
WBC # FLD AUTO: 8.29 K/UL — SIGNIFICANT CHANGE UP (ref 3.8–10.5)

## 2022-11-03 PROCEDURE — 99232 SBSQ HOSP IP/OBS MODERATE 35: CPT

## 2022-11-03 RX ORDER — LIDOCAINE 4 G/100G
1 CREAM TOPICAL
Refills: 0 | Status: DISCONTINUED | OUTPATIENT
Start: 2022-11-03 | End: 2022-11-11

## 2022-11-03 RX ADMIN — Medication 975 MILLIGRAM(S): at 15:25

## 2022-11-03 RX ADMIN — AMLODIPINE BESYLATE 10 MILLIGRAM(S): 2.5 TABLET ORAL at 21:40

## 2022-11-03 RX ADMIN — Medication 500 MILLIGRAM(S): at 18:27

## 2022-11-03 RX ADMIN — POLYETHYLENE GLYCOL 3350 17 GRAM(S): 17 POWDER, FOR SOLUTION ORAL at 14:59

## 2022-11-03 RX ADMIN — Medication 81 MILLIGRAM(S): at 14:57

## 2022-11-03 RX ADMIN — Medication 500 MILLIGRAM(S): at 05:37

## 2022-11-03 RX ADMIN — Medication 250 MILLIGRAM(S): at 18:27

## 2022-11-03 RX ADMIN — LOSARTAN POTASSIUM 50 MILLIGRAM(S): 100 TABLET, FILM COATED ORAL at 05:38

## 2022-11-03 RX ADMIN — LIDOCAINE 1 PATCH: 4 CREAM TOPICAL at 18:28

## 2022-11-03 RX ADMIN — ENOXAPARIN SODIUM 40 MILLIGRAM(S): 100 INJECTION SUBCUTANEOUS at 18:27

## 2022-11-03 RX ADMIN — Medication 975 MILLIGRAM(S): at 06:09

## 2022-11-03 RX ADMIN — Medication 250 MILLIGRAM(S): at 05:38

## 2022-11-03 RX ADMIN — Medication 975 MILLIGRAM(S): at 22:10

## 2022-11-03 RX ADMIN — Medication 975 MILLIGRAM(S): at 14:58

## 2022-11-03 RX ADMIN — Medication 975 MILLIGRAM(S): at 05:38

## 2022-11-03 RX ADMIN — Medication 975 MILLIGRAM(S): at 21:40

## 2022-11-03 RX ADMIN — MAGNESIUM HYDROXIDE 30 MILLILITER(S): 400 TABLET, CHEWABLE ORAL at 05:38

## 2022-11-03 RX ADMIN — Medication 1: at 12:50

## 2022-11-03 RX ADMIN — MAGNESIUM HYDROXIDE 30 MILLILITER(S): 400 TABLET, CHEWABLE ORAL at 18:27

## 2022-11-03 RX ADMIN — ATORVASTATIN CALCIUM 40 MILLIGRAM(S): 80 TABLET, FILM COATED ORAL at 21:40

## 2022-11-03 RX ADMIN — SENNA PLUS 2 TABLET(S): 8.6 TABLET ORAL at 21:40

## 2022-11-03 NOTE — PROGRESS NOTE ADULT - ASSESSMENT
83 YO male with PMH of HTN, HLD, DM II, BPH with complaint of neck pain, presented to [facility] on 10/18 for scheduled Posterior C1-6 Decompression, C4-5 Posterior Column Osteotomy, C1-C7 Fusion, Complex Plastics Closure. Course complicated by severe constipation, leukocytosis, hyponatremia, fall, urinary retention requiring straight catheterization, intermittent confusion likely related to pain medication and hospital setting.     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  - CT IMAGING: most pertinent for some airspace opacity in LLL, no evidence of hardware issues, CT neck w/ post op changes w/o abscess.  - UCx w/ Serratia marcescens, sensitivities now available.  - vancomycin discontinued, cont cefepime. Will d/w ID about narrowing abx.     # acute metabolic encephalopathy, likely due to infection, delirium, w/ agitation/combativeness - improving.  # Suicidal ideation  pt noted to have intermittent confusion at OSH, thought to be related to pain meds, delirium, and urinary retention. Very agitated and confused when first admitted here, now oriented and calm.  - treat infection above.  - Psychiatry following ,placed on scheduled zyprexa at 1800 and PRN zyprexa for agitation.. will discontinue scheduled zyprexa and monitor off. D/w Dr. Mares.    # acute urinary retention  - passed TOV, garay catheter removed.  - recommend regular bladder scans.  - avoid anti-cholinergic medications    # HTN  - amlodipine 10 mg qhs and losartan 50 mg qd    #HLD  - lipitor 40 mg qd    # DM2  - controlled, [id number] 6.0%  - ISS    dvt ppx: lovenox 81 YO male with PMH of HTN, HLD, DM II, BPH with complaint of neck pain, presented to [facility] on 10/18 for scheduled Posterior C1-6 Decompression, C4-5 Posterior Column Osteotomy, C1-C7 Fusion, Complex Plastics Closure. Course complicated by severe constipation, leukocytosis, hyponatremia, fall, urinary retention requiring straight catheterization, intermittent confusion likely related to pain medication and hospital setting.     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  - CT IMAGING: most pertinent for some airspace opacity in LLL, no evidence of hardware issues, CT neck w/ post op changes w/o abscess.  - UCx w/ Serratia marcescens, sensitivities now available.  - vancomycin discontinued, cefepime was changed to ceftin 11/1 . to contiue 4 more days  - ID f/u noted    # acute metabolic encephalopathy, likely due to infection, delirium, w/ agitation/combativeness - improving.  # Suicidal ideation  pt noted to have intermittent confusion at OSH, thought to be related to pain meds, delirium, and urinary retention. Very agitated and confused when first admitted here, now oriented and calm.  - treat infection above.  - Psychiatry following ,placed on scheduled zyprexa at 1800 and PRN zyprexa for agitation.  - off  scheduled zyprexa .on PRN    # acute urinary retention  - passed TOV,   - recommend regular bladder scans.  - avoid anti-cholinergic medications    # HTN  - amlodipine 10 mg qhs and losartan 50 mg qd    #HLD  - lipitor 40 mg qd    # DM2  - controlled, [id number] 6.0%  - ISS    dvt ppx: lovenox

## 2022-11-03 NOTE — PROGRESS NOTE ADULT - ASSESSMENT
ASSESSMENT/PLAN  This is a 83 YO male with PMH of HTN, HLD, DM II, BPH with complaint of neck pain. cervical myelopathy  admitted to rehab after scheduled Posterior C1-6 Decompression, C4-5 Posterior Column Osteotomy, C1-C7 Fusion, Complex Plastics Closure on 10/18  Course complicated by severe constipation now resolved, leukocytosis, hyponatremia, fall, urinary retention requiring straight catheterization. Patient now with gait Instability, ADL impairments and Functional impairments.    #Cervical cord compression with myelopathy  - Posterior C1-6 Decompression, C4-5 Posterior Column Osteotomy, C1-C7 Fusion, Complex Plastics Closure on 10/18  - Start Comprehensive Rehab Program: PT/OT, 3hours daily and 5 days weekly  - PT: Focused on improving strength, endurance, coordination, balance, functional mobility, and transfers  - OT: Focused on improving strength, fine motor skills, coordination, posture and ADLs.  - pain management as below  - precautions: fall, aspiration, spinal  - Incision evaluation and suture removal around 11/7/22 at plastics Dr Bay office   - CT neck soft tissue 10/30 w/ post op changes w/o abscess  - soft collar while OOB    # SIRS, unclear source - PNA vs UTI vs both?  -afebrile  - Follow cultures - Urine Cx serratia sens to cefipime Blood Cx NGTD  - CT Chest 10/29 most pertinent for some airspace opacity in LLL  - cont  cefepime  - probiotics while on abx  - f/u MRSA nares- negative- off contact  ID in-will follow plan    #Acute metabolic encephalopathy, likely due to delirium, w/ agitation/combativeness - improving  # Suicidal ideation  -Pt noted to have intermittent confusion at OSH, thought to be related to pain meds, delirium, and urinary retention. Very agitated and confused when first admitted here, more oriented and calm.  - Psychiatry following , now on scheduled zyprexa at 1800 and PRN zyprexa for agitation.  - 1:1 observation for impulsivity and confusion at night. Chair and bed alarms on at all times.  - Oxycodone and flexeril discontinued on 10/29  - Pt reporting severe neck pain- tramadol increased to 75mg Q6h PRNand flexeril restarted secondary to persistent neck pain- lidoderm added  TO DC Zyprexa - DC intermittent supervision?    # acute urinary retention  #UTI  - garay catheter discontinued for TOV 10/31 due to improvement in patient's mentation and constipation  -PVRs currently low- will Dc PVRs   - bowel regimen    #HTN  - Amlodipine 10mg daily   - Losartan 50mg daily    #HLD  - Lipitor 40mg at qhs    #Hyponatremia  -resolved  -Off salt tabs  -dc FR  -follow BMP    #Pain management  - Tylenol 975 mg Q8h -- monitor LFTS on ATC Tylenol  - Tramadol 75mg prn  - Robaxin DCd - Flexeril 5mg 3x/day PRN  lidoderm to shoulders/ and chest wall    #DVT ppx  - Lovenox, SCD, TEDs    #Bowel Regimen  - Senna 2 tabs at qhs, miralax, Dulcolax supp    #FEN   - Diet: Consistent Carb    #Skin:  - Skin on admission: posterior cervical spine incision with sutures MEETA   - Pressure injury/Skin: Turn Q2hrs while in bed, OOB to Chair, PT/OT     #Sleep:   - Maintain quiet hours and low stim environment.  - on Olanzapine 2.5 mg at bedtime for sundowning      #GOC  CODE STATUS: FULL CODE    Outpatient Follow-up (Specialty/Name of physician):    Caleb Bond)  Neurosurgery  [address], [address]  [place] [address]  Phone: [phone]  Fax: [phone]    Jose Raul Bay)  Plastic Surgery  [address] [place] [address]  Phone: [phone]  Fax: [phone]

## 2022-11-03 NOTE — PROGRESS NOTE ADULT - ASSESSMENT
81yo M hx HTN,T2DM, HLD, BPH, and chronic neck pain who was admitted to [facility] on 10/18 for elective neck surgery.  He underwent  a posterior C1-C6 decompression, C4-5 posterior column osteotomy, and C10C7 fusion with PRS closure.  His post op course was complicated  by constipation, intermittent confusion felt to be narcotic related, and urinary retention requiring ISC.He was felt to have left deltoid weakness, his drain was removed 10/27, and he was sent to rehab on 10/27.  He had a fever to 101.2 on 10/28, required placement of a garay catheter, and was started on cefepime.  He was up all night and is sleepy. History is via an , he has no specific complaints but nods off to sleep.  Etiology of fever not clear. It could be  as in setting of retention can develop systemic infection even if urine is not purulent.  His spine incision appears to be without evidence of infection.  His CXR seems to show elevated diaphragm on the left, ? atelectasis vs artifact. CXR on 10/29 officially read as atelectasis vs infiltrate.  He spike to 103, vanco had been added,  CT of neck with post op changes, CT of C/A/P with left sided infiltrates.  He has garay for retention. Blood cultures are negative so far, urine culture with >100K GNR-serratia    pneumonia may be present along with possible  infection  He appears to be responding to antibiotics and his leukocytosis has resolved.  MRSA screen negative.Legionella urine antigen is negative  He is voiding well  Suggest;  1. continue ceftin for another 4 days  2.stable course from ID viewpoint  3.With no additional ID w/u planned and no signs of active infection we will stop actively following. Please call if questions arise

## 2022-11-03 NOTE — PROGRESS NOTE ADULT - SUBJECTIVE AND OBJECTIVE BOX
HISTORY OF PRESENT ILLNESS  This is a 81 YO male with PMH of HTN, HLD, DM II, BPH with complaint of neck pain. Patient endorses increasing neck pain, gait instability and clumsiness of the hands. He ambulates with a cane. He denies fever, chills, trauma or injury. He presents to [facility] on 10/18 for scheduled Posterior C1-6 Decompression, C4-5 Posterior Column Osteotomy, C1-C7 Fusion, Complex Plastics Closure. Course complicated by severe constipation now resolved, leukocytosis, hyponatremia- stable on salt tablet, fall, urinary retention requiring straight catheterization, intermittent confusion likely related to pain medication and hospital setting. Noted with  Left deltoid weakness. MR Cervical spine 10/26/22 without cord compression. Surgical drain removed 10/27/22. Patient was evaluated by PM&R and therapy for functional deficits, gait/ADL impairments and acute rehabilitation was recommended. Patient was medically optimized for discharge to [facility]U on 10/27/22.      TODAY's SUBJECTIVE:  pain much improved - minimal Left shoulder pain, and left lower rib cage  slept well, no agitation  spontaneously voiding  Afebrile, on PO abx now  patient much calmer- no need for 1:1 observation.        ROS:  No dizziness  No chest pain, no palpitations  No shortness of breath  No abdominal pain, no nausea    PAST MEDICAL & SURGICAL HISTORY:  Hypertension  Hyperlipidemia        VITALS  Vital Signs Last 24 Hrs  T(C): 36.6 (03 Nov 2022 06:09), Max: 36.6 (02 Nov 2022 21:30)  T(F): 97.9 (03 Nov 2022 06:09), Max: 97.9 (03 Nov 2022 06:09)  HR: 87 (03 Nov 2022 06:09) (87 - 88)  BP: 150/75 (03 Nov 2022 06:09) (132/70 - 150/75)  BP(mean): --  RR: 16 (03 Nov 2022 06:09) (16 - 17)  SpO2: 94% (03 Nov 2022 06:09) (93% - 94%)    Parameters below as of 03 Nov 2022 06:09  Patient On (Oxygen Delivery Method): room air      RECENT LABS                        12.0   8.29  )-----------( 437      ( 03 Nov 2022 06:57 )             35.4     11-03    139  |  102  |  26<H>  ----------------------------<  133<H>  3.6   |  29  |  1.24    Ca    9.3      03 Nov 2022 06:57    TPro  7.1  /  Alb  2.6<L>  /  TBili  0.3  /  DBili  x   /  AST  33  /  ALT  44  /  AlkPhos  143<H>  11-03      LIVER FUNCTIONS - ( 03 Nov 2022 06:57 )  Alb: 2.6 g/dL / Pro: 7.1 g/dL / ALK PHOS: 143 U/L / ALT: 44 U/L / AST: 33 U/L / GGT: x             CURRENT MEDICATIONS  MEDICATIONS  (STANDING):  acetaminophen     Tablet .. 975 milliGRAM(s) Oral every 8 hours  amLODIPine   Tablet 10 milliGRAM(s) Oral at bedtime  aspirin  chewable 81 milliGRAM(s) Oral daily  atorvastatin 40 milliGRAM(s) Oral at bedtime  cefuroxime   Tablet 500 milliGRAM(s) Oral every 12 hours  dextrose 5%. 1000 milliLiter(s) (100 mL/Hr) IV Continuous <Continuous>  dextrose 5%. 1000 milliLiter(s) (50 mL/Hr) IV Continuous <Continuous>  dextrose 50% Injectable 25 Gram(s) IV Push once  dextrose 50% Injectable 12.5 Gram(s) IV Push once  dextrose 50% Injectable 25 Gram(s) IV Push once  enoxaparin Injectable 40 milliGRAM(s) SubCutaneous <User Schedule>  glucagon  Injectable 1 milliGRAM(s) IntraMuscular once  insulin lispro (ADMELOG) corrective regimen sliding scale   SubCutaneous three times a day before meals  insulin lispro (ADMELOG) corrective regimen sliding scale   SubCutaneous at bedtime  lidocaine   4% Patch 2 Patch Transdermal <User Schedule>  lidocaine   4% Patch 1 Patch Transdermal <User Schedule>  losartan 50 milliGRAM(s) Oral daily  magnesium hydroxide Suspension 30 milliLiter(s) Oral every 12 hours  polyethylene glycol 3350 17 Gram(s) Oral daily  saccharomyces boulardii 250 milliGRAM(s) Oral two times a day  senna 2 Tablet(s) Oral at bedtime    MEDICATIONS  (PRN):  bisacodyl Suppository 10 milliGRAM(s) Rectal daily PRN Constipation  cyclobenzaprine 5 milliGRAM(s) Oral three times a day PRN Muscle Spasm  dextrose Oral Gel 15 Gram(s) Oral once PRN Blood Glucose LESS THAN 70 milliGRAM(s)/deciliter  OLANZapine Injectable 2.5 milliGRAM(s) IntraMuscular every 12 hours PRN severe agitation  traMADol 75 milliGRAM(s) Oral every 6 hours PRN Severe Pain (7 - 10)      PHYSICAL EXAM  Gen - NAD, Comfortable  HEENT - NCAT, EOMI   Neck - Supple, +posterior neck incision intact  Pulm - CTAB, No wheeze, No rhonchi, No crackles  Cardiovascular - RRR, S1S2, No murmurs  Chest - good chest expansion, good respiratory effort  Abdomen - Soft, NT/ND, +BS  Extremities - No Cyanosis, no clubbing, no edema, no calf tenderness  Neuro-     Cognitive - awake, alert, oriented to person, hospital setting, month/year. Able to follow commands     Communication - Fluent, Comprehensible     Attention: Intact     Cranial Nerves - CN 2-12 intact. No facial asymmetry, Tongue midline, EOMI, Shoulder shrug intact     Motor -                     LEFT    UE - ShAB 3/5, EF 4/5, EE 4/5,  4/5                    RIGHT UE - ShAB 4/5, EF 5/5, EE 5/5,  5/5                    LEFT    LE - HF 5/5, KE 5/5, DF 5/5, PF 5/5                    RIGHT LE - HF 5/5, KE 5/5, DF 5/5, PF 5/5        Sensory - Intact to LT      Reflexes - DTR Intact     Coordination - + dysmetria to left arm     Tone - normal  MSK: left arm weakness  Psychiatric - Mood stable, Affect WNL      Continue comprehensive acute rehab program consisting of 3hrs/day of OT/PT and SLP.

## 2022-11-03 NOTE — PROGRESS NOTE ADULT - SUBJECTIVE AND OBJECTIVE BOX
Follow-up Pulmonary Progress Note  Chief Complaint : Status post arthrodesis      patient seen and examined  comfortable   no cp, sob, palp, n/v        Allergies :penicillin (Rash)      PAST MEDICAL & SURGICAL HISTORY:  Hypertension    Hyperlipidemia    Chronic kidney disease    Diabetes mellitus  -patient states prediabetes, not on medication    Benign prostate hyperplasia    COVID-19 virus infection  -dec 2021 (cough, fever, malaise)    History of carpal tunnel surgery  -bilateral hands    History of cataract surgery  -bilateral    H/O colonoscopy        Medications:  MEDICATIONS  (STANDING):  acetaminophen     Tablet .. 975 milliGRAM(s) Oral every 8 hours  amLODIPine   Tablet 10 milliGRAM(s) Oral at bedtime  aspirin  chewable 81 milliGRAM(s) Oral daily  atorvastatin 40 milliGRAM(s) Oral at bedtime  cefuroxime   Tablet 500 milliGRAM(s) Oral every 12 hours  dextrose 5%. 1000 milliLiter(s) (100 mL/Hr) IV Continuous <Continuous>  dextrose 5%. 1000 milliLiter(s) (50 mL/Hr) IV Continuous <Continuous>  dextrose 50% Injectable 25 Gram(s) IV Push once  dextrose 50% Injectable 12.5 Gram(s) IV Push once  dextrose 50% Injectable 25 Gram(s) IV Push once  enoxaparin Injectable 40 milliGRAM(s) SubCutaneous <User Schedule>  glucagon  Injectable 1 milliGRAM(s) IntraMuscular once  insulin lispro (ADMELOG) corrective regimen sliding scale   SubCutaneous three times a day before meals  insulin lispro (ADMELOG) corrective regimen sliding scale   SubCutaneous at bedtime  lidocaine   4% Patch 2 Patch Transdermal <User Schedule>  lidocaine   4% Patch 1 Patch Transdermal <User Schedule>  losartan 50 milliGRAM(s) Oral daily  magnesium hydroxide Suspension 30 milliLiter(s) Oral every 12 hours  polyethylene glycol 3350 17 Gram(s) Oral daily  saccharomyces boulardii 250 milliGRAM(s) Oral two times a day  senna 2 Tablet(s) Oral at bedtime    MEDICATIONS  (PRN):  bisacodyl Suppository 10 milliGRAM(s) Rectal daily PRN Constipation  cyclobenzaprine 5 milliGRAM(s) Oral three times a day PRN Muscle Spasm  dextrose Oral Gel 15 Gram(s) Oral once PRN Blood Glucose LESS THAN 70 milliGRAM(s)/deciliter  OLANZapine Injectable 2.5 milliGRAM(s) IntraMuscular every 12 hours PRN severe agitation  traMADol 75 milliGRAM(s) Oral every 6 hours PRN Severe Pain (7 - 10)      Antibiotics History  cefepime   IVPB    , 10-29-22 @ 01:37  cefepime   IVPB 1000 milliGRAM(s) IV Intermittent once, 10-28-22 @ 22:58  cefepime   IVPB 1000 milliGRAM(s) IV Intermittent every 12 hours, 10-29-22 @ 06:00  cefuroxime   Tablet 500 milliGRAM(s) Oral every 12 hours, 11-02-22 @ 15:44  vancomycin  IVPB    , 10-30-22 @ 08:45  vancomycin  IVPB 1000 milliGRAM(s) IV Intermittent once, 10-30-22 @ 07:38, Stop order after: 1 Doses  vancomycin  IVPB 1000 milliGRAM(s) IV Intermittent every 12 hours, 10-30-22 @ 18:00, Stop order after: 7 Days      Heme Medications   aspirin  chewable 81 milliGRAM(s) Oral daily, 10-28-22 @ 00:00  enoxaparin Injectable 40 milliGRAM(s) SubCutaneous <User Schedule>, 10-27-22 @ 17:14      GI Medications  bisacodyl Suppository 10 milliGRAM(s) Rectal daily, 10-27-22 @ 17:14, Routine PRN  magnesium hydroxide Suspension 30 milliLiter(s) Oral every 12 hours, 10-27-22 @ 17:14,   polyethylene glycol 3350 17 Gram(s) Oral daily, 10-27-22 @ 17:14,   senna 2 Tablet(s) Oral at bedtime, 10-27-22 @ 17:14, Routine        LABS:                        12.0   8.29  )-----------( 437      ( 03 Nov 2022 06:57 )             35.4     11-03    139  |  102  |  26<H>  ----------------------------<  133<H>  3.6   |  29  |  1.24    Ca    9.3      03 Nov 2022 06:57    TPro  7.1  /  Alb  2.6<L>  /  TBili  0.3  /  DBili  x   /  AST  33  /  ALT  44  /  AlkPhos  143<H>  11-03         CULTURES: (if applicable)    Culture - Urine (collected 10-29-22 @ 13:00)  Source: Catheterized Catheterized  Final Report (11-01-22 @ 16:56):    >100,000 CFU/ml Serratia marcescens  Organism: Serratia marcescens (11-01-22 @ 16:56)  Organism: Serratia marcescens (11-01-22 @ 16:56)      -  Amikacin: S <=16      -  Amoxicillin/Clavulanic Acid: R >16/8      -  Ampicillin: R >16 These ampicillin results predict results for amoxicillin      -  Ampicillin/Sulbactam: R >16/8 Enterobacter, Klebsiella aerogenes, Citrobacter, and Serratia may develop resistance during prolonged therapy (3-4 days)      -  Aztreonam: S <=4      -  Cefazolin: R >16      -  Cefepime: S <=2      -  Cefoxitin: R 16      -  Ceftriaxone: S <=1 Enterobacter, Klebsiella aerogenes, Citrobacter, and Serratia may develop resistance during prolonged therapy      -  Ciprofloxacin: S <=0.25      -  Ertapenem: S <=0.5      -  Gentamicin: S <=2      -  Levofloxacin: S <=0.5      -  Meropenem: S <=1      -  Nitrofurantoin: R >64 Should not be used to treat pyelonephritis      -  Piperacillin/Tazobactam: S <=8      -  Tigecycline: S <=2      -  Tobramycin: S <=2      -  Trimethoprim/Sulfamethoxazole: S <=0.5/9.5      Method Type: PROSPER    Culture - Blood (collected 10-29-22 @ 01:06)  Source: .Blood Blood  Final Report (11-03-22 @ 05:01):    No Growth Final    Culture - Blood (collected 10-29-22 @ 01:06)  Source: .Blood Blood  Final Report (11-03-22 @ 05:01):    No Growth Final             VITALS:  T(C): 36.6 (11-03-22 @ 06:09), Max: 36.6 (11-02-22 @ 21:30)  T(F): 97.9 (11-03-22 @ 06:09), Max: 97.9 (11-03-22 @ 06:09)  HR: 87 (11-03-22 @ 06:09) (87 - 88)  BP: 150/75 (11-03-22 @ 06:09) (132/70 - 150/75)  BP(mean): --  ABP: --  ABP(mean): --  RR: 16 (11-03-22 @ 06:09) (16 - 17)  SpO2: 94% (11-03-22 @ 06:09) (93% - 94%)  CVP(mm Hg): --  CVP(cm H2O): --    Ins and Outs     11-03-22 @ 07:01  -  11-03-22 @ 20:11  --------------------------------------------------------  IN: 240 mL / OUT: 0 mL / NET: 240 mL         I&O's Detail    03 Nov 2022 07:01  -  03 Nov 2022 20:11  --------------------------------------------------------  IN:    Oral Fluid: 240 mL  Total IN: 240 mL    OUT:  Total OUT: 0 mL    Total NET: 240 mL

## 2022-11-03 NOTE — PROGRESS NOTE ADULT - SUBJECTIVE AND OBJECTIVE BOX
CC: f/u for post op pneumonia and UTI    Patient reports: he is feeling well, now voiding with PVR less than 100    REVIEW OF SYSTEMS:  All other review of systems negative (Comprehensive ROS)    Antimicrobials Day #  :day 6/10  cefuroxime   Tablet 500 milliGRAM(s) Oral every 12 hours    Other Medications Reviewed  MEDICATIONS  (STANDING):  acetaminophen     Tablet .. 975 milliGRAM(s) Oral every 8 hours  amLODIPine   Tablet 10 milliGRAM(s) Oral at bedtime  aspirin  chewable 81 milliGRAM(s) Oral daily  atorvastatin 40 milliGRAM(s) Oral at bedtime  cefuroxime   Tablet 500 milliGRAM(s) Oral every 12 hours  dextrose 5%. 1000 milliLiter(s) (100 mL/Hr) IV Continuous <Continuous>  dextrose 5%. 1000 milliLiter(s) (50 mL/Hr) IV Continuous <Continuous>  dextrose 50% Injectable 25 Gram(s) IV Push once  dextrose 50% Injectable 12.5 Gram(s) IV Push once  dextrose 50% Injectable 25 Gram(s) IV Push once  enoxaparin Injectable 40 milliGRAM(s) SubCutaneous <User Schedule>  glucagon  Injectable 1 milliGRAM(s) IntraMuscular once  insulin lispro (ADMELOG) corrective regimen sliding scale   SubCutaneous three times a day before meals  insulin lispro (ADMELOG) corrective regimen sliding scale   SubCutaneous at bedtime  lidocaine   4% Patch 2 Patch Transdermal <User Schedule>  lidocaine   4% Patch 1 Patch Transdermal <User Schedule>  losartan 50 milliGRAM(s) Oral daily  magnesium hydroxide Suspension 30 milliLiter(s) Oral every 12 hours  polyethylene glycol 3350 17 Gram(s) Oral daily  saccharomyces boulardii 250 milliGRAM(s) Oral two times a day  senna 2 Tablet(s) Oral at bedtime    T(F): 97.9 (11-03-22 @ 06:09), Max: 97.9 (11-03-22 @ 06:09)  HR: 87 (11-03-22 @ 06:09)  BP: 150/75 (11-03-22 @ 06:09)  RR: 16 (11-03-22 @ 06:09)  SpO2: 94% (11-03-22 @ 06:09)  Wt(kg): --    PHYSICAL EXAM:  General: alert, no acute distress  Eyes:  anicteric, no conjunctival injection, no discharge  Oropharynx: no lesions or injection 	  Neck: supple, without adenopathy. Neck incision C/D/I  Lungs: clear to auscultation  Heart: regular rate and rhythm; no murmur, rubs or gallops  Abdomen: soft, nondistended, nontender, without mass or organomegaly  Skin: no lesions  Extremities: no clubbing, cyanosis, or edema  Neurologic: alert, oriented, moves all extremities    LAB RESULTS:                        12.0   8.29  )-----------( 437      ( 03 Nov 2022 06:57 )             35.4     11-03    139  |  102  |  26<H>  ----------------------------<  133<H>  3.6   |  29  |  1.24    Ca    9.3      03 Nov 2022 06:57    TPro  7.1  /  Alb  2.6<L>  /  TBili  0.3  /  DBili  x   /  AST  33  /  ALT  44  /  AlkPhos  143<H>  11-03    LIVER FUNCTIONS - ( 03 Nov 2022 06:57 )  Alb: 2.6 g/dL / Pro: 7.1 g/dL / ALK PHOS: 143 U/L / ALT: 44 U/L / AST: 33 U/L / GGT: x             MICROBIOLOGY:  RECENT CULTURES:      RADIOLOGY REVIEWED:

## 2022-11-03 NOTE — PROGRESS NOTE ADULT - ASSESSMENT
Physical Examination:  GENERAL:               Alert,  No acute distress.    HEENT:                     No JVD, Moist MM, neck brace in place  PULM:                     Bilateral air entry, Clear to auscultation bilaterally, no significant sputum production, trace Rales, No Rhonchi, No Wheezing  CVS:                         S1, S2,  No Murmur  Vascular:                Warm Extremities, Normal Capillary refill, Normal Distal Pulses  SKIN:                       Warm and well perfused, no rashes noted.   NEURO:                  Alert,  interactive, nonfocal, follows commands  PSYC:                      Calm, +Insight.      Assessment  1) Likely pneumonia with Fever leukocytosis and abnormal CT chest but   2) s/p Posterior cervical fusion with instrumentation  3) Underlying HTN, DM2, High chol, BPH      Plan  Finish antibiotics as per ID  patient now back to baseline     Incentive spirometry if able to tolerate    Case d/w Dr. Cullen on 11/2/22  Will follow as needed

## 2022-11-03 NOTE — PROGRESS NOTE ADULT - SUBJECTIVE AND OBJECTIVE BOX
Medicine Progress Note    Patient is a 82y old  Male who presents with a chief complaint of Cervical Laminectomy (02 Nov 2022 13:35)      SUBJECTIVE / OVERNIGHT EVENTS:    ADDITIONAL REVIEW OF SYSTEMS:    MEDICATIONS  (STANDING):  acetaminophen     Tablet .. 975 milliGRAM(s) Oral every 8 hours  amLODIPine   Tablet 10 milliGRAM(s) Oral at bedtime  aspirin  chewable 81 milliGRAM(s) Oral daily  atorvastatin 40 milliGRAM(s) Oral at bedtime  cefuroxime   Tablet 500 milliGRAM(s) Oral every 12 hours  dextrose 5%. 1000 milliLiter(s) (100 mL/Hr) IV Continuous <Continuous>  dextrose 5%. 1000 milliLiter(s) (50 mL/Hr) IV Continuous <Continuous>  dextrose 50% Injectable 25 Gram(s) IV Push once  dextrose 50% Injectable 12.5 Gram(s) IV Push once  dextrose 50% Injectable 25 Gram(s) IV Push once  enoxaparin Injectable 40 milliGRAM(s) SubCutaneous <User Schedule>  glucagon  Injectable 1 milliGRAM(s) IntraMuscular once  insulin lispro (ADMELOG) corrective regimen sliding scale   SubCutaneous three times a day before meals  insulin lispro (ADMELOG) corrective regimen sliding scale   SubCutaneous at bedtime  lidocaine   4% Patch 2 Patch Transdermal <User Schedule>  losartan 50 milliGRAM(s) Oral daily  magnesium hydroxide Suspension 30 milliLiter(s) Oral every 12 hours  polyethylene glycol 3350 17 Gram(s) Oral daily  saccharomyces boulardii 250 milliGRAM(s) Oral two times a day  senna 2 Tablet(s) Oral at bedtime    MEDICATIONS  (PRN):  bisacodyl Suppository 10 milliGRAM(s) Rectal daily PRN Constipation  cyclobenzaprine 5 milliGRAM(s) Oral three times a day PRN Muscle Spasm  dextrose Oral Gel 15 Gram(s) Oral once PRN Blood Glucose LESS THAN 70 milliGRAM(s)/deciliter  OLANZapine Injectable 2.5 milliGRAM(s) IntraMuscular every 12 hours PRN severe agitation  traMADol 75 milliGRAM(s) Oral every 6 hours PRN Severe Pain (7 - 10)    CAPILLARY BLOOD GLUCOSE      POCT Blood Glucose.: 155 mg/dL (02 Nov 2022 21:39)  POCT Blood Glucose.: 142 mg/dL (02 Nov 2022 17:09)  POCT Blood Glucose.: 139 mg/dL (02 Nov 2022 11:42)  POCT Blood Glucose.: 148 mg/dL (02 Nov 2022 07:35)    I&O's Summary      PHYSICAL EXAM:  Vital Signs Last 24 Hrs  T(C): 36.6 (03 Nov 2022 06:09), Max: 36.7 (02 Nov 2022 07:38)  T(F): 97.9 (03 Nov 2022 06:09), Max: 98 (02 Nov 2022 07:38)  HR: 87 (03 Nov 2022 06:09) (87 - 94)  BP: 150/75 (03 Nov 2022 06:09) (132/70 - 150/75)  BP(mean): --  RR: 16 (03 Nov 2022 06:09) (16 - 17)  SpO2: 94% (03 Nov 2022 06:09) (93% - 94%)    Parameters below as of 03 Nov 2022 06:09  Patient On (Oxygen Delivery Method): room air    GENERAL: Not in distress. Alert    HEENT: clear conjuctiva, MMM. no pallor or icterus  CARDIOVASCULAR: RRR S1, S2. No murmur/rubs/gallop  LUNGS: BLAE+, no rales, no wheezing, no rhonchi.    ABDOMEN: ND. Soft,  NT, no guarding / rebound / rigidity. BS normoactive  BACK: No spine tenderness.  EXTREMITIES: no edema. no leg or calf TP.  SKIN: warm and dry  PSYCHIATRIC: Calm.  No agitation.    LABS:                        12.0   8.29  )-----------( 437      ( 03 Nov 2022 06:57 )             35.4                     COVID-19 PCR: NotDetec (30 Oct 2022 17:48)  COVID-19 PCR: NotDetec (27 Oct 2022 23:11)  COVID-19 PCR: NotDetec (24 Oct 2022 07:47)  COVID-19 PCR: NotDetec (15 Oct 2022 13:40)      RADIOLOGY & ADDITIONAL TESTS:  Imaging from Last 24 Hours:    Electrocardiogram/QTc Interval:    COORDINATION OF CARE:  Care Discussed with Consultants/Other Providers:   Medicine Progress Note    Patient is a 82y old  Male who presents with a chief complaint of Cervical Laminectomy (02 Nov 2022 13:35)      SUBJECTIVE / OVERNIGHT EVENTS:  no complaints    ADDITIONAL REVIEW OF SYSTEMS:  no fever/chills/CP/sob/palpitation/dizziness/ abd pain/nausea/vomiting/diarrhea/constipation/dysuria/headaches. all other ROS neg    MEDICATIONS  (STANDING):  acetaminophen     Tablet .. 975 milliGRAM(s) Oral every 8 hours  amLODIPine   Tablet 10 milliGRAM(s) Oral at bedtime  aspirin  chewable 81 milliGRAM(s) Oral daily  atorvastatin 40 milliGRAM(s) Oral at bedtime  cefuroxime   Tablet 500 milliGRAM(s) Oral every 12 hours  dextrose 5%. 1000 milliLiter(s) (100 mL/Hr) IV Continuous <Continuous>  dextrose 5%. 1000 milliLiter(s) (50 mL/Hr) IV Continuous <Continuous>  dextrose 50% Injectable 25 Gram(s) IV Push once  dextrose 50% Injectable 12.5 Gram(s) IV Push once  dextrose 50% Injectable 25 Gram(s) IV Push once  enoxaparin Injectable 40 milliGRAM(s) SubCutaneous <User Schedule>  glucagon  Injectable 1 milliGRAM(s) IntraMuscular once  insulin lispro (ADMELOG) corrective regimen sliding scale   SubCutaneous three times a day before meals  insulin lispro (ADMELOG) corrective regimen sliding scale   SubCutaneous at bedtime  lidocaine   4% Patch 2 Patch Transdermal <User Schedule>  losartan 50 milliGRAM(s) Oral daily  magnesium hydroxide Suspension 30 milliLiter(s) Oral every 12 hours  polyethylene glycol 3350 17 Gram(s) Oral daily  saccharomyces boulardii 250 milliGRAM(s) Oral two times a day  senna 2 Tablet(s) Oral at bedtime    MEDICATIONS  (PRN):  bisacodyl Suppository 10 milliGRAM(s) Rectal daily PRN Constipation  cyclobenzaprine 5 milliGRAM(s) Oral three times a day PRN Muscle Spasm  dextrose Oral Gel 15 Gram(s) Oral once PRN Blood Glucose LESS THAN 70 milliGRAM(s)/deciliter  OLANZapine Injectable 2.5 milliGRAM(s) IntraMuscular every 12 hours PRN severe agitation  traMADol 75 milliGRAM(s) Oral every 6 hours PRN Severe Pain (7 - 10)    CAPILLARY BLOOD GLUCOSE      POCT Blood Glucose.: 155 mg/dL (02 Nov 2022 21:39)  POCT Blood Glucose.: 142 mg/dL (02 Nov 2022 17:09)  POCT Blood Glucose.: 139 mg/dL (02 Nov 2022 11:42)  POCT Blood Glucose.: 148 mg/dL (02 Nov 2022 07:35)    I&O's Summary      PHYSICAL EXAM:  Vital Signs Last 24 Hrs  T(C): 36.6 (03 Nov 2022 06:09), Max: 36.7 (02 Nov 2022 07:38)  T(F): 97.9 (03 Nov 2022 06:09), Max: 98 (02 Nov 2022 07:38)  HR: 87 (03 Nov 2022 06:09) (87 - 94)  BP: 150/75 (03 Nov 2022 06:09) (132/70 - 150/75)  BP(mean): --  RR: 16 (03 Nov 2022 06:09) (16 - 17)  SpO2: 94% (03 Nov 2022 06:09) (93% - 94%)    Parameters below as of 03 Nov 2022 06:09  Patient On (Oxygen Delivery Method): room air    GENERAL: Not in distress. Alert    HEENT: clear conjuctiva, MMM. no pallor or icterus  CARDIOVASCULAR: RRR S1, S2. No murmur/rubs/gallop  LUNGS: BLAE+, no rales, no wheezing, no rhonchi.    ABDOMEN: ND. Soft,  NT, no guarding / rebound / rigidity. BS normoactive  BACK: No spine tenderness.  EXTREMITIES: no edema. no leg or calf TP.  SKIN: warm and dry  PSYCHIATRIC: Calm.  No agitation.    LABS:                        12.0   8.29  )-----------( 437      ( 03 Nov 2022 06:57 )             35.4                     COVID-19 PCR: NotDetec (30 Oct 2022 17:48)  COVID-19 PCR: NotDetec (27 Oct 2022 23:11)  COVID-19 PCR: NotDetec (24 Oct 2022 07:47)  COVID-19 PCR: NotDetec (15 Oct 2022 13:40)      RADIOLOGY & ADDITIONAL TESTS:  Imaging from Last 24 Hours:    Electrocardiogram/QTc Interval:    COORDINATION OF CARE:  Care Discussed with Consultants/Other Providers:

## 2022-11-04 LAB
GLUCOSE BLDC GLUCOMTR-MCNC: 113 MG/DL — HIGH (ref 70–99)
GLUCOSE BLDC GLUCOMTR-MCNC: 114 MG/DL — HIGH (ref 70–99)
GLUCOSE BLDC GLUCOMTR-MCNC: 126 MG/DL — HIGH (ref 70–99)
GLUCOSE BLDC GLUCOMTR-MCNC: 146 MG/DL — HIGH (ref 70–99)

## 2022-11-04 PROCEDURE — 99232 SBSQ HOSP IP/OBS MODERATE 35: CPT

## 2022-11-04 RX ADMIN — Medication 975 MILLIGRAM(S): at 06:48

## 2022-11-04 RX ADMIN — Medication 250 MILLIGRAM(S): at 06:17

## 2022-11-04 RX ADMIN — Medication 500 MILLIGRAM(S): at 06:16

## 2022-11-04 RX ADMIN — AMLODIPINE BESYLATE 10 MILLIGRAM(S): 2.5 TABLET ORAL at 22:39

## 2022-11-04 RX ADMIN — ENOXAPARIN SODIUM 40 MILLIGRAM(S): 100 INJECTION SUBCUTANEOUS at 17:52

## 2022-11-04 RX ADMIN — LIDOCAINE 2 PATCH: 4 CREAM TOPICAL at 17:54

## 2022-11-04 RX ADMIN — TRAMADOL HYDROCHLORIDE 75 MILLIGRAM(S): 50 TABLET ORAL at 10:49

## 2022-11-04 RX ADMIN — Medication 975 MILLIGRAM(S): at 06:16

## 2022-11-04 RX ADMIN — LIDOCAINE 2 PATCH: 4 CREAM TOPICAL at 06:18

## 2022-11-04 RX ADMIN — ATORVASTATIN CALCIUM 40 MILLIGRAM(S): 80 TABLET, FILM COATED ORAL at 22:39

## 2022-11-04 RX ADMIN — SENNA PLUS 2 TABLET(S): 8.6 TABLET ORAL at 22:39

## 2022-11-04 RX ADMIN — LIDOCAINE 1 PATCH: 4 CREAM TOPICAL at 06:28

## 2022-11-04 RX ADMIN — Medication 81 MILLIGRAM(S): at 12:14

## 2022-11-04 RX ADMIN — Medication 975 MILLIGRAM(S): at 22:51

## 2022-11-04 RX ADMIN — Medication 250 MILLIGRAM(S): at 17:51

## 2022-11-04 RX ADMIN — MAGNESIUM HYDROXIDE 30 MILLILITER(S): 400 TABLET, CHEWABLE ORAL at 06:19

## 2022-11-04 RX ADMIN — LIDOCAINE 1 PATCH: 4 CREAM TOPICAL at 17:54

## 2022-11-04 RX ADMIN — LIDOCAINE 1 PATCH: 4 CREAM TOPICAL at 06:17

## 2022-11-04 RX ADMIN — LIDOCAINE 1 PATCH: 4 CREAM TOPICAL at 10:40

## 2022-11-04 RX ADMIN — Medication 500 MILLIGRAM(S): at 17:51

## 2022-11-04 RX ADMIN — LIDOCAINE 2 PATCH: 4 CREAM TOPICAL at 10:40

## 2022-11-04 RX ADMIN — LOSARTAN POTASSIUM 50 MILLIGRAM(S): 100 TABLET, FILM COATED ORAL at 06:17

## 2022-11-04 RX ADMIN — TRAMADOL HYDROCHLORIDE 75 MILLIGRAM(S): 50 TABLET ORAL at 11:40

## 2022-11-04 NOTE — PROGRESS NOTE ADULT - ASSESSMENT
ASSESSMENT/PLAN  This is a 83 YO male with PMH of HTN, HLD, DM II, BPH with complaint of neck pain. cervical myelopathy  admitted to rehab after scheduled Posterior C1-6 Decompression, C4-5 Posterior Column Osteotomy, C1-C7 Fusion, Complex Plastics Closure on 10/18  Course complicated by severe constipation now resolved, leukocytosis, hyponatremia, fall, urinary retention requiring straight catheterization. Patient now with gait Instability, ADL impairments and Functional impairments.    #Cervical cord compression with myelopathy  - Posterior C1-6 Decompression, C4-5 Posterior Column Osteotomy, C1-C7 Fusion, Complex Plastics Closure on 10/18  - Comprehensive Rehab Program: PT/OT, 3hours daily and 5 days weekly  - PT: Focused on improving strength, endurance, coordination, balance, functional mobility, and transfers  - OT: Focused on improving strength, fine motor skills, coordination, posture and ADLs.  - pain management as below  - precautions: fall, aspiration, spinal  - Incision evaluation and suture removal around 11/7/22 at plastics Dr [facility] office   - CT neck soft tissue 10/30 w/ post op changes w/o abscess  - soft collar while OOB    # SIRS, unclear source - PNA vs UTI vs both?  -afebrile  - Follow cultures - Urine Cx serratia sens to cefipime Blood Cx NGTD  - CT Chest 10/29 most pertinent for some airspace opacity in LLL  - cont  cefepime  - probiotics while on abx  - f/u MRSA nares- negative- off contact  ID in-will follow plan    #Acute metabolic encephalopathy, likely due to delirium, w/ agitation/combativeness - improving  # Suicidal ideation  -Pt noted to have intermittent confusion at OSH, thought to be related to pain meds, delirium, and urinary retention. Very agitated and confused when first admitted here, more oriented and calm.  - Psychiatry following , now on scheduled zyprexa at 1800 and PRN zyprexa for agitation.  - 1:1 observation for impulsivity and confusion at night. Chair and bed alarms on at all times.  - Oxycodone and flexeril discontinued on 10/29  - Pt reporting severe neck pain- tramadol increased to 75mg Q6h PRNand flexeril restarted secondary to persistent neck pain- lidoderm added  TO DC Zyprexa    # acute urinary retention  #UTI  - garay catheter discontinued for TOV 10/31 due to improvement in patient's mentation and constipation  -PVRs currently low- will Dc PVRs   - bowel regimen    #HTN  - Amlodipine 10mg daily   - Losartan 50mg daily    #HLD  - Lipitor 40mg at qhs    #Hyponatremia  -resolved  -Off salt tabs  -dc FR  -follow BMP    #Pain management  - Tylenol 975 mg Q8h -- monitor LFTS on ATC Tylenol  - Tramadol 75mg prn  - Robaxin DCd - Flexeril 5mg 3x/day PRN  lidoderm to shoulders/ and chest wall    #DVT ppx  - Lovenox, SCD, TEDs    #Bowel Regimen  - Senna 2 tabs at qhs, miralax, Dulcolax supp    #FEN   - Diet: Consistent Carb    #Skin:  - Skin on admission: posterior cervical spine incision with sutures MEETA   - Pressure injury/Skin: Turn Q2hrs while in bed, OOB to Chair, PT/OT     #Sleep:   - Maintain quiet hours and low stim environment.  - on Olanzapine 2.5 mg at bedtime for sundowning      #GOC  CODE STATUS: FULL CODE    Outpatient Follow-up (Specialty/Name of physician):    Caleb Bond)  Neurosurgery  [address], [address]  [place] [address]  Phone: [phone]  Fax: [phone]    Jose Ralu [facility])  Plastic Surgery  [address] [place] [address]  Phone: [phone]  Fax: [phone]

## 2022-11-04 NOTE — PROGRESS NOTE ADULT - SUBJECTIVE AND OBJECTIVE BOX
HISTORY OF PRESENT ILLNESS  This is a 83 YO male with PMH of HTN, HLD, DM II, BPH with complaint of neck pain. Patient endorses increasing neck pain, gait instability and clumsiness of the hands. He ambulates with a cane. He denies fever, chills, trauma or injury. He presents to [facility] on 10/18 for scheduled Posterior C1-6 Decompression, C4-5 Posterior Column Osteotomy, C1-C7 Fusion, Complex Plastics Closure. Course complicated by severe constipation now resolved, leukocytosis, hyponatremia- stable on salt tablet, fall, urinary retention requiring straight catheterization, intermittent confusion likely related to pain medication and hospital setting. Noted with  Left deltoid weakness. MR Cervical spine 10/26/22 without cord compression. Surgical drain removed 10/27/22. Patient was evaluated by PM&R and therapy for functional deficits, gait/ADL impairments and acute rehabilitation was recommended. Patient was medically optimized for discharge to [facility] IRU on 10/27/22.      TODAY's SUBJECTIVE:  pain much improved   slept well, no agitation  spontaneously voiding  Afebrile, on PO abx now  patient much calmer- no need for 1:1 observation.      ROS:  No dizziness  No chest pain, no palpitations  No shortness of breath  No abdominal pain, no nausea      PAST MEDICAL & SURGICAL HISTORY:  Hypertension      Hyperlipidemia      Chronic kidney disease      Diabetes mellitus  -patient states prediabetes, not on medication      Benign prostate hyperplasia      COVID-19 virus infection  -dec 2021 (cough, fever, malaise)      History of carpal tunnel surgery  -bilateral hands      History of cataract surgery  -bilateral      H/O colonoscopy      VITALS  Vital Signs Last 24 Hrs  T(C): 36.5 (04 Nov 2022 07:46), Max: 36.7 (03 Nov 2022 21:38)  T(F): 97.7 (04 Nov 2022 07:46), Max: 98 (03 Nov 2022 21:38)  HR: 101 (04 Nov 2022 07:46) (92 - 102)  BP: 146/74 (04 Nov 2022 07:46) (146/74 - 159/82)  BP(mean): --  RR: 16 (04 Nov 2022 07:46) (16 - 16)  SpO2: 95% (04 Nov 2022 07:46) (95% - 98%)    Parameters below as of 04 Nov 2022 07:46  Patient On (Oxygen Delivery Method): room air      RECENT LABS                        12.0   8.29  )-----------( 437      ( 03 Nov 2022 06:57 )             35.4     11-03    139  |  102  |  26<H>  ----------------------------<  133<H>  3.6   |  29  |  1.24    Ca    9.3      03 Nov 2022 06:57    TPro  7.1  /  Alb  2.6<L>  /  TBili  0.3  /  DBili  x   /  AST  33  /  ALT  44  /  AlkPhos  143<H>  11-03      LIVER FUNCTIONS - ( 03 Nov 2022 06:57 )  Alb: 2.6 g/dL / Pro: 7.1 g/dL / ALK PHOS: 143 U/L / ALT: 44 U/L / AST: 33 U/L / GGT: x             CURRENT MEDICATIONS  MEDICATIONS  (STANDING):  acetaminophen     Tablet .. 975 milliGRAM(s) Oral every 8 hours  amLODIPine   Tablet 10 milliGRAM(s) Oral at bedtime  aspirin  chewable 81 milliGRAM(s) Oral daily  atorvastatin 40 milliGRAM(s) Oral at bedtime  cefuroxime   Tablet 500 milliGRAM(s) Oral every 12 hours  dextrose 5%. 1000 milliLiter(s) (100 mL/Hr) IV Continuous <Continuous>  dextrose 5%. 1000 milliLiter(s) (50 mL/Hr) IV Continuous <Continuous>  dextrose 50% Injectable 25 Gram(s) IV Push once  dextrose 50% Injectable 12.5 Gram(s) IV Push once  dextrose 50% Injectable 25 Gram(s) IV Push once  enoxaparin Injectable 40 milliGRAM(s) SubCutaneous <User Schedule>  glucagon  Injectable 1 milliGRAM(s) IntraMuscular once  insulin lispro (ADMELOG) corrective regimen sliding scale   SubCutaneous three times a day before meals  insulin lispro (ADMELOG) corrective regimen sliding scale   SubCutaneous at bedtime  lidocaine   4% Patch 1 Patch Transdermal <User Schedule>  lidocaine   4% Patch 2 Patch Transdermal <User Schedule>  losartan 50 milliGRAM(s) Oral daily  magnesium hydroxide Suspension 30 milliLiter(s) Oral every 12 hours  polyethylene glycol 3350 17 Gram(s) Oral daily  saccharomyces boulardii 250 milliGRAM(s) Oral two times a day  senna 2 Tablet(s) Oral at bedtime    MEDICATIONS  (PRN):  bisacodyl Suppository 10 milliGRAM(s) Rectal daily PRN Constipation  cyclobenzaprine 5 milliGRAM(s) Oral three times a day PRN Muscle Spasm  dextrose Oral Gel 15 Gram(s) Oral once PRN Blood Glucose LESS THAN 70 milliGRAM(s)/deciliter  OLANZapine Injectable 2.5 milliGRAM(s) IntraMuscular every 12 hours PRN severe agitation  traMADol 75 milliGRAM(s) Oral every 6 hours PRN Severe Pain (7 - 10)    PHYSICAL EXAM  Gen - NAD, Comfortable  HEENT - NCAT, EOMI   Neck - Supple, +posterior neck incision intact  Pulm - CTAB, No wheeze, No rhonchi, No crackles  Cardiovascular - RRR, S1S2, No murmurs  Chest - good chest expansion, good respiratory effort  Abdomen - Soft, NT/ND, +BS  Extremities - No Cyanosis, no clubbing, no edema, no calf tenderness  Neuro-     Cognitive - awake, alert, oriented to person, hospital setting, month/year. Able to follow commands     Communication - Fluent, Comprehensible     Attention: Intact     Cranial Nerves - CN 2-12 intact. No facial asymmetry, Tongue midline, EOMI, Shoulder shrug intact     Motor -                     LEFT    UE - ShAB 3/5, EF 4/5, EE 4/5,  4/5                    RIGHT UE - ShAB 4/5, EF 5/5, EE 5/5,  5/5                    LEFT    LE - HF 5/5, KE 5/5, DF 5/5, PF 5/5                    RIGHT LE - HF 5/5, KE 5/5, DF 5/5, PF 5/5        Sensory - Intact to LT      Reflexes - DTR Intact     Coordination - + dysmetria to left arm     Tone - normal  MSK: left arm weakness  Psychiatric - Mood stable, Affect WNL        Continue comprehensive acute rehab program consisting of 3hrs/day of OT/PT and SLP.

## 2022-11-04 NOTE — PROGRESS NOTE ADULT - ASSESSMENT
82M with PMH HTN, HLD, Type 2 DM, BPH presents with complaint of neck pain, presented to [facility] on 10/18 for scheduled Posterior C1-6 Decompression, C4-5 Posterior Column Osteotomy, C1-C7 Fusion, Complex Plastics Closure. Course complicated by severe constipation, leukocytosis, hyponatremia, fall, urinary retention requiring straight catheterization, intermittent confusion likely related to pain medication and hospital setting. Now admitted to [facility] for initiation of multidisciplinary rehab program.     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   -s/p Posterior C1-6 Decompression, C4-5 Posterior Column Osteotomy, C1-C7 Fusion, Complex Plastics Closure on 10/18  -Continue comprehensive rehab program   -Continue pain control with Tylenol q 8 hours    # Severe Sepsis, likely UTI - improving  Mets sepsis criteria with fever, leukocytosis, change in mental status metabolic encephalopathy  -Continue Ceftin BID x 4 more days (to be completed 11/7)  -ID following, recommendations appreciated    #Acute metabolic encephalopathy, likely due to infection, delirium, w/ agitation/combativeness - improving  -Continue 1:1 observation   -Continue Zyprexa PRN  -Psych following, recommendations appreciated    #Acute urinary retention  -Continue bladder scan and straight cath per protocol  -Avoid anti-cholinergic medications    #HTN  -Continue Amlodipine, Losartan  -Monitor vitals     #HLD  -Continue Lipitor    #Type 2 DM  A1C 6.0  -Continue low dose insulin sliding scale   -hypoglycemia protocol, accu-checks  -Blood glucose goal 100-180 in hospital setting    #Prophylactic measure  -DVT ppx: Lovenox  -Bowel regimen

## 2022-11-04 NOTE — PROGRESS NOTE ADULT - SUBJECTIVE AND OBJECTIVE BOX
Patient is a 82y old  Male who presents with a chief complaint of Cervical Laminectomy (04 Nov 2022 12:10)      Patient seen and examined at bedside. No overnight events.  Remains on 1:1  ROS limited due to mental status / confusion.   Pain well controlled with current regimen     REVIEW OF SYSTEMS:  CONSTITUTIONAL: No fever or chills  CARDIOVASCULAR: No chest pain, palpitations    ALLERGIES:  penicillin (Rash)    MEDICATIONS  (STANDING):  acetaminophen     Tablet .. 975 milliGRAM(s) Oral every 8 hours  amLODIPine   Tablet 10 milliGRAM(s) Oral at bedtime  aspirin  chewable 81 milliGRAM(s) Oral daily  atorvastatin 40 milliGRAM(s) Oral at bedtime  cefuroxime   Tablet 500 milliGRAM(s) Oral every 12 hours  dextrose 5%. 1000 milliLiter(s) (50 mL/Hr) IV Continuous <Continuous>  dextrose 5%. 1000 milliLiter(s) (100 mL/Hr) IV Continuous <Continuous>  dextrose 50% Injectable 25 Gram(s) IV Push once  dextrose 50% Injectable 12.5 Gram(s) IV Push once  dextrose 50% Injectable 25 Gram(s) IV Push once  enoxaparin Injectable 40 milliGRAM(s) SubCutaneous <User Schedule>  glucagon  Injectable 1 milliGRAM(s) IntraMuscular once  insulin lispro (ADMELOG) corrective regimen sliding scale   SubCutaneous three times a day before meals  insulin lispro (ADMELOG) corrective regimen sliding scale   SubCutaneous at bedtime  lidocaine   4% Patch 1 Patch Transdermal <User Schedule>  lidocaine   4% Patch 2 Patch Transdermal <User Schedule>  losartan 50 milliGRAM(s) Oral daily  magnesium hydroxide Suspension 30 milliLiter(s) Oral every 12 hours  polyethylene glycol 3350 17 Gram(s) Oral daily  saccharomyces boulardii 250 milliGRAM(s) Oral two times a day  senna 2 Tablet(s) Oral at bedtime    MEDICATIONS  (PRN):  bisacodyl Suppository 10 milliGRAM(s) Rectal daily PRN Constipation  cyclobenzaprine 5 milliGRAM(s) Oral three times a day PRN Muscle Spasm  dextrose Oral Gel 15 Gram(s) Oral once PRN Blood Glucose LESS THAN 70 milliGRAM(s)/deciliter  OLANZapine Injectable 2.5 milliGRAM(s) IntraMuscular every 12 hours PRN severe agitation  traMADol 75 milliGRAM(s) Oral every 6 hours PRN Severe Pain (7 - 10)    Vital Signs Last 24 Hrs  T(F): 97.7 (04 Nov 2022 07:46), Max: 98 (03 Nov 2022 21:38)  HR: 101 (04 Nov 2022 07:46) (92 - 102)  BP: 146/74 (04 Nov 2022 07:46) (146/74 - 159/82)  RR: 16 (04 Nov 2022 07:46) (16 - 16)  SpO2: 95% (04 Nov 2022 07:46) (95% - 98%)  I&O's Summary    03 Nov 2022 07:01  -  04 Nov 2022 07:00  --------------------------------------------------------  IN: 240 mL / OUT: 0 mL / NET: 240 mL          PHYSICAL EXAM:  GENERAL: NAD  HEENT:  AT/NC, anicteric, moist mucous membranes, EOMI, PERRL, no lid-lag, conjunctiva and sclera clear  CHEST/LUNG:  CTA b/l, no rales, wheezes, or rhonchi,  normal respiratory effort, no intercostal retractions  HEART:  RRR, S1, S2, no murmurs; no pitting edema  ABDOMEN:  BS+, soft, nontender, nondistended  MSK/EXTREMITIES: palpable peripheral pulses, no clubbing or cyanosis  NERVOUS SYSTEM: A&Ox2, grossly moves all extremities; follows simple commands  PSYCH: Appropriate affect, Alert & Awake; fair judgement    LABS: Personally reviewed                        12.0   8.29  )-----------( 437      ( 03 Nov 2022 06:57 )             35.4       11-03    139  |  102  |  26  ----------------------------<  133  3.6   |  29  |  1.24    Ca    9.3      03 Nov 2022 06:57    TPro  7.1  /  Alb  2.6  /  TBili  0.3  /  DBili  x   /  AST  33  /  ALT  44  /  AlkPhos  143  11-03                              POCT Blood Glucose.: 114 mg/dL (04 Nov 2022 12:12)  POCT Blood Glucose.: 113 mg/dL (04 Nov 2022 07:45)  POCT Blood Glucose.: 163 mg/dL (03 Nov 2022 21:43)  POCT Blood Glucose.: 136 mg/dL (03 Nov 2022 17:02)          Culture - Urine (collected 29 Oct 2022 13:00)  Source: Catheterized Catheterized  Final Report (01 Nov 2022 16:56):    >100,000 CFU/ml Serratia marcescens  Organism: Serratia marcescens (01 Nov 2022 16:56)  Organism: Serratia marcescens (01 Nov 2022 16:56)      -  Amikacin: S <=16      -  Amoxicillin/Clavulanic Acid: R >16/8      -  Ampicillin: R >16 These ampicillin results predict results for amoxicillin      -  Ampicillin/Sulbactam: R >16/8 Enterobacter, Klebsiella aerogenes, Citrobacter, and Serratia may develop resistance during prolonged therapy (3-4 days)      -  Aztreonam: S <=4      -  Cefazolin: R >16      -  Cefepime: S <=2      -  Cefoxitin: R 16      -  Ceftriaxone: S <=1 Enterobacter, Klebsiella aerogenes, Citrobacter, and Serratia may develop resistance during prolonged therapy      -  Ciprofloxacin: S <=0.25      -  Ertapenem: S <=0.5      -  Gentamicin: S <=2      -  Levofloxacin: S <=0.5      -  Meropenem: S <=1      -  Nitrofurantoin: R >64 Should not be used to treat pyelonephritis      -  Piperacillin/Tazobactam: S <=8      -  Tigecycline: S <=2      -  Tobramycin: S <=2      -  Trimethoprim/Sulfamethoxazole: S <=0.5/9.5      Method Type: PROSPER    Culture - Blood (collected 29 Oct 2022 01:06)  Source: .Blood Blood  Final Report (03 Nov 2022 05:01):    No Growth Final    Culture - Blood (collected 29 Oct 2022 01:06)  Source: .Blood Blood  Final Report (03 Nov 2022 05:01):    No Growth Final      COVID-19 PCR: NotDetec (10-30-22 @ 17:48)  COVID-19 PCR: NotDetec (10-27-22 @ 23:11)  COVID-19 PCR: NotDetec (10-24-22 @ 07:47)  COVID-19 PCR: NotDetejose (10-15-22 @ 13:40)      RADIOLOGY & ADDITIONAL TESTS: Personally reviewed    Medical management discussed with Dr. Mares (physiatry), will continue enhanced supervision for now as patient is impulsive although calmer

## 2022-11-05 LAB
GLUCOSE BLDC GLUCOMTR-MCNC: 116 MG/DL — HIGH (ref 70–99)
GLUCOSE BLDC GLUCOMTR-MCNC: 116 MG/DL — HIGH (ref 70–99)
GLUCOSE BLDC GLUCOMTR-MCNC: 143 MG/DL — HIGH (ref 70–99)
GLUCOSE BLDC GLUCOMTR-MCNC: 161 MG/DL — HIGH (ref 70–99)

## 2022-11-05 PROCEDURE — 99232 SBSQ HOSP IP/OBS MODERATE 35: CPT

## 2022-11-05 RX ADMIN — AMLODIPINE BESYLATE 10 MILLIGRAM(S): 2.5 TABLET ORAL at 21:43

## 2022-11-05 RX ADMIN — Medication 975 MILLIGRAM(S): at 14:03

## 2022-11-05 RX ADMIN — LIDOCAINE 2 PATCH: 4 CREAM TOPICAL at 19:03

## 2022-11-05 RX ADMIN — LIDOCAINE 2 PATCH: 4 CREAM TOPICAL at 09:09

## 2022-11-05 RX ADMIN — LIDOCAINE 1 PATCH: 4 CREAM TOPICAL at 09:10

## 2022-11-05 RX ADMIN — Medication 975 MILLIGRAM(S): at 06:40

## 2022-11-05 RX ADMIN — MAGNESIUM HYDROXIDE 30 MILLILITER(S): 400 TABLET, CHEWABLE ORAL at 18:58

## 2022-11-05 RX ADMIN — POLYETHYLENE GLYCOL 3350 17 GRAM(S): 17 POWDER, FOR SOLUTION ORAL at 12:27

## 2022-11-05 RX ADMIN — Medication 975 MILLIGRAM(S): at 21:43

## 2022-11-05 RX ADMIN — LIDOCAINE 1 PATCH: 4 CREAM TOPICAL at 19:02

## 2022-11-05 RX ADMIN — Medication 81 MILLIGRAM(S): at 12:27

## 2022-11-05 RX ADMIN — LOSARTAN POTASSIUM 50 MILLIGRAM(S): 100 TABLET, FILM COATED ORAL at 05:38

## 2022-11-05 RX ADMIN — Medication 250 MILLIGRAM(S): at 05:28

## 2022-11-05 RX ADMIN — Medication 975 MILLIGRAM(S): at 05:25

## 2022-11-05 RX ADMIN — Medication 500 MILLIGRAM(S): at 05:28

## 2022-11-05 RX ADMIN — Medication 1: at 17:45

## 2022-11-05 RX ADMIN — MAGNESIUM HYDROXIDE 30 MILLILITER(S): 400 TABLET, CHEWABLE ORAL at 05:27

## 2022-11-05 RX ADMIN — Medication 975 MILLIGRAM(S): at 22:13

## 2022-11-05 RX ADMIN — Medication 975 MILLIGRAM(S): at 00:42

## 2022-11-05 RX ADMIN — ENOXAPARIN SODIUM 40 MILLIGRAM(S): 100 INJECTION SUBCUTANEOUS at 17:47

## 2022-11-05 RX ADMIN — Medication 975 MILLIGRAM(S): at 15:03

## 2022-11-05 RX ADMIN — Medication 250 MILLIGRAM(S): at 17:48

## 2022-11-05 RX ADMIN — LIDOCAINE 2 PATCH: 4 CREAM TOPICAL at 21:00

## 2022-11-05 RX ADMIN — LIDOCAINE 1 PATCH: 4 CREAM TOPICAL at 21:00

## 2022-11-05 RX ADMIN — Medication 500 MILLIGRAM(S): at 17:47

## 2022-11-05 RX ADMIN — ATORVASTATIN CALCIUM 40 MILLIGRAM(S): 80 TABLET, FILM COATED ORAL at 21:43

## 2022-11-05 NOTE — PROGRESS NOTE ADULT - SUBJECTIVE AND OBJECTIVE BOX
Cc: Gait dysfunction    HPI: Patient seen and examined at bedside. No acute events overnight.   Pain controlled, no chest pain.  Has been tolerating rehabilitation program.    acetaminophen     Tablet .. 975 milliGRAM(s) Oral every 8 hours  amLODIPine   Tablet 10 milliGRAM(s) Oral at bedtime  aspirin  chewable 81 milliGRAM(s) Oral daily  atorvastatin 40 milliGRAM(s) Oral at bedtime  bisacodyl Suppository 10 milliGRAM(s) Rectal daily PRN  cefuroxime   Tablet 500 milliGRAM(s) Oral every 12 hours  cyclobenzaprine 5 milliGRAM(s) Oral three times a day PRN  dextrose 5%. 1000 milliLiter(s) IV Continuous <Continuous>  dextrose 5%. 1000 milliLiter(s) IV Continuous <Continuous>  dextrose 50% Injectable 25 Gram(s) IV Push once  dextrose 50% Injectable 12.5 Gram(s) IV Push once  dextrose 50% Injectable 25 Gram(s) IV Push once  dextrose Oral Gel 15 Gram(s) Oral once PRN  enoxaparin Injectable 40 milliGRAM(s) SubCutaneous <User Schedule>  glucagon  Injectable 1 milliGRAM(s) IntraMuscular once  insulin lispro (ADMELOG) corrective regimen sliding scale   SubCutaneous three times a day before meals  insulin lispro (ADMELOG) corrective regimen sliding scale   SubCutaneous at bedtime  lidocaine   4% Patch 2 Patch Transdermal <User Schedule>  lidocaine   4% Patch 1 Patch Transdermal <User Schedule>  losartan 50 milliGRAM(s) Oral daily  magnesium hydroxide Suspension 30 milliLiter(s) Oral every 12 hours  OLANZapine Injectable 2.5 milliGRAM(s) IntraMuscular every 12 hours PRN  polyethylene glycol 3350 17 Gram(s) Oral daily  saccharomyces boulardii 250 milliGRAM(s) Oral two times a day  senna 2 Tablet(s) Oral at bedtime  traMADol 75 milliGRAM(s) Oral every 6 hours PRN      T(C): 36.7 (11-05-22 @ 10:42), Max: 36.7 (11-05-22 @ 06:00)  HR: 90 (11-05-22 @ 10:42) (88 - 95)  BP: 134/67 (11-05-22 @ 10:42) (134/67 - 152/91)  RR: 16 (11-05-22 @ 10:42) (16 - 16)  SpO2: 93% (11-05-22 @ 10:42) (93% - 97%)    In NAD  HEENT- EOMI, well healing posterior neck incision  Heart- S1S2  Lungs- CTA bl.  Abd- + BS, NT  Ext- No calf pain  Neuro- Exam unchanged    Imp: Patient with diagnosis of s/p C1-6 Decompression, C1-C7 fusion, complex plastics closure admitted for comprehensive acute rehabilitation.    Plan:  - Continue PT/OT/SLP as indicated  - DVT prophylaxis  - Skin- Turn q2h, check skin daily  - Continue current medications  -Active issues-   UTI - Continue Ceftin  Encephalopathy/agitation - continue 1:1, psych recs appreciated  Acute urinary retention - continue straight cath PRN  - Patient is stable to continue current rehabilitation program.

## 2022-11-06 LAB
GLUCOSE BLDC GLUCOMTR-MCNC: 118 MG/DL — HIGH (ref 70–99)
GLUCOSE BLDC GLUCOMTR-MCNC: 147 MG/DL — HIGH (ref 70–99)
GLUCOSE BLDC GLUCOMTR-MCNC: 151 MG/DL — HIGH (ref 70–99)
GLUCOSE BLDC GLUCOMTR-MCNC: 154 MG/DL — HIGH (ref 70–99)

## 2022-11-06 PROCEDURE — 99232 SBSQ HOSP IP/OBS MODERATE 35: CPT

## 2022-11-06 RX ADMIN — LIDOCAINE 2 PATCH: 4 CREAM TOPICAL at 21:44

## 2022-11-06 RX ADMIN — Medication 250 MILLIGRAM(S): at 17:25

## 2022-11-06 RX ADMIN — Medication 1: at 16:50

## 2022-11-06 RX ADMIN — LOSARTAN POTASSIUM 50 MILLIGRAM(S): 100 TABLET, FILM COATED ORAL at 05:39

## 2022-11-06 RX ADMIN — Medication 81 MILLIGRAM(S): at 11:33

## 2022-11-06 RX ADMIN — MAGNESIUM HYDROXIDE 30 MILLILITER(S): 400 TABLET, CHEWABLE ORAL at 05:42

## 2022-11-06 RX ADMIN — Medication 975 MILLIGRAM(S): at 05:39

## 2022-11-06 RX ADMIN — Medication 500 MILLIGRAM(S): at 17:25

## 2022-11-06 RX ADMIN — Medication 975 MILLIGRAM(S): at 14:18

## 2022-11-06 RX ADMIN — SENNA PLUS 2 TABLET(S): 8.6 TABLET ORAL at 21:35

## 2022-11-06 RX ADMIN — MAGNESIUM HYDROXIDE 30 MILLILITER(S): 400 TABLET, CHEWABLE ORAL at 17:25

## 2022-11-06 RX ADMIN — AMLODIPINE BESYLATE 10 MILLIGRAM(S): 2.5 TABLET ORAL at 21:35

## 2022-11-06 RX ADMIN — LIDOCAINE 2 PATCH: 4 CREAM TOPICAL at 09:29

## 2022-11-06 RX ADMIN — Medication 975 MILLIGRAM(S): at 06:09

## 2022-11-06 RX ADMIN — ENOXAPARIN SODIUM 40 MILLIGRAM(S): 100 INJECTION SUBCUTANEOUS at 17:25

## 2022-11-06 RX ADMIN — Medication 250 MILLIGRAM(S): at 05:39

## 2022-11-06 RX ADMIN — Medication 975 MILLIGRAM(S): at 14:20

## 2022-11-06 RX ADMIN — LIDOCAINE 2 PATCH: 4 CREAM TOPICAL at 21:42

## 2022-11-06 RX ADMIN — LIDOCAINE 1 PATCH: 4 CREAM TOPICAL at 21:44

## 2022-11-06 RX ADMIN — Medication 500 MILLIGRAM(S): at 05:39

## 2022-11-06 RX ADMIN — Medication 975 MILLIGRAM(S): at 21:35

## 2022-11-06 RX ADMIN — LIDOCAINE 1 PATCH: 4 CREAM TOPICAL at 09:29

## 2022-11-06 RX ADMIN — Medication 975 MILLIGRAM(S): at 22:05

## 2022-11-06 RX ADMIN — LIDOCAINE 1 PATCH: 4 CREAM TOPICAL at 19:00

## 2022-11-06 RX ADMIN — ATORVASTATIN CALCIUM 40 MILLIGRAM(S): 80 TABLET, FILM COATED ORAL at 21:35

## 2022-11-06 NOTE — CHART NOTE - NSCHARTNOTEFT_GEN_A_CORE
Called by RN - patient complaining of vision changes/perception changes.   Patient seen and examined, interviewed in English by me.   Patient notes the following: Since his operation he has had random, intermittent episodes of vision and perception changes. Specifically, he notes it appears as if the TV mounted on the wall is actually on the floor and he is looking at it from above. He notes when this happens he has no other symptoms. Currently, he is not experiencing this. He notes an episode yesterday morning and early this AM which has resolved. He denies blurry vision. He notes using glasses occasionally but not all the time for both vision and reading.     On exam:  CN II-XII grossly intact, strength 5/5 ShAb, EF, EE, . Visual fields intact grossly.    Given stable neuro exam will consider ophthalmology evaluation if persistent. Called by RN - patient complaining of vision changes/perception changes.   Patient seen and examined, interviewed in Hungarian by me.   Patient notes the following: Since his operation he has had random, intermittent episodes of vision and perception changes. Specifically, he notes it appears as if the TV mounted on the wall is actually on the floor and he is looking at it from above. He notes when this happens he has no other symptoms. Currently, he is not experiencing this. He notes an episode yesterday morning and early this AM which has resolved. He denies blurry vision. He notes using glasses occasionally but not all the time for both vision and reading.     On exam:  CN II-XII grossly intact, strength 5/5 ShAb, EF, EE, . Visual fields intact grossly.    Given stable neuro exam may consider ophthalmology evaluation if persistent.

## 2022-11-06 NOTE — PROGRESS NOTE ADULT - SUBJECTIVE AND OBJECTIVE BOX
Cc: Gait dysfunction    HPI: Patient seen and examined at bedside. No acute events overnight. Did have some complaints of vision changes/perception changes that resolved by the time of my evaluation.  Pain controlled, no chest pain, no N/V, no Fevers/Chills. No other new ROS  Has been tolerating rehabilitation program.    acetaminophen     Tablet .. 975 milliGRAM(s) Oral every 8 hours  amLODIPine   Tablet 10 milliGRAM(s) Oral at bedtime  aspirin  chewable 81 milliGRAM(s) Oral daily  atorvastatin 40 milliGRAM(s) Oral at bedtime  bisacodyl Suppository 10 milliGRAM(s) Rectal daily PRN  cefuroxime   Tablet 500 milliGRAM(s) Oral every 12 hours  cyclobenzaprine 5 milliGRAM(s) Oral three times a day PRN  dextrose 5%. 1000 milliLiter(s) IV Continuous <Continuous>  dextrose 5%. 1000 milliLiter(s) IV Continuous <Continuous>  dextrose 50% Injectable 25 Gram(s) IV Push once  dextrose 50% Injectable 12.5 Gram(s) IV Push once  dextrose 50% Injectable 25 Gram(s) IV Push once  dextrose Oral Gel 15 Gram(s) Oral once PRN  enoxaparin Injectable 40 milliGRAM(s) SubCutaneous <User Schedule>  glucagon  Injectable 1 milliGRAM(s) IntraMuscular once  insulin lispro (ADMELOG) corrective regimen sliding scale   SubCutaneous three times a day before meals  insulin lispro (ADMELOG) corrective regimen sliding scale   SubCutaneous at bedtime  lidocaine   4% Patch 1 Patch Transdermal <User Schedule>  lidocaine   4% Patch 2 Patch Transdermal <User Schedule>  losartan 50 milliGRAM(s) Oral daily  magnesium hydroxide Suspension 30 milliLiter(s) Oral every 12 hours  OLANZapine Injectable 2.5 milliGRAM(s) IntraMuscular every 12 hours PRN  polyethylene glycol 3350 17 Gram(s) Oral daily  saccharomyces boulardii 250 milliGRAM(s) Oral two times a day  senna 2 Tablet(s) Oral at bedtime  traMADol 75 milliGRAM(s) Oral every 6 hours PRN      T(C): 36.8 (11-06-22 @ 07:49), Max: 37.2 (11-05-22 @ 19:03)  HR: 99 (11-06-22 @ 07:49) (79 - 99)  BP: 147/74 (11-06-22 @ 07:49) (136/75 - 160/73)  RR: 16 (11-06-22 @ 07:49) (16 - 16)  SpO2: 93% (11-06-22 @ 07:49) (93% - 97%)    In NAD  HEENT- EOMI, well healing posterior neck incision  Heart- S1S2  Lungs- CTA bl.  Abd- + BS, NT  Ext- No calf pain  Neuro- Exam unchanged, CN 2-12 intact    Imp: Patient with diagnosis of s/p C1-6 Decompression, C1-C7 fusion, complex plastics closure admitted for comprehensive acute rehabilitation.    Plan:  - Continue PT/OT/SLP as indicated  - DVT prophylaxis  - Skin- Turn q2h, check skin daily  - Continue current medications  -Active issues-   UTI - Continue Ceftin  Encephalopathy/agitation - continue 1:1, psych recs appreciated.  Acute urinary retention - continue straight cath PRN  Vision changes - denied any at time of evaluation, if return, can consider ophtho consult.   - Patient is stable to continue current rehabilitation program.

## 2022-11-07 LAB
ALBUMIN SERPL ELPH-MCNC: 2.8 G/DL — LOW (ref 3.3–5)
ALP SERPL-CCNC: 138 U/L — HIGH (ref 40–120)
ALT FLD-CCNC: 46 U/L — HIGH (ref 10–45)
ANION GAP SERPL CALC-SCNC: 10 MMOL/L — SIGNIFICANT CHANGE UP (ref 5–17)
AST SERPL-CCNC: 35 U/L — SIGNIFICANT CHANGE UP (ref 10–40)
BILIRUB SERPL-MCNC: 0.2 MG/DL — SIGNIFICANT CHANGE UP (ref 0.2–1.2)
BUN SERPL-MCNC: 34 MG/DL — HIGH (ref 7–23)
CALCIUM SERPL-MCNC: 9.2 MG/DL — SIGNIFICANT CHANGE UP (ref 8.4–10.5)
CHLORIDE SERPL-SCNC: 103 MMOL/L — SIGNIFICANT CHANGE UP (ref 96–108)
CO2 SERPL-SCNC: 27 MMOL/L — SIGNIFICANT CHANGE UP (ref 22–31)
CREAT SERPL-MCNC: 1.34 MG/DL — HIGH (ref 0.5–1.3)
EGFR: 52 ML/MIN/1.73M2 — LOW
GLUCOSE BLDC GLUCOMTR-MCNC: 114 MG/DL — HIGH (ref 70–99)
GLUCOSE BLDC GLUCOMTR-MCNC: 121 MG/DL — HIGH (ref 70–99)
GLUCOSE BLDC GLUCOMTR-MCNC: 137 MG/DL — HIGH (ref 70–99)
GLUCOSE BLDC GLUCOMTR-MCNC: 140 MG/DL — HIGH (ref 70–99)
GLUCOSE SERPL-MCNC: 142 MG/DL — HIGH (ref 70–99)
HCT VFR BLD CALC: 34.3 % — LOW (ref 39–50)
HGB BLD-MCNC: 11.5 G/DL — LOW (ref 13–17)
MCHC RBC-ENTMCNC: 30.2 PG — SIGNIFICANT CHANGE UP (ref 27–34)
MCHC RBC-ENTMCNC: 33.5 GM/DL — SIGNIFICANT CHANGE UP (ref 32–36)
MCV RBC AUTO: 90 FL — SIGNIFICANT CHANGE UP (ref 80–100)
NRBC # BLD: 0 /100 WBCS — SIGNIFICANT CHANGE UP (ref 0–0)
PLATELET # BLD AUTO: 451 K/UL — HIGH (ref 150–400)
POTASSIUM SERPL-MCNC: 4.4 MMOL/L — SIGNIFICANT CHANGE UP (ref 3.5–5.3)
POTASSIUM SERPL-SCNC: 4.4 MMOL/L — SIGNIFICANT CHANGE UP (ref 3.5–5.3)
PROT SERPL-MCNC: 6.9 G/DL — SIGNIFICANT CHANGE UP (ref 6–8.3)
RBC # BLD: 3.81 M/UL — LOW (ref 4.2–5.8)
RBC # FLD: 13.2 % — SIGNIFICANT CHANGE UP (ref 10.3–14.5)
SODIUM SERPL-SCNC: 140 MMOL/L — SIGNIFICANT CHANGE UP (ref 135–145)
WBC # BLD: 6.14 K/UL — SIGNIFICANT CHANGE UP (ref 3.8–10.5)
WBC # FLD AUTO: 6.14 K/UL — SIGNIFICANT CHANGE UP (ref 3.8–10.5)

## 2022-11-07 PROCEDURE — 99232 SBSQ HOSP IP/OBS MODERATE 35: CPT

## 2022-11-07 RX ORDER — TRAMADOL HYDROCHLORIDE 50 MG/1
75 TABLET ORAL EVERY 6 HOURS
Refills: 0 | Status: DISCONTINUED | OUTPATIENT
Start: 2022-11-07 | End: 2022-11-10

## 2022-11-07 RX ADMIN — MAGNESIUM HYDROXIDE 30 MILLILITER(S): 400 TABLET, CHEWABLE ORAL at 17:37

## 2022-11-07 RX ADMIN — ATORVASTATIN CALCIUM 40 MILLIGRAM(S): 80 TABLET, FILM COATED ORAL at 21:18

## 2022-11-07 RX ADMIN — Medication 975 MILLIGRAM(S): at 06:04

## 2022-11-07 RX ADMIN — Medication 975 MILLIGRAM(S): at 13:17

## 2022-11-07 RX ADMIN — Medication 500 MILLIGRAM(S): at 05:36

## 2022-11-07 RX ADMIN — Medication 975 MILLIGRAM(S): at 22:18

## 2022-11-07 RX ADMIN — LIDOCAINE 1 PATCH: 4 CREAM TOPICAL at 19:22

## 2022-11-07 RX ADMIN — Medication 250 MILLIGRAM(S): at 17:37

## 2022-11-07 RX ADMIN — Medication 975 MILLIGRAM(S): at 13:19

## 2022-11-07 RX ADMIN — LIDOCAINE 1 PATCH: 4 CREAM TOPICAL at 19:21

## 2022-11-07 RX ADMIN — TRAMADOL HYDROCHLORIDE 75 MILLIGRAM(S): 50 TABLET ORAL at 04:21

## 2022-11-07 RX ADMIN — LIDOCAINE 1 PATCH: 4 CREAM TOPICAL at 07:52

## 2022-11-07 RX ADMIN — Medication 81 MILLIGRAM(S): at 12:21

## 2022-11-07 RX ADMIN — Medication 975 MILLIGRAM(S): at 05:34

## 2022-11-07 RX ADMIN — Medication 975 MILLIGRAM(S): at 21:18

## 2022-11-07 RX ADMIN — Medication 500 MILLIGRAM(S): at 17:37

## 2022-11-07 RX ADMIN — Medication 250 MILLIGRAM(S): at 05:36

## 2022-11-07 RX ADMIN — SENNA PLUS 2 TABLET(S): 8.6 TABLET ORAL at 21:18

## 2022-11-07 RX ADMIN — TRAMADOL HYDROCHLORIDE 75 MILLIGRAM(S): 50 TABLET ORAL at 04:51

## 2022-11-07 RX ADMIN — LIDOCAINE 2 PATCH: 4 CREAM TOPICAL at 17:38

## 2022-11-07 RX ADMIN — POLYETHYLENE GLYCOL 3350 17 GRAM(S): 17 POWDER, FOR SOLUTION ORAL at 12:21

## 2022-11-07 RX ADMIN — LIDOCAINE 2 PATCH: 4 CREAM TOPICAL at 07:52

## 2022-11-07 RX ADMIN — ENOXAPARIN SODIUM 40 MILLIGRAM(S): 100 INJECTION SUBCUTANEOUS at 17:37

## 2022-11-07 RX ADMIN — LOSARTAN POTASSIUM 50 MILLIGRAM(S): 100 TABLET, FILM COATED ORAL at 05:38

## 2022-11-07 RX ADMIN — AMLODIPINE BESYLATE 10 MILLIGRAM(S): 2.5 TABLET ORAL at 21:18

## 2022-11-07 RX ADMIN — LIDOCAINE 2 PATCH: 4 CREAM TOPICAL at 05:35

## 2022-11-07 RX ADMIN — MAGNESIUM HYDROXIDE 30 MILLILITER(S): 400 TABLET, CHEWABLE ORAL at 05:33

## 2022-11-07 NOTE — PROGRESS NOTE ADULT - ASSESSMENT
ASSESSMENT/PLAN  This is a 81 YO male with PMH of HTN, HLD, DM II, BPH with complaint of neck pain. cervical myelopathy  admitted to rehab after scheduled Posterior C1-6 Decompression, C4-5 Posterior Column Osteotomy, C1-C7 Fusion, Complex Plastics Closure on 10/18  Course complicated by severe constipation now resolved, leukocytosis, hyponatremia, fall, urinary retention requiring straight catheterization. Patient now with gait Instability, ADL impairments and Functional impairments.    #Cervical cord compression with myelopathy  - Posterior C1-6 Decompression, C4-5 Posterior Column Osteotomy, C1-C7 Fusion, Complex Plastics Closure on 10/18  - Comprehensive Rehab Program: PT/OT, 3hours daily and 5 days weekly  - PT: Focused on improving strength, endurance, coordination, balance, functional mobility, and transfers  - OT: Focused on improving strength, fine motor skills, coordination, posture and ADLs.  - pain management as below  - precautions: fall, aspiration, spinal  - Incision evaluation and suture removal around 11/7/22 at plastics Dr [facility] office   - CT neck soft tissue 10/30 w/ post op changes w/o abscess  - soft collar while OOB    # SIRS, unclear source - PNA vs UTI vs both?  -afebrile  - Follow cultures - Urine Cx serratia sens to cefipime Blood Cx NGTD  - CT Chest 10/29 most pertinent for some airspace opacity in LLL  - cont  cefepime  - probiotics while on abx  - f/u MRSA nares- negative- off contact  ID in-will follow plan    #Acute metabolic encephalopathy, likely due to delirium, w/ agitation/combativeness - improving  # Suicidal ideation  -Pt noted to have intermittent confusion at OSH, thought to be related to pain meds, delirium, and urinary retention. Very agitated and confused when first admitted here, more oriented and calm.  - Psychiatry following , now on scheduled zyprexa at 1800 and PRN zyprexa for agitation.  - 1:1 observation for impulsivity and confusion at night. Chair and bed alarms on at all times.  - Oxycodone and flexeril discontinued on 10/29  - Pt reporting severe neck pain- tramadol increased to 75mg Q6h PRNand flexeril restarted secondary to persistent neck pain- lidoderm add  off Zyprexa    # acute urinary retention  #UTI  - garay catheter discontinued for TOV 10/31 due to improvement in patient's mentation and constipation  -PVRs currently low- will Dc PVRs   - bowel regimen    #HTN  - Amlodipine 10mg daily   - Losartan 50mg daily    #HLD  - Lipitor 40mg at qhs    #Hyponatremia  -resolved  -Off salt tabs  -follow BMP    #Pain management  - Tylenol 975 mg Q8h -- monitor LFTS on ATC Tylenol  - Tramadol 75mg prn  - Robaxin DCd - Flexeril 5mg 3x/day PRN  lidoderm to shoulders/ and chest wall    #DVT ppx  - Lovenox, SCD, TEDs    #Bowel Regimen  - Senna 2 tabs at qhs, miralax, Dulcolax supp    #FEN   - Diet: Consistent Carb    #Skin:  - Skin on admission: posterior cervical spine incision with sutures MEETA   - Pressure injury/Skin: Turn Q2hrs while in bed, OOB to Chair, PT/OT     #Sleep:   - Maintain quiet hours and low stim environment.  - on Olanzapine 2.5 mg at bedtime for sundowning      #GOC  CODE STATUS: FULL CODE    Outpatient Follow-up (Specialty/Name of physician):    Caleb Bond)  Neurosurgery  [address], [address]  [place] [address]  Phone: [phone]  Fax: [phone]    Jose Raul Bay)  Plastic Surgery  [address] [place] [address]  Phone: [phone]  Fax: [phone]

## 2022-11-07 NOTE — PROGRESS NOTE ADULT - SUBJECTIVE AND OBJECTIVE BOX
HISTORY OF PRESENT ILLNESS  This is a 81 YO male with PMH of HTN, HLD, DM II, BPH with complaint of neck pain. Patient endorses increasing neck pain, gait instability and clumsiness of the hands. He ambulates with a cane. He denies fever, chills, trauma or injury. He presents to [facility] on 10/18 for scheduled Posterior C1-6 Decompression, C4-5 Posterior Column Osteotomy, C1-C7 Fusion, Complex Plastics Closure. Course complicated by severe constipation now resolved, leukocytosis, hyponatremia- stable on salt tablet, fall, urinary retention requiring straight catheterization, intermittent confusion likely related to pain medication and hospital setting. Noted with  Left deltoid weakness. MR Cervical spine 10/26/22 without cord compression. Surgical drain removed 10/27/22. Patient was evaluated by PM&R and therapy for functional deficits, gait/ADL impairments and acute rehabilitation was recommended. Patient was medically optimized for discharge to [facility] IRU on 10/27/22.       TODAY's SUBJECTIVE:  pain much improved   slept well, no agitation  spontaneously voiding  Afebrile, on PO abx now  patient much calmer- no need for 1:1 observation.      ROS:  No dizziness  No chest pain, no palpitations  No shortness of breath  No abdominal pain, no nausea      PAST MEDICAL & SURGICAL HISTORY:  Hypertension      Hyperlipidemia      Chronic kidney disease      Diabetes mellitus  -patient states prediabetes, not on medication      Benign prostate hyperplasia      COVID-19 virus infection  -dec 2021 (cough, fever, malaise)      History of carpal tunnel surgery  -bilateral hands      History of cataract surgery  -bilateral      H/O colonoscopy    VITALS  Vital Signs Last 24 Hrs  T(C): 36.4 (07 Nov 2022 07:46), Max: 36.8 (06 Nov 2022 19:47)  T(F): 97.6 (07 Nov 2022 07:46), Max: 98.3 (06 Nov 2022 19:47)  HR: 87 (07 Nov 2022 07:46) (87 - 99)  BP: 151/75 (07 Nov 2022 07:46) (142/79 - 161/79)  BP(mean): --  RR: 16 (07 Nov 2022 07:46) (16 - 16)  SpO2: 95% (07 Nov 2022 07:46) (95% - 95%)    Parameters below as of 07 Nov 2022 07:46  Patient On (Oxygen Delivery Method): room air        RECENT LABS                        11.5   6.14  )-----------( 451      ( 07 Nov 2022 06:35 )             34.3     11-07    140  |  103  |  34<H>  ----------------------------<  142<H>  4.4   |  27  |  1.34<H>    Ca    9.2      07 Nov 2022 06:35    TPro  6.9  /  Alb  2.8<L>  /  TBili  0.2  /  DBili  x   /  AST  35  /  ALT  46<H>  /  AlkPhos  138<H>  11-07      LIVER FUNCTIONS - ( 07 Nov 2022 06:35 )  Alb: 2.8 g/dL / Pro: 6.9 g/dL / ALK PHOS: 138 U/L / ALT: 46 U/L / AST: 35 U/L / GGT: x             CURRENT MEDICATIONS  MEDICATIONS  (STANDING):  acetaminophen     Tablet .. 975 milliGRAM(s) Oral every 8 hours  amLODIPine   Tablet 10 milliGRAM(s) Oral at bedtime  aspirin  chewable 81 milliGRAM(s) Oral daily  atorvastatin 40 milliGRAM(s) Oral at bedtime  cefuroxime   Tablet 500 milliGRAM(s) Oral every 12 hours  dextrose 5%. 1000 milliLiter(s) (50 mL/Hr) IV Continuous <Continuous>  dextrose 5%. 1000 milliLiter(s) (100 mL/Hr) IV Continuous <Continuous>  dextrose 50% Injectable 12.5 Gram(s) IV Push once  dextrose 50% Injectable 25 Gram(s) IV Push once  dextrose 50% Injectable 25 Gram(s) IV Push once  enoxaparin Injectable 40 milliGRAM(s) SubCutaneous <User Schedule>  glucagon  Injectable 1 milliGRAM(s) IntraMuscular once  insulin lispro (ADMELOG) corrective regimen sliding scale   SubCutaneous three times a day before meals  insulin lispro (ADMELOG) corrective regimen sliding scale   SubCutaneous at bedtime  lidocaine   4% Patch 2 Patch Transdermal <User Schedule>  lidocaine   4% Patch 1 Patch Transdermal <User Schedule>  losartan 50 milliGRAM(s) Oral daily  magnesium hydroxide Suspension 30 milliLiter(s) Oral every 12 hours  polyethylene glycol 3350 17 Gram(s) Oral daily  saccharomyces boulardii 250 milliGRAM(s) Oral two times a day  senna 2 Tablet(s) Oral at bedtime    MEDICATIONS  (PRN):  bisacodyl Suppository 10 milliGRAM(s) Rectal daily PRN Constipation  cyclobenzaprine 5 milliGRAM(s) Oral three times a day PRN Muscle Spasm  dextrose Oral Gel 15 Gram(s) Oral once PRN Blood Glucose LESS THAN 70 milliGRAM(s)/deciliter  OLANZapine Injectable 2.5 milliGRAM(s) IntraMuscular every 12 hours PRN severe agitation  traMADol 75 milliGRAM(s) Oral every 6 hours PRN Severe Pain (7 - 10)      PHYSICAL EXAM  Gen - NAD, Comfortable  HEENT - NCAT, EOMI   Neck - Supple, +posterior neck incision intact  Pulm - CTAB, No wheeze, No rhonchi, No crackles  Cardiovascular - RRR, S1S2, No murmurs  Chest - good chest expansion, good respiratory effort  Abdomen - Soft, NT/ND, +BS  Extremities - No Cyanosis, no clubbing, no edema, no calf tenderness  Neuro-     Cognitive - awake, alert, oriented to person, hospital setting, month/year. Able to follow commands     Communication - Fluent, Comprehensible     Attention: Intact     Cranial Nerves - CN 2-12 intact. No facial asymmetry, Tongue midline, EOMI, Shoulder shrug intact     Motor -                     LEFT    UE - ShAB 3/5, EF 4/5, EE 4/5,  4/5                    RIGHT UE - ShAB 4/5, EF 5/5, EE 5/5,  5/5                    LEFT    LE - HF 5/5, KE 5/5, DF 5/5, PF 5/5                    RIGHT LE - HF 5/5, KE 5/5, DF 5/5, PF 5/5        Sensory - Intact to LT      Reflexes - DTR Intact     Coordination - + dysmetria to left arm     Tone - normal  MSK: left arm weakness  Psychiatric - Mood stable, Affect WNL    Continue comprehensive acute rehab program consisting of 3hrs/day of OT/PT and SLP.

## 2022-11-08 LAB
GLUCOSE BLDC GLUCOMTR-MCNC: 104 MG/DL — HIGH (ref 70–99)
GLUCOSE BLDC GLUCOMTR-MCNC: 119 MG/DL — HIGH (ref 70–99)
GLUCOSE BLDC GLUCOMTR-MCNC: 143 MG/DL — HIGH (ref 70–99)

## 2022-11-08 PROCEDURE — 84100 ASSAY OF PHOSPHORUS: CPT

## 2022-11-08 PROCEDURE — 83735 ASSAY OF MAGNESIUM: CPT

## 2022-11-08 PROCEDURE — 82435 ASSAY OF BLOOD CHLORIDE: CPT

## 2022-11-08 PROCEDURE — 82565 ASSAY OF CREATININE: CPT

## 2022-11-08 PROCEDURE — 72040 X-RAY EXAM NECK SPINE 2-3 VW: CPT

## 2022-11-08 PROCEDURE — 85025 COMPLETE CBC W/AUTO DIFF WBC: CPT

## 2022-11-08 PROCEDURE — 85018 HEMOGLOBIN: CPT

## 2022-11-08 PROCEDURE — 82962 GLUCOSE BLOOD TEST: CPT

## 2022-11-08 PROCEDURE — 97110 THERAPEUTIC EXERCISES: CPT

## 2022-11-08 PROCEDURE — 99232 SBSQ HOSP IP/OBS MODERATE 35: CPT

## 2022-11-08 PROCEDURE — 83935 ASSAY OF URINE OSMOLALITY: CPT

## 2022-11-08 PROCEDURE — C1713: CPT

## 2022-11-08 PROCEDURE — 97165 OT EVAL LOW COMPLEX 30 MIN: CPT

## 2022-11-08 PROCEDURE — 82330 ASSAY OF CALCIUM: CPT

## 2022-11-08 PROCEDURE — 81001 URINALYSIS AUTO W/SCOPE: CPT

## 2022-11-08 PROCEDURE — 82570 ASSAY OF URINE CREATININE: CPT

## 2022-11-08 PROCEDURE — 82803 BLOOD GASES ANY COMBINATION: CPT

## 2022-11-08 PROCEDURE — A9585: CPT

## 2022-11-08 PROCEDURE — 85027 COMPLETE CBC AUTOMATED: CPT

## 2022-11-08 PROCEDURE — C1769: CPT

## 2022-11-08 PROCEDURE — U0003: CPT

## 2022-11-08 PROCEDURE — U0005: CPT

## 2022-11-08 PROCEDURE — 74018 RADEX ABDOMEN 1 VIEW: CPT

## 2022-11-08 PROCEDURE — 72156 MRI NECK SPINE W/O & W/DYE: CPT

## 2022-11-08 PROCEDURE — 83605 ASSAY OF LACTIC ACID: CPT

## 2022-11-08 PROCEDURE — 85014 HEMATOCRIT: CPT

## 2022-11-08 PROCEDURE — 94640 AIRWAY INHALATION TREATMENT: CPT

## 2022-11-08 PROCEDURE — 36415 COLL VENOUS BLD VENIPUNCTURE: CPT

## 2022-11-08 PROCEDURE — 84295 ASSAY OF SERUM SODIUM: CPT

## 2022-11-08 PROCEDURE — C1889: CPT

## 2022-11-08 PROCEDURE — 70450 CT HEAD/BRAIN W/O DYE: CPT

## 2022-11-08 PROCEDURE — 84300 ASSAY OF URINE SODIUM: CPT

## 2022-11-08 PROCEDURE — 71045 X-RAY EXAM CHEST 1 VIEW: CPT

## 2022-11-08 PROCEDURE — 84132 ASSAY OF SERUM POTASSIUM: CPT

## 2022-11-08 PROCEDURE — 97161 PT EVAL LOW COMPLEX 20 MIN: CPT

## 2022-11-08 PROCEDURE — 97116 GAIT TRAINING THERAPY: CPT

## 2022-11-08 PROCEDURE — 80048 BASIC METABOLIC PNL TOTAL CA: CPT

## 2022-11-08 PROCEDURE — 93970 EXTREMITY STUDY: CPT

## 2022-11-08 PROCEDURE — 97530 THERAPEUTIC ACTIVITIES: CPT

## 2022-11-08 PROCEDURE — 72125 CT NECK SPINE W/O DYE: CPT

## 2022-11-08 PROCEDURE — 82947 ASSAY GLUCOSE BLOOD QUANT: CPT

## 2022-11-08 RX ORDER — LOPERAMIDE HCL 2 MG
2 TABLET ORAL ONCE
Refills: 0 | Status: DISCONTINUED | OUTPATIENT
Start: 2022-11-08 | End: 2022-11-11

## 2022-11-08 RX ADMIN — Medication 975 MILLIGRAM(S): at 05:52

## 2022-11-08 RX ADMIN — LIDOCAINE 1 PATCH: 4 CREAM TOPICAL at 08:08

## 2022-11-08 RX ADMIN — Medication 81 MILLIGRAM(S): at 12:15

## 2022-11-08 RX ADMIN — LIDOCAINE 1 PATCH: 4 CREAM TOPICAL at 19:20

## 2022-11-08 RX ADMIN — Medication 250 MILLIGRAM(S): at 05:15

## 2022-11-08 RX ADMIN — LIDOCAINE 2 PATCH: 4 CREAM TOPICAL at 02:10

## 2022-11-08 RX ADMIN — ENOXAPARIN SODIUM 40 MILLIGRAM(S): 100 INJECTION SUBCUTANEOUS at 17:57

## 2022-11-08 RX ADMIN — LIDOCAINE 1 PATCH: 4 CREAM TOPICAL at 21:09

## 2022-11-08 RX ADMIN — Medication 975 MILLIGRAM(S): at 16:00

## 2022-11-08 RX ADMIN — Medication 975 MILLIGRAM(S): at 21:27

## 2022-11-08 RX ADMIN — MAGNESIUM HYDROXIDE 30 MILLILITER(S): 400 TABLET, CHEWABLE ORAL at 05:15

## 2022-11-08 RX ADMIN — Medication 500 MILLIGRAM(S): at 05:15

## 2022-11-08 RX ADMIN — LOSARTAN POTASSIUM 50 MILLIGRAM(S): 100 TABLET, FILM COATED ORAL at 05:17

## 2022-11-08 RX ADMIN — AMLODIPINE BESYLATE 10 MILLIGRAM(S): 2.5 TABLET ORAL at 21:31

## 2022-11-08 RX ADMIN — LIDOCAINE 2 PATCH: 4 CREAM TOPICAL at 19:21

## 2022-11-08 RX ADMIN — Medication 975 MILLIGRAM(S): at 22:15

## 2022-11-08 RX ADMIN — ATORVASTATIN CALCIUM 40 MILLIGRAM(S): 80 TABLET, FILM COATED ORAL at 21:30

## 2022-11-08 RX ADMIN — Medication 975 MILLIGRAM(S): at 05:15

## 2022-11-08 NOTE — PROGRESS NOTE ADULT - ASSESSMENT
ASSESSMENT/PLAN  This is a 83 YO male with PMH of HTN, HLD, DM II, BPH with complaint of neck pain. cervical myelopathy  admitted to rehab after scheduled Posterior C1-6 Decompression, C4-5 Posterior Column Osteotomy, C1-C7 Fusion, Complex Plastics Closure on 10/18  Course complicated by severe constipation now resolved, leukocytosis, hyponatremia, fall, urinary retention requiring straight catheterization. Patient now with gait Instability, ADL impairments and Functional impairments.    #Cervical cord compression with myelopathy  - Posterior C1-6 Decompression, C4-5 Posterior Column Osteotomy, C1-C7 Fusion, Complex Plastics Closure on 10/18  - Comprehensive Rehab Program: PT/OT, 3hours daily and 5 days weekly  - PT: Focused on improving strength, endurance, coordination, balance, functional mobility, and transfers  - OT: Focused on improving strength, fine motor skills, coordination, posture and ADLs.  - For neck pain - tramadol 75mg Q6h PRN and flexeril 5mg TID PRN - will d/c if not requiring PRNs, lidoderm added  - precautions: fall, aspiration, spinal  - per discharge instructoins evaluation and suture removal around 11/7/22 by plastics at Dr Bay office as complex plastics closure. To be seen by plastics here (Dr Robison) on 11/9 to eval sutures for removal.   - CT neck soft tissue 10/30 w/ post op changes w/o abscess  - soft collar while OOB    #SIRS, PNA/UTI - Resolved  -  Urine Cx serratia sens to cefipime Blood Cx NGTD  - CT Chest 10/29 most pertinent for some airspace opacity in LLL  - cefepime narrowed to ceftin 11/2, completed 11/8 per ID recs  - probiotics while on abx - d/suzan 11/8  - MRSA nares negative  - Monitor for fevers.    #Acute metabolic encephalopathy, likely due to delirium, w/ agitation/combativeness - Resolved  # Suicidal ideation  -Pt noted to have intermittent confusion at OSH, thought to be related to pain meds, delirium, and urinary retention. Very agitated and confused when first admitted here, now resolved. Patient is oriented and calm.  - Oxycodone and flexeril discontinued on 10/29  -off Zyprexa  -No longer requiring 1:1 at this time.     # acute urinary retention  #UTI  - garay catheter discontinued for TOV 10/31 due to improvement in patient's mentation and constipation  -PVRs currently low- will Dc PVRs   - bowel regimen    #HTN  - Amlodipine 10mg daily   - Losartan 50mg daily    #HLD  - Lipitor 40mg at qhs    #Hyponatremia - resolved  -last  11/3  -Off salt tabs  -BMP 11/10    #Pain management  - Tylenol 975 mg Q8h -- monitor LFTS on ATC Tylenol  - Tramadol 75mg prn  - Robaxin DCd - Flexeril 5mg 3x/day PRN  lidoderm to shoulders/ and chest wall    #DVT ppx  - Lovenox, SCD, TEDs    #Bowel Regimen  - Senna 2 tabs at qhs, miralax, Dulcolax supp    #FEN   - Diet: Consistent Carb    #Skin:  - Skin on admission: posterior cervical spine incision with sutures MEETA   - Pressure injury/Skin: encourage turning while in bed, OOB to Chair, PT/OT     #Sleep:   - Maintain quiet hours and low stim environment.    #[facility]  CODE STATUS: FULL CODE    Outpatient Follow-up (Specialty/Name of physician):    Caleb Bond)  Neurosurgery  [address], [address]  [place] [address]  Phone: [phone]  Fax: [phone]    Jose Raul Bay)  Plastic Surgery  [address] [place] [address]  Phone: [phone]  Fax: [phone]

## 2022-11-08 NOTE — PROGRESS NOTE ADULT - SUBJECTIVE AND OBJECTIVE BOX
Patient is a 82y old  Male who presents with a chief complaint of Cervical Laminectomy (07 Nov 2022 10:56)      HPI:  This is a 83 YO male with PMH of HTN, HLD, DM II, BPH with complaint of neck pain. Patient endorses increasing neck pain, gait instability and clumsiness of the hands. He ambulates with a cane. He denies fever, chills, trauma or injury. He presents to [facility] on 10/18 for scheduled Posterior C1-6 Decompression, C4-5 Posterior Column Osteotomy, C1-C7 Fusion, Complex Plastics Closure. Course complicated by severe constipation now resolved, leukocytosis, hyponatremia- stable on salt tablet, fall, urinary retention requiring straight catheterization, intermittent confusion likely related to pain medication and hospital setting. Noted with  Left deltoid weakness. MR Cervical spine 10/26/22 without cord compression. Surgical drain removed 10/27/22. Patient was evaluated by PM&R and therapy for functional deficits, gait/ADL impairments and acute rehabilitation was recommended. Patient was medically optimized for discharge to [facility] IRU on 10/27/22.   (27 Oct 2022 13:00)        SUBJECTIVE: Patient seen and examined. No acute overnight events. He notes some itchiness at the site of the incision but denies any pain. He notes his strength is improving in his left arm. No other complaints.       REVIEW OF SYSTEMS        VITALS  82y  Vital Signs Last 24 Hrs  T(C): 36.3 (08 Nov 2022 08:01), Max: 36.6 (07 Nov 2022 21:13)  T(F): 97.3 (08 Nov 2022 08:01), Max: 97.8 (07 Nov 2022 21:13)  HR: 98 (08 Nov 2022 08:01) (90 - 98)  BP: 149/84 (08 Nov 2022 08:01) (149/84 - 160/77)  BP(mean): --  RR: 16 (08 Nov 2022 08:01) (16 - 16)  SpO2: 95% (08 Nov 2022 08:01) (94% - 95%)    Parameters below as of 08 Nov 2022 08:01  Patient On (Oxygen Delivery Method): room air      Daily     Daily         PHYSICAL EXAM:   Gen - NAD, Comfortable  HEENT - NCAT, EOMI, MMM  Neck - Supple, No limited ROM  Pulm - CTAB, No wheeze, No rhonchi, No crackles  Cardiovascular - RRR, S1S2, No murmurs  Abdomen - Soft, NT/ND, +BS  Extremities - No C/C/E, No calf tenderness  Neuro-     Cognitive - AAOx3     Communication - Fluent, No dysarthria     Attention: Intact      Judgement: Good evidence of judgement     Memory: Recall 3 objects immediate and 3 min later         Cranial Nerves - CN 2-12 intact     Motor -                     LEFT    UE - ShAB 5/5, EF 5/5, EE 5/5, WE 5/5,  5/5                    RIGHT UE - ShAB 5/5, EF 5/5, EE 5/5, WE 5/5,  5/5                    LEFT    LE - HF 5/5, KE 5/5, DF 5/5, PF 5/5                    RIGHT LE - HF 5/5, KE 5/5, DF 5/5, PF 5/5        Sensory - Intact to LT     Reflexes - DTR Intact, No primitive reflexive     Coordination - FTN intact     Tone - normal  Psychiatric - Mood stable, Affect WNL  Skin:  all skin intact    Wounds: None Present        FUNCTIONAL STATUS:        RECENT LABS:                        11.5   6.14  )-----------( 451      ( 07 Nov 2022 06:35 )             34.3     11-07    140  |  103  |  34<H>  ----------------------------<  142<H>  4.4   |  27  |  1.34<H>    Ca    9.2      07 Nov 2022 06:35    TPro  6.9  /  Alb  2.8<L>  /  TBili  0.2  /  DBili  x   /  AST  35  /  ALT  46<H>  /  AlkPhos  138<H>  11-07    LIVER FUNCTIONS - ( 07 Nov 2022 06:35 )  Alb: 2.8 g/dL / Pro: 6.9 g/dL / ALK PHOS: 138 U/L / ALT: 46 U/L / AST: 35 U/L / GGT: x                   CAPILLARY BLOOD GLUCOSE      POCT Blood Glucose.: 117 mg/dL (08 Nov 2022 12:14)  POCT Blood Glucose.: 143 mg/dL (08 Nov 2022 08:05)  POCT Blood Glucose.: 114 mg/dL (07 Nov 2022 21:17)  POCT Blood Glucose.: 137 mg/dL (07 Nov 2022 16:34)        MEDICATIONS:  MEDICATIONS  (STANDING):  acetaminophen     Tablet .. 975 milliGRAM(s) Oral every 8 hours  amLODIPine   Tablet 10 milliGRAM(s) Oral at bedtime  aspirin  chewable 81 milliGRAM(s) Oral daily  atorvastatin 40 milliGRAM(s) Oral at bedtime  dextrose 5%. 1000 milliLiter(s) (100 mL/Hr) IV Continuous <Continuous>  dextrose 5%. 1000 milliLiter(s) (50 mL/Hr) IV Continuous <Continuous>  dextrose 50% Injectable 25 Gram(s) IV Push once  dextrose 50% Injectable 12.5 Gram(s) IV Push once  dextrose 50% Injectable 25 Gram(s) IV Push once  enoxaparin Injectable 40 milliGRAM(s) SubCutaneous <User Schedule>  glucagon  Injectable 1 milliGRAM(s) IntraMuscular once  insulin lispro (ADMELOG) corrective regimen sliding scale   SubCutaneous three times a day before meals  insulin lispro (ADMELOG) corrective regimen sliding scale   SubCutaneous at bedtime  lidocaine   4% Patch 2 Patch Transdermal <User Schedule>  lidocaine   4% Patch 1 Patch Transdermal <User Schedule>  losartan 50 milliGRAM(s) Oral daily  magnesium hydroxide Suspension 30 milliLiter(s) Oral every 12 hours  polyethylene glycol 3350 17 Gram(s) Oral daily  saccharomyces boulardii 250 milliGRAM(s) Oral two times a day  senna 2 Tablet(s) Oral at bedtime    MEDICATIONS  (PRN):  bisacodyl Suppository 10 milliGRAM(s) Rectal daily PRN Constipation  cyclobenzaprine 5 milliGRAM(s) Oral three times a day PRN Muscle Spasm  dextrose Oral Gel 15 Gram(s) Oral once PRN Blood Glucose LESS THAN 70 milliGRAM(s)/deciliter  OLANZapine Injectable 2.5 milliGRAM(s) IntraMuscular every 12 hours PRN severe agitation  traMADol 75 milliGRAM(s) Oral every 6 hours PRN Severe Pain (7 - 10)     Patient is a 82y old  Male who presents with a chief complaint of Cervical Laminectomy (07 Nov 2022 10:56)      HPI:  This is a 83 YO male with PMH of HTN, HLD, DM II, BPH with complaint of neck pain. Patient endorses increasing neck pain, gait instability and clumsiness of the hands. He ambulates with a cane. He denies fever, chills, trauma or injury. He presents to [facility] on 10/18 for scheduled Posterior C1-6 Decompression, C4-5 Posterior Column Osteotomy, C1-C7 Fusion, Complex Plastics Closure. Course complicated by severe constipation now resolved, leukocytosis, hyponatremia- stable on salt tablet, fall, urinary retention requiring straight catheterization, intermittent confusion likely related to pain medication and hospital setting. Noted with  Left deltoid weakness. MR Cervical spine 10/26/22 without cord compression. Surgical drain removed 10/27/22. Patient was evaluated by PM&R and therapy for functional deficits, gait/ADL impairments and acute rehabilitation was recommended. Patient was medically optimized for discharge to [facility] IRU on 10/27/22.   (27 Oct 2022 13:00)        SUBJECTIVE: Patient seen and examined. No acute overnight events. He notes some itchiness at the site of the incision but denies any pain. He notes his strength is improving in his left arm. No other complaints.       REVIEW OF SYSTEMS  No fevers  Denies pain  Denies lightheadedness/dizziness      VITALS  82y  Vital Signs Last 24 Hrs  T(C): 36.3 (08 Nov 2022 08:01), Max: 36.6 (07 Nov 2022 21:13)  T(F): 97.3 (08 Nov 2022 08:01), Max: 97.8 (07 Nov 2022 21:13)  HR: 98 (08 Nov 2022 08:01) (90 - 98)  BP: 149/84 (08 Nov 2022 08:01) (149/84 - 160/77)  BP(mean): --  RR: 16 (08 Nov 2022 08:01) (16 - 16)  SpO2: 95% (08 Nov 2022 08:01) (94% - 95%)    Parameters below as of 08 Nov 2022 08:01  Patient On (Oxygen Delivery Method): room air    Daily       PHYSICAL EXAM:     Gen - NAD, Comfortable  HEENT - NCAT, EOMI   Neck - Supple, +posterior neck incision intact, +sutures  Pulm - no respiratory distress, CTAB  Cardiovascular - RRR,  Chest - good respiratory effort  Abdomen - Soft, NT/ND, +BS  Extremities - no edema, no calf tenderness  Neuro-     Cognitive - AAOx4     Communication - Fluent, Comprehensible     Attention: Intact     Cranial Nerves - CN 2-12 intact. No facial asymmetry, Tongue midline, EOMI,     Motor -                     LEFT    UE - ShAB 3/5, EF 4/5, EE 4/5,  4/5                    RIGHT UE - ShAB 4/5, EF 5/5, EE 5/5,  5/5                    LEFT    LE - HF 5/5, KE 5/5, DF 5/5, PF 5/5                    RIGHT LE - HF 5/5, KE 5/5, DF 5/5, PF 5/5        Sensory - Intact to LT      Reflexes - DTR Intact     Coordination - + dysmetria to left arm     Tone - normal  MSK: left arm weakness  Psychiatric - Mood stable, Affect WNL      RECENT LABS:                        11.5   6.14  )-----------( 451      ( 07 Nov 2022 06:35 )             34.3     11-07    140  |  103  |  34<H>  ----------------------------<  142<H>  4.4   |  27  |  1.34<H>    Ca    9.2      07 Nov 2022 06:35    TPro  6.9  /  Alb  2.8<L>  /  TBili  0.2  /  DBili  x   /  AST  35  /  ALT  46<H>  /  AlkPhos  138<H>  11-07    LIVER FUNCTIONS - ( 07 Nov 2022 06:35 )  Alb: 2.8 g/dL / Pro: 6.9 g/dL / ALK PHOS: 138 U/L / ALT: 46 U/L / AST: 35 U/L / GGT: x                   CAPILLARY BLOOD GLUCOSE      POCT Blood Glucose.: 117 mg/dL (08 Nov 2022 12:14)  POCT Blood Glucose.: 143 mg/dL (08 Nov 2022 08:05)  POCT Blood Glucose.: 114 mg/dL (07 Nov 2022 21:17)  POCT Blood Glucose.: 137 mg/dL (07 Nov 2022 16:34)        MEDICATIONS:  MEDICATIONS  (STANDING):  acetaminophen     Tablet .. 975 milliGRAM(s) Oral every 8 hours  amLODIPine   Tablet 10 milliGRAM(s) Oral at bedtime  aspirin  chewable 81 milliGRAM(s) Oral daily  atorvastatin 40 milliGRAM(s) Oral at bedtime  dextrose 5%. 1000 milliLiter(s) (100 mL/Hr) IV Continuous <Continuous>  dextrose 5%. 1000 milliLiter(s) (50 mL/Hr) IV Continuous <Continuous>  dextrose 50% Injectable 25 Gram(s) IV Push once  dextrose 50% Injectable 12.5 Gram(s) IV Push once  dextrose 50% Injectable 25 Gram(s) IV Push once  enoxaparin Injectable 40 milliGRAM(s) SubCutaneous <User Schedule>  glucagon  Injectable 1 milliGRAM(s) IntraMuscular once  insulin lispro (ADMELOG) corrective regimen sliding scale   SubCutaneous three times a day before meals  insulin lispro (ADMELOG) corrective regimen sliding scale   SubCutaneous at bedtime  lidocaine   4% Patch 2 Patch Transdermal <User Schedule>  lidocaine   4% Patch 1 Patch Transdermal <User Schedule>  losartan 50 milliGRAM(s) Oral daily  magnesium hydroxide Suspension 30 milliLiter(s) Oral every 12 hours  polyethylene glycol 3350 17 Gram(s) Oral daily  saccharomyces boulardii 250 milliGRAM(s) Oral two times a day  senna 2 Tablet(s) Oral at bedtime    MEDICATIONS  (PRN):  bisacodyl Suppository 10 milliGRAM(s) Rectal daily PRN Constipation  cyclobenzaprine 5 milliGRAM(s) Oral three times a day PRN Muscle Spasm  dextrose Oral Gel 15 Gram(s) Oral once PRN Blood Glucose LESS THAN 70 milliGRAM(s)/deciliter  OLANZapine Injectable 2.5 milliGRAM(s) IntraMuscular every 12 hours PRN severe agitation  traMADol 75 milliGRAM(s) Oral every 6 hours PRN Severe Pain (7 - 10)

## 2022-11-08 NOTE — CHART NOTE - NSCHARTNOTESELECT_GEN_ALL_CORE
Event Note
Event Note
IDT/Event Note
Event Note
Event Note
IDT 11/1/Event Note
IPOC/Event Note
Nutrition Services
Nutrition Services
Vision changes/Event Note

## 2022-11-08 NOTE — CHART NOTE - NSCHARTNOTEFT_GEN_A_CORE
IDT 11/8  lives with spouse in a basement apt, 5 TAHIR with no HR.  has cane and rollator at home    OT:  Eating mod I  grooming mod I  UBD min A  LBD CG  bathing CG  Toilet transfer CG  toileting CG  Shower transfer CG    PT:   bed CG/CS  transfers CG/CS  amb 100ft CG RW  Goals: SV with bed mob/transfers/amb 150' RW. CG 12 steps    SLP:  [place] uses amplifier  Diet: reg/thin - impulsive with food  mild- mod comprehension, memory deficits, slurred speech    Barriers: impaired coordination, right shoulder pain/weakness    TDD: 11/12 home.

## 2022-11-08 NOTE — CHART NOTE - NSCHARTNOTEFT_GEN_A_CORE
Assessment:   Patient seen for: f/u     Source: [x] medical review, [x] patient    Factors impacting intake: [x] none    Intake: varies, but noted improved appetite the last week to %    Diet Prescription: Diet, Consistent Carbohydrate w/Evening Snack (11-01-22 @ 14:07)    Current Weight: 10/28: 186.5 Lbs  % Weight Change: no new wts recorded. Requested new weights.     Interview conducted in [place], by Dietitian fluent in this language. Pt reports good appetite. Observed PO intake 75% at lunch. Pt feeds self, reports no chewing/swallowing difficultie. Denies N/V/C/D. Last BM: 11/06. Review w/ pt Consistent Carbohydrates diet, identifying CHO in foods, limited them to small amounts. Add more non-starchy vegetables and meats or fish if desired. Healthy plate explained. Pt was agreeable with recommendations.     Pertinent Medications: MEDICATIONS  (STANDING):  acetaminophen     Tablet .. 975 milliGRAM(s) Oral every 8 hours  amLODIPine   Tablet 10 milliGRAM(s) Oral at bedtime  aspirin  chewable 81 milliGRAM(s) Oral daily  atorvastatin 40 milliGRAM(s) Oral at bedtime  dextrose 5%. 1000 milliLiter(s) (100 mL/Hr) IV Continuous <Continuous>  dextrose 5%. 1000 milliLiter(s) (50 mL/Hr) IV Continuous <Continuous>  dextrose 50% Injectable 25 Gram(s) IV Push once  dextrose 50% Injectable 12.5 Gram(s) IV Push once  dextrose 50% Injectable 25 Gram(s) IV Push once  enoxaparin Injectable 40 milliGRAM(s) SubCutaneous <User Schedule>  glucagon  Injectable 1 milliGRAM(s) IntraMuscular once  insulin lispro (ADMELOG) corrective regimen sliding scale   SubCutaneous three times a day before meals  insulin lispro (ADMELOG) corrective regimen sliding scale   SubCutaneous at bedtime  lidocaine   4% Patch 2 Patch Transdermal <User Schedule>  lidocaine   4% Patch 1 Patch Transdermal <User Schedule>  losartan 50 milliGRAM(s) Oral daily  magnesium hydroxide Suspension 30 milliLiter(s) Oral every 12 hours  polyethylene glycol 3350 17 Gram(s) Oral daily  saccharomyces boulardii 250 milliGRAM(s) Oral two times a day  senna 2 Tablet(s) Oral at bedtime    MEDICATIONS  (PRN):  bisacodyl Suppository 10 milliGRAM(s) Rectal daily PRN Constipation  cyclobenzaprine 5 milliGRAM(s) Oral three times a day PRN Muscle Spasm  dextrose Oral Gel 15 Gram(s) Oral once PRN Blood Glucose LESS THAN 70 milliGRAM(s)/deciliter  OLANZapine Injectable 2.5 milliGRAM(s) IntraMuscular every 12 hours PRN severe agitation  traMADol 75 milliGRAM(s) Oral every 6 hours PRN Severe Pain (7 - 10)    Pertinent Labs: 11-07 Na140 mmol/L Glu 142 mg/dL<H> K+ 4.4 mmol/L Cr  1.34 mg/dL<H> BUN 34 mg/dL<H> 11-07 Alb 2.8 g/dL<L>     CAPILLARY BLOOD GLUCOSE      POCT Blood Glucose.: 117 mg/dL (08 Nov 2022 12:14)  POCT Blood Glucose.: 143 mg/dL (08 Nov 2022 08:05)  POCT Blood Glucose.: 114 mg/dL (07 Nov 2022 21:17)  POCT Blood Glucose.: 137 mg/dL (07 Nov 2022 16:34)      Skin: WDL except sx incision: cervical fusion C1-C7     Edema: none, as per nursing flowsheet     Estimated Needs:   [x] no change since previous assessment  [ ] recalculated:     Previous Nutrition Diagnosis:   [x] Overweight/Obesity     Nutrition Diagnosis is [x] ongoing  [ ] resolved [ ] not applicable     New Nutrition Diagnosis: [x] not applicable       Interventions:   Recommend  [x] Continue with current diet: Consistent Carbohydrates diet (evening snacks)  [x] Nutrition Support: Honor pt's food preferences as feasible with prescribed diet.   [x] Other: Obtain and record PO intake and weights daily     Monitoring and Evaluation:   Continue to monitor Nutritional intake, Tolerance to diet prescription, weights, labs, skin integrity.  other:  RD remains available upon request and will follow up per protocol.

## 2022-11-09 LAB
GLUCOSE BLDC GLUCOMTR-MCNC: 113 MG/DL — HIGH (ref 70–99)
GLUCOSE BLDC GLUCOMTR-MCNC: 121 MG/DL — HIGH (ref 70–99)
GLUCOSE BLDC GLUCOMTR-MCNC: 121 MG/DL — HIGH (ref 70–99)
GLUCOSE BLDC GLUCOMTR-MCNC: 127 MG/DL — HIGH (ref 70–99)
SARS-COV-2 RNA SPEC QL NAA+PROBE: SIGNIFICANT CHANGE UP

## 2022-11-09 PROCEDURE — 99232 SBSQ HOSP IP/OBS MODERATE 35: CPT

## 2022-11-09 RX ADMIN — LIDOCAINE 1 PATCH: 4 CREAM TOPICAL at 17:30

## 2022-11-09 RX ADMIN — Medication 975 MILLIGRAM(S): at 13:23

## 2022-11-09 RX ADMIN — Medication 975 MILLIGRAM(S): at 13:22

## 2022-11-09 RX ADMIN — LIDOCAINE 2 PATCH: 4 CREAM TOPICAL at 17:30

## 2022-11-09 RX ADMIN — LIDOCAINE 1 PATCH: 4 CREAM TOPICAL at 07:47

## 2022-11-09 RX ADMIN — Medication 975 MILLIGRAM(S): at 22:03

## 2022-11-09 RX ADMIN — AMLODIPINE BESYLATE 10 MILLIGRAM(S): 2.5 TABLET ORAL at 21:23

## 2022-11-09 RX ADMIN — LIDOCAINE 2 PATCH: 4 CREAM TOPICAL at 06:12

## 2022-11-09 RX ADMIN — Medication 81 MILLIGRAM(S): at 12:25

## 2022-11-09 RX ADMIN — ATORVASTATIN CALCIUM 40 MILLIGRAM(S): 80 TABLET, FILM COATED ORAL at 21:23

## 2022-11-09 RX ADMIN — ENOXAPARIN SODIUM 40 MILLIGRAM(S): 100 INJECTION SUBCUTANEOUS at 17:41

## 2022-11-09 RX ADMIN — LOSARTAN POTASSIUM 50 MILLIGRAM(S): 100 TABLET, FILM COATED ORAL at 05:52

## 2022-11-09 RX ADMIN — Medication 975 MILLIGRAM(S): at 21:23

## 2022-11-09 RX ADMIN — LIDOCAINE 2 PATCH: 4 CREAM TOPICAL at 07:47

## 2022-11-09 RX ADMIN — Medication 975 MILLIGRAM(S): at 07:45

## 2022-11-09 RX ADMIN — LIDOCAINE 1 PATCH: 4 CREAM TOPICAL at 06:14

## 2022-11-09 RX ADMIN — Medication 975 MILLIGRAM(S): at 05:51

## 2022-11-09 RX ADMIN — MAGNESIUM HYDROXIDE 30 MILLILITER(S): 400 TABLET, CHEWABLE ORAL at 17:41

## 2022-11-09 NOTE — PROGRESS NOTE ADULT - NS ATTEND AMEND GEN_ALL_CORE FT
I have personally seen and examined the patient with the NP. Medical records reviewed. I have made amendments to the documentation where necessary and adjusted the history, physical examination, and plan as documented by the NP.

## 2022-11-09 NOTE — PROGRESS NOTE ADULT - ASSESSMENT
ASSESSMENT/PLAN  This is a 81 YO male with PMH of HTN, HLD, DM II, BPH with complaint of neck pain. cervical myelopathy  admitted to rehab after scheduled Posterior C1-6 Decompression, C4-5 Posterior Column Osteotomy, C1-C7 Fusion, Complex Plastics Closure on 10/18  Course complicated by severe constipation now resolved, leukocytosis, hyponatremia, fall, urinary retention requiring straight catheterization. Patient now with gait Instability, ADL impairments and Functional impairments.    #Cervical cord compression with myelopathy  - Posterior C1-6 Decompression, C4-5 Posterior Column Osteotomy, C1-C7 Fusion, Complex Plastics Closure on 10/18  - Comprehensive Rehab Program: PT/OT, 3hours daily and 5 days weekly  - PT: Focused on improving strength, endurance, coordination, balance, functional mobility, and transfers  - OT: Focused on improving strength, fine motor skills, coordination, posture and ADLs.  - For neck pain - tramadol 75mg Q6h PRN and flexeril 5mg TID PRN - will d/c if not requiring PRNs, lidoderm added  - precautions: fall, aspiration, spinal  - per discharge instructoins evaluation and suture removal around 11/7/22 by plastics at Dr Bay office as complex plastics closure- plastics here (Dr Robison) consulted on 11/9 to eval sutures for removal.   - CT neck soft tissue 10/30 w/ post op changes w/o abscess  - soft collar while OOB    #SIRS, PNA/UTI - Resolved  -  Urine Cx serratia sens to cefipime Blood Cx NGTD  - CT Chest 10/29 most pertinent for some airspace opacity in LLL  - cefepime narrowed to ceftin 11/2, completed 11/8 per ID recs  - probiotics while on abx - d/suzan 11/8  - MRSA nares negative  - Monitor for fevers.    #Acute metabolic encephalopathy, likely due to delirium, w/ agitation/combativeness - Resolved  # Suicidal ideation  -Pt noted to have intermittent confusion at OSH, thought to be related to pain meds, delirium, and urinary retention. Very agitated and confused when first admitted here, now resolved. Patient is oriented and calm.  - Oxycodone and flexeril discontinued on 10/29  -off Zyprexa  -No longer requiring 1:1 at this time.     # acute urinary retention  #UTI  - garay catheter discontinued for TOV 10/31 due to improvement in patient's mentation and constipation  -PVRs currently low- will Dc PVRs   - bowel regimen    #HTN  - Amlodipine 10mg daily   - Losartan 50mg daily    #HLD  - Lipitor 40mg at qhs    #Hyponatremia - resolved  -last  11/3  -Off salt tabs  -BMP 11/10    #Pain management  - Tylenol 975 mg Q8h -- monitor LFTS on ATC Tylenol  - Tramadol 75mg prn  - Robaxin DCd - Flexeril 5mg 3x/day PRN  lidoderm to shoulders/ and chest wall    #DVT ppx  - Lovenox, SCD, TEDs    #Bowel Regimen  - Senna 2 tabs at qhs, miralax, Dulcolax supp    #FEN   - Diet: Consistent Carb    #Skin:  - Skin on admission: posterior cervical spine incision with sutures MEETA   - Pressure injury/Skin: encourage turning while in bed, OOB to Chair, PT/OT     #Sleep:   - Maintain quiet hours and low stim environment.    #[facility]  CODE STATUS: FULL CODE    Outpatient Follow-up (Specialty/Name of physician):    Caleb Bond)  Neurosurgery  [address], [address]  [place] [address]  Phone: [phone]  Fax: [phone]    Jose Raul Bay)  Plastic Surgery  [address] [place] [address]  Phone: [phone]  Fax: [phone]

## 2022-11-09 NOTE — CONSULT NOTE ADULT - ASSESSMENT
his is a 83 YO male with PMH of HTN, HLD, DM II, BPH with complaint of neck pain. Patient endorses increasing neck pain, gait instability and clumsiness of the hands. He ambulates with a cane. He denies fever, chills, trauma or injury. He presents to [facility] on 10/18 for scheduled Posterior C1-6 Decompression, C4-5 Posterior Column Osteotomy, C1-C7 Fusion, Complex Plastics Closure.   -allow steri strips to auto separate  -okay to shower in 2 days  -pt to follow up with primary plastic surgeon post discharge.

## 2022-11-09 NOTE — PROGRESS NOTE ADULT - SUBJECTIVE AND OBJECTIVE BOX
HISTORY OF PRESENT ILLNESS  This is a 81 YO male with PMH of HTN, HLD, DM II, BPH with complaint of neck pain. Patient endorses increasing neck pain, gait instability and clumsiness of the hands. He ambulates with a cane. He denies fever, chills, trauma or injury. He presents to [facility] on 10/18 for scheduled Posterior C1-6 Decompression, C4-5 Posterior Column Osteotomy, C1-C7 Fusion, Complex Plastics Closure. Course complicated by severe constipation now resolved, leukocytosis, hyponatremia- stable on salt tablet, fall, urinary retention requiring straight catheterization, intermittent confusion likely related to pain medication and hospital setting. Noted with  Left deltoid weakness. MR Cervical spine 10/26/22 without cord compression. Surgical drain removed 10/27/22. Patient was evaluated by PM&R and therapy for functional deficits, gait/ADL impairments and acute rehabilitation was recommended. Patient was medically optimized for discharge to [facility] IRU on 10/27/22.         SUBJECTIVE: Patient seen and examined. No acute overnight events. NAD. He notes his strength is improving in his left arm. No other complaints.       REVIEW OF SYSTEMS  No fevers  Denies pain  Denies lightheadedness/dizziness      PAST MEDICAL & SURGICAL HISTORY:  Hypertension      Hyperlipidemia      Chronic kidney disease      Diabetes mellitus  -patient states prediabetes, not on medication      Benign prostate hyperplasia      COVID-19 virus infection  -dec 2021 (cough, fever, malaise)      History of carpal tunnel surgery  -bilateral hands      History of cataract surgery  -bilateral      H/O colonoscopy    VITALS  Vital Signs Last 24 Hrs  T(C): 36.4 (09 Nov 2022 07:41), Max: 36.7 (08 Nov 2022 21:26)  T(F): 97.6 (09 Nov 2022 07:41), Max: 98.1 (08 Nov 2022 21:26)  HR: 87 (09 Nov 2022 07:41) (84 - 97)  BP: 152/90 (09 Nov 2022 07:41) (141/75 - 156/88)  BP(mean): --  RR: 15 (09 Nov 2022 07:41) (15 - 18)  SpO2: 95% (09 Nov 2022 07:41) (94% - 98%)    Parameters below as of 09 Nov 2022 07:41  Patient On (Oxygen Delivery Method): room air      CURRENT MEDICATIONS  MEDICATIONS  (STANDING):  acetaminophen     Tablet .. 975 milliGRAM(s) Oral every 8 hours  amLODIPine   Tablet 10 milliGRAM(s) Oral at bedtime  aspirin  chewable 81 milliGRAM(s) Oral daily  atorvastatin 40 milliGRAM(s) Oral at bedtime  dextrose 5%. 1000 milliLiter(s) (50 mL/Hr) IV Continuous <Continuous>  dextrose 5%. 1000 milliLiter(s) (100 mL/Hr) IV Continuous <Continuous>  dextrose 50% Injectable 25 Gram(s) IV Push once  dextrose 50% Injectable 12.5 Gram(s) IV Push once  dextrose 50% Injectable 25 Gram(s) IV Push once  enoxaparin Injectable 40 milliGRAM(s) SubCutaneous <User Schedule>  glucagon  Injectable 1 milliGRAM(s) IntraMuscular once  insulin lispro (ADMELOG) corrective regimen sliding scale   SubCutaneous three times a day before meals  insulin lispro (ADMELOG) corrective regimen sliding scale   SubCutaneous at bedtime  lidocaine   4% Patch 2 Patch Transdermal <User Schedule>  lidocaine   4% Patch 1 Patch Transdermal <User Schedule>  losartan 50 milliGRAM(s) Oral daily  magnesium hydroxide Suspension 30 milliLiter(s) Oral every 12 hours    MEDICATIONS  (PRN):  bisacodyl Suppository 10 milliGRAM(s) Rectal daily PRN Constipation  cyclobenzaprine 5 milliGRAM(s) Oral three times a day PRN Muscle Spasm  dextrose Oral Gel 15 Gram(s) Oral once PRN Blood Glucose LESS THAN 70 milliGRAM(s)/deciliter  loperamide 2 milliGRAM(s) Oral once PRN Diarrhea  OLANZapine Injectable 2.5 milliGRAM(s) IntraMuscular every 12 hours PRN severe agitation  traMADol 75 milliGRAM(s) Oral every 6 hours PRN Severe Pain (7 - 10)    PHYSICAL EXAM:     Gen - NAD, Comfortable  HEENT - NCAT, EOMI   Neck - Supple, +posterior neck incision intact, +sutures  Pulm - no respiratory distress, CTAB  Cardiovascular - RRR,  Chest - good respiratory effort  Abdomen - Soft, NT/ND, +BS  Extremities - no edema, no calf tenderness  Neuro-     Cognitive - AAOx4     Communication - Fluent, Comprehensible     Attention: Intact     Cranial Nerves - CN 2-12 intact. No facial asymmetry, Tongue midline, EOMI,     Motor -                     LEFT    UE - ShAB 3/5, EF 4/5, EE 4/5,  4/5                    RIGHT UE - ShAB 4/5, EF 5/5, EE 5/5,  5/5                    LEFT    LE - HF 5/5, KE 5/5, DF 5/5, PF 5/5                    RIGHT LE - HF 5/5, KE 5/5, DF 5/5, PF 5/5        Sensory - Intact to LT      Reflexes - DTR Intact     Coordination - + dysmetria to left arm     Tone - normal  MSK: left arm weakness  Psychiatric - Mood stable, Affect WNL      Continue comprehensive acute rehab program consisting of 3hrs/day of OT/PT and SLP.

## 2022-11-09 NOTE — PROGRESS NOTE ADULT - SUBJECTIVE AND OBJECTIVE BOX
Patient is a 82y old  Male who presents with a chief complaint of Cervical Laminectomy (09 Nov 2022 10:24)      24 HOUR EVENTS:  No overnight events reported.     SUBJECTIVE:  Patient seen and examined at bedside.     ALLERGIES:  penicillin (Rash)    MEDICATIONS  (STANDING):  acetaminophen     Tablet .. 975 milliGRAM(s) Oral every 8 hours  amLODIPine   Tablet 10 milliGRAM(s) Oral at bedtime  aspirin  chewable 81 milliGRAM(s) Oral daily  atorvastatin 40 milliGRAM(s) Oral at bedtime  dextrose 5%. 1000 milliLiter(s) (50 mL/Hr) IV Continuous <Continuous>  dextrose 5%. 1000 milliLiter(s) (100 mL/Hr) IV Continuous <Continuous>  dextrose 50% Injectable 25 Gram(s) IV Push once  dextrose 50% Injectable 12.5 Gram(s) IV Push once  dextrose 50% Injectable 25 Gram(s) IV Push once  enoxaparin Injectable 40 milliGRAM(s) SubCutaneous <User Schedule>  glucagon  Injectable 1 milliGRAM(s) IntraMuscular once  insulin lispro (ADMELOG) corrective regimen sliding scale   SubCutaneous three times a day before meals  insulin lispro (ADMELOG) corrective regimen sliding scale   SubCutaneous at bedtime  lidocaine   4% Patch 2 Patch Transdermal <User Schedule>  lidocaine   4% Patch 1 Patch Transdermal <User Schedule>  losartan 50 milliGRAM(s) Oral daily  magnesium hydroxide Suspension 30 milliLiter(s) Oral every 12 hours    MEDICATIONS  (PRN):  bisacodyl Suppository 10 milliGRAM(s) Rectal daily PRN Constipation  cyclobenzaprine 5 milliGRAM(s) Oral three times a day PRN Muscle Spasm  dextrose Oral Gel 15 Gram(s) Oral once PRN Blood Glucose LESS THAN 70 milliGRAM(s)/deciliter  loperamide 2 milliGRAM(s) Oral once PRN Diarrhea  OLANZapine Injectable 2.5 milliGRAM(s) IntraMuscular every 12 hours PRN severe agitation  traMADol 75 milliGRAM(s) Oral every 6 hours PRN Severe Pain (7 - 10)    Vital Signs Last 24 Hrs  T(F): 97.6 (09 Nov 2022 07:41), Max: 98.1 (08 Nov 2022 21:26)  HR: 87 (09 Nov 2022 07:41) (84 - 97)  BP: 152/90 (09 Nov 2022 07:41) (141/75 - 156/88)  RR: 15 (09 Nov 2022 07:41) (15 - 18)  SpO2: 95% (09 Nov 2022 07:41) (94% - 98%)  I&O's Summary    PHYSICAL EXAM:  General: NAD, A/O   HEENT: Moist mucous membranes, no thrush; wearing soft cervical collar.  Neck: Supple, No JVD  Lungs: Clear to auscultation bilaterally, good air entry, non-labored breathing  Cardio: RRR, S1/S2, No murmur  Abdomen: Soft, Nontender, Nondistended; Bowel sounds present  Extremities: No calf tenderness, No pitting edema    LABS:                        11.5   6.14  )-----------( 451      ( 07 Nov 2022 06:35 )             34.3     11-07    140  |  103  |  34  ----------------------------<  142  4.4   |  27  |  1.34    Ca    9.2      07 Nov 2022 06:35    TPro  6.9  /  Alb  2.8  /  TBili  0.2  /  DBili  x   /  AST  35  /  ALT  46  /  AlkPhos  138  11-07    POCT Blood Glucose.: 113 mg/dL (09 Nov 2022 11:53)  POCT Blood Glucose.: 121 mg/dL (09 Nov 2022 07:46)  POCT Blood Glucose.: 104 mg/dL (08 Nov 2022 21:25)  POCT Blood Glucose.: 119 mg/dL (08 Nov 2022 16:33)          COVID-19 PCR: NotDetec (10-30-22 @ 17:48)  COVID-19 PCR: NotDetec (10-27-22 @ 23:11)  COVID-19 PCR: NotDetec (10-24-22 @ 07:47)  COVID-19 PCR: NotDetec (10-15-22 @ 13:40)    RADIOLOGY & ADDITIONAL TESTS:    Care Discussed with Consultants/Other Providers:

## 2022-11-09 NOTE — PROGRESS NOTE ADULT - ASSESSMENT
82M with PMH HTN, HLD, Type 2 DM, BPH presents with complaint of neck pain, presented to [facility] on 10/18 for scheduled Posterior C1-6 Decompression, C4-5 Posterior Column Osteotomy, C1-C7 Fusion, Complex Plastics Closure. Course complicated by severe constipation, leukocytosis, hyponatremia, fall, urinary retention requiring straight catheterization, intermittent confusion likely related to pain medication and hospital setting. Now admitted to [facility] for initiation of multidisciplinary rehab program.     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  Mets sepsis criteria with fever, leukocytosis, change in mental status metabolic encephalopathy  -s/p ceftin    #HTN  -Continue Amlodipine, Losartan  -Monitor vitals     #HLD  -Continue Lipitor    #Type 2 DM  A1C 6.0  -sugars have been wnl, not requiring insulin, will d/c FS  -hypoglycemia protocol  -Blood glucose goal 100-180 in hospital setting    #Acute metabolic encephalopathy, likely due to infection, delirium, w/ agitation/combativeness - resolved.  # Severe sepsis 2/2 UTI - resolved  -Continue Zyprexa PRN    #Acute urinary retention - resolved.  -Continue bladder scan and straight cath per protocol  -Avoid anti-cholinergic medications    #Prophylactic measure  -DVT ppx: Lovenox  -Bowel regimen

## 2022-11-09 NOTE — CONSULT NOTE ADULT - SUBJECTIVE AND OBJECTIVE BOX
CC:  Patient is a 82y old  Male who presents with a chief complaint s/p Cervical Laminectomy 3 weeks ago      HPI:  This is a 81 YO male with PMH of HTN, HLD, DM II, BPH with complaint of neck pain. Patient endorses increasing neck pain, gait instability and clumsiness of the hands. He ambulates with a cane. He denies fever, chills, trauma or injury. He presents to [facility] on 10/18 for scheduled Posterior C1-6 Decompression, C4-5 Posterior Column Osteotomy, C1-C7 Fusion, Complex Plastics Closure. Course complicated by severe constipation now resolved, leukocytosis, hyponatremia- stable on salt tablet, fall, urinary retention requiring straight catheterization, intermittent confusion likely related to pain medication and hospital setting. Noted with  Left deltoid weakness. MR Cervical spine 10/26/22 without cord compression. Surgical drain removed 10/27/22. Patient was evaluated by PM&R and therapy for functional deficits, gait/ADL impairments and acute rehabilitation was recommended. Patient was medically optimized for discharge to [facility]       PAST MEDICAL & SURGICAL HISTORY:  Hypertension      Hyperlipidemia      Chronic kidney disease      Diabetes mellitus  -patient states prediabetes, not on medication      Benign prostate hyperplasia      COVID-19 virus infection  -dec 2021 (cough, fever, malaise)      History of carpal tunnel surgery  -bilateral hands      History of cataract surgery  -bilateral      H/O colonoscopy          Allergies    penicillin (Rash)    Intolerances        SOCIAL HISTORY          Smoking: Yes [ ]  No [ ]   ______pk yrs          ETOH  Yes [ ]  No [ ]  Social [ ]          DRUGS:  Yes [ ]  No [ ]  if so what______________    FAMILY HISTORY:      MEDICATIONS  (STANDING):  acetaminophen     Tablet .. 975 milliGRAM(s) Oral every 8 hours  amLODIPine   Tablet 10 milliGRAM(s) Oral at bedtime  aspirin  chewable 81 milliGRAM(s) Oral daily  atorvastatin 40 milliGRAM(s) Oral at bedtime  dextrose 5%. 1000 milliLiter(s) (50 mL/Hr) IV Continuous <Continuous>  dextrose 5%. 1000 milliLiter(s) (100 mL/Hr) IV Continuous <Continuous>  dextrose 50% Injectable 25 Gram(s) IV Push once  dextrose 50% Injectable 12.5 Gram(s) IV Push once  dextrose 50% Injectable 25 Gram(s) IV Push once  enoxaparin Injectable 40 milliGRAM(s) SubCutaneous <User Schedule>  glucagon  Injectable 1 milliGRAM(s) IntraMuscular once  insulin lispro (ADMELOG) corrective regimen sliding scale   SubCutaneous three times a day before meals  insulin lispro (ADMELOG) corrective regimen sliding scale   SubCutaneous at bedtime  lidocaine   4% Patch 2 Patch Transdermal <User Schedule>  lidocaine   4% Patch 1 Patch Transdermal <User Schedule>  losartan 50 milliGRAM(s) Oral daily  magnesium hydroxide Suspension 30 milliLiter(s) Oral every 12 hours    MEDICATIONS  (PRN):  bisacodyl Suppository 10 milliGRAM(s) Rectal daily PRN Constipation  cyclobenzaprine 5 milliGRAM(s) Oral three times a day PRN Muscle Spasm  dextrose Oral Gel 15 Gram(s) Oral once PRN Blood Glucose LESS THAN 70 milliGRAM(s)/deciliter  loperamide 2 milliGRAM(s) Oral once PRN Diarrhea  OLANZapine Injectable 2.5 milliGRAM(s) IntraMuscular every 12 hours PRN severe agitation  traMADol 75 milliGRAM(s) Oral every 6 hours PRN Severe Pain (7 - 10)         Review of systems:  General:  no fever or chills  Pulmonary:  no SOB  Cardiac:  denies chest pain, palpitations  GI:  no nausea, vomitting, or abddominal pain  :  no increase frequency, or burning  Heme:  no easy bruising with minor trauma  Musculoskeletal:  No history of back pain or trauma  Skin:  no history of rashes, injury, trauma  Neuro:  no weakness         Vital Signs Last 24 Hrs  T(C): 36.4 (09 Nov 2022 07:41), Max: 36.7 (08 Nov 2022 21:26)  T(F): 97.6 (09 Nov 2022 07:41), Max: 98.1 (08 Nov 2022 21:26)  HR: 87 (09 Nov 2022 07:41) (84 - 97)  BP: 152/90 (09 Nov 2022 07:41) (141/75 - 156/88)  BP(mean): --  RR: 15 (09 Nov 2022 07:41) (15 - 18)  SpO2: 95% (09 Nov 2022 07:41) (94% - 98%)    Parameters below as of 09 Nov 2022 07:41  Patient On (Oxygen Delivery Method): room air        Physical Exam:    General:  Appears stated age, well-groomed, well-nourished, no distress  Extremities: wnl   Skin:   w/lo rash  other:  post neck incision with sutures, sutures removed, incision intact     LABS:                RADIOLOGY & ADDITIONAL STUDIES:    Risks, benefits, and alternatives to treatment discussed. All questions answered with understanding.    Procedure Performed:  (  )Yes     (  )No  Name of Procedure:      [  ]Debridement     [  ]I&D    [  ]Laceration Repair     [  ]Other:  (  )partial thickness     (  )full thickness     (  )subcutaneous     (  )muscle/tendon     (  )bone  (  )sharp     (  )surgical

## 2022-11-10 ENCOUNTER — TRANSCRIPTION ENCOUNTER (OUTPATIENT)
Age: 82
End: 2022-11-10

## 2022-11-10 LAB
ALBUMIN SERPL ELPH-MCNC: 3.1 G/DL — LOW (ref 3.3–5)
ALP SERPL-CCNC: 140 U/L — HIGH (ref 40–120)
ALT FLD-CCNC: 43 U/L — SIGNIFICANT CHANGE UP (ref 10–45)
ANION GAP SERPL CALC-SCNC: 10 MMOL/L — SIGNIFICANT CHANGE UP (ref 5–17)
AST SERPL-CCNC: 39 U/L — SIGNIFICANT CHANGE UP (ref 10–40)
BILIRUB SERPL-MCNC: 0.4 MG/DL — SIGNIFICANT CHANGE UP (ref 0.2–1.2)
BUN SERPL-MCNC: 30 MG/DL — HIGH (ref 7–23)
CALCIUM SERPL-MCNC: 9.3 MG/DL — SIGNIFICANT CHANGE UP (ref 8.4–10.5)
CHLORIDE SERPL-SCNC: 103 MMOL/L — SIGNIFICANT CHANGE UP (ref 96–108)
CO2 SERPL-SCNC: 28 MMOL/L — SIGNIFICANT CHANGE UP (ref 22–31)
CREAT SERPL-MCNC: 1.28 MG/DL — SIGNIFICANT CHANGE UP (ref 0.5–1.3)
EGFR: 55 ML/MIN/1.73M2 — LOW
GLUCOSE SERPL-MCNC: 119 MG/DL — HIGH (ref 70–99)
HCT VFR BLD CALC: 37.9 % — LOW (ref 39–50)
HGB BLD-MCNC: 12.5 G/DL — LOW (ref 13–17)
MCHC RBC-ENTMCNC: 30.2 PG — SIGNIFICANT CHANGE UP (ref 27–34)
MCHC RBC-ENTMCNC: 33 GM/DL — SIGNIFICANT CHANGE UP (ref 32–36)
MCV RBC AUTO: 91.5 FL — SIGNIFICANT CHANGE UP (ref 80–100)
NRBC # BLD: 0 /100 WBCS — SIGNIFICANT CHANGE UP (ref 0–0)
PLATELET # BLD AUTO: 446 K/UL — HIGH (ref 150–400)
POTASSIUM SERPL-MCNC: 4.1 MMOL/L — SIGNIFICANT CHANGE UP (ref 3.5–5.3)
POTASSIUM SERPL-SCNC: 4.1 MMOL/L — SIGNIFICANT CHANGE UP (ref 3.5–5.3)
PROT SERPL-MCNC: 6.9 G/DL — SIGNIFICANT CHANGE UP (ref 6–8.3)
RBC # BLD: 4.14 M/UL — LOW (ref 4.2–5.8)
RBC # FLD: 13.2 % — SIGNIFICANT CHANGE UP (ref 10.3–14.5)
SODIUM SERPL-SCNC: 141 MMOL/L — SIGNIFICANT CHANGE UP (ref 135–145)
WBC # BLD: 7.49 K/UL — SIGNIFICANT CHANGE UP (ref 3.8–10.5)
WBC # FLD AUTO: 7.49 K/UL — SIGNIFICANT CHANGE UP (ref 3.8–10.5)

## 2022-11-10 PROCEDURE — 99232 SBSQ HOSP IP/OBS MODERATE 35: CPT

## 2022-11-10 RX ORDER — SENNA PLUS 8.6 MG/1
2 TABLET ORAL AT BEDTIME
Refills: 0 | Status: DISCONTINUED | OUTPATIENT
Start: 2022-11-10 | End: 2022-11-11

## 2022-11-10 RX ORDER — ACETAMINOPHEN 500 MG
650 TABLET ORAL EVERY 8 HOURS
Refills: 0 | Status: DISCONTINUED | OUTPATIENT
Start: 2022-11-10 | End: 2022-11-11

## 2022-11-10 RX ADMIN — AMLODIPINE BESYLATE 10 MILLIGRAM(S): 2.5 TABLET ORAL at 21:27

## 2022-11-10 RX ADMIN — LOSARTAN POTASSIUM 50 MILLIGRAM(S): 100 TABLET, FILM COATED ORAL at 05:05

## 2022-11-10 RX ADMIN — Medication 650 MILLIGRAM(S): at 21:34

## 2022-11-10 RX ADMIN — CYCLOBENZAPRINE HYDROCHLORIDE 5 MILLIGRAM(S): 10 TABLET, FILM COATED ORAL at 12:54

## 2022-11-10 RX ADMIN — Medication 975 MILLIGRAM(S): at 05:06

## 2022-11-10 RX ADMIN — Medication 650 MILLIGRAM(S): at 14:02

## 2022-11-10 RX ADMIN — Medication 81 MILLIGRAM(S): at 12:53

## 2022-11-10 RX ADMIN — Medication 975 MILLIGRAM(S): at 05:57

## 2022-11-10 RX ADMIN — SENNA PLUS 2 TABLET(S): 8.6 TABLET ORAL at 21:34

## 2022-11-10 RX ADMIN — ATORVASTATIN CALCIUM 40 MILLIGRAM(S): 80 TABLET, FILM COATED ORAL at 21:26

## 2022-11-10 RX ADMIN — Medication 650 MILLIGRAM(S): at 22:30

## 2022-11-10 RX ADMIN — Medication 650 MILLIGRAM(S): at 15:02

## 2022-11-10 RX ADMIN — MAGNESIUM HYDROXIDE 30 MILLILITER(S): 400 TABLET, CHEWABLE ORAL at 05:05

## 2022-11-10 RX ADMIN — ENOXAPARIN SODIUM 40 MILLIGRAM(S): 100 INJECTION SUBCUTANEOUS at 17:41

## 2022-11-10 NOTE — DISCHARGE NOTE PROVIDER - PROVIDER TOKENS
PROVIDER:[TOKEN:[[id number]],FOLLOWUP:[2 weeks]],PROVIDER:[TOKEN:[[id number]],FOLLOWUP:[2 weeks]] PROVIDER:[TOKEN:[[id number]],FOLLOWUP:[2 weeks]],PROVIDER:[TOKEN:[[id number]],FOLLOWUP:[2 weeks]],PROVIDER:[TOKEN:[[id number]],FOLLOWUP:[1 month]]

## 2022-11-10 NOTE — DISCHARGE NOTE PROVIDER - HOSPITAL COURSE
This is a 83 YO male with PMH of HTN, HLD, DM II, BPH with complaint of neck pain. Patient endorses increasing neck pain, gait instability and clumsiness of the hands. He ambulates with a cane. He denies fever, chills, trauma or injury. He presents to [facility] on 10/18 for scheduled Posterior C1-6 Decompression, C4-5 Posterior Column Osteotomy, C1-C7 Fusion, Complex Plastics Closure. Course complicated by severe constipation now resolved, leukocytosis, hyponatremia- stable on salt tablet, fall, urinary retention requiring straight catheterization, intermittent confusion likely related to pain medication and hospital setting. Noted with  Left deltoid weakness. MR Cervical spine 10/26/22 without cord compression. Surgical drain removed 10/27/22. Patient was evaluated by PM&R and therapy for functional deficits, gait/ADL impairments and acute rehabilitation was recommended. Patient was medically optimized for discharge to [facility] IRU on 10/27/22.       Rehab Course significant for delirium and agitation initially on admission with impulsivity requiring zyprexa for severe agitation. Opioids and muscle relaxants were discontinued. Delirum resolved and patient no longer required zyprexa. Patient also with urinary retention with [place] which was removed and trial of void was successful. Patient also with SIRS with UTI and/or PNA and treated with cephalosporin with improvement . Blood ctulures were negative. CT chest with airspace opacity LLL, urine cx w/ serratia. He was seen by infectious disease and managed accordingly with antibiotics which he completed.  Patient's sutures were removed by plastic surgery at rehab, without any issues noted.    All other medical co-morbidities were stable.     Pt tolerated course of inpatient PT/OT/SLP rehab with significant functional improvements and met rehab goals prior to discharge.    Pt was medically cleared on 11/11 for discharge home.    Pt will follow up with the following: neurosx and plastic sx, pcp, rehab.

## 2022-11-10 NOTE — DISCHARGE NOTE PROVIDER - CARE PROVIDER_API CALL
Caleb Bond)  Neurosurgery  [address], [address]  [place] [address]  Phone: [phone]  Fax: [phone]  Follow Up Time: 2 weeks    Jose Raul Bay)  Plastic Surgery  [address] [place] [address]  Phone: [phone]  Fax: [phone]  Follow Up Time: 2 weeks   Caleb Bond)  Neurosurgery  [address], [address]  [place] [address]  Phone: [phone]  Fax: [phone]  Follow Up Time: 2 weeks    Jose Raul Bay)  Plastic Surgery  [address] [place] [address]  Phone: [phone]  Fax: [phone]  Follow Up Time: 2 weeks    Luan Mares)  PhysicalRehab Medicine  [address] [place] [address]  Phone: [phone]  Fax: [phone]  Follow Up Time: 1 month

## 2022-11-10 NOTE — PROGRESS NOTE ADULT - ASSESSMENT
ASSESSMENT/PLAN  This is a 83 YO male with PMH of HTN, HLD, DM II, BPH with complaint of neck pain. cervical myelopathy  admitted to rehab after scheduled Posterior C1-6 Decompression, C4-5 Posterior Column Osteotomy, C1-C7 Fusion, Complex Plastics Closure on 10/18  Course complicated by severe constipation now resolved, leukocytosis, hyponatremia, fall, urinary retention requiring straight catheterization. Patient now with gait Instability, ADL impairments and Functional impairments.    #Cervical cord compression with myelopathy  - Posterior C1-6 Decompression, C4-5 Posterior Column Osteotomy, C1-C7 Fusion, Complex Plastics Closure on 10/18  - Comprehensive Rehab Program: PT/OT, 3hours daily and 5 days weekly  - PT: Focused on improving strength, endurance, coordination, balance, functional mobility, and transfers  - OT: Focused on improving strength, fine motor skills, coordination, posture and ADLs.  - For neck pain - tramadol 75mg Q6h PRN and flexeril 5mg TID PRN - will d/c if not requiring PRNs, lidoderm added  - precautions: fall, aspiration, spinal  - per discharge instructoins evaluation and suture removal around 11/7/22 by plastics at Dr Bay office as complex plastics closure- plastics here (Dr Robison) consulted on 11/9 to eval sutures for removal.   - CT neck soft tissue 10/30 w/ post op changes w/o abscess  - soft collar while OOB    #SIRS, PNA/UTI - Resolved  -  Urine Cx serratia sens to cefipime Blood Cx NGTD  - CT Chest 10/29 most pertinent for some airspace opacity in LLL  - cefepime narrowed to ceftin 11/2, completed 11/8 per ID recs  - probiotics while on abx - d/suzan 11/8  - MRSA nares negative  - Monitor for fevers.    #Acute metabolic encephalopathy, likely due to delirium, w/ agitation/combativeness - Resolved  # Suicidal ideation  -Pt noted to have intermittent confusion at OSH, thought to be related to pain meds, delirium, and urinary retention. Very agitated and confused when first admitted here, now resolved. Patient is oriented and calm.  - Oxycodone and flexeril discontinued on 10/29  -off Zyprexa  -No longer requiring 1:1 at this time.     # acute urinary retention  #UTI  - garay catheter discontinued for TOV 10/31 due to improvement in patient's mentation and constipation  -PVRs currently low- will Dc PVRs   - bowel regimen    #HTN  - Amlodipine 10mg daily   - Losartan 50mg daily    #HLD  - Lipitor 40mg at qhs    #Hyponatremia - resolved  -last  11/3  -Off salt tabs  -BMP 11/10    #Pain management  - Tylenol 975 mg Q8h -- monitor LFTS on ATC Tylenol  - Tramadol 75mg prn  - Robaxin DCd - Flexeril 5mg 3x/day PRN  lidoderm to shoulders/ and chest wall    #DVT ppx  - Lovenox, SCD, TEDs    #Bowel Regimen  - Senna 2 tabs at qhs, miralax, Dulcolax supp    #FEN   - Diet: Consistent Carb    #Skin:  - Skin on admission: posterior cervical spine incision with sutures MEETA   - Pressure injury/Skin: encourage turning while in bed, OOB to Chair, PT/OT     #Sleep:   - Maintain quiet hours and low stim environment.    #[facility]  CODE STATUS: FULL CODE    Outpatient Follow-up (Specialty/Name of physician):    Caleb Bond)  Neurosurgery  [address], [address]  [place] [address]  Phone: [phone]  Fax: [phone]    Jose Raul Bay)  Plastic Surgery  [address] [place] [address]  Phone: [phone]  Fax: [phone]   ASSESSMENT/PLAN  This is a 83 YO male with PMH of HTN, HLD, DM II, BPH with complaint of neck pain. cervical myelopathy  admitted to rehab after scheduled Posterior C1-6 Decompression, C4-5 Posterior Column Osteotomy, C1-C7 Fusion, Complex Plastics Closure on 10/18  Course complicated by severe constipation now resolved, leukocytosis, hyponatremia, fall, urinary retention requiring straight catheterization. Patient now with gait Instability, ADL impairments and Functional impairments.    #Cervical cord compression with myelopathy  - Posterior C1-6 Decompression, C4-5 Posterior Column Osteotomy, C1-C7 Fusion, Complex Plastics Closure on 10/18  - Comprehensive Rehab Program: PT/OT, 3hours daily and 5 days weekly  - PT: Focused on improving strength, endurance, coordination, balance, functional mobility, and transfers  - OT: Focused on improving strength, fine motor skills, coordination, posture and ADLs.  - Tylenol to 650mg, lidoderm   - precautions: fall, aspiration, spinal  - sutures removed by Plastics, ss in place CDI  - CT neck soft tissue 10/30 w/ post op changes w/o abscess  - soft collar while OOB    #SIRS, PNA/UTI - Resolved, off abx    #Acute metabolic encephalopathy   - Resolved  - Oxycodone and flexeril discontinued on 10/29  - dc prn Zyprexa  -No longer requiring 1:1 at this time.     # acute urinary retention  #UTI  -resolved  -PVRs currently low- will Dc PVRs   - bowel regimen    #HTN  - Amlodipine 10mg daily   - Losartan 50mg daily    #HLD  - Lipitor 40mg at qhs    #Hyponatremia - resolved  -Off salt tabs    #Pain management  - Tylenol 975 mg Q8h to 650mg-- monitor LFTS  - Tramadol 75mg prn-dc  - Robaxin DCd - Flexeril 5mg 3x/day PRN  lidoderm to shoulders/ and chest wall  -comfortable    #DVT ppx  - Lovenox    #Bowel Regimen  - Senna 2 tabs at qhs  #FEN   - Diet: Consistent Carb    #Sleep:   - Maintain quiet hours and low stim environment.    #[facility]  CODE STATUS: FULL CODE    Outpatient Follow-up (Specialty/Name of physician):    Caleb Bond)  Neurosurgery  [address] [place] [address], [address]  [place] [address]  Phone: [phone]  Fax: [phone]    Jose Raul Bay)  Plastic Surgery  [address] [place] [address]  Phone: [phone]  Fax: [phone]

## 2022-11-10 NOTE — PROGRESS NOTE ADULT - SUBJECTIVE AND OBJECTIVE BOX
Patient is a 82y old  Male who presents with a chief complaint of Cervical Laminectomy (10 Nov 2022 09:57)    Patient seen and examined at bedside. No acute overnight events. Denies pain/discomfort     ALLERGIES:  penicillin (Rash)    MEDICATIONS  (STANDING):  acetaminophen     Tablet .. 650 milliGRAM(s) Oral every 8 hours  amLODIPine   Tablet 10 milliGRAM(s) Oral at bedtime  aspirin  chewable 81 milliGRAM(s) Oral daily  atorvastatin 40 milliGRAM(s) Oral at bedtime  dextrose 5%. 1000 milliLiter(s) (100 mL/Hr) IV Continuous <Continuous>  dextrose 5%. 1000 milliLiter(s) (50 mL/Hr) IV Continuous <Continuous>  dextrose 50% Injectable 25 Gram(s) IV Push once  dextrose 50% Injectable 12.5 Gram(s) IV Push once  dextrose 50% Injectable 25 Gram(s) IV Push once  enoxaparin Injectable 40 milliGRAM(s) SubCutaneous <User Schedule>  glucagon  Injectable 1 milliGRAM(s) IntraMuscular once  lidocaine   4% Patch 2 Patch Transdermal <User Schedule>  lidocaine   4% Patch 1 Patch Transdermal <User Schedule>  losartan 50 milliGRAM(s) Oral daily  senna 2 Tablet(s) Oral at bedtime    MEDICATIONS  (PRN):  bisacodyl Suppository 10 milliGRAM(s) Rectal daily PRN Constipation  cyclobenzaprine 5 milliGRAM(s) Oral three times a day PRN Muscle Spasm  dextrose Oral Gel 15 Gram(s) Oral once PRN Blood Glucose LESS THAN 70 milliGRAM(s)/deciliter  loperamide 2 milliGRAM(s) Oral once PRN Diarrhea    Vital Signs Last 24 Hrs  T(F): 97.3 (10 Nov 2022 09:37), Max: 97.9 (09 Nov 2022 19:31)  HR: 100 (10 Nov 2022 09:37) (83 - 100)  BP: 132/71 (10 Nov 2022 09:37) (132/71 - 156/64)  RR: 16 (10 Nov 2022 09:37) (15 - 16)  SpO2: 97% (10 Nov 2022 09:37) (97% - 98%)  I&O's Summary    PHYSICAL EXAM:  General: NAD, Awake, alert   ENT: MMM, no tonsilar exudate  Neck: Supple +soft c-collar  Lungs: Clear to auscultation bilaterally, no wheezes. Good air entry bilaterally   Cardio: RRR, S1/S2, No murmurs  Abdomen: Soft, Nontender, Nondistended; Bowel sounds present  Extremities: No calf tenderness, No pitting edema    LABS:                        12.5   7.49  )-----------( 446      ( 10 Nov 2022 06:34 )             37.9       11-10    141  |  103  |  30  ----------------------------<  119  4.1   |  28  |  1.28    Ca    9.3      10 Nov 2022 06:34    TPro  6.9  /  Alb  3.1  /  TBili  0.4  /  DBili  x   /  AST  39  /  ALT  43  /  AlkPhos  140  11-10     POCT Blood Glucose.: 127 mg/dL (09 Nov 2022 21:22)  POCT Blood Glucose.: 121 mg/dL (09 Nov 2022 17:23)  POCT Blood Glucose.: 113 mg/dL (09 Nov 2022 11:53)    COVID-19 PCR: NotDetec (11-09-22 @ 11:30)  COVID-19 PCR: NotDetec (10-30-22 @ 17:48)  COVID-19 PCR: NotDetec (10-27-22 @ 23:11)  COVID-19 PCR: NotDetec (10-24-22 @ 07:47)  COVID-19 PCR: NotDetec (10-15-22 @ 13:40)    RADIOLOGY & ADDITIONAL TESTS:     Care Discussed with Consultants/Other Providers:

## 2022-11-10 NOTE — PROGRESS NOTE ADULT - SUBJECTIVE AND OBJECTIVE BOX
HISTORY OF PRESENT ILLNESS  This is a 83 YO male with PMH of HTN, HLD, DM II, BPH with complaint of neck pain. Patient endorses increasing neck pain, gait instability and clumsiness of the hands. He ambulates with a cane. He denies fever, chills, trauma or injury. He presents to [facility] on 10/18 for scheduled Posterior C1-6 Decompression, C4-5 Posterior Column Osteotomy, C1-C7 Fusion, Complex Plastics Closure. Course complicated by severe constipation now resolved, leukocytosis, hyponatremia- stable on salt tablet, fall, urinary retention requiring straight catheterization, intermittent confusion likely related to pain medication and hospital setting. Noted with  Left deltoid weakness. MR Cervical spine 10/26/22 without cord compression. Surgical drain removed 10/27/22. Patient was evaluated by PM&R and therapy for functional deficits, gait/ADL impairments and acute rehabilitation was recommended. Patient was medically optimized for discharge to [facility] IRU on 10/27/22.         SUBJECTIVE: Patient seen and examined. No acute overnight events. He notes some itchiness at the site of the incision but denies any pain. He notes his strength is improving in his left arm. No other complaints.       REVIEW OF SYSTEMS  No fevers  Denies pain  Denies lightheadedness/dizziness      VITALS  Vital Signs Last 24 Hrs  T(C): 36.3 (10 Nov 2022 09:37), Max: 36.6 (09 Nov 2022 19:31)  T(F): 97.3 (10 Nov 2022 09:37), Max: 97.9 (09 Nov 2022 19:31)  HR: 100 (10 Nov 2022 09:37) (83 - 100)  BP: 132/71 (10 Nov 2022 09:37) (132/71 - 156/64)  BP(mean): --  RR: 16 (10 Nov 2022 09:37) (15 - 16)  SpO2: 97% (10 Nov 2022 09:37) (97% - 98%)    Parameters below as of 10 Nov 2022 09:37  Patient On (Oxygen Delivery Method): room air      RECENT LABS                        12.5   7.49  )-----------( 446      ( 10 Nov 2022 06:34 )             37.9     11-10    141  |  103  |  30<H>  ----------------------------<  119<H>  4.1   |  28  |  1.28    Ca    9.3      10 Nov 2022 06:34    TPro  6.9  /  Alb  3.1<L>  /  TBili  0.4  /  DBili  x   /  AST  39  /  ALT  43  /  AlkPhos  140<H>  11-10      LIVER FUNCTIONS - ( 10 Nov 2022 06:34 )  Alb: 3.1 g/dL / Pro: 6.9 g/dL / ALK PHOS: 140 U/L / ALT: 43 U/L / AST: 39 U/L / GGT: x           PHYSICAL EXAM:     Gen - NAD, Comfortable  HEENT - NCAT, EOMI   Neck - Supple, +posterior neck incision intact, +sutures  Pulm - no respiratory distress, CTAB  Cardiovascular - RRR,  Chest - good respiratory effort  Abdomen - Soft, NT/ND, +BS  Extremities - no edema, no calf tenderness  Neuro-     Cognitive - AAOx4     Communication - Fluent, Comprehensible     Attention: Intact     Cranial Nerves - CN 2-12 intact. No facial asymmetry, Tongue midline, EOMI,     Motor -                     LEFT    UE - ShAB 3/5, EF 4/5, EE 4/5,  4/5                    RIGHT UE - ShAB 4/5, EF 5/5, EE 5/5,  5/5                    LEFT    LE - HF 5/5, KE 5/5, DF 5/5, PF 5/5                    RIGHT LE - HF 5/5, KE 5/5, DF 5/5, PF 5/5        Sensory - Intact to LT      Reflexes - DTR Intact     Coordination - + dysmetria to left arm     Tone - normal  MSK: left arm weakness  Psychiatric - Mood stable, Affect WNL      CURRENT MEDICATIONS  MEDICATIONS  (STANDING):  acetaminophen     Tablet .. 975 milliGRAM(s) Oral every 8 hours  amLODIPine   Tablet 10 milliGRAM(s) Oral at bedtime  aspirin  chewable 81 milliGRAM(s) Oral daily  atorvastatin 40 milliGRAM(s) Oral at bedtime  dextrose 5%. 1000 milliLiter(s) (100 mL/Hr) IV Continuous <Continuous>  dextrose 5%. 1000 milliLiter(s) (50 mL/Hr) IV Continuous <Continuous>  dextrose 50% Injectable 25 Gram(s) IV Push once  dextrose 50% Injectable 12.5 Gram(s) IV Push once  dextrose 50% Injectable 25 Gram(s) IV Push once  enoxaparin Injectable 40 milliGRAM(s) SubCutaneous <User Schedule>  glucagon  Injectable 1 milliGRAM(s) IntraMuscular once  lidocaine   4% Patch 1 Patch Transdermal <User Schedule>  lidocaine   4% Patch 2 Patch Transdermal <User Schedule>  losartan 50 milliGRAM(s) Oral daily  magnesium hydroxide Suspension 30 milliLiter(s) Oral every 12 hours    MEDICATIONS  (PRN):  bisacodyl Suppository 10 milliGRAM(s) Rectal daily PRN Constipation  cyclobenzaprine 5 milliGRAM(s) Oral three times a day PRN Muscle Spasm  dextrose Oral Gel 15 Gram(s) Oral once PRN Blood Glucose LESS THAN 70 milliGRAM(s)/deciliter  loperamide 2 milliGRAM(s) Oral once PRN Diarrhea  OLANZapine Injectable 2.5 milliGRAM(s) IntraMuscular every 12 hours PRN severe agitation  traMADol 75 milliGRAM(s) Oral every 6 hours PRN Severe Pain (7 - 10)      Continue comprehensive acute rehab program consisting of 3hrs/day of OT/PT and SLP. HISTORY OF PRESENT ILLNESS  This is a 81 YO male with PMH of HTN, HLD, DM II, BPH with complaint of neck pain. Patient endorses increasing neck pain, gait instability and clumsiness of the hands. He ambulates with a cane. He denies fever, chills, trauma or injury. He presents to [facility] on 10/18 for scheduled Posterior C1-6 Decompression, C4-5 Posterior Column Osteotomy, C1-C7 Fusion, Complex Plastics Closure. Course complicated by severe constipation now resolved, leukocytosis, hyponatremia- stable on salt tablet, fall, urinary retention requiring straight catheterization, intermittent confusion likely related to pain medication and hospital setting. Noted with  Left deltoid weakness. MR Cervical spine 10/26/22 without cord compression. Surgical drain removed 10/27/22. Patient was evaluated by PM&R and therapy for functional deficits, gait/ADL impairments and acute rehabilitation was recommended. Patient was medically optimized for discharge to [facility] IRU on 10/27/22.         SUBJECTIVE: Patient seen and examined. No acute overnight events. NAD this am. Sutures removed by plastics-healing well. He notes his strength is improving in his left arm. No other complaints.       REVIEW OF SYSTEMS  No fevers  Denies pain  Denies lightheadedness/dizziness      VITALS  Vital Signs Last 24 Hrs  T(C): 36.3 (10 Nov 2022 09:37), Max: 36.6 (09 Nov 2022 19:31)  T(F): 97.3 (10 Nov 2022 09:37), Max: 97.9 (09 Nov 2022 19:31)  HR: 100 (10 Nov 2022 09:37) (83 - 100)  BP: 132/71 (10 Nov 2022 09:37) (132/71 - 156/64)  BP(mean): --  RR: 16 (10 Nov 2022 09:37) (15 - 16)  SpO2: 97% (10 Nov 2022 09:37) (97% - 98%)    Parameters below as of 10 Nov 2022 09:37  Patient On (Oxygen Delivery Method): room air      RECENT LABS                        12.5   7.49  )-----------( 446      ( 10 Nov 2022 06:34 )             37.9     11-10    141  |  103  |  30<H>  ----------------------------<  119<H>  4.1   |  28  |  1.28    Ca    9.3      10 Nov 2022 06:34    TPro  6.9  /  Alb  3.1<L>  /  TBili  0.4  /  DBili  x   /  AST  39  /  ALT  43  /  AlkPhos  140<H>  11-10      LIVER FUNCTIONS - ( 10 Nov 2022 06:34 )  Alb: 3.1 g/dL / Pro: 6.9 g/dL / ALK PHOS: 140 U/L / ALT: 43 U/L / AST: 39 U/L / GGT: x           PHYSICAL EXAM:     Gen - NAD, Comfortable  HEENT - NCAT  Neck - Supple, +posterior neck incision intact, +steri strips CDI  Pulm - no respiratory distress, CTAB  Cardiovascular - RRR,  Chest - good respiratory effort  Abdomen - Soft, NT/ND, +BS  Extremities - no edema, no calf tenderness  Neuro- at baseline, no new weakness, alert and oriented x3     Motor -                     LEFT    UE - ShAB 3/5, EF 4/5, EE 4/5,  4/5                    RIGHT UE - ShAB 4/5, EF 5/5, EE 5/5,  5/5                    LEFT    LE - HF 5/5, KE 5/5, DF 5/5, PF 5/5                    RIGHT LE - HF 5/5, KE 5/5, DF 5/5, PF 5/5        Sensory - Intact to LT      Coordination - + dysmetria to left arm     Tone - normal  Psychiatric - Mood stable, Affect WNL      CURRENT MEDICATIONS  MEDICATIONS  (STANDING):  acetaminophen     Tablet .. 975 milliGRAM(s) Oral every 8 hours  amLODIPine   Tablet 10 milliGRAM(s) Oral at bedtime  aspirin  chewable 81 milliGRAM(s) Oral daily  atorvastatin 40 milliGRAM(s) Oral at bedtime  dextrose 5%. 1000 milliLiter(s) (100 mL/Hr) IV Continuous <Continuous>  dextrose 5%. 1000 milliLiter(s) (50 mL/Hr) IV Continuous <Continuous>  dextrose 50% Injectable 25 Gram(s) IV Push once  dextrose 50% Injectable 12.5 Gram(s) IV Push once  dextrose 50% Injectable 25 Gram(s) IV Push once  enoxaparin Injectable 40 milliGRAM(s) SubCutaneous <User Schedule>  glucagon  Injectable 1 milliGRAM(s) IntraMuscular once  lidocaine   4% Patch 1 Patch Transdermal <User Schedule>  lidocaine   4% Patch 2 Patch Transdermal <User Schedule>  losartan 50 milliGRAM(s) Oral daily  magnesium hydroxide Suspension 30 milliLiter(s) Oral every 12 hours    MEDICATIONS  (PRN):  bisacodyl Suppository 10 milliGRAM(s) Rectal daily PRN Constipation  cyclobenzaprine 5 milliGRAM(s) Oral three times a day PRN Muscle Spasm  dextrose Oral Gel 15 Gram(s) Oral once PRN Blood Glucose LESS THAN 70 milliGRAM(s)/deciliter  loperamide 2 milliGRAM(s) Oral once PRN Diarrhea  OLANZapine Injectable 2.5 milliGRAM(s) IntraMuscular every 12 hours PRN severe agitation  traMADol 75 milliGRAM(s) Oral every 6 hours PRN Severe Pain (7 - 10)      Continue comprehensive acute rehab program consisting of 3hrs/day of OT/PT and SLP.

## 2022-11-10 NOTE — DISCHARGE NOTE PROVIDER - NSDCMRMEDTOKEN_GEN_ALL_CORE_FT
amLODIPine 10 mg oral tablet: 1 tab(s) orally once a day (at bedtime)  aspirin 81 mg oral tablet: 1 tab(s) orally once a day  atorvastatin 40 mg oral tablet: 1 tab(s) orally once a day  bisacodyl 10 mg rectal suppository: 1 suppository(ies) rectal once a day, As needed, Constipation  cyclobenzaprine 10 mg oral tablet: 1 tab(s) orally every 12 hours  enoxaparin: 40 milligram(s) subcutaneously once a day  losartan 50 mg oral tablet: 1 tab(s) orally once a day  oxyCODONE 10 mg oral tablet: 1 tab(s) orally every 6 hours, As Needed - for severe pain  polyethylene glycol 3350 oral powder for reconstitution: 17 gram(s) orally once a day  senna leaf extract oral tablet: 2 tab(s) orally once a day (at bedtime)  sodium chloride 1 g oral tablet: 1 tab(s) orally every 12 hours  Tylenol 500 mg oral tablet: 2 tab(s) orally every 6 hours, As Needed - for moderate pain   amLODIPine 10 mg oral tablet: 1 tab(s) orally once a day (at bedtime)  aspirin 81 mg oral tablet: 1 tab(s) orally once a day  atorvastatin 40 mg oral tablet: 1 tab(s) orally once a day (at bedtime)  losartan 50 mg oral tablet: 1 tab(s) orally once a day

## 2022-11-10 NOTE — DISCHARGE NOTE PROVIDER - NSDCCPCAREPLAN_GEN_ALL_CORE_FT
PRINCIPAL DISCHARGE DIAGNOSIS  Diagnosis: H/O cervical spine surgery  Assessment and Plan of Treatment: You had cervical spine surgery at previous hospital. You had various issues during recovery including constipation, urinary retention, confusion. You came to acute rehab to help recover and improve your functional status before going home. You completed rehab successfully and met your goals. You must follow-up with your neurosurgeon and plastic surgeon, as well as primary care doctor and rehab doctor.      SECONDARY DISCHARGE DIAGNOSES  Diagnosis: Delirium  Assessment and Plan of Treatment: You had delirium and agitation initially during your admission. Your medications were adjusted and you no longer had issues with confusion and agitation by the end of your stay.     PRINCIPAL DISCHARGE DIAGNOSIS  Diagnosis: H/O cervical spine surgery  Assessment and Plan of Treatment: You had cervical spine surgery at previous hospital. You had various issues during recovery including constipation, urinary retention, confusion. You came to acute rehab to help recover and improve your functional status before going home. You completed rehab successfully and met your goals. You must follow-up with your neurosurgeon and plastic surgeon, as well as primary care doctor and rehab doctor. If you have pain at home you may take tylenol as needed per box instructions. Do not exceed 3grams of tylenol (acetaminophen) in 24 hours. You were receiving 650mg three times a day during rehab.      SECONDARY DISCHARGE DIAGNOSES  Diagnosis: Delirium  Assessment and Plan of Treatment: You had delirium and agitation initially during your admission. You were given medications for agitation and your medications were adjusted and you no longer had issues with confusion and agitation by the end of your stay. You no longer required medications for agitation or confusion.    Diagnosis: Urinary retention  Assessment and Plan of Treatment: you had difficulty urinating for which you had a garay catheter. Your garay catheter was removed and you successfully voided during rehab. Follow-up with your urologist if you have concerns about urinary function.

## 2022-11-10 NOTE — PROGRESS NOTE ADULT - ASSESSMENT
82M with PMH HTN, HLD, Type 2 DM, BPH presents with complaint of neck pain, presented to [facility] on 10/18 for scheduled Posterior C1-6 Decompression, C4-5 Posterior Column Osteotomy, C1-C7 Fusion, Complex Plastics Closure. Course complicated by severe constipation, leukocytosis, hyponatremia, fall, urinary retention requiring straight catheterization, intermittent confusion likely related to pain medication and hospital setting. Now admitted to [facility] for initiation of multidisciplinary rehab program.     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  Mets sepsis criteria with fever, leukocytosis, change in mental status metabolic encephalopathy  -s/p ceftin    #HTN  -Continue Amlodipine, Losartan  -Monitor vitals     #HLD  -Continue Lipitor    #Type 2 DM  A1C 6.0  -sugars have been wnl, not requiring insulin, will d/c FS  -hypoglycemia protocol  -Blood glucose goal 100-180 in hospital setting    #Acute metabolic encephalopathy, likely due to infection, delirium, w/ agitation/combativeness - resolved.  # Severe sepsis 2/2 UTI - resolved  -Continue Zyprexa PRN    #Acute urinary retention - resolved.  -Continue bladder scan and straight cath per protocol  -Avoid anti-cholinergic medications    #Prophylactic measure  -DVT ppx: Lovenox  -Bowel regimen

## 2022-11-11 ENCOUNTER — TRANSCRIPTION ENCOUNTER (OUTPATIENT)
Age: 82
End: 2022-11-11

## 2022-11-11 VITALS — WEIGHT: 192.68 LBS

## 2022-11-11 PROCEDURE — 80202 ASSAY OF VANCOMYCIN: CPT

## 2022-11-11 PROCEDURE — 85025 COMPLETE CBC W/AUTO DIFF WBC: CPT

## 2022-11-11 PROCEDURE — 85027 COMPLETE CBC AUTOMATED: CPT

## 2022-11-11 PROCEDURE — 74176 CT ABD & PELVIS W/O CONTRAST: CPT

## 2022-11-11 PROCEDURE — U0003: CPT

## 2022-11-11 PROCEDURE — 71045 X-RAY EXAM CHEST 1 VIEW: CPT

## 2022-11-11 PROCEDURE — 93005 ELECTROCARDIOGRAM TRACING: CPT

## 2022-11-11 PROCEDURE — 82962 GLUCOSE BLOOD TEST: CPT

## 2022-11-11 PROCEDURE — 99366 TEAM CONF W/PAT BY HC PROF: CPT

## 2022-11-11 PROCEDURE — 36415 COLL VENOUS BLD VENIPUNCTURE: CPT

## 2022-11-11 PROCEDURE — 87449 NOS EACH ORGANISM AG IA: CPT

## 2022-11-11 PROCEDURE — 97110 THERAPEUTIC EXERCISES: CPT

## 2022-11-11 PROCEDURE — 97167 OT EVAL HIGH COMPLEX 60 MIN: CPT

## 2022-11-11 PROCEDURE — 92523 SPEECH SOUND LANG COMPREHEN: CPT

## 2022-11-11 PROCEDURE — 87640 STAPH A DNA AMP PROBE: CPT

## 2022-11-11 PROCEDURE — 97535 SELF CARE MNGMENT TRAINING: CPT

## 2022-11-11 PROCEDURE — 92610 EVALUATE SWALLOWING FUNCTION: CPT

## 2022-11-11 PROCEDURE — 70491 CT SOFT TISSUE NECK W/DYE: CPT

## 2022-11-11 PROCEDURE — 84145 PROCALCITONIN (PCT): CPT

## 2022-11-11 PROCEDURE — 81001 URINALYSIS AUTO W/SCOPE: CPT

## 2022-11-11 PROCEDURE — 92507 TX SP LANG VOICE COMM INDIV: CPT

## 2022-11-11 PROCEDURE — 97112 NEUROMUSCULAR REEDUCATION: CPT

## 2022-11-11 PROCEDURE — 71250 CT THORAX DX C-: CPT

## 2022-11-11 PROCEDURE — 99239 HOSP IP/OBS DSCHRG MGMT >30: CPT

## 2022-11-11 PROCEDURE — 87186 SC STD MICRODIL/AGAR DIL: CPT

## 2022-11-11 PROCEDURE — 80053 COMPREHEN METABOLIC PANEL: CPT

## 2022-11-11 PROCEDURE — 97163 PT EVAL HIGH COMPLEX 45 MIN: CPT

## 2022-11-11 PROCEDURE — 83605 ASSAY OF LACTIC ACID: CPT

## 2022-11-11 PROCEDURE — 87641 MR-STAPH DNA AMP PROBE: CPT

## 2022-11-11 PROCEDURE — U0005: CPT

## 2022-11-11 PROCEDURE — 87040 BLOOD CULTURE FOR BACTERIA: CPT

## 2022-11-11 PROCEDURE — 87077 CULTURE AEROBIC IDENTIFY: CPT

## 2022-11-11 PROCEDURE — 87635 SARS-COV-2 COVID-19 AMP PRB: CPT

## 2022-11-11 PROCEDURE — 87086 URINE CULTURE/COLONY COUNT: CPT

## 2022-11-11 PROCEDURE — 97530 THERAPEUTIC ACTIVITIES: CPT

## 2022-11-11 PROCEDURE — 99232 SBSQ HOSP IP/OBS MODERATE 35: CPT

## 2022-11-11 PROCEDURE — 97116 GAIT TRAINING THERAPY: CPT

## 2022-11-11 RX ORDER — AMLODIPINE BESYLATE 2.5 MG/1
1 TABLET ORAL
Qty: 30 | Refills: 0
Start: 2022-11-11 | End: 2022-12-10

## 2022-11-11 RX ORDER — ATORVASTATIN CALCIUM 80 MG/1
1 TABLET, FILM COATED ORAL
Qty: 0 | Refills: 0 | DISCHARGE

## 2022-11-11 RX ORDER — ACETAMINOPHEN 500 MG
2 TABLET ORAL
Qty: 0 | Refills: 0 | DISCHARGE

## 2022-11-11 RX ORDER — ATORVASTATIN CALCIUM 80 MG/1
1 TABLET, FILM COATED ORAL
Qty: 30 | Refills: 0
Start: 2022-11-11 | End: 2022-12-10

## 2022-11-11 RX ORDER — LOSARTAN POTASSIUM 100 MG/1
1 TABLET, FILM COATED ORAL
Qty: 30 | Refills: 0
Start: 2022-11-11 | End: 2022-12-10

## 2022-11-11 RX ADMIN — Medication 650 MILLIGRAM(S): at 05:29

## 2022-11-11 RX ADMIN — LOSARTAN POTASSIUM 50 MILLIGRAM(S): 100 TABLET, FILM COATED ORAL at 05:29

## 2022-11-11 RX ADMIN — Medication 650 MILLIGRAM(S): at 14:30

## 2022-11-11 RX ADMIN — Medication 650 MILLIGRAM(S): at 13:42

## 2022-11-11 RX ADMIN — Medication 81 MILLIGRAM(S): at 11:28

## 2022-11-11 NOTE — PROGRESS NOTE ADULT - SUBJECTIVE AND OBJECTIVE BOX
Patient is a 82y old  Male who presents with a chief complaint of Cervical Laminectomy (2022 11:46)      HPI:  This is a 81 YO male with PMH of HTN, HLD, DM II, BPH with complaint of neck pain. Patient endorses increasing neck pain, gait instability and clumsiness of the hands. He ambulates with a cane. He denies fever, chills, trauma or injury. He presents to [facility] on 10/18 for scheduled Posterior C1-6 Decompression, C4-5 Posterior Column Osteotomy, C1-C7 Fusion, Complex Plastics Closure. Course complicated by severe constipation now resolved, leukocytosis, hyponatremia- stable on salt tablet, fall, urinary retention requiring straight catheterization, intermittent confusion likely related to pain medication and hospital setting. Noted with  Left deltoid weakness. MR Cervical spine 10/26/22 without cord compression. Surgical drain removed 10/27/22. Patient was evaluated by PM&R and therapy for functional deficits, gait/ADL impairments and acute rehabilitation was recommended. Patient was medically optimized for discharge to [facility] IRU on 10/27/22.   (27 Oct 2022 13:00)        SUBJECTIVE/ROS: Patient seen and examined during family training with therapy with daughter at bedside. No acute overnight events, slept well. Denies any pain. Daughter is please with progress and feels he is ready for discharge.       VITALS  82y  Vital Signs Last 24 Hrs  T(C): 36.6 (2022 09:05), Max: 36.7 (10 Nov 2022 20:52)  T(F): 97.8 (2022 09:05), Max: 98.1 (10 Nov 2022 20:52)  HR: 99 (2022 09:05) (67 - 99)  BP: 143/82 (2022 09:05) (122/78 - 143/82)  BP(mean): --  RR: 19 (2022 09:05) (18 - 19)  SpO2: 97% (2022 09:05) (97% - 97%)    Parameters below as of 2022 09:05  Patient On (Oxygen Delivery Method): room air      Daily     Daily Weight in k.4 (2022 05:39)        PHYSICAL EXAM:     Gen - NAD, Comfortable  HEENT - NCAT  Neck - Supple, +posterior neck incision intact, +steri strips CDI  Pulm - no respiratory distress, CTAB  Cardiovascular - RRR,  Chest - good respiratory effort  Abdomen - Soft, NT/ND, +BS  Extremities - no edema, no calf tenderness  Neuro- at baseline, no new weakness, alert and oriented x3     Motor -                     LEFT    UE - ShAB 3/5, EF 4/5, EE 4/5,  4/5                    RIGHT UE - ShAB 4/5, EF 5/5, EE 5/5,  5/5                    LEFT    LE - HF 5/5, KE 5/5, DF 5/5, PF 5/5                    RIGHT LE - HF 5/5, KE 5/5, DF 5/5, PF 5/5        Sensory - Intact to LT      Coordination - + dysmetria to left arm     Tone - normal  Psychiatric - Mood stable, Affect WNL        RECENT LABS:                        12.5   7.49  )-----------( 446      ( 10 Nov 2022 06:34 )             37.9     11-10    141  |  103  |  30<H>  ----------------------------<  119<H>  4.1   |  28  |  1.28    Ca    9.3      10 Nov 2022 06:34    TPro  6.9  /  Alb  3.1<L>  /  TBili  0.4  /  DBili  x   /  AST  39  /  ALT  43  /  AlkPhos  140<H>  11-10    LIVER FUNCTIONS - ( 10 Nov 2022 06:34 )  Alb: 3.1 g/dL / Pro: 6.9 g/dL / ALK PHOS: 140 U/L / ALT: 43 U/L / AST: 39 U/L / GGT: x                   CAPILLARY BLOOD GLUCOSE            MEDICATIONS:  MEDICATIONS  (STANDING):  acetaminophen     Tablet .. 650 milliGRAM(s) Oral every 8 hours  amLODIPine   Tablet 10 milliGRAM(s) Oral at bedtime  aspirin  chewable 81 milliGRAM(s) Oral daily  atorvastatin 40 milliGRAM(s) Oral at bedtime  dextrose 5%. 1000 milliLiter(s) (100 mL/Hr) IV Continuous <Continuous>  dextrose 5%. 1000 milliLiter(s) (50 mL/Hr) IV Continuous <Continuous>  dextrose 50% Injectable 25 Gram(s) IV Push once  dextrose 50% Injectable 12.5 Gram(s) IV Push once  dextrose 50% Injectable 25 Gram(s) IV Push once  enoxaparin Injectable 40 milliGRAM(s) SubCutaneous <User Schedule>  glucagon  Injectable 1 milliGRAM(s) IntraMuscular once  lidocaine   4% Patch 1 Patch Transdermal <User Schedule>  lidocaine   4% Patch 2 Patch Transdermal <User Schedule>  losartan 50 milliGRAM(s) Oral daily  senna 2 Tablet(s) Oral at bedtime    MEDICATIONS  (PRN):  bisacodyl Suppository 10 milliGRAM(s) Rectal daily PRN Constipation  dextrose Oral Gel 15 Gram(s) Oral once PRN Blood Glucose LESS THAN 70 milliGRAM(s)/deciliter  loperamide 2 milliGRAM(s) Oral once PRN Diarrhea

## 2022-11-11 NOTE — DISCHARGE NOTE NURSING/CASE MANAGEMENT/SOCIAL WORK - NSSCTYPOFSERV_GEN_ALL_CORE
Home visit physical therapy, occupational therapy, speech therapy, skilled nursing, aide evaluation, social work. Note: If you do not hear from [facility] within 24-48 hours after your discharge, please contact them at

## 2022-11-11 NOTE — DISCHARGE NOTE NURSING/CASE MANAGEMENT/SOCIAL WORK - PATIENT PORTAL LINK FT
You can access the FollowMyHealth Patient Portal offered by [facility] by registering at the following website: [email or web address]. By joining [employer]’s FollowMyHealth portal, you will also be able to view your health information using other applications (apps) compatible with our system.

## 2022-11-11 NOTE — PROGRESS NOTE ADULT - ASSESSMENT
82M with PMH HTN, HLD, Type 2 DM, BPH presents with complaint of neck pain, presented to [facility] on 10/18 for scheduled Posterior C1-6 Decompression, C4-5 Posterior Column Osteotomy, C1-C7 Fusion, Complex Plastics Closure. Course complicated by severe constipation, leukocytosis, hyponatremia, fall, urinary retention requiring straight catheterization, intermittent confusion likely related to pain medication and hospital setting. Now admitted to [facility] for initiation of multidisciplinary rehab program.     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  Mets sepsis criteria with fever, leukocytosis, change in mental status metabolic encephalopathy  -s/p ceftin    #HTN  -Continue Amlodipine, Losartan  -Monitor vitals     #HLD  -Continue Lipitor    #Type 2 DM  A1C 6.0  -sugars have been wnl, not requiring insulin, will d/c FS  -hypoglycemia protocol  -Blood glucose goal 100-180 in hospital setting    #Acute metabolic encephalopathy, likely due to infection, delirium, w/ agitation/combativeness - resolved.  # Severe sepsis 2/2 UTI - resolved  -Continue Zyprexa PRN    #Acute urinary retention - resolved.  -Continue bladder scan and straight cath per protocol  -Avoid anti-cholinergic medications    #Prophylactic measure  -DVT ppx: Lovenox  -Bowel regimen

## 2022-11-11 NOTE — DISCHARGE NOTE NURSING/CASE MANAGEMENT/SOCIAL WORK - NSDCPEFALRISK_GEN_ALL_CORE
For information on Fall & Injury Prevention, visit: [email or web address] OR  [email or web address] OR  https://www.cdc.gov/steadi/patient.html

## 2022-11-11 NOTE — PROGRESS NOTE ADULT - ASSESSMENT
This is a 81 YO male with PMH of HTN, HLD, DM II, BPH with complaint of neck pain. cervical myelopathy  admitted to rehab after scheduled Posterior C1-6 Decompression, C4-5 Posterior Column Osteotomy, C1-C7 Fusion, Complex Plastics Closure on 10/18  Course complicated by severe constipation now resolved, leukocytosis, hyponatremia, fall, urinary retention requiring straight catheterization. Patient now with gait Instability, ADL impairments and Functional impairments.    #Cervical cord compression with myelopathy  - Posterior C1-6 Decompression, C4-5 Posterior Column Osteotomy, C1-C7 Fusion, Complex Plastics Closure on 10/18  - Comprehensive Rehab Program: PT/OT, 3hours daily and 5 days weekly  - PT: Focused on improving strength, endurance, coordination, balance, functional mobility, and transfers  - OT: Focused on improving strength, fine motor skills, coordination, posture and ADLs.  - Tylenol to 650mg, lidoderm prn  - precautions: fall, aspiration, spinal  - sutures removed by Plastics, ss in place CDI  - CT neck soft tissue 10/30 w/ post op changes w/o abscess  - soft collar while OOB    #SIRS, PNA/UTI - Resolved, off abx    #Acute metabolic encephalopathy   - Resolved  - Oxycodone and flexeril discontinued on 10/29  - dc prn Zyprexa  -No longer requiring 1:1.     # acute urinary retention  #UTI  -resolved  -PVRs low and d/suzan      #HTN  - Amlodipine 10mg daily   - Losartan 50mg daily    #HLD  - Lipitor 40mg at qhs    #Hyponatremia - resolved  -Off salt tabs    #Pain management  - Tylenol 975 mg Q8h to 650mg-- monitor LFTS  - Tramadol 75mg prn d/suzan   - Robaxin DCd - Flexeril 5mg 3x/day PRN d/suzan  lidoderm to shoulders/ and chest wall   -comfortable, no pain    #DVT ppx  - Lovenox    #Bowel Regimen  - Senna 2 tabs at qhs  #FEN   - Diet: Consistent Carb    #Sleep:   - Maintain quiet hours and low stim environment.    #[facility]  CODE STATUS: FULL CODE    #Dispo  -Cleared for d/c home w/ home care today 11/11 . Discussed w/ SW. Discussed w/ daughter and patient. Answered all questions. Discharge follow-up instructions provided.

## 2022-11-11 NOTE — PROGRESS NOTE ADULT - SUBJECTIVE AND OBJECTIVE BOX
Patient is a 82y old  Male who presents with a chief complaint of Cervical Laminectomy (10 Nov 2022 17:08)    Patient seen and examined at bedside. No acute overnight events.     ALLERGIES:  penicillin (Rash)    MEDICATIONS  (STANDING):  acetaminophen     Tablet .. 650 milliGRAM(s) Oral every 8 hours  amLODIPine   Tablet 10 milliGRAM(s) Oral at bedtime  aspirin  chewable 81 milliGRAM(s) Oral daily  atorvastatin 40 milliGRAM(s) Oral at bedtime  dextrose 5%. 1000 milliLiter(s) (100 mL/Hr) IV Continuous <Continuous>  dextrose 5%. 1000 milliLiter(s) (50 mL/Hr) IV Continuous <Continuous>  dextrose 50% Injectable 25 Gram(s) IV Push once  dextrose 50% Injectable 12.5 Gram(s) IV Push once  dextrose 50% Injectable 25 Gram(s) IV Push once  enoxaparin Injectable 40 milliGRAM(s) SubCutaneous <User Schedule>  glucagon  Injectable 1 milliGRAM(s) IntraMuscular once  lidocaine   4% Patch 1 Patch Transdermal <User Schedule>  lidocaine   4% Patch 2 Patch Transdermal <User Schedule>  losartan 50 milliGRAM(s) Oral daily  senna 2 Tablet(s) Oral at bedtime    MEDICATIONS  (PRN):  bisacodyl Suppository 10 milliGRAM(s) Rectal daily PRN Constipation  dextrose Oral Gel 15 Gram(s) Oral once PRN Blood Glucose LESS THAN 70 milliGRAM(s)/deciliter  loperamide 2 milliGRAM(s) Oral once PRN Diarrhea    Vital Signs Last 24 Hrs  T(F): 97.8 (11 Nov 2022 09:05), Max: 98.1 (10 Nov 2022 20:52)  HR: 99 (11 Nov 2022 09:05) (67 - 99)  BP: 143/82 (11 Nov 2022 09:05) (122/78 - 143/82)  RR: 19 (11 Nov 2022 09:05) (18 - 19)  SpO2: 97% (11 Nov 2022 09:05) (97% - 97%)  I&O's Summary    PHYSICAL EXAM:  General: NAD, awake, alert  ENT: MMM, no tonsilar exudate  Neck: Supple, No JVD  Lungs: Clear to auscultation bilaterally, no wheezes. Good air entry bilaterally   Cardio: RRR, S1/S2, No murmurs  Abdomen: Soft, Nontender, Nondistended; Bowel sounds present  Extremities: No calf tenderness, No pitting edema    LABS:                        12.5   7.49  )-----------( 446      ( 10 Nov 2022 06:34 )             37.9       11-10    141  |  103  |  30  ----------------------------<  119  4.1   |  28  |  1.28    Ca    9.3      10 Nov 2022 06:34    TPro  6.9  /  Alb  3.1  /  TBili  0.4  /  DBili  x   /  AST  39  /  ALT  43  /  AlkPhos  140  11-10     COVID-19 PCR: NotDetec (11-09-22 @ 11:30)  COVID-19 PCR: NotDetec (10-30-22 @ 17:48)  COVID-19 PCR: NotDetec (10-27-22 @ 23:11)  COVID-19 PCR: NotDetec (10-24-22 @ 07:47)  COVID-19 PCR: NotDetec (10-15-22 @ 13:40)    RADIOLOGY & ADDITIONAL TESTS:     Care Discussed with Consultants/Other Providers:

## 2022-11-11 NOTE — PROGRESS NOTE ADULT - ATTENDING COMMENTS
I have personally seen and examined the patient with the resident. Medical records reviewed. I have made amendments to the documentation where necessary and adjusted the history, physical examination, and plan as documented by the resident.    Completing ABx  Pain control  Plastic surgery input requested - surgical incision evaluation and sutures removal   Labs and medications reviewed  Spinal and fall precautions
Patient is being discharged home with home care today.  Discharge instructions were discussed with patient and daughter, all current medications were sent to the pharmacy. Patient and daughter were educated on importance of medication compliance,  continued  care with PMD and follow-up care with the specialists in the community. Safety and fall risk precautions  were discussed in detail, counseled on healthy life style modifications.  All questions were answered to their satisfaction.    37 min spent
Rehab Attending- Patient seen and examined by me - Case discussed, above note reviewed by me with modifications made    doing much better- oriented via   CO headache, neck and upper back pain- pain meds adjusted  seen by psych- delirium improved-to continue olanzapine as ordered- enhanced supervision  on contact RO MRSA- swab negative  urine for legionella pending  on vanco/cefepime for pneumonia/UTI  labs reviewed - WBC sl decreasing  [place] D[facility] this AM- for TOV  to continue intensive rehab program

## 2022-12-19 ENCOUNTER — OUTPATIENT (OUTPATIENT)
Dept: OUTPATIENT SERVICES | Facility: HOSPITAL | Age: 82
LOS: 1 days | End: 2022-12-19
Payer: COMMERCIAL

## 2022-12-19 ENCOUNTER — APPOINTMENT (OUTPATIENT)
Dept: RADIOLOGY | Facility: IMAGING CENTER | Age: 82
End: 2022-12-19

## 2022-12-19 ENCOUNTER — APPOINTMENT (OUTPATIENT)
Dept: NEUROSURGERY | Facility: CLINIC | Age: 82
End: 2022-12-19

## 2022-12-19 DIAGNOSIS — Z98.890 OTHER SPECIFIED POSTPROCEDURAL STATES: Chronic | ICD-10-CM

## 2022-12-19 DIAGNOSIS — M48.02 SPINAL STENOSIS, CERVICAL REGION: ICD-10-CM

## 2022-12-19 DIAGNOSIS — R29.898 OTHER SYMPTOMS AND SIGNS INVOLVING THE MUSCULOSKELETAL SYSTEM: ICD-10-CM

## 2022-12-19 DIAGNOSIS — G95.9 DISEASE OF SPINAL CORD, UNSPECIFIED: ICD-10-CM

## 2022-12-19 DIAGNOSIS — S49.92XA UNSPECIFIED INJURY OF LEFT SHOULDER AND UPPER ARM, INITIAL ENCOUNTER: ICD-10-CM

## 2022-12-19 DIAGNOSIS — R26.9 UNSPECIFIED ABNORMALITIES OF GAIT AND MOBILITY: ICD-10-CM

## 2022-12-19 DIAGNOSIS — Z98.49 CATARACT EXTRACTION STATUS, UNSPECIFIED EYE: Chronic | ICD-10-CM

## 2022-12-19 PROCEDURE — 72040 X-RAY EXAM NECK SPINE 2-3 VW: CPT | Mod: 26

## 2022-12-19 PROCEDURE — 72040 X-RAY EXAM NECK SPINE 2-3 VW: CPT

## 2022-12-19 PROCEDURE — 99024 POSTOP FOLLOW-UP VISIT: CPT

## 2022-12-20 ENCOUNTER — APPOINTMENT (OUTPATIENT)
Dept: MRI IMAGING | Facility: IMAGING CENTER | Age: 82
End: 2022-12-20

## 2022-12-20 NOTE — PHYSICAL EXAM
[General Appearance - Alert] : alert [General Appearance - In No Acute Distress] : in no acute distress [Oriented To Time, Place, And Person] : oriented to person, place, and time [Impaired Insight] : insight and judgment were intact [Weakness] : extension weakness [Neck Appearance] : the appearance of the neck was normal [] : no respiratory distress [Skin Color & Pigmentation] : normal skin color and pigmentation [Heart Rate And Rhythm] : heart rate was normal and rhythm regular [FreeTextEntry1] : Ambulates with walker

## 2022-12-20 NOTE — REVIEW OF SYSTEMS
[As Noted in HPI] : as noted in HPI [Feeling Poorly] : feeling poorly [Poor Coordination] : poor coordination [Abnormal Sensation] : an abnormal sensation [Negative] : Psychiatric [de-identified] : GAit improving since surgery

## 2022-12-20 NOTE — REASON FOR VISIT
[de-identified] : S/P Posterior C1 to C7 exposure.\par  Bilateral C1 to C6 decompression.\par  Intraoperative ultrasound to confirm central as well as lateral decompression.\par  C1 to C7 posterolateral arthrodesis and instrumented fusion.\par  Use of morselized autograft.\par  Complex plastic closure by Dr. Jose Raul Bay from Plastic Surgery.\par \par  [de-identified] : 10/18/22 [de-identified] : Today he come visit with his daughter\par He reports difficulty raising his left arm\par His daughter reports that his left arm extension is limited since the fall in the hospital.\par He presents with Left deltoid weakness\par Hand  and extension intact; \par Ambulates with walker

## 2022-12-20 NOTE — ASSESSMENT
[FreeTextEntry1] : Mr. COBY BEGUM is S/P Posterior Cervical fusion for cervical myelopathy\par Presenting with Left deltoid weakness after experiencing a fall during his hospital stay.\par Begin outpatient Occupational and Physical Therapy to strengthen Left Arm.\par \par Follow up  in 3 months\par Cervical Xray today\par MRI LEFT SHOULDER to assess for injury\par Follow up with orthopedic to assess left shoulder.\par \par Please see Dr. Bond's dictation for details.\par I, Dr. Kerrie Bond evaluated the patient with the nurse practitioner Mian Fields and established the plan of care. I personally discuss this patient with the nurse practitioner at the time of the visit. I agree with the assessment and plan as written, unless noted below.\par \par \par

## 2022-12-28 ENCOUNTER — APPOINTMENT (OUTPATIENT)
Dept: MRI IMAGING | Facility: IMAGING CENTER | Age: 82
End: 2022-12-28

## 2022-12-28 ENCOUNTER — OUTPATIENT (OUTPATIENT)
Dept: OUTPATIENT SERVICES | Facility: HOSPITAL | Age: 82
LOS: 1 days | End: 2022-12-28
Payer: COMMERCIAL

## 2022-12-28 DIAGNOSIS — Z98.890 OTHER SPECIFIED POSTPROCEDURAL STATES: Chronic | ICD-10-CM

## 2022-12-28 DIAGNOSIS — Z00.8 ENCOUNTER FOR OTHER GENERAL EXAMINATION: ICD-10-CM

## 2022-12-28 DIAGNOSIS — Z98.49 CATARACT EXTRACTION STATUS, UNSPECIFIED EYE: Chronic | ICD-10-CM

## 2022-12-28 PROCEDURE — 73221 MRI JOINT UPR EXTREM W/O DYE: CPT

## 2022-12-29 ENCOUNTER — NON-APPOINTMENT (OUTPATIENT)
Age: 82
End: 2022-12-29

## 2022-12-29 DIAGNOSIS — M25.512 PAIN IN LEFT SHOULDER: ICD-10-CM

## 2022-12-29 DIAGNOSIS — G89.29 PAIN IN LEFT SHOULDER: ICD-10-CM

## 2022-12-29 DIAGNOSIS — M25.412 EFFUSION, LEFT SHOULDER: ICD-10-CM

## 2023-01-11 ENCOUNTER — APPOINTMENT (OUTPATIENT)
Dept: PHYSICAL MEDICINE AND REHAB | Facility: CLINIC | Age: 83
End: 2023-01-11

## 2023-01-17 ENCOUNTER — NON-APPOINTMENT (OUTPATIENT)
Age: 83
End: 2023-01-17

## 2023-01-23 ENCOUNTER — APPOINTMENT (OUTPATIENT)
Dept: ORTHOPEDIC SURGERY | Facility: CLINIC | Age: 83
End: 2023-01-23
Payer: MEDICARE

## 2023-01-23 VITALS — BODY MASS INDEX: 28.99 KG/M2 | WEIGHT: 174 LBS | HEIGHT: 65 IN

## 2023-01-23 VITALS
HEART RATE: 100 BPM | TEMPERATURE: 97.9 F | OXYGEN SATURATION: 98 % | SYSTOLIC BLOOD PRESSURE: 130 MMHG | DIASTOLIC BLOOD PRESSURE: 78 MMHG

## 2023-01-23 PROCEDURE — 73030 X-RAY EXAM OF SHOULDER: CPT | Mod: LT

## 2023-01-23 PROCEDURE — 99204 OFFICE O/P NEW MOD 45 MIN: CPT

## 2023-01-25 NOTE — PHYSICAL EXAM
[de-identified] : Oriented to time, place, person\par Mood: Normal\par Affect: Normal\par Appearance: Healthy, well appearing, no acute distress.\par Gait: Normal\par Assistive Devices: None\par \par Left shoulder exam:\par \par Inspection: No malalignment, No defects, No atrophy, mild swelling throughout the arm. \par Skin: No masses, No lesions\par Neck: Negative Spurling, full ROM, no pain with ROM\par AROM: FF to 40, abduction to 50, ER to 30, IR to lower lumbar\par PROM: full passive. \par Painful arc ROM: Pain with further motion. \par Tenderness: No bicipital tenderness, no tenderness to greater tuberosity/RTC insertion, + anterior shoulder/lesser tuberosity tenderness\par Strength: 3/5 ER, 5/5 IR in adduction, 2/5 supraspinatus testing, negative Gogebic's test\par AC joint: No TTP/pain with cross arm testing\par Biceps: Speed Negative, Yergason Negative \par Impingement test: Negative Rubin, +Neer\par Vasc: 2+ radial pulse \par Stability: Stable \par Neuro: AIN, PIN, Ulnar nerve intact to motor\par Sensation: Intact to light touch throughout  [de-identified] : The following radiographs were ordered and read by me during this patients visit. I reviewed each radiograph in detail with the patient and discussed the findings as highlighted below.\par \par 3 views of left shoulder were obtained today, 01/23/2023, that show no acute fracture or dislocation. There is no glenohumeral and mild AC joint degenerative change seen. Type II acromion. There is no significant malalignment. Degenerative changes at the RTC insertion. \par \par MRI left shoulder dated 12.28.2022 shows complete, full-thickness retracted tear supraspinatus tendon. Infraspinatus moderate tendinosis, without tear. Prominent tendinosis of biceps tendon long head at its intra-articular portion.\par \par We independently reviewed and discussed in detail the images and the radiologic reports with the patient.

## 2023-01-25 NOTE — HISTORY OF PRESENT ILLNESS
[de-identified] : 83 y/o male with left shoulder pain x months.  The patient reports that following recent cervical surgery symptoms have been persistent.  The pain is brought on with movement of the arm. He is unable to lift the arm up due to weakness. Patient is s/p C1-C7 exposure and bilateral C1 to C6 decompression on 10/18/2022 (Neurosurg). Reports that the arm felt fine prior to the surgery. He also has balance issues and tends to fall frequently. \par \par The patient's past medical history, past surgical history, medications and allergies were reviewed by me today with the patient and documented accordingly. In addition, the patient's family and social history, which were noncontributory to this visit, were reviewed also.

## 2023-01-25 NOTE — DISCUSSION/SUMMARY
[de-identified] : 81 y/o male with left shoulder RTC dysfunction\par \par Patient presents today for evaluation of left shoulder pain and immobility/pseudoparalysis.  Patient is status post cervical spine surgery, and developed significant loss of function of the left upper extremity.  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  We discussed that while there is evidence of a high-grade rotator cuff injury to the left shoulder, surgical intervention is not necessarily needed/warranted for somebody of his age.  Patient also likely has some associated deltoid weakness which has caused him to have some degree of pseudoparalysis despite having intact posterior/anterior rotator cuff function.\par \par Nonoperative management with physical therapy is the first-line treatment.  Physical therapy is focused on scapular and rotator cuff strengthening in an attempt to maximize function and decrease anterior superior escape.  Surgical intervention can be considered if patient continues to fail conservative management, but would likely have a prolonged recovery.\par \par Recommendation: Begin trial of PT, Rx given. OTC meds as tolerated, with application of ice to the area 2-3x daily for 20 minutes after periods of activity. \par \par Follow-up 6-8wks

## 2023-01-25 NOTE — ADDENDUM
[FreeTextEntry1] : This note was written by Elvia Moreland on 01/23/2023 acting solely as a scribe for Dr. Leon Monroy.\par \par All medical record entries made by the Scribe were at my, Dr. Leon Monroy, direction and personally dictated by me on 01/23/2023. I have personally reviewed the chart and agree that the record accurately reflects my personal performance of the history, physical exam, assessment and plan.

## 2023-03-13 ENCOUNTER — APPOINTMENT (OUTPATIENT)
Dept: NEUROSURGERY | Facility: CLINIC | Age: 83
End: 2023-03-13
Payer: MEDICARE

## 2023-03-13 ENCOUNTER — NON-APPOINTMENT (OUTPATIENT)
Age: 83
End: 2023-03-13

## 2023-03-13 DIAGNOSIS — R29.898 OTHER SYMPTOMS AND SIGNS INVOLVING THE MUSCULOSKELETAL SYSTEM: ICD-10-CM

## 2023-03-13 PROCEDURE — 99212 OFFICE O/P EST SF 10 MIN: CPT

## 2023-03-16 NOTE — PHYSICAL EXAM
[General Appearance - Alert] : alert [General Appearance - In No Acute Distress] : in no acute distress [Oriented To Time, Place, And Person] : oriented to person, place, and time [] : no respiratory distress [Heart Rate And Rhythm] : heart rate was normal and rhythm regular [FreeTextEntry1] : slow steady gait

## 2023-03-16 NOTE — ASSESSMENT
[FreeTextEntry1] : Mr. COBY BEGUM is presenting S/P Posterior cervical fusion for the treatment of cervical Myelopathy\par Dr. Bond explained in great detail a diagnosis of S/P Posterior cervical fusion for the treatment of cervical Myelopathy\par The recommendation is for the following:\par Begin PT for strengthening of upper extremities\par Being Occupational therapy\par Follow up  in 6-8 weeks..\par \par Please see Dr. Bond's dictation for details.\par I, Dr. Kerrie Bond evaluated the patient with the nurse practitioner Mian Fields and established the plan of care. I personally discuss this patient with the nurse practitioner at the time of the visit. I agree with the assessment and plan as written, unless noted below.\par \par \par

## 2023-03-16 NOTE — REASON FOR VISIT
[Follow-Up: _____] : a [unfilled] follow-up visit [FreeTextEntry1] : Today he reports that His Left shoulder has improve with conservative management.\par Shoulder surgery was recommended but he refused. He report overall increase in weakness in upper extremities. He ambulates with walker.

## 2023-04-03 ENCOUNTER — APPOINTMENT (OUTPATIENT)
Dept: MRI IMAGING | Facility: CLINIC | Age: 83
End: 2023-04-03
Payer: MEDICARE

## 2023-04-03 PROCEDURE — 72141 MRI NECK SPINE W/O DYE: CPT

## 2023-04-21 NOTE — ED ADULT NURSE NOTE - PAIN RATING/NUMBER SCALE (0-10): ACTIVITY
7 bilateral UE Active ROM was WFL  (within functional limits)/bilateral UE Passive ROM was WFL  (within functional limits)

## 2023-05-15 ENCOUNTER — APPOINTMENT (OUTPATIENT)
Dept: NEUROSURGERY | Facility: CLINIC | Age: 83
End: 2023-05-15
Payer: MEDICARE

## 2023-05-15 VITALS
HEIGHT: 65 IN | WEIGHT: 157 LBS | HEART RATE: 86 BPM | BODY MASS INDEX: 26.16 KG/M2 | SYSTOLIC BLOOD PRESSURE: 133 MMHG | OXYGEN SATURATION: 96 % | DIASTOLIC BLOOD PRESSURE: 73 MMHG

## 2023-05-15 DIAGNOSIS — M67.912 UNSPECIFIED DISORDER OF SYNOVIUM AND TENDON, LEFT SHOULDER: ICD-10-CM

## 2023-05-15 PROCEDURE — 99212 OFFICE O/P EST SF 10 MIN: CPT

## 2023-05-16 NOTE — ASSESSMENT
[FreeTextEntry1] : Mr. COBY BEGUM is presenting S/p cervical fusion; persistent lowerback pain \par The recommendation is for the following: MRI evidence of Left Shoulder full thickness tear\par \par Plan:\par Physical Therapy for core strengthening exercise of lumbar spine, and left  shoulder \par Follow up with new MRI LUMBAR SPINE to assess for spinal stenosis.\par \par \par Please see Dr. Bond's dictation for details.\par I, Dr. Kerrie Bond evaluated the patient with the nurse practitioner Mian Fields and established the plan of care. I personally discuss this patient with the nurse practitioner at the time of the visit. I agree with the assessment and plan as written, unless noted below.\par \par \par \par \par

## 2023-05-16 NOTE — REASON FOR VISIT
[Follow-Up: _____] : a [unfilled] follow-up visit [FreeTextEntry1] : Today her reports improved pain in left shoulder and upper extremities. He now reports lowerback pain and difficulty walking. She has had PT and insurance coverage is limited and his PT is on hold. He ambulates with walker. He reports improved shoulder pain and mobility. His most impacting issue is his lumbar spine according to pt and his daughter.

## 2023-05-16 NOTE — PHYSICAL EXAM
[General Appearance - Alert] : alert [General Appearance - In No Acute Distress] : in no acute distress [Oriented To Time, Place, And Person] : oriented to person, place, and time [Neck Appearance] : the appearance of the neck was normal [] : no respiratory distress [Heart Rate And Rhythm] : heart rate was normal and rhythm regular [FreeTextEntry1] : Ambulates with walker [Skin Color & Pigmentation] : normal skin color and pigmentation

## 2023-05-30 ENCOUNTER — OUTPATIENT (OUTPATIENT)
Dept: OUTPATIENT SERVICES | Facility: HOSPITAL | Age: 83
LOS: 1 days | End: 2023-05-30
Payer: COMMERCIAL

## 2023-05-30 ENCOUNTER — APPOINTMENT (OUTPATIENT)
Dept: MRI IMAGING | Facility: CLINIC | Age: 83
End: 2023-05-30
Payer: MEDICARE

## 2023-05-30 DIAGNOSIS — Z98.890 OTHER SPECIFIED POSTPROCEDURAL STATES: Chronic | ICD-10-CM

## 2023-05-30 DIAGNOSIS — M48.062 SPINAL STENOSIS, LUMBAR REGION WITH NEUROGENIC CLAUDICATION: ICD-10-CM

## 2023-05-30 DIAGNOSIS — Z98.49 CATARACT EXTRACTION STATUS, UNSPECIFIED EYE: Chronic | ICD-10-CM

## 2023-05-30 PROCEDURE — 72148 MRI LUMBAR SPINE W/O DYE: CPT

## 2023-05-30 PROCEDURE — 72148 MRI LUMBAR SPINE W/O DYE: CPT | Mod: 26

## 2023-06-08 ENCOUNTER — NON-APPOINTMENT (OUTPATIENT)
Age: 83
End: 2023-06-08

## 2023-06-26 ENCOUNTER — APPOINTMENT (OUTPATIENT)
Dept: NEUROSURGERY | Facility: CLINIC | Age: 83
End: 2023-06-26
Payer: MEDICARE

## 2023-06-26 VITALS
SYSTOLIC BLOOD PRESSURE: 156 MMHG | TEMPERATURE: 98.4 F | BODY MASS INDEX: 26.16 KG/M2 | DIASTOLIC BLOOD PRESSURE: 84 MMHG | HEIGHT: 65 IN | OXYGEN SATURATION: 96 % | WEIGHT: 157 LBS | HEART RATE: 93 BPM

## 2023-06-26 DIAGNOSIS — M54.16 RADICULOPATHY, LUMBAR REGION: ICD-10-CM

## 2023-06-26 DIAGNOSIS — M54.10 RADICULOPATHY, SITE UNSPECIFIED: ICD-10-CM

## 2023-06-26 PROCEDURE — 99213 OFFICE O/P EST LOW 20 MIN: CPT

## 2023-06-26 NOTE — REVIEW OF SYSTEMS
[As Noted in HPI] : as noted in HPI [Feeling Poorly] : feeling poorly [Arthralgias] : arthralgias [Negative] : Heme/Lymph

## 2023-06-26 NOTE — ASSESSMENT
[FreeTextEntry1] : Mr. COBY BEGUM is presenting S/P Posterior cervical fusion; Chronic bilateral leg pain\par The recommendation is for the following:\par Imaging evaluation shows evidence of \par Severe Lumbar Stenosis\par Plan:\par Conservative pain management with AKASH injection\par PT for strengthening of BLE and core strengthening exercises\par \par Follow up in  3 months\par Please see Dr. Bond's dictation for details.\par I, Dr. Kerrie Bond evaluated the patient with the nurse practitioner Mian Fields and established the plan of care. I personally discuss this patient with the nurse practitioner at the time of the visit. I agree with the assessment and plan as written, unless noted below.\par \par \par \par \par

## 2023-06-26 NOTE — PHYSICAL EXAM
[General Appearance - Alert] : alert [General Appearance - In No Acute Distress] : in no acute distress [No Visual Abnormalities] : no visible abnormalities [Antalgic] : antalgic [Able to toe walk] : the patient was not able to toe walk [Able to heel walk] : the patient was not able to heel walk [Outer Ear] : the ears and nose were normal in appearance [] : no respiratory distress [Heart Rate And Rhythm] : heart rate was normal and rhythm regular [FreeTextEntry1] : ambulate with wheeled walker

## 2023-06-26 NOTE — REASON FOR VISIT
[Follow-Up: _____] : a [unfilled] follow-up visit [FreeTextEntry1] : Today he reports persistent neck heaviness, bilateral shoulder pain, worse Left shoulder and bilateral leg pain and difficulty walking. His neurological status remains stable. [Family Member] : family member [Other: ______] : provided by [employer] [FreeTextEntry3] : family member used as . Pt refuses other translation services. [TWNoteComboBox1] : Irish

## 2023-06-26 NOTE — REASON FOR VISIT
[Follow-Up: _____] : a [unfilled] follow-up visit [FreeTextEntry1] : Today he reports persistent neck heaviness, bilateral shoulder pain, worse Left shoulder and bilateral leg pain and difficulty walking. His neurological status remains stable. [Family Member] : family member [Other: ______] : provided by [employer] [FreeTextEntry3] : family member used as . Pt refuses other translation services. [TWNoteComboBox1] : [place]

## 2023-06-28 ENCOUNTER — APPOINTMENT (OUTPATIENT)
Dept: UROLOGY | Facility: CLINIC | Age: 83
End: 2023-06-28
Payer: MEDICARE

## 2023-06-28 VITALS
HEIGHT: 65 IN | BODY MASS INDEX: 26.16 KG/M2 | WEIGHT: 157 LBS | OXYGEN SATURATION: 96 % | DIASTOLIC BLOOD PRESSURE: 83 MMHG | TEMPERATURE: 98 F | SYSTOLIC BLOOD PRESSURE: 152 MMHG | HEART RATE: 77 BPM

## 2023-06-28 DIAGNOSIS — N40.0 BENIGN PROSTATIC HYPERPLASIA WITHOUT LOWER URINARY TRACT SYMPMS: ICD-10-CM

## 2023-06-28 PROCEDURE — 99214 OFFICE O/P EST MOD 30 MIN: CPT

## 2023-06-28 RX ORDER — TAMSULOSIN HYDROCHLORIDE 0.4 MG/1
0.4 CAPSULE ORAL
Qty: 90 | Refills: 1 | Status: ACTIVE | COMMUNITY
Start: 2023-06-28 | End: 1900-01-01

## 2023-06-29 NOTE — PHYSICAL EXAM
[General Appearance - Well Developed] : well developed [General Appearance - Well Nourished] : well nourished [Normal Appearance] : normal appearance [Well Groomed] : well groomed [General Appearance - In No Acute Distress] : no acute distress [Abdomen Tenderness] : non-tender [Abdomen Soft] : soft [Costovertebral Angle Tenderness] : no ~M costovertebral angle tenderness [Urethral Meatus] : meatus normal [Urinary Bladder Findings] : the bladder was normal on palpation [Scrotum] : the scrotum was normal [No Prostate Nodules] : no prostate nodules [Testes Mass (___cm)] : there were no testicular masses [Edema] : no peripheral edema [] : no respiratory distress [Respiration, Rhythm And Depth] : normal respiratory rhythm and effort [Exaggerated Use Of Accessory Muscles For Inspiration] : no accessory muscle use [Oriented To Time, Place, And Person] : oriented to person, place, and time [Affect] : the affect was normal [Mood] : the mood was normal [Not Anxious] : not anxious [Normal Station and Gait] : the gait and station were normal for the patient's age [No Focal Deficits] : no focal deficits [No Palpable Adenopathy] : no palpable adenopathy

## 2023-06-29 NOTE — HISTORY OF PRESENT ILLNESS
[FreeTextEntry1] : cc bph\par pt is  yo male referred for bph . was on tamsulosin but admitted to hospital for dizziness and low bp so stopped \par last few months increased frequency and nocturia  good flow \par The is no family history of prostate cancer\par ua neg \par 3 cups coffee daily \par no constipation\par \par

## 2023-07-10 ENCOUNTER — APPOINTMENT (OUTPATIENT)
Dept: ALLERGY | Facility: CLINIC | Age: 83
End: 2023-07-10
Payer: MEDICARE

## 2023-07-10 ENCOUNTER — NON-APPOINTMENT (OUTPATIENT)
Age: 83
End: 2023-07-10

## 2023-07-10 VITALS
WEIGHT: 157 LBS | DIASTOLIC BLOOD PRESSURE: 60 MMHG | HEIGHT: 65 IN | SYSTOLIC BLOOD PRESSURE: 128 MMHG | RESPIRATION RATE: 14 BRPM | HEART RATE: 87 BPM | OXYGEN SATURATION: 97 % | TEMPERATURE: 98.6 F | BODY MASS INDEX: 26.16 KG/M2

## 2023-07-10 PROCEDURE — 99203 OFFICE O/P NEW LOW 30 MIN: CPT | Mod: 25

## 2023-07-10 PROCEDURE — 95004 PERQ TESTS W/ALRGNC XTRCS: CPT

## 2023-07-10 NOTE — HISTORY OF PRESENT ILLNESS
[de-identified] : Daughter is the  for encounter - 3-4 weeks ago - itchy right shin - he saw PCP - treated with topical creams - he than developed itching of his forearms - the rashes improved with topical creams.   The rash has not recurred since. \par \par Born in [place] and [place] 36.   \par \par No changes in his medications. \par \par Patient does gardening on a regular basis - he tends to fall down so family encourages him not to garden.

## 2023-07-10 NOTE — SOCIAL HISTORY
[House] : [unfilled] lives in a house  [Cat] : cat [] :  [FreeTextEntry1] : Lives with spouse\par  - retired  [Smokers in Household] : there are no smokers in the home [de-identified] : x2 - cats do not have fleas

## 2023-07-10 NOTE — ASSESSMENT
[FreeTextEntry1] : Pruritic rash -possible poison ivy:\par \par I have advised patient and his daughter that they should return if rash recurs.   He can apply the topical steroid cream and take he can take Allegra 180 mg QD and RV at that point in time.

## 2023-07-10 NOTE — REVIEW OF SYSTEMS
[Nl] : Endocrine [FreeTextEntry5] : hypertension and elevated cholesterol  [de-identified] : enlarged prostate

## 2023-07-10 NOTE — PHYSICAL EXAM
[Alert] : alert [Well Nourished] : well nourished [Healthy Appearance] : healthy appearance [No Acute Distress] : no acute distress [Normal Voice/Communication] : normal voice communication [Well Developed] : well developed [No Neck Mass] : no neck mass was observed [No LAD] : no lymphadenopathy [Normal Rate and Effort] : normal respiratory rhythm and effort [No Crackles] : no crackles [No Retractions] : no retractions [Bilateral Audible Breath Sounds] : bilateral audible breath sounds [Normal Rate] : heart rate was normal  [Normal S1, S2] : normal S1 and S2 [Regular Rhythm] : with a regular rhythm [Normal Cervical Lymph Nodes] : cervical [No Rash] : no rash [Normal Mood] : mood was normal [Normal Affect] : affect was normal [Wheezing] : no wheezing was heard

## 2023-07-10 NOTE — IMPRESSION
[Allergy Testing Cockroach] : cockroach [Allergy Testing Dog] : dog [Allergy Testing Cat] : cat [] : molds [Allergy Testing Trees] : trees [Allergy Testing Weeds] : weeds [Allergy Testing Grasses] : grasses [_____] : dust mites ([unfilled])

## 2023-07-18 ENCOUNTER — APPOINTMENT (OUTPATIENT)
Dept: PHYSICAL MEDICINE AND REHAB | Facility: CLINIC | Age: 83
End: 2023-07-18
Payer: MEDICARE

## 2023-07-18 VITALS — HEART RATE: 85 BPM | DIASTOLIC BLOOD PRESSURE: 78 MMHG | SYSTOLIC BLOOD PRESSURE: 161 MMHG | OXYGEN SATURATION: 96 %

## 2023-07-18 DIAGNOSIS — M54.16 RADICULOPATHY, LUMBAR REGION: ICD-10-CM

## 2023-07-18 PROCEDURE — 99214 OFFICE O/P EST MOD 30 MIN: CPT

## 2023-07-18 NOTE — ASSESSMENT
[FreeTextEntry1] : Mr. COBY BEGUM is a 83 year M with pain in the lower back across with radiation down both legs. He reports chronic long standing pain and is noting an acute on chronic exacerbation of this pain due to lumbar radiculopathy and spinal stenosis. There are no myelopathic signs on today's exam.\par \par Patient reassured and educated on the diagnosis and treatment options. Risks and benefits of treatment and of delaying treatment discussed with patient. Risks discussed include but not limited to: progression of symptoms, worsening pain and functional status, etc.\par \par This note was generated using Dragon medical dictation software. A reasonable effort had been made for proofreading its contents, but spelling mistakes or grammatical errors may still remain. If there are any questions or points of clarification needed please notify my office.\par \par Dr. Bond's notes reviewed\par \par MRI lumbar spine imaging reviewed with patient in office today. Imaging and report demonstrate: severe multilevel spinal stenosis\par \par Start Tizanidine 2mg 1-2 tablets PO qHS PRN pain, dispense #30. Rx sent. Patient denies any liver problems. Monitor LFTs. Patient warned about the sedative nature of this medication and recommended not to drive or to handle heavy machinery.\par \par Start Meloxicam (Mobic) 15 mg PO daily x 30 days PRN pain with food #30. Denies CKD, CAD, or gastritis. Recommend that if patient develops GI symptoms including abdominal pain, nausea, or vomiting to discontinue use of medication immediately.\par \par Patient had tried and failed conservative treatment. This includes but is not limited to: Acetaminophen, NSAIDs, muscle relaxants, neuropathic medications, physician directed home exercises and/or physical therapy for at least 8 weeks. After a thorough discussion of risks and benefits patient would like to proceed with LUMBAR INTERLAMINAR EPIDURAL STEROID INJECTION AT L5-S1 WITH FLUOROSCOPIC GUIDANCE .\par \par Risks and benefits of having injection discussed with patient. Risks discussed included, but not limited to: pain, infection, bleeding requiring emergency surgery, headaches, damage to vital organs, etc.\par \par At this time, patient appears to be psychologically stable. Based on the history, physical exam and diagnostic findings, patient will benefit from this procedure. The goal of this procedure is to reduce pain and inflammation, improve function and range of motion and to further promote increased activity and return to previous level of functioning. The ultimate goal of performing this procedure is to improve patient's quality of life.\par \par Asking for authorization for LUMBAR INTERLAMINAR EPIDURAL STEROID INJECTION AT L5-S1 WITH FLUOROSCOPIC GUIDANCE  to help treat patient´s pain.  Patient will be scheduled for this procedure.\par \par I have personally spent a total of at least 45 minutes preparing, reviewing internal and external records, explaining, counseling, and coordinating care for this patient encounter.\par \par Patient was advised if the following symptoms develop: chills, fever, loss of bladder control, bowel incontinence or urinary retention, numbness/tingling or weakness is present in upper or lower extremities, to go to the nearest emergency room. This may be a new clinical condition not present at the time of the patient visit that may lead to paralysis and/or death. Patient advised if the above symptoms developed to also call the office immediately to inform us and to go to the nearest emergency room.\par \par

## 2023-07-18 NOTE — PHYSICAL EXAM
[FreeTextEntry1] : General exam \par \par Constitutional: The patient appears well-developed, well-nourished, and in no apparent distress. Patient is well-groomed.  \par \par Skin: The skin is warm and dry, with normal turgor.  No rashes or lesions are noted.  \par \par Eyes: PERRL.  \par \par ENMT: Ears: Hearing is grossly within normal limits.  \par \par Neck: Supple: The neck is supple.  \par \par Respiratory: Inspection: Breathing unlabored.  \par \par Neurologic: Alert and oriented x 3. \par \par Psychiatric: Patient is cooperative and appropriate.  Mood and affect are normal.  Patient's insight is good, and memory and judgment are intact.\par \par LUMBAR EXAM\par \par APPEARANCE:\par No visible scars\par No gross deformity or malalignment\par No erythema, swelling or ecchymosis\par Decreased lumbar lordosis with flexed forward posture\par +muscle atrophy of the left lower extremity in quadriceps\par +muscle atrophy of the right lower extremity in quadriceps\par No clubbing, cyanosis, swelling or erythema on the left lower extremity\par No clubbing, cyanosis, swelling or erythema on the right lower extremity\par \par TENDERNESS:\par +trigger points over bilateral lumbar paraspinal muscles\par Absent over midline spinous processes\par +over left lumbar facet joints\par +over right lumbar facet joints \par +over left lumbar paraspinal muscles: erector spinae and quadratus lumborum\par +over right lumbar paraspinal muscles: erector spinae and quadratus lumborum\par Absent over left sacroiliac joint\par Absent over right sacroiliac joint\par \par ROM:\par Pain limited A/PROM of the lumbar spine\par +pain with flexion, extension of the lumbar spine\par +pain with left side bending\par +pain with right side bending\par +pain with left rotation\par +pain with right rotation\par +hamstring tightness on the left\par +hamstring tightness on the right\par \par SPECIAL TESTS:\par +left straight leg raising test\par +right straight leg raising test\par \par SENSORY TESTING:\par Intact to light touch Left L1-S2\par Intact to light touch Right L1-S2\par \par MOTOR TESTING:\par Muscle tone of the left lower extremity is normal\par Muscle tone of the right lower extremity is normal\par \par Left hip flexion strength is 5/5\par Right hip flexion strength is 5/5\par \par Left quadriceps strength is 5/5\par Right quadriceps strength is 5/5\par \par Left ankle dorsiflexion strength is 5/5\par Right ankle dorsiflexion strength is 5/5\par \par Left ankle plantar flexion strength is 5/5\par Right ankle plantar flexion strength is 5/5\par \par REFLEXES:\par Patella (L4) left 0+\par Patella (L4) right 0+\par \par GAIT:\par antalgic gait w rolator

## 2023-07-18 NOTE — HISTORY OF PRESENT ILLNESS
[FreeTextEntry1] : COBY BEGUM is an 83 year old M here with his daughter for initial evaluation of back pain. Mr. BEGUM was sent for consultation by Dr. Bond.\par \par From 6/26/23 note: "Mr. COBY BEGUM is presenting S/P Posterior cervical fusion; Chronic bilateral leg pain\par The recommendation is for the following:\par Imaging evaluation shows evidence of \par Severe Lumbar Stenosis\par Plan:\par Conservative pain management with AKASH injection\par PT for strengthening of BLE and core strengthening exercises"\par \par Pain location: low back pain\par Quality: aching, shooting\par Radiation: down both legs\par Severity: 10/10\par Onset: many years ago\par Associated symptoms: muscle tightness, spasms\par Numbness: down both legs\par Weakness: down both legs - denies falling, uses rolator\par Exacerbated by: walking, stanfing for more than 10 minutes - limited in walking distance\par Improved by: rest, sitting down, leaning forward\par Bowel or bladder involvement: denies\par \par Denies bowel/bladder dysfunction, saddle anesthesia, fevers, chills, weight loss, night pain, or night sweats at this time.\par \par The pain interferes with function, ADLs and quality of life.\par Patient had tried Acetaminophen, NSAIDs without any lasting relief of pain.\par \par Patient had imaging studies to evaluate the pain. \par Patient had tried physical therapy, and/or physician directed home exercises, stretching, lifestyle modification for over 8 weeks without any significant relief.

## 2023-07-18 NOTE — REVIEW OF SYSTEMS
[Chest Pain] : no chest pain [Shortness Of Breath] : no shortness of breath [Abdominal Pain] : no abdominal pain [Negative] : Neurological [FreeTextEntry9] : back pain

## 2023-08-28 ENCOUNTER — APPOINTMENT (OUTPATIENT)
Dept: NEUROSURGERY | Facility: CLINIC | Age: 83
End: 2023-08-28

## 2023-09-28 ENCOUNTER — APPOINTMENT (OUTPATIENT)
Dept: UROLOGY | Facility: CLINIC | Age: 83
End: 2023-09-28

## 2023-10-09 ENCOUNTER — APPOINTMENT (OUTPATIENT)
Dept: NEUROSURGERY | Facility: CLINIC | Age: 83
End: 2023-10-09
Payer: MEDICARE

## 2023-10-09 PROCEDURE — 99213 OFFICE O/P EST LOW 20 MIN: CPT

## 2023-10-19 ENCOUNTER — OUTPATIENT (OUTPATIENT)
Dept: OUTPATIENT SERVICES | Facility: HOSPITAL | Age: 83
LOS: 1 days | End: 2023-10-19
Payer: COMMERCIAL

## 2023-10-19 VITALS
SYSTOLIC BLOOD PRESSURE: 150 MMHG | RESPIRATION RATE: 16 BRPM | HEART RATE: 73 BPM | DIASTOLIC BLOOD PRESSURE: 82 MMHG | OXYGEN SATURATION: 98 % | HEIGHT: 65 IN | TEMPERATURE: 98 F | WEIGHT: 149.91 LBS

## 2023-10-19 DIAGNOSIS — Z98.49 CATARACT EXTRACTION STATUS, UNSPECIFIED EYE: Chronic | ICD-10-CM

## 2023-10-19 DIAGNOSIS — Z98.890 OTHER SPECIFIED POSTPROCEDURAL STATES: Chronic | ICD-10-CM

## 2023-10-19 DIAGNOSIS — M48.062 SPINAL STENOSIS, LUMBAR REGION WITH NEUROGENIC CLAUDICATION: ICD-10-CM

## 2023-10-19 DIAGNOSIS — Z01.818 ENCOUNTER FOR OTHER PREPROCEDURAL EXAMINATION: ICD-10-CM

## 2023-10-19 DIAGNOSIS — Z98.1 ARTHRODESIS STATUS: Chronic | ICD-10-CM

## 2023-10-19 DIAGNOSIS — G95.9 DISEASE OF SPINAL CORD, UNSPECIFIED: ICD-10-CM

## 2023-10-19 LAB
ANION GAP SERPL CALC-SCNC: 13 MMOL/L — SIGNIFICANT CHANGE UP (ref 5–17)
ANION GAP SERPL CALC-SCNC: 13 MMOL/L — SIGNIFICANT CHANGE UP (ref 5–17)
BLD GP AB SCN SERPL QL: NEGATIVE — SIGNIFICANT CHANGE UP
BLD GP AB SCN SERPL QL: NEGATIVE — SIGNIFICANT CHANGE UP
BUN SERPL-MCNC: 27 MG/DL — HIGH (ref 7–23)
BUN SERPL-MCNC: 27 MG/DL — HIGH (ref 7–23)
CALCIUM SERPL-MCNC: 9.6 MG/DL — SIGNIFICANT CHANGE UP (ref 8.4–10.5)
CALCIUM SERPL-MCNC: 9.6 MG/DL — SIGNIFICANT CHANGE UP (ref 8.4–10.5)
CHLORIDE SERPL-SCNC: 105 MMOL/L — SIGNIFICANT CHANGE UP (ref 96–108)
CHLORIDE SERPL-SCNC: 105 MMOL/L — SIGNIFICANT CHANGE UP (ref 96–108)
CO2 SERPL-SCNC: 23 MMOL/L — SIGNIFICANT CHANGE UP (ref 22–31)
CO2 SERPL-SCNC: 23 MMOL/L — SIGNIFICANT CHANGE UP (ref 22–31)
CREAT SERPL-MCNC: 1.09 MG/DL — SIGNIFICANT CHANGE UP (ref 0.5–1.3)
CREAT SERPL-MCNC: 1.09 MG/DL — SIGNIFICANT CHANGE UP (ref 0.5–1.3)
EGFR: 67 ML/MIN/1.73M2 — SIGNIFICANT CHANGE UP
EGFR: 67 ML/MIN/1.73M2 — SIGNIFICANT CHANGE UP
GLUCOSE SERPL-MCNC: 97 MG/DL — SIGNIFICANT CHANGE UP (ref 70–99)
GLUCOSE SERPL-MCNC: 97 MG/DL — SIGNIFICANT CHANGE UP (ref 70–99)
HCT VFR BLD CALC: 39.7 % — SIGNIFICANT CHANGE UP (ref 39–50)
HCT VFR BLD CALC: 39.7 % — SIGNIFICANT CHANGE UP (ref 39–50)
HGB BLD-MCNC: 13.3 G/DL — SIGNIFICANT CHANGE UP (ref 13–17)
HGB BLD-MCNC: 13.3 G/DL — SIGNIFICANT CHANGE UP (ref 13–17)
MCHC RBC-ENTMCNC: 30.2 PG — SIGNIFICANT CHANGE UP (ref 27–34)
MCHC RBC-ENTMCNC: 30.2 PG — SIGNIFICANT CHANGE UP (ref 27–34)
MCHC RBC-ENTMCNC: 33.5 GM/DL — SIGNIFICANT CHANGE UP (ref 32–36)
MCHC RBC-ENTMCNC: 33.5 GM/DL — SIGNIFICANT CHANGE UP (ref 32–36)
MCV RBC AUTO: 90 FL — SIGNIFICANT CHANGE UP (ref 80–100)
MCV RBC AUTO: 90 FL — SIGNIFICANT CHANGE UP (ref 80–100)
NRBC # BLD: 0 /100 WBCS — SIGNIFICANT CHANGE UP (ref 0–0)
NRBC # BLD: 0 /100 WBCS — SIGNIFICANT CHANGE UP (ref 0–0)
PLATELET # BLD AUTO: 229 K/UL — SIGNIFICANT CHANGE UP (ref 150–400)
PLATELET # BLD AUTO: 229 K/UL — SIGNIFICANT CHANGE UP (ref 150–400)
POTASSIUM SERPL-MCNC: 4.3 MMOL/L — SIGNIFICANT CHANGE UP (ref 3.5–5.3)
POTASSIUM SERPL-MCNC: 4.3 MMOL/L — SIGNIFICANT CHANGE UP (ref 3.5–5.3)
POTASSIUM SERPL-SCNC: 4.3 MMOL/L — SIGNIFICANT CHANGE UP (ref 3.5–5.3)
POTASSIUM SERPL-SCNC: 4.3 MMOL/L — SIGNIFICANT CHANGE UP (ref 3.5–5.3)
RBC # BLD: 4.41 M/UL — SIGNIFICANT CHANGE UP (ref 4.2–5.8)
RBC # BLD: 4.41 M/UL — SIGNIFICANT CHANGE UP (ref 4.2–5.8)
RBC # FLD: 13.3 % — SIGNIFICANT CHANGE UP (ref 10.3–14.5)
RBC # FLD: 13.3 % — SIGNIFICANT CHANGE UP (ref 10.3–14.5)
RH IG SCN BLD-IMP: POSITIVE — SIGNIFICANT CHANGE UP
RH IG SCN BLD-IMP: POSITIVE — SIGNIFICANT CHANGE UP
SODIUM SERPL-SCNC: 141 MMOL/L — SIGNIFICANT CHANGE UP (ref 135–145)
SODIUM SERPL-SCNC: 141 MMOL/L — SIGNIFICANT CHANGE UP (ref 135–145)
WBC # BLD: 6.37 K/UL — SIGNIFICANT CHANGE UP (ref 3.8–10.5)
WBC # BLD: 6.37 K/UL — SIGNIFICANT CHANGE UP (ref 3.8–10.5)
WBC # FLD AUTO: 6.37 K/UL — SIGNIFICANT CHANGE UP (ref 3.8–10.5)
WBC # FLD AUTO: 6.37 K/UL — SIGNIFICANT CHANGE UP (ref 3.8–10.5)

## 2023-10-19 PROCEDURE — G0463: CPT

## 2023-10-19 PROCEDURE — 86900 BLOOD TYPING SEROLOGIC ABO: CPT

## 2023-10-19 PROCEDURE — 87641 MR-STAPH DNA AMP PROBE: CPT

## 2023-10-19 PROCEDURE — 87640 STAPH A DNA AMP PROBE: CPT

## 2023-10-19 PROCEDURE — 86850 RBC ANTIBODY SCREEN: CPT

## 2023-10-19 PROCEDURE — 86901 BLOOD TYPING SEROLOGIC RH(D): CPT

## 2023-10-19 PROCEDURE — 80048 BASIC METABOLIC PNL TOTAL CA: CPT

## 2023-10-19 PROCEDURE — 85027 COMPLETE CBC AUTOMATED: CPT

## 2023-10-19 PROCEDURE — 83036 HEMOGLOBIN GLYCOSYLATED A1C: CPT

## 2023-10-19 RX ORDER — VANCOMYCIN HCL 1 G
1000 VIAL (EA) INTRAVENOUS ONCE
Refills: 0 | Status: DISCONTINUED | OUTPATIENT
Start: 2023-11-03 | End: 2023-11-06

## 2023-10-19 RX ORDER — SODIUM CHLORIDE 9 MG/ML
3 INJECTION INTRAMUSCULAR; INTRAVENOUS; SUBCUTANEOUS EVERY 8 HOURS
Refills: 0 | Status: DISCONTINUED | OUTPATIENT
Start: 2023-11-03 | End: 2023-11-03

## 2023-10-19 NOTE — H&P PST ADULT - PROBLEM SELECTOR PLAN 1
Posterior L2-L5 decompression on 11/03/2023.  Instructed to continue Losartan, Amlodipine & gabapentin, take with sips of water in AM the day of surgery     - Preop cbc, bmp, type and screen, A1c, mrsa/mssa swab done at [facility]  -medical evaluation advised  -preop instructions provided. All questions and concerns addressed  -instructed to hold low dose aspirin and omega-3 supplement 7 days preop (as directed by surgeon)  -ABO day of surgery  -fingerstick on admit

## 2023-10-19 NOTE — H&P PST ADULT - MUSCULOSKELETAL
ROM NECK LIMITED, ROM LEFT SHOULDER LIMITED/no joint swelling/no joint erythema/no joint warmth/no calf tenderness/decreased ROM/decreased ROM due to pain/normal gait/strength 5/5 bilateral lower extremities details…

## 2023-10-19 NOTE — H&P PST ADULT - PRO INTERPRETER NEED 2
Patient stated missed call yesterday about surgery scheduled. Please reach out to patient to discuss details.   [place]

## 2023-10-19 NOTE — H&P PST ADULT - HISTORY OF PRESENT ILLNESS
83 year old male RHD French speaking male  presents in a wheel chair with h/o Spinal stenosis- cervical & Lumbar, s/p Posterior C1-C6 Decompression, C4-5 Posterior Column Osteotomy, C1-C7 Fusion / Plastic Closure on 10/18/2022 . He reports having low back pain,  bilateral leg pain with  difficulty walking, persistent neck heaviness, bilateral shoulder pain, worse Left shoulder ,  low back pain is there for 5 years progressively getting worse causing numbness, tingling in both legs  with difficulty walking  for at least couple of  years. S/p neurosurgery consult &  presents for scheduled Posterior L2-L5 decompression on 11/03/2023.  His neurological status remains stable.    PMH of HTN, HLD, Pre-diabetes, BPH & lumbar radiculopathy. He ambulates with a rolling walker. He denies fever, chills, trauma or injury. .

## 2023-10-19 NOTE — H&P PST ADULT - NSICDXPASTMEDICALHX_GEN_ALL_CORE_FT
PAST MEDICAL HISTORY:  Benign prostate hyperplasia     Chronic kidney disease     COVID-19 virus infection -dec 2021 (cough, fever, malaise)    Hyperlipidemia     Hypertension

## 2023-10-19 NOTE — H&P PST ADULT - NSICDXPASTSURGICALHX_GEN_ALL_CORE_FT
PAST SURGICAL HISTORY:  H/O colonoscopy     History of carpal tunnel surgery -bilateral hands    History of cataract surgery -bilateral    S/P cervical spinal fusion

## 2023-10-19 NOTE — H&P PST ADULT - NSICDXPROCEDURE_GEN_ALL_CORE_FT
PROCEDURES:  Lumbar decompression 19-Oct-2023 17:18:34 Posterior L2-L5 decompression on 11/03/2023. Guy De Leon

## 2023-10-19 NOTE — H&P PST ADULT - BP NONINVASIVE SYSTOLIC (MM HG)
[facility]    [address]    [facility] [address]    [facility] [address]    Phone:  [phone]    Fax:  [phone]       Thank You for choosing us for your health care visit. We are glad to serve you and happy to provide you with this summary of your visit. Please help us to ensure we have accurate records. If you find anything that needs to be changed, please let our staff know as soon as possible.          Your Demographic Information     Patient Name Sex     Prachi Murdock Female 1966       Ethnic Group Patient Race    Not of  or  Origin White      Your Visit Details     Date & Time Provider Department    2017 3:30 PM Analilai Lewis MD [facility]      Your Upcoming Appointment*(Max 10)     Monday May 22, 2017 10:45 AM CDT   US PELVIS COMPLETE with MRUS1   [facility] ([facility], [address])    [address]  [facility] [address]   [phone]              Your To Do List     Future Orders Please Complete On or Around Expires    THYROID STIMULATING HORMONE REFLEX  May 11, 2017 (Approximate) Oct 08, 2017    US PELVIS COMPLETE NON OB  May 11, 2017 (Approximate) Aug 09, 2017      We Ordered or Performed the Following     SERVICE TO OB GYN     THYROID STIMULATING HORMONE REFLEX       Conditions Discussed Today or Order-Related Diagnoses        Comments    Post-menopausal bleeding    -  Primary     Hypothyroidism, unspecified type           Your Vitals Were     BP Pulse Height Weight LMP BMI    120/72 83 5' 5.6\" (1.666 m) 202 lb 13.2 oz (92 kg) 2015 (Approximate) 33.14 kg/m2    Smoking Status                   Never Smoker           Medications Prescribed or Re-Ordered Today     None      Your Current Medications Are        Disp Refills Start End    levothyroxine (SYNTHROID, LEVOTHROID) 50 MCG tablet 90 tablet 0 2017     Sig: TAKE 1 TABLET BY MOUTH DAILY    Class: Eprescribe    atorvastatin (LIPITOR) 10 MG tablet 30 tablet 0 4/11/2017     Sig: TAKE 1 TABLET BY MOUTH DAILY    Class: Eprescribe    losartan (COZAAR) 50 MG tablet 90 tablet 0 11/20/2016     Sig: TAKE 1 TABLET BY MOUTH DAILY    Class: Eprescribe    ibuprofen (MOTRIN) 200 MG tablet        Sig - Route: Take 200 mg by mouth every 6 hours as needed. - Oral    Class: Historical Med    losartan (COZAAR) 50 MG tablet 90 tablet 0 4/21/2017     Sig: TAKE 1 TABLET BY MOUTH DAILY    Class: Eprescribe    losartan (COZAAR) 50 MG tablet 90 tablet 0 2/2/2017     Sig: TAKE 1 TABLET BY MOUTH DAILY    Class: Eprescribe    omeprazole (PRILOSEC OTC) 20 MG tablet        Sig - Route: Take 20 mg by mouth daily. - Oral    Class: Historical Med      Allergies     No Known Allergies      Immunizations History as of 5/11/2017     Name Date    Influenza Quadrivalent Preservative Free 12/29/2014 10:34 AM    Tdap 3/13/2014      Problem List as of 5/11/2017     Essential (primary) hypertension    Esophageal reflux disease    Small fiber polyneuropathy            Patient Instructions     None       150

## 2023-10-19 NOTE — H&P PST ADULT - NEUROLOGICAL
details… ROM neck external rotation to both right & left limited( s/p Cervical fusion 10/2022)/cranial nerves II-XII intact/responds to verbal commands/cranial nerves intact

## 2023-10-19 NOTE — H&P PST ADULT - ASSESSMENT
Lumbar radiculopathy:  Posterior L2-L5 decompression on 2023.    CAPRINI VTE 2.0 SCORE [CLOT updated 2019]    AGE RELATED RISK FACTORS                                                       MOBILITY RELATED FACTORS  [ ] Age 41-60 years                                            (1 Point)                    [ ] Bed rest                                                        (1 Point)  [ ] Age: 61-74 years                                           (2 Points)                  [ ] Plaster cast                                                   (2 Points)  [x] Age= 75 years                                              (3 Points)                    [ ] Bed bound for more than 72 hours                 (2 Points)    DISEASE RELATED RISK FACTORS                                               GENDER SPECIFIC FACTORS  [ ] Edema in the lower extremities                       (1 Point)              [ ] Pregnancy                                                     (1 Point)  [ ] Varicose veins                                               (1 Point)                     [ ] Post-partum < 6 weeks                                   (1 Point)             [x] BMI > 25 Kg/m2                                            (1 Point)                     [ ] Hormonal therapy  or oral contraception          (1 Point)                 [ ] Sepsis (in the previous month)                        (1 Point)               [ ] History of pregnancy complications                 (1 point)  [ ] Pneumonia or serious lung disease                                               [ ] Unexplained or recurrent                     (1 Point)           (in the previous month)                               (1 Point)  [ ] Abnormal pulmonary function test                     (1 Point)                 SURGERY RELATED RISK FACTORS  [ ] Acute myocardial infarction                              (1 Point)               [ ]  Section                                             (1 Point)  [ ] Congestive heart failure (in the previous month)  (1 Point)      [ ] Minor surgery                                                  (1 Point)   [ ] Inflammatory bowel disease                             (1 Point)               [ ] Arthroscopic surgery                                        (2 Points)  [ ] Central venous access                                      (2 Points)                [x] General surgery lasting more than 45 minutes (2 points)  [ ] Malignancy- Present or previous                   (2 Points)                [ ] Elective arthroplasty                                         (5 points)    [ ] Stroke (in the previous month)                          (5 Points)                                                                                                                                                           HEMATOLOGY RELATED FACTORS                                                 TRAUMA RELATED RISK FACTORS  [ ] Prior episodes of VTE                                     (3 Points)                [ ] Fracture of the hip, pelvis, or leg                       (5 Points)  [ ] Positive family history for VTE                         (3 Points)             [ ] Acute spinal cord injury (in the previous month)  (5 Points)  [ ] Prothrombin 19315 A                                     (3 Points)               [ ] Paralysis  (less than 1 month)                             (5 Points)  [ ] Factor V Leiden                                             (3 Points)                  [ ] Multiple Trauma within 1 month                        (5 Points)  [ ] Lupus anticoagulants                                     (3 Points)                                                           [ ] Anticardiolipin antibodies                               (3 Points)                                                       [ ] High homocysteine in the blood                      (3 Points)                                             [ ] Other congenital or acquired thrombophilia      (3 Points)                                                [ ] Heparin induced thrombocytopenia                  (3 Points)                                     Total Score [  6]

## 2023-10-20 LAB
A1C WITH ESTIMATED AVERAGE GLUCOSE RESULT: 5.9 % — HIGH (ref 4–5.6)
A1C WITH ESTIMATED AVERAGE GLUCOSE RESULT: 5.9 % — HIGH (ref 4–5.6)
ESTIMATED AVERAGE GLUCOSE: 123 MG/DL — HIGH (ref 68–114)
ESTIMATED AVERAGE GLUCOSE: 123 MG/DL — HIGH (ref 68–114)
MRSA PCR RESULT.: SIGNIFICANT CHANGE UP
MRSA PCR RESULT.: SIGNIFICANT CHANGE UP
S AUREUS DNA NOSE QL NAA+PROBE: DETECTED
S AUREUS DNA NOSE QL NAA+PROBE: DETECTED

## 2023-11-02 ENCOUNTER — TRANSCRIPTION ENCOUNTER (OUTPATIENT)
Age: 83
End: 2023-11-02

## 2023-11-03 ENCOUNTER — APPOINTMENT (OUTPATIENT)
Dept: NEUROSURGERY | Facility: HOSPITAL | Age: 83
End: 2023-11-03

## 2023-11-03 ENCOUNTER — INPATIENT (INPATIENT)
Facility: HOSPITAL | Age: 83
LOS: 3 days | Discharge: NURSING, REHAB OR LONG-TERM CARE | DRG: 520 | End: 2023-11-07
Attending: NEUROLOGICAL SURGERY | Admitting: NEUROLOGICAL SURGERY
Payer: COMMERCIAL

## 2023-11-03 ENCOUNTER — TRANSCRIPTION ENCOUNTER (OUTPATIENT)
Age: 83
End: 2023-11-03

## 2023-11-03 VITALS
OXYGEN SATURATION: 96 % | DIASTOLIC BLOOD PRESSURE: 90 MMHG | HEART RATE: 82 BPM | SYSTOLIC BLOOD PRESSURE: 170 MMHG | HEIGHT: 65 IN | WEIGHT: 149.91 LBS | TEMPERATURE: 98 F | RESPIRATION RATE: 18 BRPM

## 2023-11-03 DIAGNOSIS — G95.9 DISEASE OF SPINAL CORD, UNSPECIFIED: ICD-10-CM

## 2023-11-03 DIAGNOSIS — M48.062 SPINAL STENOSIS, LUMBAR REGION WITH NEUROGENIC CLAUDICATION: ICD-10-CM

## 2023-11-03 DIAGNOSIS — Z98.49 CATARACT EXTRACTION STATUS, UNSPECIFIED EYE: Chronic | ICD-10-CM

## 2023-11-03 DIAGNOSIS — Z98.890 OTHER SPECIFIED POSTPROCEDURAL STATES: Chronic | ICD-10-CM

## 2023-11-03 DIAGNOSIS — Z98.1 ARTHRODESIS STATUS: Chronic | ICD-10-CM

## 2023-11-03 LAB
GAS PNL BLDA: SIGNIFICANT CHANGE UP
GAS PNL BLDA: SIGNIFICANT CHANGE UP
GLUCOSE BLDC GLUCOMTR-MCNC: 105 MG/DL — HIGH (ref 70–99)
GLUCOSE BLDC GLUCOMTR-MCNC: 105 MG/DL — HIGH (ref 70–99)
POTASSIUM SERPL-MCNC: 3.6 MMOL/L — SIGNIFICANT CHANGE UP (ref 3.5–5.3)
POTASSIUM SERPL-MCNC: 3.6 MMOL/L — SIGNIFICANT CHANGE UP (ref 3.5–5.3)
POTASSIUM SERPL-SCNC: 3.6 MMOL/L — SIGNIFICANT CHANGE UP (ref 3.5–5.3)
POTASSIUM SERPL-SCNC: 3.6 MMOL/L — SIGNIFICANT CHANGE UP (ref 3.5–5.3)

## 2023-11-03 PROCEDURE — 72020 X-RAY EXAM OF SPINE 1 VIEW: CPT | Mod: 26

## 2023-11-03 PROCEDURE — 63048 LAM FACETEC &FORAMOT EA ADDL: CPT

## 2023-11-03 PROCEDURE — 63047 LAM FACETEC & FORAMOT LUMBAR: CPT

## 2023-11-03 DEVICE — SURGIFOAM PAD 8CM X 12.5CM X 10MM (100): Type: IMPLANTABLE DEVICE | Status: FUNCTIONAL

## 2023-11-03 DEVICE — FLOSEAL WITH RECOTHROM THROMBIN 10ML: Type: IMPLANTABLE DEVICE | Status: FUNCTIONAL

## 2023-11-03 RX ORDER — ACETAMINOPHEN 500 MG
1000 TABLET ORAL EVERY 6 HOURS
Refills: 0 | Status: DISCONTINUED | OUTPATIENT
Start: 2023-11-03 | End: 2023-11-07

## 2023-11-03 RX ORDER — HYDROMORPHONE HYDROCHLORIDE 2 MG/ML
0.25 INJECTION INTRAMUSCULAR; INTRAVENOUS; SUBCUTANEOUS
Refills: 0 | Status: DISCONTINUED | OUTPATIENT
Start: 2023-11-03 | End: 2023-11-03

## 2023-11-03 RX ORDER — APREPITANT 80 MG/1
40 CAPSULE ORAL ONCE
Refills: 0 | Status: COMPLETED | OUTPATIENT
Start: 2023-11-03 | End: 2023-11-03

## 2023-11-03 RX ORDER — TIZANIDINE 4 MG/1
2 TABLET ORAL
Refills: 0 | DISCHARGE

## 2023-11-03 RX ORDER — CEFAZOLIN SODIUM 1 G
2000 VIAL (EA) INJECTION EVERY 8 HOURS
Refills: 0 | Status: COMPLETED | OUTPATIENT
Start: 2023-11-03 | End: 2023-11-04

## 2023-11-03 RX ORDER — PANTOPRAZOLE SODIUM 20 MG/1
40 TABLET, DELAYED RELEASE ORAL
Refills: 0 | Status: DISCONTINUED | OUTPATIENT
Start: 2023-11-03 | End: 2023-11-07

## 2023-11-03 RX ORDER — LOSARTAN POTASSIUM 100 MG/1
50 TABLET, FILM COATED ORAL DAILY
Refills: 0 | Status: DISCONTINUED | OUTPATIENT
Start: 2023-11-03 | End: 2023-11-07

## 2023-11-03 RX ORDER — TAMSULOSIN HYDROCHLORIDE 0.4 MG/1
1 CAPSULE ORAL
Refills: 0 | DISCHARGE

## 2023-11-03 RX ORDER — SENNA PLUS 8.6 MG/1
2 TABLET ORAL AT BEDTIME
Refills: 0 | Status: DISCONTINUED | OUTPATIENT
Start: 2023-11-03 | End: 2023-11-07

## 2023-11-03 RX ORDER — ACETAMINOPHEN 500 MG
1000 TABLET ORAL ONCE
Refills: 0 | Status: COMPLETED | OUTPATIENT
Start: 2023-11-03 | End: 2023-11-03

## 2023-11-03 RX ORDER — AMLODIPINE BESYLATE 2.5 MG/1
10 TABLET ORAL AT BEDTIME
Refills: 0 | Status: DISCONTINUED | OUTPATIENT
Start: 2023-11-03 | End: 2023-11-07

## 2023-11-03 RX ORDER — OXYCODONE HYDROCHLORIDE 5 MG/1
5 TABLET ORAL EVERY 6 HOURS
Refills: 0 | Status: DISCONTINUED | OUTPATIENT
Start: 2023-11-03 | End: 2023-11-06

## 2023-11-03 RX ORDER — GABAPENTIN 400 MG/1
1 CAPSULE ORAL
Refills: 0 | DISCHARGE

## 2023-11-03 RX ORDER — ENOXAPARIN SODIUM 100 MG/ML
40 INJECTION SUBCUTANEOUS EVERY 24 HOURS
Refills: 0 | Status: DISCONTINUED | OUTPATIENT
Start: 2023-11-04 | End: 2023-11-07

## 2023-11-03 RX ORDER — HYDROMORPHONE HYDROCHLORIDE 2 MG/ML
1 INJECTION INTRAMUSCULAR; INTRAVENOUS; SUBCUTANEOUS EVERY 6 HOURS
Refills: 0 | Status: DISCONTINUED | OUTPATIENT
Start: 2023-11-03 | End: 2023-11-05

## 2023-11-03 RX ORDER — CHLORHEXIDINE GLUCONATE 213 G/1000ML
1 SOLUTION TOPICAL ONCE
Refills: 0 | Status: COMPLETED | OUTPATIENT
Start: 2023-11-03 | End: 2023-11-03

## 2023-11-03 RX ORDER — ONDANSETRON 8 MG/1
4 TABLET, FILM COATED ORAL ONCE
Refills: 0 | Status: DISCONTINUED | OUTPATIENT
Start: 2023-11-03 | End: 2023-11-03

## 2023-11-03 RX ORDER — ATORVASTATIN CALCIUM 80 MG/1
40 TABLET, FILM COATED ORAL AT BEDTIME
Refills: 0 | Status: DISCONTINUED | OUTPATIENT
Start: 2023-11-03 | End: 2023-11-07

## 2023-11-03 RX ORDER — SODIUM CHLORIDE 9 MG/ML
1000 INJECTION INTRAMUSCULAR; INTRAVENOUS; SUBCUTANEOUS
Refills: 0 | Status: DISCONTINUED | OUTPATIENT
Start: 2023-11-03 | End: 2023-11-04

## 2023-11-03 RX ADMIN — HYDROMORPHONE HYDROCHLORIDE 0.25 MILLIGRAM(S): 2 INJECTION INTRAMUSCULAR; INTRAVENOUS; SUBCUTANEOUS at 18:45

## 2023-11-03 RX ADMIN — AMLODIPINE BESYLATE 10 MILLIGRAM(S): 2.5 TABLET ORAL at 21:06

## 2023-11-03 RX ADMIN — HYDROMORPHONE HYDROCHLORIDE 0.25 MILLIGRAM(S): 2 INJECTION INTRAMUSCULAR; INTRAVENOUS; SUBCUTANEOUS at 19:10

## 2023-11-03 RX ADMIN — ATORVASTATIN CALCIUM 40 MILLIGRAM(S): 80 TABLET, FILM COATED ORAL at 21:06

## 2023-11-03 RX ADMIN — Medication 1000 MILLIGRAM(S): at 11:37

## 2023-11-03 RX ADMIN — HYDROMORPHONE HYDROCHLORIDE 0.25 MILLIGRAM(S): 2 INJECTION INTRAMUSCULAR; INTRAVENOUS; SUBCUTANEOUS at 19:25

## 2023-11-03 RX ADMIN — HYDROMORPHONE HYDROCHLORIDE 0.25 MILLIGRAM(S): 2 INJECTION INTRAMUSCULAR; INTRAVENOUS; SUBCUTANEOUS at 21:04

## 2023-11-03 RX ADMIN — SODIUM CHLORIDE 65 MILLILITER(S): 9 INJECTION INTRAMUSCULAR; INTRAVENOUS; SUBCUTANEOUS at 19:47

## 2023-11-03 RX ADMIN — CHLORHEXIDINE GLUCONATE 1 APPLICATION(S): 213 SOLUTION TOPICAL at 11:37

## 2023-11-03 RX ADMIN — SENNA PLUS 2 TABLET(S): 8.6 TABLET ORAL at 21:06

## 2023-11-03 RX ADMIN — HYDROMORPHONE HYDROCHLORIDE 0.25 MILLIGRAM(S): 2 INJECTION INTRAMUSCULAR; INTRAVENOUS; SUBCUTANEOUS at 18:30

## 2023-11-03 RX ADMIN — Medication 100 MILLIGRAM(S): at 21:49

## 2023-11-03 RX ADMIN — APREPITANT 40 MILLIGRAM(S): 80 CAPSULE ORAL at 11:37

## 2023-11-03 RX ADMIN — HYDROMORPHONE HYDROCHLORIDE 0.25 MILLIGRAM(S): 2 INJECTION INTRAMUSCULAR; INTRAVENOUS; SUBCUTANEOUS at 20:49

## 2023-11-03 NOTE — PHYSICAL THERAPY INITIAL EVALUATION ADULT - NSPTDISCHREC_GEN_A_CORE
TBD upon functional eval Subacute Rehab; if home, pt would require assist with ALL ADL's and functional mobility and home PT. Pt would require assistance to get into home./Sub-acute Rehab

## 2023-11-03 NOTE — PHYSICAL THERAPY INITIAL EVALUATION ADULT - ADDITIONAL COMMENTS
Pt lives with daughter and spouse in a PH. Pt states he will be residing in basement after discharge, with 2 steps to enter. Pt states there is an additional 2 floors with 8-10 steps each. Pt has a WC and ambulates with RW. Wife will be able to assist as needed. Daughter works during the day.

## 2023-11-03 NOTE — PHYSICAL THERAPY INITIAL EVALUATION ADULT - PERTINENT HX OF CURRENT PROBLEM, REHAB EVAL
83 year old male RHD Macedonian speaking male s/p Posterior C1-C6 Decompression, C4-5 Posterior Column Osteotomy, C1-C7 Fusion / Plastic Closure on 10/18/2022 . He reports having low back pain,  bilateral leg pain with  difficulty walking, low back pain is there for 5 years progressively getting worse causing numbness, tingling in both legs  with difficulty walking  for at least couple of  years. S/p neurosurgery consult &  presents for Posterior L2-L5 decompression on 11/03/2023.

## 2023-11-03 NOTE — BRIEF OPERATIVE NOTE - OPERATION/FINDINGS
Called patient and let him know Katty's message below and also informed patient that the a copy of the  approval letter was sent in the mail L2-5 well decompressed, confirmed via [place]

## 2023-11-03 NOTE — PRE-OP CHECKLIST - ADVANCE DIRECTIVE ADDRESSED/READDRESSED
done Detail Level: Detailed Patient Specific Counseling (Will Not Stick From Patient To Patient): Scalp looks great with no active inflammation. Tolerating low dose plaquenil and labwork has been normal. Uses clobetasol prn flares and not having to  use it often. \\n\\nDr Child does bloodwork twice a year and he visits his eye doctor regularly. Detail Level: Zone

## 2023-11-03 NOTE — PATIENT PROFILE ADULT - FALL HARM RISK - HARM RISK INTERVENTIONS

## 2023-11-03 NOTE — PHYSICAL THERAPY INITIAL EVALUATION ADULT - NSPTDMEREC_GEN_A_CORE
TBD upon functional eval If home pt owns WC, RW. Pt would benefit from 3:1 commode. Pt is confined to a single room without a bathroom. Pt would benefit from transport wheelchair.

## 2023-11-04 RX ADMIN — ENOXAPARIN SODIUM 40 MILLIGRAM(S): 100 INJECTION SUBCUTANEOUS at 21:46

## 2023-11-04 RX ADMIN — OXYCODONE HYDROCHLORIDE 5 MILLIGRAM(S): 5 TABLET ORAL at 11:31

## 2023-11-04 RX ADMIN — PANTOPRAZOLE SODIUM 40 MILLIGRAM(S): 20 TABLET, DELAYED RELEASE ORAL at 05:33

## 2023-11-04 RX ADMIN — AMLODIPINE BESYLATE 10 MILLIGRAM(S): 2.5 TABLET ORAL at 21:45

## 2023-11-04 RX ADMIN — OXYCODONE HYDROCHLORIDE 5 MILLIGRAM(S): 5 TABLET ORAL at 06:29

## 2023-11-04 RX ADMIN — OXYCODONE HYDROCHLORIDE 5 MILLIGRAM(S): 5 TABLET ORAL at 18:15

## 2023-11-04 RX ADMIN — Medication 100 MILLIGRAM(S): at 05:33

## 2023-11-04 RX ADMIN — Medication 1000 MILLIGRAM(S): at 22:16

## 2023-11-04 RX ADMIN — LOSARTAN POTASSIUM 50 MILLIGRAM(S): 100 TABLET, FILM COATED ORAL at 05:33

## 2023-11-04 RX ADMIN — Medication 1000 MILLIGRAM(S): at 21:46

## 2023-11-04 RX ADMIN — OXYCODONE HYDROCHLORIDE 5 MILLIGRAM(S): 5 TABLET ORAL at 12:36

## 2023-11-04 RX ADMIN — OXYCODONE HYDROCHLORIDE 5 MILLIGRAM(S): 5 TABLET ORAL at 05:33

## 2023-11-04 RX ADMIN — ATORVASTATIN CALCIUM 40 MILLIGRAM(S): 80 TABLET, FILM COATED ORAL at 21:46

## 2023-11-04 RX ADMIN — SENNA PLUS 2 TABLET(S): 8.6 TABLET ORAL at 21:46

## 2023-11-04 NOTE — PROGRESS NOTE ADULT - SUBJECTIVE AND OBJECTIVE BOX
Patient seen and examined at bedside.    --Anticoagulation--  enoxaparin Injectable 40 milliGRAM(s) SubCutaneous every 24 hours    T(C): 36.5 (11-04-23 @ 16:33), Max: 36.5 (11-04-23 @ 08:00)  HR: 72 (11-04-23 @ 16:33) (61 - 87)  BP: 159/79 (11-04-23 @ 16:33) (126/69 - 168/71)  RR: 18 (11-04-23 @ 16:33) (16 - 18)  SpO2: 97% (11-04-23 @ 16:33) (94% - 100%)  Wt(kg): --    Exam:   Patient seen and examined at bedside.    --Anticoagulation--  enoxaparin Injectable 40 milliGRAM(s) SubCutaneous every 24 hours    T(C): 36.5 (11-04-23 @ 16:33), Max: 36.5 (11-04-23 @ 08:00)  HR: 72 (11-04-23 @ 16:33) (61 - 87)  BP: 159/79 (11-04-23 @ 16:33) (126/69 - 168/71)  RR: 18 (11-04-23 @ 16:33) (16 - 18)  SpO2: 97% (11-04-23 @ 16:33) (94% - 100%)  Wt(kg): --    Exam:  Wide awake, BUE strong ag L>R, BLE pain nance but wiggles toes and moves ag w/ effort, incision c/d/i.

## 2023-11-05 LAB
ANION GAP SERPL CALC-SCNC: 16 MMOL/L — SIGNIFICANT CHANGE UP (ref 5–17)
ANION GAP SERPL CALC-SCNC: 16 MMOL/L — SIGNIFICANT CHANGE UP (ref 5–17)
BUN SERPL-MCNC: 28 MG/DL — HIGH (ref 7–23)
BUN SERPL-MCNC: 28 MG/DL — HIGH (ref 7–23)
CALCIUM SERPL-MCNC: 8.6 MG/DL — SIGNIFICANT CHANGE UP (ref 8.4–10.5)
CALCIUM SERPL-MCNC: 8.6 MG/DL — SIGNIFICANT CHANGE UP (ref 8.4–10.5)
CHLORIDE SERPL-SCNC: 102 MMOL/L — SIGNIFICANT CHANGE UP (ref 96–108)
CHLORIDE SERPL-SCNC: 102 MMOL/L — SIGNIFICANT CHANGE UP (ref 96–108)
CO2 SERPL-SCNC: 21 MMOL/L — LOW (ref 22–31)
CO2 SERPL-SCNC: 21 MMOL/L — LOW (ref 22–31)
CREAT SERPL-MCNC: 1.2 MG/DL — SIGNIFICANT CHANGE UP (ref 0.5–1.3)
CREAT SERPL-MCNC: 1.2 MG/DL — SIGNIFICANT CHANGE UP (ref 0.5–1.3)
EGFR: 59 ML/MIN/1.73M2 — LOW
EGFR: 59 ML/MIN/1.73M2 — LOW
GLUCOSE SERPL-MCNC: 118 MG/DL — HIGH (ref 70–99)
GLUCOSE SERPL-MCNC: 118 MG/DL — HIGH (ref 70–99)
HCT VFR BLD CALC: 36.4 % — LOW (ref 39–50)
HCT VFR BLD CALC: 36.4 % — LOW (ref 39–50)
HGB BLD-MCNC: 11.8 G/DL — LOW (ref 13–17)
HGB BLD-MCNC: 11.8 G/DL — LOW (ref 13–17)
MCHC RBC-ENTMCNC: 29.7 PG — SIGNIFICANT CHANGE UP (ref 27–34)
MCHC RBC-ENTMCNC: 29.7 PG — SIGNIFICANT CHANGE UP (ref 27–34)
MCHC RBC-ENTMCNC: 32.4 GM/DL — SIGNIFICANT CHANGE UP (ref 32–36)
MCHC RBC-ENTMCNC: 32.4 GM/DL — SIGNIFICANT CHANGE UP (ref 32–36)
MCV RBC AUTO: 91.7 FL — SIGNIFICANT CHANGE UP (ref 80–100)
MCV RBC AUTO: 91.7 FL — SIGNIFICANT CHANGE UP (ref 80–100)
NRBC # BLD: 0 /100 WBCS — SIGNIFICANT CHANGE UP (ref 0–0)
NRBC # BLD: 0 /100 WBCS — SIGNIFICANT CHANGE UP (ref 0–0)
PLATELET # BLD AUTO: 188 K/UL — SIGNIFICANT CHANGE UP (ref 150–400)
PLATELET # BLD AUTO: 188 K/UL — SIGNIFICANT CHANGE UP (ref 150–400)
POTASSIUM SERPL-MCNC: 3.8 MMOL/L — SIGNIFICANT CHANGE UP (ref 3.5–5.3)
POTASSIUM SERPL-MCNC: 3.8 MMOL/L — SIGNIFICANT CHANGE UP (ref 3.5–5.3)
POTASSIUM SERPL-SCNC: 3.8 MMOL/L — SIGNIFICANT CHANGE UP (ref 3.5–5.3)
POTASSIUM SERPL-SCNC: 3.8 MMOL/L — SIGNIFICANT CHANGE UP (ref 3.5–5.3)
RBC # BLD: 3.97 M/UL — LOW (ref 4.2–5.8)
RBC # BLD: 3.97 M/UL — LOW (ref 4.2–5.8)
RBC # FLD: 13.7 % — SIGNIFICANT CHANGE UP (ref 10.3–14.5)
RBC # FLD: 13.7 % — SIGNIFICANT CHANGE UP (ref 10.3–14.5)
SODIUM SERPL-SCNC: 139 MMOL/L — SIGNIFICANT CHANGE UP (ref 135–145)
SODIUM SERPL-SCNC: 139 MMOL/L — SIGNIFICANT CHANGE UP (ref 135–145)
WBC # BLD: 12.01 K/UL — HIGH (ref 3.8–10.5)
WBC # BLD: 12.01 K/UL — HIGH (ref 3.8–10.5)
WBC # FLD AUTO: 12.01 K/UL — HIGH (ref 3.8–10.5)
WBC # FLD AUTO: 12.01 K/UL — HIGH (ref 3.8–10.5)

## 2023-11-05 RX ORDER — OXYCODONE HYDROCHLORIDE 5 MG/1
10 TABLET ORAL EVERY 6 HOURS
Refills: 0 | Status: DISCONTINUED | OUTPATIENT
Start: 2023-11-05 | End: 2023-11-06

## 2023-11-05 RX ORDER — ACETAMINOPHEN 500 MG
1000 TABLET ORAL ONCE
Refills: 0 | Status: COMPLETED | OUTPATIENT
Start: 2023-11-05 | End: 2023-11-05

## 2023-11-05 RX ORDER — HYDROMORPHONE HYDROCHLORIDE 2 MG/ML
1 INJECTION INTRAMUSCULAR; INTRAVENOUS; SUBCUTANEOUS EVERY 6 HOURS
Refills: 0 | Status: DISCONTINUED | OUTPATIENT
Start: 2023-11-05 | End: 2023-11-06

## 2023-11-05 RX ORDER — ACETAMINOPHEN 500 MG
1000 TABLET ORAL EVERY 8 HOURS
Refills: 0 | Status: DISCONTINUED | OUTPATIENT
Start: 2023-11-05 | End: 2023-11-07

## 2023-11-05 RX ORDER — METHOCARBAMOL 500 MG/1
500 TABLET, FILM COATED ORAL THREE TIMES A DAY
Refills: 0 | Status: DISCONTINUED | OUTPATIENT
Start: 2023-11-05 | End: 2023-11-07

## 2023-11-05 RX ADMIN — OXYCODONE HYDROCHLORIDE 5 MILLIGRAM(S): 5 TABLET ORAL at 13:02

## 2023-11-05 RX ADMIN — OXYCODONE HYDROCHLORIDE 5 MILLIGRAM(S): 5 TABLET ORAL at 12:02

## 2023-11-05 RX ADMIN — OXYCODONE HYDROCHLORIDE 5 MILLIGRAM(S): 5 TABLET ORAL at 06:06

## 2023-11-05 RX ADMIN — Medication 1000 MILLIGRAM(S): at 06:06

## 2023-11-05 RX ADMIN — OXYCODONE HYDROCHLORIDE 5 MILLIGRAM(S): 5 TABLET ORAL at 21:14

## 2023-11-05 RX ADMIN — Medication 400 MILLIGRAM(S): at 14:43

## 2023-11-05 RX ADMIN — PANTOPRAZOLE SODIUM 40 MILLIGRAM(S): 20 TABLET, DELAYED RELEASE ORAL at 05:36

## 2023-11-05 RX ADMIN — OXYCODONE HYDROCHLORIDE 5 MILLIGRAM(S): 5 TABLET ORAL at 22:14

## 2023-11-05 RX ADMIN — METHOCARBAMOL 500 MILLIGRAM(S): 500 TABLET, FILM COATED ORAL at 21:14

## 2023-11-05 RX ADMIN — LOSARTAN POTASSIUM 50 MILLIGRAM(S): 100 TABLET, FILM COATED ORAL at 05:36

## 2023-11-05 RX ADMIN — Medication 1000 MILLIGRAM(S): at 15:13

## 2023-11-05 RX ADMIN — AMLODIPINE BESYLATE 10 MILLIGRAM(S): 2.5 TABLET ORAL at 21:14

## 2023-11-05 RX ADMIN — SENNA PLUS 2 TABLET(S): 8.6 TABLET ORAL at 21:13

## 2023-11-05 RX ADMIN — Medication 1000 MILLIGRAM(S): at 05:36

## 2023-11-05 RX ADMIN — ATORVASTATIN CALCIUM 40 MILLIGRAM(S): 80 TABLET, FILM COATED ORAL at 21:14

## 2023-11-05 RX ADMIN — OXYCODONE HYDROCHLORIDE 5 MILLIGRAM(S): 5 TABLET ORAL at 05:36

## 2023-11-05 RX ADMIN — ENOXAPARIN SODIUM 40 MILLIGRAM(S): 100 INJECTION SUBCUTANEOUS at 21:17

## 2023-11-05 RX ADMIN — METHOCARBAMOL 500 MILLIGRAM(S): 500 TABLET, FILM COATED ORAL at 13:24

## 2023-11-05 NOTE — PROGRESS NOTE ADULT - SUBJECTIVE AND OBJECTIVE BOX
Patient seen and examined at bedside.    --Anticoagulation--  enoxaparin Injectable 40 milliGRAM(s) SubCutaneous every 24 hours    T(C): 37 (11-05-23 @ 12:25), Max: 37 (11-05-23 @ 12:25)  HR: 78 (11-05-23 @ 12:25) (68 - 81)  BP: 146/77 (11-05-23 @ 12:25) (120/66 - 159/79)  RR: 18 (11-05-23 @ 12:25) (18 - 18)  SpO2: 92% (11-05-23 @ 12:25) (92% - 98%)  Wt(kg): --    Exam: Czech speaking, intact

## 2023-11-06 PROCEDURE — 99222 1ST HOSP IP/OBS MODERATE 55: CPT

## 2023-11-06 RX ORDER — OXYCODONE HYDROCHLORIDE 5 MG/1
5 TABLET ORAL EVERY 4 HOURS
Refills: 0 | Status: DISCONTINUED | OUTPATIENT
Start: 2023-11-06 | End: 2023-11-07

## 2023-11-06 RX ORDER — POLYETHYLENE GLYCOL 3350 17 G/17G
17 POWDER, FOR SOLUTION ORAL
Refills: 0 | Status: DISCONTINUED | OUTPATIENT
Start: 2023-11-06 | End: 2023-11-07

## 2023-11-06 RX ORDER — OXYCODONE HYDROCHLORIDE 5 MG/1
10 TABLET ORAL EVERY 4 HOURS
Refills: 0 | Status: DISCONTINUED | OUTPATIENT
Start: 2023-11-06 | End: 2023-11-07

## 2023-11-06 RX ADMIN — METHOCARBAMOL 500 MILLIGRAM(S): 500 TABLET, FILM COATED ORAL at 05:44

## 2023-11-06 RX ADMIN — OXYCODONE HYDROCHLORIDE 5 MILLIGRAM(S): 5 TABLET ORAL at 11:11

## 2023-11-06 RX ADMIN — ENOXAPARIN SODIUM 40 MILLIGRAM(S): 100 INJECTION SUBCUTANEOUS at 22:23

## 2023-11-06 RX ADMIN — PANTOPRAZOLE SODIUM 40 MILLIGRAM(S): 20 TABLET, DELAYED RELEASE ORAL at 05:44

## 2023-11-06 RX ADMIN — OXYCODONE HYDROCHLORIDE 5 MILLIGRAM(S): 5 TABLET ORAL at 06:44

## 2023-11-06 RX ADMIN — METHOCARBAMOL 500 MILLIGRAM(S): 500 TABLET, FILM COATED ORAL at 13:30

## 2023-11-06 RX ADMIN — METHOCARBAMOL 500 MILLIGRAM(S): 500 TABLET, FILM COATED ORAL at 22:23

## 2023-11-06 RX ADMIN — OXYCODONE HYDROCHLORIDE 5 MILLIGRAM(S): 5 TABLET ORAL at 10:11

## 2023-11-06 RX ADMIN — OXYCODONE HYDROCHLORIDE 5 MILLIGRAM(S): 5 TABLET ORAL at 19:15

## 2023-11-06 RX ADMIN — Medication 10 MILLIGRAM(S): at 16:19

## 2023-11-06 RX ADMIN — OXYCODONE HYDROCHLORIDE 5 MILLIGRAM(S): 5 TABLET ORAL at 16:23

## 2023-11-06 RX ADMIN — OXYCODONE HYDROCHLORIDE 5 MILLIGRAM(S): 5 TABLET ORAL at 17:23

## 2023-11-06 RX ADMIN — OXYCODONE HYDROCHLORIDE 5 MILLIGRAM(S): 5 TABLET ORAL at 05:44

## 2023-11-06 RX ADMIN — AMLODIPINE BESYLATE 10 MILLIGRAM(S): 2.5 TABLET ORAL at 22:23

## 2023-11-06 RX ADMIN — SENNA PLUS 2 TABLET(S): 8.6 TABLET ORAL at 22:22

## 2023-11-06 RX ADMIN — ATORVASTATIN CALCIUM 40 MILLIGRAM(S): 80 TABLET, FILM COATED ORAL at 22:23

## 2023-11-06 RX ADMIN — LOSARTAN POTASSIUM 50 MILLIGRAM(S): 100 TABLET, FILM COATED ORAL at 05:44

## 2023-11-06 NOTE — OCCUPATIONAL THERAPY INITIAL EVALUATION ADULT - DIAGNOSIS, OT EVAL
Pt presents with decreased strength, ROM,, balance, endurance, impacting ability to perform ADL and mobility.

## 2023-11-06 NOTE — OCCUPATIONAL THERAPY INITIAL EVALUATION ADULT - PERTINENT HX OF CURRENT PROBLEM, REHAB EVAL
83 year old male RHD Maori speaking male  presents in a wheel chair with h/o Spinal stenosis- cervical & Lumbar, s/p Posterior C1-C6 Decompression, C4-5 Posterior Column Osteotomy, C1-C7 Fusion / Plastic Closure on 10/18/2022 . He reports having low back pain,  bilateral leg pain with difficulty walking, persistent neck heaviness, bilateral shoulder pain, worse Left shoulder, low back pain is there for 5 years progressively getting worse causing numbness, tingling in both legs  with difficulty walking  for at least couple of  years. PMH of HTN, HLD, Pre-diabetes, BPH & lumbar radiculopathy. He ambulates with a rolling walker. Now s/p Posterior L2 to L5 decompression.

## 2023-11-06 NOTE — OCCUPATIONAL THERAPY INITIAL EVALUATION ADULT - LIVES WITH, PROFILE
Pt lives in a house with his wife/daughter +5STE +2 flights inside. Pt was independent with ADLs/mobility (+RW) prior to admission. Pt has a walk in shower and a tub./children/spouse

## 2023-11-06 NOTE — PROGRESS NOTE ADULT - SUBJECTIVE AND OBJECTIVE BOX
CHIEF COMPLAINT:  used, patient seen in chair, reports pain getting better    Vital Signs Last 24 Hrs  T(C): 37.7 (06 Nov 2023 13:58), Max: 37.7 (06 Nov 2023 13:58)  T(F): 99.9 (06 Nov 2023 13:58), Max: 99.9 (06 Nov 2023 13:58)  HR: 89 (06 Nov 2023 13:58) (77 - 89)  BP: 146/86 (06 Nov 2023 13:58) (113/87 - 155/79)  BP(mean): --  RR: 18 (06 Nov 2023 13:58) (18 - 18)  SpO2: 98% (06 Nov 2023 13:58) (93% - 98%)    Parameters below as of 06 Nov 2023 13:58  Patient On (Oxygen Delivery Method): room air    DRAINS: [] KIRT (cc/24h) [x] HMV (80cc/24h)    PHYSICAL EXAM:    General: No Acute Distress     Neurological: Awake, alert oriented to person, place and time, Following Commands, PERRL, EOMI, Face Symmetrical, Speech Fluent, Moving all extremities, Muscle Strength normal in all four extremities, No Drift, Sensation to Light Touch Intact    Pulmonary: Clear to Auscultation, No Rales, No Rhonchi, No Wheezes     Cardiovascular: S1, S2, Regular Rate and Rhythm     Gastrointestinal: Soft, Nontender, Nondistended     Incision: +dressing, old drainage at drain site, no active drainage noted, new dressing placed.     LABS:                        11.8   12.01 )-----------( 188      ( 05 Nov 2023 06:14 )             36.4    11-05    139  |  102  |  28<H>  ----------------------------<  118<H>  3.8   |  21<L>  |  1.20    Ca    8.6      05 Nov 2023 06:15    11-05 @ 07:01  -  11-06 @ 07:00  --------------------------------------------------------  IN: 1380 mL / OUT: 1830 mL / NET: -450 mL    11-06 @ 07:01  - 11-06 @ 15:55  --------------------------------------------------------  IN: 400 mL / OUT: 50 mL / NET: 350 mL    MEDICATIONS:  Anticoagulation:  enoxaparin Injectable 40 milliGRAM(s) SubCutaneous every 24 hours    Endo:  atorvastatin 40 milliGRAM(s) Oral at bedtime    Neuro:  acetaminophen     Tablet .. 1000 milliGRAM(s) Oral every 6 hours PRN Temp greater or equal to 38C (100.4F), Mild Pain (1 - 3)  acetaminophen   IVPB .. 1000 milliGRAM(s) IV Intermittent every 8 hours PRN Temp greater or equal to 38C (100.4F), Mild Pain (1 - 3)  methocarbamol 500 milliGRAM(s) Oral three times a day  oxyCODONE    IR 5 milliGRAM(s) Oral every 4 hours PRN Moderate Pain (4 - 6)  oxyCODONE    IR 10 milliGRAM(s) Oral every 4 hours PRN Severe Pain (7 - 10)    Cardiac:  amLODIPine   Tablet 10 milliGRAM(s) Oral at bedtime  losartan 50 milliGRAM(s) Oral daily    GI/:  bisacodyl 5 milliGRAM(s) Oral every 12 hours PRN Constipation  bisacodyl Suppository 10 milliGRAM(s) Rectal daily PRN Constipation  pantoprazole    Tablet 40 milliGRAM(s) Oral before breakfast  polyethylene glycol 3350 17 Gram(s) Oral two times a day  senna 2 Tablet(s) Oral at bedtime    Other:     DIET: [x] Regular [] CCD [] Renal [] Puree [] Dysphagia [] Tube Feeds:     IMAGING: no new imaging   Lab Facility: [id number]

## 2023-11-06 NOTE — CONSULT NOTE ADULT - SUBJECTIVE AND OBJECTIVE BOX
HPI:  83 year old male RHD Belarusian speaking male  presents in a wheel chair with h/o Spinal stenosis- cervical & Lumbar, s/p Posterior C1-C6 Decompression, C4-5 Posterior Column Osteotomy, C1-C7 Fusion / Plastic Closure on 10/18/2022 . He reports having low back pain,  bilateral leg pain with  difficulty walking, persistent neck heaviness, bilateral shoulder pain, worse Left shoulder ,  low back pain is there for 5 years progressively getting worse causing numbness, tingling in both legs  with difficulty walking  for at least couple of  years. S/p neurosurgery consult &  presents for scheduled Posterior L2-L5 decompression on 11/03/2023.  His neurological status remains stable.    PMH of HTN, HLD, Pre-diabetes, BPH & lumbar radiculopathy. He ambulates with a rolling walker. He denies fever, chills, trauma or injury. .    Patient was admitted on 11/3, s/p L2-5 laminectomy, seen today     REVIEW OF SYSTEMS  Constitutional - No fever, No weight loss, No fatigue  HEENT - No vertigo, No neck pain  Respiratory - No cough, No wheezing, No shortness of breath  Cardiovascular - No chest pain, No palpitations  Gastrointestinal - No abdominal pain, No nausea, No vomiting, No diarrhea, No constipation  Genitourinary - No dysuria, No frequency, No hematuria, No incontinence  Psychiatric - No depression, No anxiety    VITALS  T(C): 37 (11-06-23 @ 04:50), Max: 37.4 (11-05-23 @ 20:24)  HR: 80 (11-06-23 @ 04:50) (77 - 87)  BP: 147/74 (11-06-23 @ 04:50) (146/77 - 155/79)  RR: 18 (11-06-23 @ 04:50) (18 - 18)  SpO2: 93% (11-06-23 @ 04:50) (92% - 93%)  Wt(kg): --    PAST MEDICAL & SURGICAL HISTORY  Hypertension    Hyperlipidemia    Chronic kidney disease    Diabetes mellitus    Benign prostate hyperplasia    COVID-19 virus infection    History of prediabetes    H/O carpal tunnel repair    H/O cataract extraction    H/O colonoscopy    S/P cervical spinal fusion        SOCIAL HISTORY  Smoking - Denied  EtOH - Denied   Drugs - Denied    FUNCTIONAL HISTORY  Lives with daughter, 2 steps to enter, +flight  Independent AMB and ADLs PTA     CURRENT FUNCTIONAL STATUS  11/5 PT  bed mobility mod assist  transfers mod assist with RW  gait mod assist with RW x 4 steps     FAMILY HISTORY   No pertinent family history in first degree relatives        RECENT LABS/IMAGING  CBC Full  -  ( 05 Nov 2023 06:14 )  WBC Count : 12.01 K/uL  RBC Count : 3.97 M/uL  Hemoglobin : 11.8 g/dL  Hematocrit : 36.4 %  Platelet Count - Automated : 188 K/uL  Mean Cell Volume : 91.7 fl  Mean Cell Hemoglobin : 29.7 pg  Mean Cell Hemoglobin Concentration : 32.4 gm/dL  Auto Neutrophil # : x  Auto Lymphocyte # : x  Auto Monocyte # : x  Auto Eosinophil # : x  Auto Basophil # : x  Auto Neutrophil % : x  Auto Lymphocyte % : x  Auto Monocyte % : x  Auto Eosinophil % : x  Auto Basophil % : x    11-05    139  |  102  |  28<H>  ----------------------------<  118<H>  3.8   |  21<L>  |  1.20    Ca    8.6      05 Nov 2023 06:15      Urinalysis Basic - ( 05 Nov 2023 06:15 )    Color: x / Appearance: x / SG: x / pH: x  Gluc: 118 mg/dL / Ketone: x  / Bili: x / Urobili: x   Blood: x / Protein: x / Nitrite: x   Leuk Esterase: x / RBC: x / WBC x   Sq Epi: x / Non Sq Epi: x / Bacteria: x    < from: Xray Spine Single View (11.03.23 @ 17:18) >      INTERPRETATION:  Lumbar spine:    HISTORY: Operating room guidance    Multiple lateral views of the lumbar spine were obtained before and after   posterior decompression surgery at what are labeled levels L2-L5.   Posterior surgical drain is seen in the final image.    IMPRESSION:  Operating room localization images.      < end of copied text >      ALLERGIES  penicillin (Rash)      MEDICATIONS   acetaminophen     Tablet .. 1000 milliGRAM(s) Oral every 6 hours PRN  acetaminophen   IVPB .. 1000 milliGRAM(s) IV Intermittent every 8 hours PRN  amLODIPine   Tablet 10 milliGRAM(s) Oral at bedtime  atorvastatin 40 milliGRAM(s) Oral at bedtime  bisacodyl 5 milliGRAM(s) Oral every 12 hours PRN  bisacodyl Suppository 10 milliGRAM(s) Rectal daily PRN  enoxaparin Injectable 40 milliGRAM(s) SubCutaneous every 24 hours  losartan 50 milliGRAM(s) Oral daily  methocarbamol 500 milliGRAM(s) Oral three times a day  oxyCODONE    IR 5 milliGRAM(s) Oral every 4 hours PRN  oxyCODONE    IR 10 milliGRAM(s) Oral every 4 hours PRN  pantoprazole    Tablet 40 milliGRAM(s) Oral before breakfast  polyethylene glycol 3350 17 Gram(s) Oral two times a day  senna 2 Tablet(s) Oral at bedtime      ----------------------------------------------------------------------------------------  PHYSICAL EXAM  Constitutional - NAD, Comfortable, in bed   Chest - Breathing comfortably, room air   Cardiovascular - S1S2   Abdomen - Soft   Extremities - No C/C/E, No calf tenderness   Neurologic Exam -                    Cognitive - Awake, Alert, AAO to self, place, date, year, situation     Communication - Fluent, No dysarthria        Motor - moves all ext      Sensory - Intact to LT     Psychiatric - Mood stable, Affect WNL  ----------------------------------------------------------------------------------------  ASSESSMENT/PLAN  83yMale h/o spinal stenosis, cervical fusion/decompression 10/2022 with functional deficits after lumbar laminectomy   Pain - Tylenol oxycodone, methocarbamol  DVT PPX - SCDs lovenox   Rehab - Will continue to follow for ongoing rehab needs and recommendations.   continue bedside therapy  out of bed to chair daily    Recommend ACUTE inpatient rehabilitation for the functional deficits consisting of 3 hours of therapy/day x 4weeks & 24 hour RN/daily PMR physician for comorbid medical management. Patient will be able to tolerate 3 hours a day.

## 2023-11-06 NOTE — OCCUPATIONAL THERAPY INITIAL EVALUATION ADULT - ADL RETRAINING, OT EVAL
GOAL: pt will perform UB dressing independently in 4 weeks. GOAL: pt will perform LB dressing independently in 4 weeks.

## 2023-11-07 ENCOUNTER — INPATIENT (INPATIENT)
Facility: HOSPITAL | Age: 83
LOS: 13 days | Discharge: HOME HEALTH | DRG: 560 | End: 2023-11-21
Attending: PHYSICAL MEDICINE & REHABILITATION | Admitting: PHYSICAL MEDICINE & REHABILITATION
Payer: COMMERCIAL

## 2023-11-07 ENCOUNTER — TRANSCRIPTION ENCOUNTER (OUTPATIENT)
Age: 83
End: 2023-11-07

## 2023-11-07 VITALS
SYSTOLIC BLOOD PRESSURE: 147 MMHG | RESPIRATION RATE: 18 BRPM | OXYGEN SATURATION: 96 % | DIASTOLIC BLOOD PRESSURE: 75 MMHG | TEMPERATURE: 99 F | HEART RATE: 84 BPM

## 2023-11-07 VITALS
HEIGHT: 65 IN | HEART RATE: 85 BPM | TEMPERATURE: 99 F | DIASTOLIC BLOOD PRESSURE: 67 MMHG | OXYGEN SATURATION: 93 % | WEIGHT: 151.9 LBS | SYSTOLIC BLOOD PRESSURE: 171 MMHG | RESPIRATION RATE: 14 BRPM

## 2023-11-07 DIAGNOSIS — Z98.890 OTHER SPECIFIED POSTPROCEDURAL STATES: Chronic | ICD-10-CM

## 2023-11-07 DIAGNOSIS — Z98.1 ARTHRODESIS STATUS: Chronic | ICD-10-CM

## 2023-11-07 DIAGNOSIS — Z98.49 CATARACT EXTRACTION STATUS, UNSPECIFIED EYE: Chronic | ICD-10-CM

## 2023-11-07 DIAGNOSIS — Z98.1 ARTHRODESIS STATUS: ICD-10-CM

## 2023-11-07 LAB
ANION GAP SERPL CALC-SCNC: 10 MMOL/L — SIGNIFICANT CHANGE UP (ref 5–17)
ANION GAP SERPL CALC-SCNC: 10 MMOL/L — SIGNIFICANT CHANGE UP (ref 5–17)
BASOPHILS # BLD AUTO: 0.02 K/UL — SIGNIFICANT CHANGE UP (ref 0–0.2)
BASOPHILS # BLD AUTO: 0.02 K/UL — SIGNIFICANT CHANGE UP (ref 0–0.2)
BASOPHILS NFR BLD AUTO: 0.2 % — SIGNIFICANT CHANGE UP (ref 0–2)
BASOPHILS NFR BLD AUTO: 0.2 % — SIGNIFICANT CHANGE UP (ref 0–2)
BUN SERPL-MCNC: 23 MG/DL — SIGNIFICANT CHANGE UP (ref 7–23)
BUN SERPL-MCNC: 23 MG/DL — SIGNIFICANT CHANGE UP (ref 7–23)
CALCIUM SERPL-MCNC: 9.1 MG/DL — SIGNIFICANT CHANGE UP (ref 8.4–10.5)
CALCIUM SERPL-MCNC: 9.1 MG/DL — SIGNIFICANT CHANGE UP (ref 8.4–10.5)
CHLORIDE SERPL-SCNC: 100 MMOL/L — SIGNIFICANT CHANGE UP (ref 96–108)
CHLORIDE SERPL-SCNC: 100 MMOL/L — SIGNIFICANT CHANGE UP (ref 96–108)
CO2 SERPL-SCNC: 26 MMOL/L — SIGNIFICANT CHANGE UP (ref 22–31)
CO2 SERPL-SCNC: 26 MMOL/L — SIGNIFICANT CHANGE UP (ref 22–31)
CREAT SERPL-MCNC: 1.12 MG/DL — SIGNIFICANT CHANGE UP (ref 0.5–1.3)
CREAT SERPL-MCNC: 1.12 MG/DL — SIGNIFICANT CHANGE UP (ref 0.5–1.3)
EGFR: 64 ML/MIN/1.73M2 — SIGNIFICANT CHANGE UP
EGFR: 64 ML/MIN/1.73M2 — SIGNIFICANT CHANGE UP
EOSINOPHIL # BLD AUTO: 0.23 K/UL — SIGNIFICANT CHANGE UP (ref 0–0.5)
EOSINOPHIL # BLD AUTO: 0.23 K/UL — SIGNIFICANT CHANGE UP (ref 0–0.5)
EOSINOPHIL NFR BLD AUTO: 2.4 % — SIGNIFICANT CHANGE UP (ref 0–6)
EOSINOPHIL NFR BLD AUTO: 2.4 % — SIGNIFICANT CHANGE UP (ref 0–6)
GLUCOSE SERPL-MCNC: 105 MG/DL — HIGH (ref 70–99)
GLUCOSE SERPL-MCNC: 105 MG/DL — HIGH (ref 70–99)
HCT VFR BLD CALC: 36.7 % — LOW (ref 39–50)
HCT VFR BLD CALC: 36.7 % — LOW (ref 39–50)
HGB BLD-MCNC: 12.4 G/DL — LOW (ref 13–17)
HGB BLD-MCNC: 12.4 G/DL — LOW (ref 13–17)
IMM GRANULOCYTES NFR BLD AUTO: 0.3 % — SIGNIFICANT CHANGE UP (ref 0–0.9)
IMM GRANULOCYTES NFR BLD AUTO: 0.3 % — SIGNIFICANT CHANGE UP (ref 0–0.9)
LYMPHOCYTES # BLD AUTO: 1.7 K/UL — SIGNIFICANT CHANGE UP (ref 1–3.3)
LYMPHOCYTES # BLD AUTO: 1.7 K/UL — SIGNIFICANT CHANGE UP (ref 1–3.3)
LYMPHOCYTES # BLD AUTO: 18 % — SIGNIFICANT CHANGE UP (ref 13–44)
LYMPHOCYTES # BLD AUTO: 18 % — SIGNIFICANT CHANGE UP (ref 13–44)
MCHC RBC-ENTMCNC: 29.5 PG — SIGNIFICANT CHANGE UP (ref 27–34)
MCHC RBC-ENTMCNC: 29.5 PG — SIGNIFICANT CHANGE UP (ref 27–34)
MCHC RBC-ENTMCNC: 33.8 GM/DL — SIGNIFICANT CHANGE UP (ref 32–36)
MCHC RBC-ENTMCNC: 33.8 GM/DL — SIGNIFICANT CHANGE UP (ref 32–36)
MCV RBC AUTO: 87.4 FL — SIGNIFICANT CHANGE UP (ref 80–100)
MCV RBC AUTO: 87.4 FL — SIGNIFICANT CHANGE UP (ref 80–100)
MONOCYTES # BLD AUTO: 0.8 K/UL — SIGNIFICANT CHANGE UP (ref 0–0.9)
MONOCYTES # BLD AUTO: 0.8 K/UL — SIGNIFICANT CHANGE UP (ref 0–0.9)
MONOCYTES NFR BLD AUTO: 8.4 % — SIGNIFICANT CHANGE UP (ref 2–14)
MONOCYTES NFR BLD AUTO: 8.4 % — SIGNIFICANT CHANGE UP (ref 2–14)
NEUTROPHILS # BLD AUTO: 6.69 K/UL — SIGNIFICANT CHANGE UP (ref 1.8–7.4)
NEUTROPHILS # BLD AUTO: 6.69 K/UL — SIGNIFICANT CHANGE UP (ref 1.8–7.4)
NEUTROPHILS NFR BLD AUTO: 70.7 % — SIGNIFICANT CHANGE UP (ref 43–77)
NEUTROPHILS NFR BLD AUTO: 70.7 % — SIGNIFICANT CHANGE UP (ref 43–77)
NRBC # BLD: 0 /100 WBCS — SIGNIFICANT CHANGE UP (ref 0–0)
NRBC # BLD: 0 /100 WBCS — SIGNIFICANT CHANGE UP (ref 0–0)
PLATELET # BLD AUTO: 203 K/UL — SIGNIFICANT CHANGE UP (ref 150–400)
PLATELET # BLD AUTO: 203 K/UL — SIGNIFICANT CHANGE UP (ref 150–400)
POTASSIUM SERPL-MCNC: 4 MMOL/L — SIGNIFICANT CHANGE UP (ref 3.5–5.3)
POTASSIUM SERPL-MCNC: 4 MMOL/L — SIGNIFICANT CHANGE UP (ref 3.5–5.3)
POTASSIUM SERPL-SCNC: 4 MMOL/L — SIGNIFICANT CHANGE UP (ref 3.5–5.3)
POTASSIUM SERPL-SCNC: 4 MMOL/L — SIGNIFICANT CHANGE UP (ref 3.5–5.3)
RBC # BLD: 4.2 M/UL — SIGNIFICANT CHANGE UP (ref 4.2–5.8)
RBC # BLD: 4.2 M/UL — SIGNIFICANT CHANGE UP (ref 4.2–5.8)
RBC # FLD: 13.6 % — SIGNIFICANT CHANGE UP (ref 10.3–14.5)
RBC # FLD: 13.6 % — SIGNIFICANT CHANGE UP (ref 10.3–14.5)
SARS-COV-2 RNA SPEC QL NAA+PROBE: SIGNIFICANT CHANGE UP
SARS-COV-2 RNA SPEC QL NAA+PROBE: SIGNIFICANT CHANGE UP
SODIUM SERPL-SCNC: 136 MMOL/L — SIGNIFICANT CHANGE UP (ref 135–145)
SODIUM SERPL-SCNC: 136 MMOL/L — SIGNIFICANT CHANGE UP (ref 135–145)
WBC # BLD: 9.47 K/UL — SIGNIFICANT CHANGE UP (ref 3.8–10.5)
WBC # BLD: 9.47 K/UL — SIGNIFICANT CHANGE UP (ref 3.8–10.5)
WBC # FLD AUTO: 9.47 K/UL — SIGNIFICANT CHANGE UP (ref 3.8–10.5)
WBC # FLD AUTO: 9.47 K/UL — SIGNIFICANT CHANGE UP (ref 3.8–10.5)

## 2023-11-07 PROCEDURE — 97162 PT EVAL MOD COMPLEX 30 MIN: CPT

## 2023-11-07 PROCEDURE — 97165 OT EVAL LOW COMPLEX 30 MIN: CPT

## 2023-11-07 PROCEDURE — 80048 BASIC METABOLIC PNL TOTAL CA: CPT

## 2023-11-07 PROCEDURE — 82435 ASSAY OF BLOOD CHLORIDE: CPT

## 2023-11-07 PROCEDURE — 99223 1ST HOSP IP/OBS HIGH 75: CPT

## 2023-11-07 PROCEDURE — 97530 THERAPEUTIC ACTIVITIES: CPT

## 2023-11-07 PROCEDURE — 82947 ASSAY GLUCOSE BLOOD QUANT: CPT

## 2023-11-07 PROCEDURE — 36415 COLL VENOUS BLD VENIPUNCTURE: CPT

## 2023-11-07 PROCEDURE — 82803 BLOOD GASES ANY COMBINATION: CPT

## 2023-11-07 PROCEDURE — 85025 COMPLETE CBC W/AUTO DIFF WBC: CPT

## 2023-11-07 PROCEDURE — 85018 HEMOGLOBIN: CPT

## 2023-11-07 PROCEDURE — 72020 X-RAY EXAM OF SPINE 1 VIEW: CPT

## 2023-11-07 PROCEDURE — 82565 ASSAY OF CREATININE: CPT

## 2023-11-07 PROCEDURE — 82962 GLUCOSE BLOOD TEST: CPT

## 2023-11-07 PROCEDURE — 84132 ASSAY OF SERUM POTASSIUM: CPT

## 2023-11-07 PROCEDURE — 84295 ASSAY OF SERUM SODIUM: CPT

## 2023-11-07 PROCEDURE — 85014 HEMATOCRIT: CPT

## 2023-11-07 PROCEDURE — C1889: CPT

## 2023-11-07 PROCEDURE — 97116 GAIT TRAINING THERAPY: CPT

## 2023-11-07 PROCEDURE — 97110 THERAPEUTIC EXERCISES: CPT

## 2023-11-07 PROCEDURE — 85027 COMPLETE CBC AUTOMATED: CPT

## 2023-11-07 PROCEDURE — 82330 ASSAY OF CALCIUM: CPT

## 2023-11-07 PROCEDURE — 83605 ASSAY OF LACTIC ACID: CPT

## 2023-11-07 RX ORDER — AMLODIPINE BESYLATE 2.5 MG/1
10 TABLET ORAL AT BEDTIME
Refills: 0 | Status: DISCONTINUED | OUTPATIENT
Start: 2023-11-07 | End: 2023-11-21

## 2023-11-07 RX ORDER — LOSARTAN POTASSIUM 100 MG/1
50 TABLET, FILM COATED ORAL DAILY
Refills: 0 | Status: DISCONTINUED | OUTPATIENT
Start: 2023-11-08 | End: 2023-11-21

## 2023-11-07 RX ORDER — METHOCARBAMOL 500 MG/1
1 TABLET, FILM COATED ORAL
Qty: 0 | Refills: 0 | DISCHARGE
Start: 2023-11-07

## 2023-11-07 RX ORDER — OXYCODONE HYDROCHLORIDE 5 MG/1
1 TABLET ORAL
Qty: 0 | Refills: 0 | DISCHARGE
Start: 2023-11-07

## 2023-11-07 RX ORDER — ACETAMINOPHEN 500 MG
975 TABLET ORAL EVERY 6 HOURS
Refills: 0 | Status: DISCONTINUED | OUTPATIENT
Start: 2023-11-07 | End: 2023-11-10

## 2023-11-07 RX ORDER — ACETAMINOPHEN 500 MG
2 TABLET ORAL
Qty: 0 | Refills: 0 | DISCHARGE
Start: 2023-11-07

## 2023-11-07 RX ORDER — OXYCODONE HYDROCHLORIDE 5 MG/1
5 TABLET ORAL EVERY 4 HOURS
Refills: 0 | Status: DISCONTINUED | OUTPATIENT
Start: 2023-11-07 | End: 2023-11-09

## 2023-11-07 RX ORDER — POLYETHYLENE GLYCOL 3350 17 G/17G
17 POWDER, FOR SOLUTION ORAL
Qty: 0 | Refills: 0 | DISCHARGE
Start: 2023-11-07

## 2023-11-07 RX ORDER — OXYCODONE HYDROCHLORIDE 5 MG/1
10 TABLET ORAL EVERY 4 HOURS
Refills: 0 | Status: DISCONTINUED | OUTPATIENT
Start: 2023-11-07 | End: 2023-11-09

## 2023-11-07 RX ORDER — METHOCARBAMOL 500 MG/1
500 TABLET, FILM COATED ORAL THREE TIMES A DAY
Refills: 0 | Status: DISCONTINUED | OUTPATIENT
Start: 2023-11-07 | End: 2023-11-20

## 2023-11-07 RX ORDER — ENOXAPARIN SODIUM 100 MG/ML
40 INJECTION SUBCUTANEOUS EVERY 24 HOURS
Refills: 0 | Status: DISCONTINUED | OUTPATIENT
Start: 2023-11-07 | End: 2023-11-21

## 2023-11-07 RX ORDER — SENNA PLUS 8.6 MG/1
2 TABLET ORAL
Qty: 0 | Refills: 0 | DISCHARGE
Start: 2023-11-07

## 2023-11-07 RX ORDER — POLYETHYLENE GLYCOL 3350 17 G/17G
17 POWDER, FOR SOLUTION ORAL
Refills: 0 | Status: DISCONTINUED | OUTPATIENT
Start: 2023-11-07 | End: 2023-11-21

## 2023-11-07 RX ORDER — PANTOPRAZOLE SODIUM 20 MG/1
40 TABLET, DELAYED RELEASE ORAL
Refills: 0 | Status: DISCONTINUED | OUTPATIENT
Start: 2023-11-08 | End: 2023-11-21

## 2023-11-07 RX ORDER — PANTOPRAZOLE SODIUM 20 MG/1
1 TABLET, DELAYED RELEASE ORAL
Qty: 0 | Refills: 0 | DISCHARGE
Start: 2023-11-07

## 2023-11-07 RX ORDER — SENNA PLUS 8.6 MG/1
2 TABLET ORAL AT BEDTIME
Refills: 0 | Status: DISCONTINUED | OUTPATIENT
Start: 2023-11-07 | End: 2023-11-21

## 2023-11-07 RX ORDER — ATORVASTATIN CALCIUM 80 MG/1
40 TABLET, FILM COATED ORAL AT BEDTIME
Refills: 0 | Status: DISCONTINUED | OUTPATIENT
Start: 2023-11-07 | End: 2023-11-21

## 2023-11-07 RX ORDER — ENOXAPARIN SODIUM 100 MG/ML
40 INJECTION SUBCUTANEOUS
Qty: 0 | Refills: 0 | DISCHARGE
Start: 2023-11-07

## 2023-11-07 RX ADMIN — PANTOPRAZOLE SODIUM 40 MILLIGRAM(S): 20 TABLET, DELAYED RELEASE ORAL at 05:23

## 2023-11-07 RX ADMIN — OXYCODONE HYDROCHLORIDE 5 MILLIGRAM(S): 5 TABLET ORAL at 06:50

## 2023-11-07 RX ADMIN — LOSARTAN POTASSIUM 50 MILLIGRAM(S): 100 TABLET, FILM COATED ORAL at 05:23

## 2023-11-07 RX ADMIN — OXYCODONE HYDROCHLORIDE 5 MILLIGRAM(S): 5 TABLET ORAL at 11:14

## 2023-11-07 RX ADMIN — OXYCODONE HYDROCHLORIDE 5 MILLIGRAM(S): 5 TABLET ORAL at 12:15

## 2023-11-07 RX ADMIN — OXYCODONE HYDROCHLORIDE 5 MILLIGRAM(S): 5 TABLET ORAL at 06:29

## 2023-11-07 RX ADMIN — ATORVASTATIN CALCIUM 40 MILLIGRAM(S): 80 TABLET, FILM COATED ORAL at 21:10

## 2023-11-07 RX ADMIN — AMLODIPINE BESYLATE 10 MILLIGRAM(S): 2.5 TABLET ORAL at 21:11

## 2023-11-07 RX ADMIN — SENNA PLUS 2 TABLET(S): 8.6 TABLET ORAL at 21:11

## 2023-11-07 RX ADMIN — POLYETHYLENE GLYCOL 3350 17 GRAM(S): 17 POWDER, FOR SOLUTION ORAL at 17:24

## 2023-11-07 RX ADMIN — METHOCARBAMOL 500 MILLIGRAM(S): 500 TABLET, FILM COATED ORAL at 21:11

## 2023-11-07 RX ADMIN — OXYCODONE HYDROCHLORIDE 5 MILLIGRAM(S): 5 TABLET ORAL at 15:10

## 2023-11-07 RX ADMIN — METHOCARBAMOL 500 MILLIGRAM(S): 500 TABLET, FILM COATED ORAL at 13:20

## 2023-11-07 RX ADMIN — ENOXAPARIN SODIUM 40 MILLIGRAM(S): 100 INJECTION SUBCUTANEOUS at 21:12

## 2023-11-07 RX ADMIN — METHOCARBAMOL 500 MILLIGRAM(S): 500 TABLET, FILM COATED ORAL at 05:23

## 2023-11-07 RX ADMIN — POLYETHYLENE GLYCOL 3350 17 GRAM(S): 17 POWDER, FOR SOLUTION ORAL at 05:25

## 2023-11-07 NOTE — H&P ADULT - REASON FOR ADMISSION
Posterior L2-L5 decompression Lumbar stenosis with neurogenic claudication and posterior L2-L5 decompression

## 2023-11-07 NOTE — H&P ADULT - NSHPLABSRESULTS_GEN_ALL_CORE
LABS:                        12.4   9.47  )-----------( 203      ( 07 Nov 2023 07:12 )             36.7     11-07    136  |  100  |  23  ----------------------------<  105<H>  4.0   |  26  |  1.12    Ca    9.1      07 Nov 2023 07:09      < from: Xray Spine Single View (11.03.23 @ 17:18) >    Multiple lateral views of the lumbar spine were obtained before and after   posterior decompression surgery at what are labeled levels L2-L5.   Posterior surgical drain is seen in the final image.    < end of copied text >

## 2023-11-07 NOTE — DISCHARGE NOTE PROVIDER - CARE PROVIDER_API CALL
Gina Bond Devendra  Neurosurgery  [address], [address]  [place] [address]  Phone: [phone]  Fax: [phone]  Follow Up Time:    Yes

## 2023-11-07 NOTE — H&P ADULT - NSHPSOCIALHISTORY_GEN_ALL_CORE
Smoking - former smoker 1-2 cigarettes/day, quit 30 years ago  EtOH - Denied   Drugs - Denied     PTA: Independent in ADLs and ambulation     Pt lives with daughter and spouse in a PH. Pt states he will be residing in basement after discharge, with 2 steps to enter. Pt states there is an additional 2 floors with 8-10 steps each. Pt has a WC and ambulates with RW. Wife will be able to assist as needed. Daughter works during the day.    CURRENT FUNCTIONAL STATUS  Bed Mobility: modA  Transfers: [place]  Gait: [place]

## 2023-11-07 NOTE — DISCHARGE NOTE PROVIDER - HOSPITAL COURSE
HPI:  83 year old male RHD Tristanian speaking male  presents in a wheel chair with h/o Spinal stenosis- cervical & Lumbar, s/p Posterior C1-C6 Decompression, C4-5 Posterior Column Osteotomy, C1-C7 Fusion / Plastic Closure on 10/18/2022 . He reports having low back pain,  bilateral leg pain with  difficulty walking, persistent neck heaviness, bilateral shoulder pain, worse Left shoulder ,  low back pain is there for 5 years progressively getting worse causing numbness, tingling in both legs  with difficulty walking  for at least couple of  years. S/p neurosurgery consult &  presents for scheduled Posterior L2-L5 decompression on 11/03/2023.  His neurological status remains stable.  PMH of HTN, HLD, Pre-diabetes, BPH & lumbar radiculopathy. He ambulates with a rolling walker. He denies fever, chills, trauma or injury. (19 Oct 2023 17:17)    Patient admitted and underwent on 11/3/23 L2-5 decompressive laminectomy for lumbar stenosis with neurogenic claudication. He had routine post operative care.   Surgical drain removed on 11/7/23. PT/OT/PMR evaluated him and recommended acute rehab. On the day of discharge he is medically and neurologically stable for   discharge to rehab.

## 2023-11-07 NOTE — DISCHARGE NOTE PROVIDER - NSDCFUADDINST_GEN_ALL_CORE_FT
please notify physician if fevers, bleeding, swelling, pain not relieved by medication, increased sluggishness or irritability, increased nausea or vomiting, inability to tolerate foods or liquids, new or increasing weakness/numbness/tingling.   please do not engage in strenuous activity, heavy lifting, drive, or return to work or school until cleared by surgeon.   please keep incision clean and dry, do not submerge wound in water for prolonged periods of time, pat dry after showering, and do not use any creams or ointments to incision.   You may shower and removed dressing in am and pat dry. Replace gauze/ tegaderm dressing including over drain exit site for another 24hrs and then may leave open to air.   Please do not take NSAIDs- ie. advil, motrin, ibuprofen, aleve, aspirin, naproxen, etc. Tylenol/ acetaminophen ok to take.

## 2023-11-07 NOTE — DISCHARGE NOTE PROVIDER - NSDCCPCAREPLAN_GEN_ALL_CORE_FT
PRINCIPAL DISCHARGE DIAGNOSIS  Diagnosis: Lumbar stenosis without neurogenic claudication  Assessment and Plan of Treatment: 11/3/23 L2-5 decompressive laminectomy for lumbar stenosis with neurogenic claudication.      SECONDARY DISCHARGE DIAGNOSES  Diagnosis: HTN (hypertension)  Assessment and Plan of Treatment: Please continue medications and follow up with your primary care physician within 1-2 weeks.    Diagnosis: HLD (hyperlipidemia)  Assessment and Plan of Treatment: Please continue medications and follow up with your primary care physician within 1-2 weeks.    Diagnosis: Prediabetes  Assessment and Plan of Treatment: A1C 5.9, Please continue medications and follow up with your primary care physician within 1-2 weeks.    Diagnosis: History of BPH  Assessment and Plan of Treatment: Please continue medications and follow up with your primary care physician within 1-2 weeks.

## 2023-11-07 NOTE — PROGRESS NOTE ADULT - SUBJECTIVE AND OBJECTIVE BOX
CHIEF COMPLAINT:  used, patient seen in bed, reports pain getting better    Vital Signs Last 24 Hrs  T(C): 37.2 (07 Nov 2023 13:57), Max: 37.4 (06 Nov 2023 16:14)  T(F): 98.9 (07 Nov 2023 13:57), Max: 99.4 (06 Nov 2023 16:14)  HR: 84 (07 Nov 2023 13:57) (78 - 98)  BP: 147/75 (07 Nov 2023 13:57) (147/75 - 161/77)  BP(mean): --  RR: 18 (07 Nov 2023 13:57) (18 - 18)  SpO2: 96% (07 Nov 2023 13:57) (93% - 96%)    Parameters below as of 07 Nov 2023 13:57  Patient On (Oxygen Delivery Method): room air    DRAINS: [] KIRT (cc/24h) [x] HMV (80cc/24h)    PHYSICAL EXAM:    General: No Acute Distress     Neurological: Awake, alert oriented to person, place and time, Following Commands, PERRL, EOMI, Face Symmetrical, Speech Fluent, Moving all extremities, Muscle Strength normal in all four extremities, No Drift, Sensation to Light Touch Intact    Pulmonary: Clear to Auscultation, No Rales, No Rhonchi, No Wheezes     Cardiovascular: S1, S2, Regular Rate and Rhythm     Gastrointestinal: Soft, Nontender, Nondistended     Incision: +dressing removed, drain removed without difficulty, prineo incisional strip intact, clean and dry, new dressing placed.     LABS:                     12.4   9.47  )-----------( 203      ( 07 Nov 2023 07:12 )             36.7     11-07    136  |  100  |  23  ----------------------------<  105<H>  4.0   |  26  |  1.12    Ca    9.1      07 Nov 2023 07:09                       DIET: [x] Regular [] CCD [] Renal [] Puree [] Dysphagia [] Tube Feeds:     MEDICATIONS  (STANDING):  amLODIPine   Tablet 10 milliGRAM(s) Oral at bedtime  atorvastatin 40 milliGRAM(s) Oral at bedtime  enoxaparin Injectable 40 milliGRAM(s) SubCutaneous every 24 hours  losartan 50 milliGRAM(s) Oral daily  methocarbamol 500 milliGRAM(s) Oral three times a day  pantoprazole    Tablet 40 milliGRAM(s) Oral before breakfast  polyethylene glycol 3350 17 Gram(s) Oral two times a day  senna 2 Tablet(s) Oral at bedtime    MEDICATIONS  (PRN):  acetaminophen     Tablet .. 1000 milliGRAM(s) Oral every 6 hours PRN Temp greater or equal to 38C (100.4F), Mild Pain (1 - 3)  acetaminophen   IVPB .. 1000 milliGRAM(s) IV Intermittent every 8 hours PRN Temp greater or equal to 38C (100.4F), Mild Pain (1 - 3)  bisacodyl Suppository 10 milliGRAM(s) Rectal daily PRN Constipation  oxyCODONE    IR 5 milliGRAM(s) Oral every 4 hours PRN Moderate Pain (4 - 6)  oxyCODONE    IR 10 milliGRAM(s) Oral every 4 hours PRN Severe Pain (7 - 10)    IMAGING: no new imaging

## 2023-11-07 NOTE — H&P ADULT - NSHPPHYSICALEXAM_GEN_ALL_CORE
PHYSICAL EXAMINATION   VItals: T(C): 37.2 (11-07-23 @ 20:44), Max: 37.2 (11-07-23 @ 00:09)  HR: 90 (11-07-23 @ 20:44) (82 - 98)  BP: 156/70 (11-07-23 @ 20:44) (147/75 - 171/67)  RR: 16 (11-07-23 @ 20:44) (14 - 18)  SpO2: 93% (11-07-23 @ 20:44) (93% - 96%)    General: NAD, Resting Comfortable,                                                                 Constitutional - NAD, Comfortable, in bed   Chest - Breathing comfortably, room air   Cardiovascular - S1S2   Abdomen - Soft   Extremities - No C/C/E, No calf tenderness   Neurologic Exam -                    Cognitive - Awake, Alert, AAO to self, place, date, year, situation     Communication - Fluent, No dysarthria  Motor    LEFT    UE: SF [5/5], EF [5/5], EE [5/5], WE [5/5],  [wnl]  RIGHT UE: SF [5/5], EF [5/5], EE [5/5], WE [5/5],  [wnl]  LEFT    LE:  HF [3-4/5], KE [4/5], DF [3/5], EHL [3/5],  PF [4/5]  RIGHT LE:  HF [3-4/5], KE [4/5], DF [4/5], EHL [4/5],  PF [4/5]      Reflex:  2 + throughout, Babinski negative PHYSICAL EXAMINATION   VItals: T(C): 37.2 (11-07-23 @ 20:44), Max: 37.2 (11-07-23 @ 00:09)  HR: 90 (11-07-23 @ 20:44) (82 - 98)  BP: 156/70 (11-07-23 @ 20:44) (147/75 - 171/67)  RR: 16 (11-07-23 @ 20:44) (14 - 18)  SpO2: 93% (11-07-23 @ 20:44) (93% - 96%)    General: NAD, Resting Comfortable,                                                                 Constitutional - NAD, Comfortable, in bed   Chest - Breathing comfortably, room air   Cardiovascular - S1S2   Abdomen - Soft   Extremities - No C/C/E, No calf tenderness   Neurologic Exam -                    Cognitive - Awake, Alert, AAO to self, place, date, year, situation     Communication - Fluent, No dysarthria  Motor    LEFT    UE: SF [5/5], EF [5/5], EE [5/5], WE [5/5],  [wnl]  RIGHT UE: SF [5/5], EF [5/5], EE [5/5], WE [5/5],  [wnl]  LEFT    LE:  HF [3-4/5], KE [4/5], DF [3/5], EHL [3/5],  PF [4/5]  RIGHT LE:  HF [3-4/5], KE [4/5], DF [4/5], EHL [4/5],  PF [4/5]      Reflex:  2 + throughout, Babinski negative  Wounds: 14 cm lumbar incision site, drain site 1 cmx1cm, Stage 1 pressure injury due to medical device on left buttock PHYSICAL EXAMINATION   VItals: T(C): 37.2 (11-07-23 @ 20:44), Max: 37.2 (11-07-23 @ 00:09)  HR: 90 (11-07-23 @ 20:44) (82 - 98)  BP: 156/70 (11-07-23 @ 20:44) (147/75 - 171/67)  RR: 16 (11-07-23 @ 20:44) (14 - 18)  SpO2: 93% (11-07-23 @ 20:44) (93% - 96%)    General: NAD, Resting Comfortable,                                                                 Constitutional - NAD, Comfortable, in bed   Chest - Breathing comfortably, room air   Cardiovascular - S1S2   Abdomen - Soft   Extremities - No C/C/E, No calf tenderness   Neurologic Exam -                    Cognitive - Awake, Alert, AAO to self, place, date, year, situation     Communication - Fluent, No dysarthria  Motor    LEFT    UE: SF [5/5], EF [5/5], EE [5/5], WE [5/5],  [wnl]  RIGHT UE: SF [5/5], EF [5/5], EE [5/5], WE [5/5],  [wnl]  LEFT    LE:  HF [3-4/5], KE [4/5], DF [3/5], EHL [3/5],  PF [4/5]  RIGHT LE:  HF [3-4/5], KE [4/5], DF [4/5], EHL [4/5],  PF [4/5]      Reflex:  2 + throughout, Babinski negative  Wounds: 14 cm lumbar incision site, drain site 1 cmx1cm, Stage 1 pressure injury to lumbar spine

## 2023-11-07 NOTE — H&P ADULT - HISTORY OF PRESENT ILLNESS
83 year old male RHD Bulgarian speaking male with PMH of HTN, HLD, pre-diabetes, BPH, cervical and lumbar spinal stenosis (s/p Posterior C1-C6 Decompression, C4-5 Posterior Column Osteotomy, C1-C7 Fusion / Plastic Closure on 10/18/2022) who presented to [facility] on 11/3 for scheduled Posterior L2-L5 decompression. Prior to surgery patient had low back pain,  bilateral leg pain with  difficulty walking, persistent neck heaviness, bilateral shoulder pain (L>R), worse left shoulder. Hospital course uncomplicated. Surgical drain removed on 11/7/23. Patient evaluated by PT/OT and was recommended for acute inpatient rehab. Patient is medically stable for discharge to [facility] on 11/7/23. Case of an 83 year old right handed Mauritanian speaking male patient with past medical history of HTN, HLD, pre-diabetes, BPH, cervical and lumbar spinal stenosis (s/p Posterior C1-C6 Decompression, C4-5 Posterior Column Osteotomy, C1-C7 Fusion with Plastic Closure on 10/18/2022) who presented to [facility] on 11/3 for a scheduled posterior L2-L5 decompression surgery for lumbar stenosis with neurogenic claudication. Prior to surgery patient had low back pain associated with bilateral leg pain and difficulty walking. he additionally had persistent neck heaviness and bilateral shoulder pain which was worse on the left shoulder. Post-operative hospital course was uncomplicated and surgical drain was removed on 11/7/23. Patient evaluated by PT/OT and was recommended for acute inpatient rehab. Patient is medically stable for discharge to [facility] on 11/7/23.

## 2023-11-07 NOTE — DISCHARGE NOTE NURSING/CASE MANAGEMENT/SOCIAL WORK - PATIENT PORTAL LINK FT
You can access the FollowMyHealth Patient Portal offered by [facility] by registering at the following website: [email or web address]. By joining [facility]’s FollowMyHealth portal, you will also be able to view your health information using other applications (apps) compatible with our system.

## 2023-11-07 NOTE — DISCHARGE NOTE NURSING/CASE MANAGEMENT/SOCIAL WORK - NSDCPEFALRISK_GEN_ALL_CORE
For information on Fall & Injury Prevention, visit: [email or web address] OR  [email or web address] OR  https://www.cdc.gov/steadi/patient.html

## 2023-11-07 NOTE — DISCHARGE NOTE PROVIDER - NSDCMRMEDTOKEN_GEN_ALL_CORE_FT
acetaminophen 500 mg oral tablet: 2 tab(s) orally every 6 hours As needed Temp greater or equal to 38C (100.4F), Mild Pain (1 - 3)  amLODIPine 10 mg oral tablet: 1 tab(s) orally once a day (at bedtime)  atorvastatin 40 mg oral tablet: 1 tab(s) orally once a day (at bedtime)  bisacodyl 10 mg rectal suppository: 1 suppository(ies) rectal once a day As needed Constipation  enoxaparin: 40 milligram(s) subcutaneous once a day (at bedtime)  losartan 50 mg oral tablet: 1 tab(s) orally once a day  methocarbamol 500 mg oral tablet: 1 tab(s) orally 3 times a day  oxyCODONE 10 mg oral tablet: 1 tab(s) orally every 4 hours As needed Severe Pain (7 - 10)  oxyCODONE 5 mg oral tablet: 1 tab(s) orally every 4 hours As needed Moderate Pain (4 - 6)  pantoprazole 40 mg oral delayed release tablet: 1 tab(s) orally once a day (before a meal)  polyethylene glycol 3350 oral powder for reconstitution: 17 gram(s) orally 2 times a day  senna leaf extract oral tablet: 2 tab(s) orally once a day (at bedtime)

## 2023-11-07 NOTE — H&P ADULT - NSHPREVIEWOFSYSTEMS_GEN_ALL_CORE
Report received by PACU RN. Assumed care of pt. Assessment complete. Mother at bedside. Pt is alert, VSS and on RA. Pt in no apparent signs of distress. Denies pain to her leg, states that it feels numb. Cast and dressing site clean dry and intact and leg elevated on pillow. Plan of care discussed. Call light within reach, bed in lowest position, and pt has no further questions at this time.       4 Eyes Skin Assessment Completed by TANESHA Maddox and Yury ALMENDAREZ.    Head WDL  Ears WDL  Nose WDL  Mouth WDL  Neck WDL  Breast/Chest: redness and blanching noted to upper chest from tele leads.  Shoulder Blades WDL  Spine WDL  (R) Arm/Elbow/Hand WDL  (L) Arm/Elbow/Hand WDL  Abdomen WDL  Groin WDL  Scrotum/Coccyx/Buttocks WDL  (R) Leg:: cast to R leg with ace wrap bandage.   (L) Leg WDL  (R) Heel/Foot/Toe: Cast to R leg with toes exposed.   (L) Heel/Foot/Toe WDL          Devices In Places Pulse Ox, PIV      Interventions In Place Pillows and Pressure Redistribution Mattress    Possible Skin Injury No    Pictures Uploaded Into Epic N/A  Wound Consult Placed N/A  RN Wound Prevention Protocol Ordered No    Constitutional - No fever, No weight loss, No fatigue  HEENT - No vertigo, No neck pain  Respiratory - No cough, No wheezing, No shortness of breath  Cardiovascular - No chest pain, No palpitations  Gastrointestinal - No abdominal pain, No nausea, No vomiting, No diarrhea, No constipation  Genitourinary - No dysuria, No frequency, No hematuria, No incontinence  Psychiatric - No depression, No anxiety

## 2023-11-07 NOTE — PROGRESS NOTE ADULT - ASSESSMENT
83 year old male RHD English speaking male  presents in a wheel chair with h/o Spinal stenosis- cervical & Lumbar, s/p Posterior C1-C6 Decompression, C4-5 Posterior Column Osteotomy, C1-C7 Fusion / Plastic Closure on 10/18/2022 . He reports having low back pain,  bilateral leg pain with  difficulty walking, persistent neck heaviness, bilateral shoulder pain, worse Left shoulder ,  low back pain is there for 5 years progressively getting worse causing numbness, tingling in both legs  with difficulty walking  for at least couple of  years. S/p neurosurgery consult &  presents for scheduled Posterior L2-L5 decompression on 11/03/2023.  His neurological status remains stable.    PMH of HTN, HLD, Pre-diabetes, BPH & lumbar radiculopathy. He ambulates with a rolling walker. He denies fever, chills, trauma or injury. .    Now s/p L2-5 lami. doing well, garay dced, voiding    - PT recd KATELYN  - q4h neuro checks  - AM labs - O  - TOV passed today, voided 300cc  - SQL tn  - complete postop abx today  - pain well controlled  - fluids dced  - fu KIRT output
HPI:  83 year old male RHD Bulgarian speaking male  presents in a wheel chair with h/o Spinal stenosis- cervical & Lumbar, s/p Posterior C1-C6 Decompression, C4-5 Posterior Column Osteotomy, C1-C7 Fusion / Plastic Closure on 10/18/2022 . He reports having low back pain,  bilateral leg pain with  difficulty walking, persistent neck heaviness, bilateral shoulder pain, worse Left shoulder ,  low back pain is there for 5 years progressively getting worse causing numbness, tingling in both legs  with difficulty walking  for at least couple of  years. S/p neurosurgery consult &  presents for scheduled Posterior L2-L5 decompression on 11/03/2023.  His neurological status remains stable.    PMH of HTN, HLD, Pre-diabetes, BPH & lumbar radiculopathy. He ambulates with a rolling walker. He denies fever, chills, trauma or injury. .       (19 Oct 2023 17:17)    PAST MEDICAL & SURGICAL HISTORY:  Hypertension      Hyperlipidemia      Chronic kidney disease      Benign prostate hyperplasia      COVID-19 virus infection  -dec 2021 (cough, fever, malaise)      History of carpal tunnel surgery  -bilateral hands      History of cataract surgery  -bilateral      H/O colonoscopy      S/P cervical spinal fusion        PROCEDURE: 11/3/23 L2-5 decompressive laminectomy.    PLAN:  Neuro: pain meds PRN, muscle relaxer, drain removed  Respiratory: patient instructed on incentive spirometer  CV: cont home losartan and amlodipine  Heme/Onc:  stable CBC             DVT ppx: [] SQH [x] SQL and venodynes bilaterally  Renal: voiding  ID: afebrile; leukocytosis resolved- likely due to intraop decadron  GI: bowel regimen, +BM yesterday  PT/OT: KATELYN, PMR- acute rehab   d/c IVL and d/c to rehab today, restart daily ASA POD #14 as discussed with Dr Bond    discussed with Dr Ronda Arreolaink # [id number]      
83 year old male RHD Uzbek speaking male  presents in a wheel chair with h/o Spinal stenosis- cervical & Lumbar, s/p Posterior C1-C6 Decompression, C4-5 Posterior Column Osteotomy, C1-C7 Fusion / Plastic Closure on 10/18/2022 . He reports having low back pain,  bilateral leg pain with  difficulty walking, persistent neck heaviness, bilateral shoulder pain, worse Left shoulder ,  low back pain is there for 5 years progressively getting worse causing numbness, tingling in both legs  with difficulty walking  for at least couple of  years. S/p neurosurgery consult &  presents for scheduled Posterior L2-L5 decompression on 11/03/2023.  His neurological status remains stable.    PMH of HTN, HLD, Pre-diabetes, BPH & lumbar radiculopathy. He ambulates with a rolling walker. He denies fever, chills, trauma or injury. .    Now s/p L2-5 lami. doing well, garay dced, voiding    - incisional back pain; continue pain control and OOB  - PT recd KATELYN  - q4h neuro checks  - TOV passed, will continue to monitor   - SQL   - fu KIRT output  
HPI:  83 year old male RHD Bengali speaking male  presents in a wheel chair with h/o Spinal stenosis- cervical & Lumbar, s/p Posterior C1-C6 Decompression, C4-5 Posterior Column Osteotomy, C1-C7 Fusion / Plastic Closure on 10/18/2022 . He reports having low back pain,  bilateral leg pain with  difficulty walking, persistent neck heaviness, bilateral shoulder pain, worse Left shoulder ,  low back pain is there for 5 years progressively getting worse causing numbness, tingling in both legs  with difficulty walking  for at least couple of  years. S/p neurosurgery consult &  presents for scheduled Posterior L2-L5 decompression on 11/03/2023.  His neurological status remains stable.    PMH of HTN, HLD, Pre-diabetes, BPH & lumbar radiculopathy. He ambulates with a rolling walker. He denies fever, chills, trauma or injury. .       (19 Oct 2023 17:17)    PAST MEDICAL & SURGICAL HISTORY:  Hypertension      Hyperlipidemia      Chronic kidney disease      Benign prostate hyperplasia      COVID-19 virus infection  -dec 2021 (cough, fever, malaise)      History of carpal tunnel surgery  -bilateral hands      History of cataract surgery  -bilateral      H/O colonoscopy      S/P cervical spinal fusion        PROCEDURE: 11/3/23 L2-5 decompressive laminectomy.    PLAN:  Neuro: pain meds PRN, muscle relaxer and cont drain and monitor output  Respiratory: patient instructed on incentive spirometer  CV: cont home losartan and amlodipine  Heme/Onc:  f/u am CBC             DVT ppx: [] SQH [x] SQL and venodynes bilaterally  Renal: voiding  ID: afebrile; trend leukocytosis- likely due to intraop decadron  GI: bowel regimen, suppository today  PT/OT: KATELYN, PMR- acute rehab   f/u am labs    Will discuss with Dr Ronda Oviedo # [id number]      
.

## 2023-11-07 NOTE — DISCHARGE NOTE PROVIDER - NSDCFUADDAPPT_GEN_ALL_CORE_FT
Please follow up with the following physicians upon discharge from rehab:  1- Dr Bond- neurosurgeon, please call office for appointment.   2- PCP    Please restart your daily home aspirin on 11/17/23.

## 2023-11-07 NOTE — H&P ADULT - ASSESSMENT
Assessment/Plan:  83 year old male RHD Kosovan speaking male with PMH of HTN, HLD, pre-diabetes, BPH, cervical and lumbar spinal stenosis (s/p Posterior C1-C6 Decompression, C4-5 Posterior Column Osteotomy, C1-C7 Fusion / Plastic Closure on 10/18/2022) who presented to [facility] on 11/3 for scheduled Posterior L2-L5 decompression. Hospital Course uncomplicated. Patient now admitted for a multidisciplinary rehab program. 11-07-23 @ 14:53    83 year old male RHD Kosovan speaking male with PMH of HTN, HLD, pre-diabetes, BPH, cervical and lumbar spinal stenosis (s/p Posterior C1-C6 Decompression, C4-5 Posterior Column Osteotomy, C1-C7 Fusion / Plastic Closure on 10/18/2022) who presented to [facility] on 11/3 for scheduled Posterior L2-L5 decompression.     Lumbar Stenosis s/p decompressive laminectomy   - L2-L5 decompressive laminectomy for lumbar stenosis with neurogenic claudication (11/3)  - surgical drain removed on 11/7  - Impaired ADLs and mobility  - Need for assistance with personal care   - Start comprehensive rehab program of PT/OT/SLP - 3 hours a day, 5 days a week. P&O as needed   - Pain control below  - Fall/Spine precautions    Pain Control  - PRN: Tylenol 975 mg Q6H mild pain, oxycodone 5 mg Q4H moderate pain, oxycodone 10 mg Q4H severe pain  - Robaxin 500 mg TID    HTN  - c/w Norvasc 10 mg QHS and losartan 50 mg QD  - Monitor BP    HLD  - cont Lipitor 40 mg QHS    Mood / Cognition  - Neuropsychology consult PRN    Sleep  - Melatonin PRN     GI / Bowel  - Senna qHS  - Miralax PRN BID  - dulcolax PRN  - GI ppx: pantoprazole 40 mg QD     / Bladder  - Currently patient voids independently  - check PVR x1 on admission    Skin / Pressure injury  - Skin assessment on admission performed: ****  - Monitor Incisions:  ****  - Pressure Injury/Skin: OOB to chair, PT/OT  - nursing to monitor skin qShift    Diet/Dysphagia:  - Diet Consistency: regular    DVT prophylaxis:   - Lovenox 40 mg QD     Outpatient Follow-up:  Gina Bond  Neurosurgery  [address] [place] [address], [address]  [place] [address]  Phone: [phone]  Fax: [phone]  Follow Up Time:      Code Status/Emergency Contact:      ---------------    Goals: Safe discharge to home  Estimated Length of Stay: 10-14 days  Rehab Potential: Good  Medical Prognosis: Good  Estimated Disposition: Home with home care      PRESCREEN COMPARISON:  I have reviewed the prescreen information and I have found no relevant changes between the preadmission screening and my post admission evaluation.    RATIONALE FOR INPATIENT ADMISSION: Patient demonstrates the following:  [X] Medically appropriate for rehabilitation admission  [X] Has attainable rehab goals with an appropriate initial discharge plan  [X]Has rehabilitation potential (expected to make a significant improvement within a reasonable period of time)  [X] Requires close medical management by a rehab physician, rehab nursing care, Hospitalist and comprehensive interdisciplinary team (including PT, OT and/or SLP, Prosthetics and Orthotics)     Assessment/Plan:  83 year old male RHD Luxembourger speaking male with PMH of HTN, HLD, pre-diabetes, BPH, cervical and lumbar spinal stenosis (s/p Posterior C1-C6 Decompression, C4-5 Posterior Column Osteotomy, C1-C7 Fusion / Plastic Closure on 10/18/2022) who presented to [facility] on 11/3 for scheduled Posterior L2-L5 decompression. Hospital Course uncomplicated. Patient now admitted for a multidisciplinary rehab program. 11-07-23 @ 14:53    83 year old male RHD Luxembourger speaking male with PMH of HTN, HLD, pre-diabetes, BPH, cervical and lumbar spinal stenosis (s/p Posterior C1-C6 Decompression, C4-5 Posterior Column Osteotomy, C1-C7 Fusion / Plastic Closure on 10/18/2022) who presented to [facility] on 11/3 for scheduled Posterior L2-L5 decompression.     Lumbar Stenosis s/p decompressive laminectomy   - L2-L5 decompressive laminectomy for lumbar stenosis with neurogenic claudication (11/3)  - surgical drain removed on 11/7  - Impaired ADLs and mobility  - Need for assistance with personal care   - Start comprehensive rehab program of PT/OT/SLP - 3 hours a day, 5 days a week. P&O as needed   - Pain control below  - Fall/Spine precautions  - restart daily ASA POD #14     Pain Control  - PRN: Tylenol 975 mg Q6H mild pain, oxycodone 5 mg Q4H moderate pain, oxycodone 10 mg Q4H severe pain  - Robaxin 500 mg TID    HTN  - c/w Norvasc 10 mg QHS and losartan 50 mg QD  - Monitor BP    HLD  - cont Lipitor 40 mg QHS    Mood / Cognition  - Neuropsychology consult PRN    Sleep  - Melatonin PRN     GI / Bowel  - Senna qHS  - Miralax PRN BID  - dulcolax PRN  - GI ppx: pantoprazole 40 mg QD     / Bladder  - Currently patient voids independently  - check PVR x1 on admission    Skin / Pressure injury  - Skin assessment on admission performed: ****  - Monitor Incisions:  ****  - Pressure Injury/Skin: OOB to chair, PT/OT  - nursing to monitor skin qShift    Diet/Dysphagia:  - Diet Consistency: regular    DVT prophylaxis:   - Lovenox 40 mg QD     Outpatient Follow-up:  Gina Bond Devendra  Neurosurgery  [address] [place] [address], [address]  [place] [address]  Phone: [phone]  Fax: [phone]  Follow Up Time:      Code Status/Emergency Contact:      ---------------    Goals: Safe discharge to home  Estimated Length of Stay: 10-14 days  Rehab Potential: Good  Medical Prognosis: Good  Estimated Disposition: Home with home care      PRESCREEN COMPARISON:  I have reviewed the prescreen information and I have found no relevant changes between the preadmission screening and my post admission evaluation.    RATIONALE FOR INPATIENT ADMISSION: Patient demonstrates the following:  [X] Medically appropriate for rehabilitation admission  [X] Has attainable rehab goals with an appropriate initial discharge plan  [X]Has rehabilitation potential (expected to make a significant improvement within a reasonable period of time)  [X] Requires close medical management by a rehab physician, rehab nursing care, Hospitalist and comprehensive interdisciplinary team (including PT, OT and/or SLP, Prosthetics and Orthotics)     Assessment/Plan:  83 year old male RHD Bolivian speaking male with PMH of HTN, HLD, pre-diabetes, BPH, cervical and lumbar spinal stenosis (s/p Posterior C1-C6 Decompression, C4-5 Posterior Column Osteotomy, C1-C7 Fusion / Plastic Closure on 10/18/2022) who presented to [facility] on 11/3 for scheduled Posterior L2-L5 decompression. Hospital Course uncomplicated. Patient now admitted for a multidisciplinary rehab program. 11-07-23 @ 14:53    83 year old male RHD Bolivian speaking male with PMH of HTN, HLD, pre-diabetes, BPH, cervical and lumbar spinal stenosis (s/p Posterior C1-C6 Decompression, C4-5 Posterior Column Osteotomy, C1-C7 Fusion / Plastic Closure on 10/18/2022) who presented to [facility] on 11/3 for scheduled Posterior L2-L5 decompression.     Lumbar Stenosis s/p decompressive laminectomy   - L2-L5 decompressive laminectomy for lumbar stenosis with neurogenic claudication (11/3)  - surgical drain removed on 11/7  - Impaired ADLs and mobility  - Need for assistance with personal care   - Start comprehensive rehab program of PT/OT/SLP - 3 hours a day, 5 days a week. P&O as needed   - Pain control below  - Fall/Spine precautions  - restart daily ASA POD #14     Pain Control  - PRN: Tylenol 975 mg Q6H mild pain, oxycodone 5 mg Q4H moderate pain, oxycodone 10 mg Q4H severe pain  - Robaxin 500 mg TID    HTN  - c/w Norvasc 10 mg QHS and losartan 50 mg QD  - Monitor BP    HLD  - cont Lipitor 40 mg QHS    Mood / Cognition  - Neuropsychology consult PRN    Sleep  - Melatonin PRN     GI / Bowel  - Senna qHS  - Miralax PRN BID  - dulcolax PRN  - GI ppx: pantoprazole 40 mg QD     / Bladder  - Currently patient voids independently  - check PVR x1 on admission    Skin / Pressure injury  - Skin assessment on admission performed: 14 cm lumbar incision site, drain site 1 cmx1cm, Stage 1 pressure injury due to medical device on left buttock  - Pressure Injury/Skin: OOB to chair, PT/OT  - nursing to monitor skin qShift    Diet/Dysphagia:  - Diet Consistency: regular    DVT prophylaxis:   - Lovenox 40 mg QD     Outpatient Follow-up:  Gina Bond  Neurosurgery  [address] [facility], Floor [address]  [place] [address]  Phone: [phone]  Fax: [phone]  Follow Up Time:      Code Status/Emergency Contact:      ---------------    Goals: Safe discharge to home  Estimated Length of Stay: 10-14 days  Rehab Potential: Good  Medical Prognosis: Good  Estimated Disposition: Home with home care      PRESCREEN COMPARISON:  I have reviewed the prescreen information and I have found no relevant changes between the preadmission screening and my post admission evaluation.    RATIONALE FOR INPATIENT ADMISSION: Patient demonstrates the following:  [X] Medically appropriate for rehabilitation admission  [X] Has attainable rehab goals with an appropriate initial discharge plan  [X]Has rehabilitation potential (expected to make a significant improvement within a reasonable period of time)  [X] Requires close medical management by a rehab physician, rehab nursing care, Hospitalist and comprehensive interdisciplinary team (including PT, OT and/or SLP, Prosthetics and Orthotics)     Assessment/Plan:  Case of an 83 year old right handed Kiswahili speaking male patient with past medical history of HTN, HLD, pre-diabetes, BPH, cervical and lumbar spinal stenosis (s/p Posterior C1-C6 Decompression, C4-5 Posterior Column Osteotomy, C1-C7 Fusion with Plastic Closure on 10/18/2022) who is admitted for Acute Inpatient Rehabilitation with a multidisciplinary rehab program at [facility] with functional impairments in ADLs and mobility secondary to lumbar stenosis with neurogenic claudication treated surgically with an elective posterior L2-L5 decompression surgery.    Lumbar stenosis with neurogenic claudication and posterior L2-L5 decompression  - L2-L5 decompressive laminectomy for lumbar stenosis with neurogenic claudication (11/3)  - surgical drain removed on 11/7  - Impaired ADLs and mobility  - Need for assistance with personal care   - Start comprehensive rehab program of PT/OT - 3 hours a day, 5 days a week. P&O as needed   - Pain control below  - Fall/Spine precautions  - Restart daily ASA POD #14     Pain Control  - PRN: Tylenol 975 mg Q6H mild pain, oxycodone 5 mg Q4H moderate pain, oxycodone 10 mg Q4H severe pain  - Robaxin 500 mg TID    HTN  - c/w Norvasc 10 mg QHS and losartan 50 mg QD  - Monitor BP    HLD  - cont Lipitor 40 mg QHS    Mood / Cognition  - Neuropsychology consult PRN    Sleep  - Melatonin PRN     GI / Bowel  - Senna qHS  - Miralax PRN BID  - dulcolax PRN  - GI ppx: pantoprazole 40 mg QD     / Bladder  - Currently patient voids independently  - check PVR x1 on admission    Skin / Pressure injury  - Skin assessment on admission performed: 14 cm lumbar incision site, drain site 1 cmx1cm, Stage 1 pressure injury due to medical device on left buttock  - Pressure Injury/Skin: OOB to chair, PT/OT  - nursing to monitor skin qShift    Diet/Dysphagia:  - Diet Consistency: regular    DVT prophylaxis:   - Lovenox 40 mg QD     Outpatient Follow-up:  Gina Bond  Neurosurgery  [address] [place] [address], [address]  [place] [address]  Phone: [phone]  Fax: [phone]  Follow Up Time:      Code Status/Emergency Contact:      ---------------    Goals: Safe discharge to home  Estimated Length of Stay: 10-14 days  Rehab Potential: Good  Medical Prognosis: Good  Estimated Disposition: Home with home care      PRESCREEN COMPARISON:  I have reviewed the prescreen information and I have found no relevant changes between the preadmission screening and my post admission evaluation.    RATIONALE FOR INPATIENT ADMISSION: Patient demonstrates the following:  [X] Medically appropriate for rehabilitation admission  [X] Has attainable rehab goals with an appropriate initial discharge plan  [X]Has rehabilitation potential (expected to make a significant improvement within a reasonable period of time)  [X] Requires close medical management by a rehab physician, rehab nursing care, Hospitalist and comprehensive interdisciplinary team (including PT, OT and/or SLP, Prosthetics and Orthotics)     Assessment/Plan:  Case of an 83 year old right handed Luxembourgish speaking male patient with past medical history of HTN, HLD, pre-diabetes, BPH, cervical and lumbar spinal stenosis (s/p Posterior C1-C6 Decompression, C4-5 Posterior Column Osteotomy, C1-C7 Fusion with Plastic Closure on 10/18/2022) who is admitted for Acute Inpatient Rehabilitation with a multidisciplinary rehab program at [facility] with functional impairments in ADLs and mobility secondary to lumbar stenosis with neurogenic claudication treated surgically with an elective posterior L2-L5 decompression surgery.    Lumbar stenosis with neurogenic claudication and posterior L2-L5 decompression  - L2-L5 decompressive laminectomy for lumbar stenosis with neurogenic claudication (11/3)  - surgical drain removed on 11/7  - Impaired ADLs and mobility  - Need for assistance with personal care   - Start comprehensive rehab program of PT/OT - 3 hours a day, 5 days a week. P&O as needed   - Pain control below  - Fall/Spine precautions  - Restart daily ASA POD #14     Pain Control  - PRN: Tylenol 975 mg Q6H mild pain, oxycodone 5 mg Q4H moderate pain, oxycodone 10 mg Q4H severe pain  - Robaxin 500 mg TID    HTN  - c/w Norvasc 10 mg QHS and losartan 50 mg QD  - Monitor BP    HLD  - cont Lipitor 40 mg QHS    Mood / Cognition  - Neuropsychology consult PRN    Sleep  - Melatonin PRN     GI / Bowel  - Senna qHS  - Miralax PRN BID  - dulcolax PRN  - GI ppx: pantoprazole 40 mg QD     / Bladder  - Currently patient voids independently  - check PVR x1 on admission    Skin / Pressure injury  - Skin assessment on admission performed: 14 cm lumbar incision site, drain site 1 cmx1cm, Stage 1 pressure injury to lumbar spine  - Pressure Injury/Skin: OOB to chair, PT/OT  - nursing to monitor skin qShift    Diet/Dysphagia:  - Diet Consistency: regular    DVT prophylaxis:   - Lovenox 40 mg QD     Outpatient Follow-up:  Gina Bond  Neurosurgery  [address] [place] [address], [address]  [place] [address]  Phone: [phone]  Fax: [phone]  Follow Up Time:      Code Status/Emergency Contact:      ---------------    Goals: Safe discharge to home  Estimated Length of Stay: 10-14 days  Rehab Potential: Good  Medical Prognosis: Good  Estimated Disposition: Home with home care      PRESCREEN COMPARISON:  I have reviewed the prescreen information and I have found no relevant changes between the preadmission screening and my post admission evaluation.    RATIONALE FOR INPATIENT ADMISSION: Patient demonstrates the following:  [X] Medically appropriate for rehabilitation admission  [X] Has attainable rehab goals with an appropriate initial discharge plan  [X]Has rehabilitation potential (expected to make a significant improvement within a reasonable period of time)  [X] Requires close medical management by a rehab physician, rehab nursing care, Hospitalist and comprehensive interdisciplinary team (including PT, OT and/or SLP, Prosthetics and Orthotics)

## 2023-11-08 LAB
ALBUMIN SERPL ELPH-MCNC: 2.7 G/DL — LOW (ref 3.3–5)
ALBUMIN SERPL ELPH-MCNC: 2.7 G/DL — LOW (ref 3.3–5)
ALP SERPL-CCNC: 103 U/L — SIGNIFICANT CHANGE UP (ref 40–120)
ALP SERPL-CCNC: 103 U/L — SIGNIFICANT CHANGE UP (ref 40–120)
ALT FLD-CCNC: 31 U/L — SIGNIFICANT CHANGE UP (ref 10–45)
ALT FLD-CCNC: 31 U/L — SIGNIFICANT CHANGE UP (ref 10–45)
ANION GAP SERPL CALC-SCNC: 8 MMOL/L — SIGNIFICANT CHANGE UP (ref 5–17)
ANION GAP SERPL CALC-SCNC: 8 MMOL/L — SIGNIFICANT CHANGE UP (ref 5–17)
AST SERPL-CCNC: 28 U/L — SIGNIFICANT CHANGE UP (ref 10–40)
AST SERPL-CCNC: 28 U/L — SIGNIFICANT CHANGE UP (ref 10–40)
BASOPHILS # BLD AUTO: 0.02 K/UL — SIGNIFICANT CHANGE UP (ref 0–0.2)
BASOPHILS # BLD AUTO: 0.02 K/UL — SIGNIFICANT CHANGE UP (ref 0–0.2)
BASOPHILS NFR BLD AUTO: 0.2 % — SIGNIFICANT CHANGE UP (ref 0–2)
BASOPHILS NFR BLD AUTO: 0.2 % — SIGNIFICANT CHANGE UP (ref 0–2)
BILIRUB SERPL-MCNC: 0.5 MG/DL — SIGNIFICANT CHANGE UP (ref 0.2–1.2)
BILIRUB SERPL-MCNC: 0.5 MG/DL — SIGNIFICANT CHANGE UP (ref 0.2–1.2)
BUN SERPL-MCNC: 34 MG/DL — HIGH (ref 7–23)
BUN SERPL-MCNC: 34 MG/DL — HIGH (ref 7–23)
CALCIUM SERPL-MCNC: 8.6 MG/DL — SIGNIFICANT CHANGE UP (ref 8.4–10.5)
CALCIUM SERPL-MCNC: 8.6 MG/DL — SIGNIFICANT CHANGE UP (ref 8.4–10.5)
CHLORIDE SERPL-SCNC: 99 MMOL/L — SIGNIFICANT CHANGE UP (ref 96–108)
CHLORIDE SERPL-SCNC: 99 MMOL/L — SIGNIFICANT CHANGE UP (ref 96–108)
CO2 SERPL-SCNC: 28 MMOL/L — SIGNIFICANT CHANGE UP (ref 22–31)
CO2 SERPL-SCNC: 28 MMOL/L — SIGNIFICANT CHANGE UP (ref 22–31)
CREAT SERPL-MCNC: 1.06 MG/DL — SIGNIFICANT CHANGE UP (ref 0.5–1.3)
CREAT SERPL-MCNC: 1.06 MG/DL — SIGNIFICANT CHANGE UP (ref 0.5–1.3)
EGFR: 69 ML/MIN/1.73M2 — SIGNIFICANT CHANGE UP
EGFR: 69 ML/MIN/1.73M2 — SIGNIFICANT CHANGE UP
EOSINOPHIL # BLD AUTO: 0.41 K/UL — SIGNIFICANT CHANGE UP (ref 0–0.5)
EOSINOPHIL # BLD AUTO: 0.41 K/UL — SIGNIFICANT CHANGE UP (ref 0–0.5)
EOSINOPHIL NFR BLD AUTO: 4.6 % — SIGNIFICANT CHANGE UP (ref 0–6)
EOSINOPHIL NFR BLD AUTO: 4.6 % — SIGNIFICANT CHANGE UP (ref 0–6)
GLUCOSE SERPL-MCNC: 115 MG/DL — HIGH (ref 70–99)
GLUCOSE SERPL-MCNC: 115 MG/DL — HIGH (ref 70–99)
HCT VFR BLD CALC: 35.5 % — LOW (ref 39–50)
HCT VFR BLD CALC: 35.5 % — LOW (ref 39–50)
HGB BLD-MCNC: 11.9 G/DL — LOW (ref 13–17)
HGB BLD-MCNC: 11.9 G/DL — LOW (ref 13–17)
IMM GRANULOCYTES NFR BLD AUTO: 0.2 % — SIGNIFICANT CHANGE UP (ref 0–0.9)
IMM GRANULOCYTES NFR BLD AUTO: 0.2 % — SIGNIFICANT CHANGE UP (ref 0–0.9)
LYMPHOCYTES # BLD AUTO: 2.12 K/UL — SIGNIFICANT CHANGE UP (ref 1–3.3)
LYMPHOCYTES # BLD AUTO: 2.12 K/UL — SIGNIFICANT CHANGE UP (ref 1–3.3)
LYMPHOCYTES # BLD AUTO: 24 % — SIGNIFICANT CHANGE UP (ref 13–44)
LYMPHOCYTES # BLD AUTO: 24 % — SIGNIFICANT CHANGE UP (ref 13–44)
MCHC RBC-ENTMCNC: 30.1 PG — SIGNIFICANT CHANGE UP (ref 27–34)
MCHC RBC-ENTMCNC: 30.1 PG — SIGNIFICANT CHANGE UP (ref 27–34)
MCHC RBC-ENTMCNC: 33.5 GM/DL — SIGNIFICANT CHANGE UP (ref 32–36)
MCHC RBC-ENTMCNC: 33.5 GM/DL — SIGNIFICANT CHANGE UP (ref 32–36)
MCV RBC AUTO: 89.9 FL — SIGNIFICANT CHANGE UP (ref 80–100)
MCV RBC AUTO: 89.9 FL — SIGNIFICANT CHANGE UP (ref 80–100)
MONOCYTES # BLD AUTO: 0.78 K/UL — SIGNIFICANT CHANGE UP (ref 0–0.9)
MONOCYTES # BLD AUTO: 0.78 K/UL — SIGNIFICANT CHANGE UP (ref 0–0.9)
MONOCYTES NFR BLD AUTO: 8.8 % — SIGNIFICANT CHANGE UP (ref 2–14)
MONOCYTES NFR BLD AUTO: 8.8 % — SIGNIFICANT CHANGE UP (ref 2–14)
NEUTROPHILS # BLD AUTO: 5.49 K/UL — SIGNIFICANT CHANGE UP (ref 1.8–7.4)
NEUTROPHILS # BLD AUTO: 5.49 K/UL — SIGNIFICANT CHANGE UP (ref 1.8–7.4)
NEUTROPHILS NFR BLD AUTO: 62.2 % — SIGNIFICANT CHANGE UP (ref 43–77)
NEUTROPHILS NFR BLD AUTO: 62.2 % — SIGNIFICANT CHANGE UP (ref 43–77)
NRBC # BLD: 0 /100 WBCS — SIGNIFICANT CHANGE UP (ref 0–0)
NRBC # BLD: 0 /100 WBCS — SIGNIFICANT CHANGE UP (ref 0–0)
PLATELET # BLD AUTO: 228 K/UL — SIGNIFICANT CHANGE UP (ref 150–400)
PLATELET # BLD AUTO: 228 K/UL — SIGNIFICANT CHANGE UP (ref 150–400)
POTASSIUM SERPL-MCNC: 4.4 MMOL/L — SIGNIFICANT CHANGE UP (ref 3.5–5.3)
POTASSIUM SERPL-MCNC: 4.4 MMOL/L — SIGNIFICANT CHANGE UP (ref 3.5–5.3)
POTASSIUM SERPL-SCNC: 4.4 MMOL/L — SIGNIFICANT CHANGE UP (ref 3.5–5.3)
POTASSIUM SERPL-SCNC: 4.4 MMOL/L — SIGNIFICANT CHANGE UP (ref 3.5–5.3)
PROT SERPL-MCNC: 6.6 G/DL — SIGNIFICANT CHANGE UP (ref 6–8.3)
PROT SERPL-MCNC: 6.6 G/DL — SIGNIFICANT CHANGE UP (ref 6–8.3)
RBC # BLD: 3.95 M/UL — LOW (ref 4.2–5.8)
RBC # BLD: 3.95 M/UL — LOW (ref 4.2–5.8)
RBC # FLD: 13.5 % — SIGNIFICANT CHANGE UP (ref 10.3–14.5)
RBC # FLD: 13.5 % — SIGNIFICANT CHANGE UP (ref 10.3–14.5)
SODIUM SERPL-SCNC: 135 MMOL/L — SIGNIFICANT CHANGE UP (ref 135–145)
SODIUM SERPL-SCNC: 135 MMOL/L — SIGNIFICANT CHANGE UP (ref 135–145)
WBC # BLD: 8.84 K/UL — SIGNIFICANT CHANGE UP (ref 3.8–10.5)
WBC # BLD: 8.84 K/UL — SIGNIFICANT CHANGE UP (ref 3.8–10.5)
WBC # FLD AUTO: 8.84 K/UL — SIGNIFICANT CHANGE UP (ref 3.8–10.5)
WBC # FLD AUTO: 8.84 K/UL — SIGNIFICANT CHANGE UP (ref 3.8–10.5)

## 2023-11-08 PROCEDURE — 99223 1ST HOSP IP/OBS HIGH 75: CPT

## 2023-11-08 PROCEDURE — 99232 SBSQ HOSP IP/OBS MODERATE 35: CPT

## 2023-11-08 RX ORDER — ASPIRIN/CALCIUM CARB/MAGNESIUM 324 MG
81 TABLET ORAL DAILY
Refills: 0 | Status: DISCONTINUED | OUTPATIENT
Start: 2023-11-17 | End: 2023-11-21

## 2023-11-08 RX ADMIN — AMLODIPINE BESYLATE 10 MILLIGRAM(S): 2.5 TABLET ORAL at 21:03

## 2023-11-08 RX ADMIN — METHOCARBAMOL 500 MILLIGRAM(S): 500 TABLET, FILM COATED ORAL at 15:14

## 2023-11-08 RX ADMIN — METHOCARBAMOL 500 MILLIGRAM(S): 500 TABLET, FILM COATED ORAL at 05:29

## 2023-11-08 RX ADMIN — POLYETHYLENE GLYCOL 3350 17 GRAM(S): 17 POWDER, FOR SOLUTION ORAL at 17:26

## 2023-11-08 RX ADMIN — PANTOPRAZOLE SODIUM 40 MILLIGRAM(S): 20 TABLET, DELAYED RELEASE ORAL at 05:29

## 2023-11-08 RX ADMIN — OXYCODONE HYDROCHLORIDE 10 MILLIGRAM(S): 5 TABLET ORAL at 05:29

## 2023-11-08 RX ADMIN — LOSARTAN POTASSIUM 50 MILLIGRAM(S): 100 TABLET, FILM COATED ORAL at 05:29

## 2023-11-08 RX ADMIN — METHOCARBAMOL 500 MILLIGRAM(S): 500 TABLET, FILM COATED ORAL at 21:04

## 2023-11-08 RX ADMIN — POLYETHYLENE GLYCOL 3350 17 GRAM(S): 17 POWDER, FOR SOLUTION ORAL at 05:32

## 2023-11-08 RX ADMIN — ATORVASTATIN CALCIUM 40 MILLIGRAM(S): 80 TABLET, FILM COATED ORAL at 21:03

## 2023-11-08 RX ADMIN — OXYCODONE HYDROCHLORIDE 10 MILLIGRAM(S): 5 TABLET ORAL at 06:48

## 2023-11-08 RX ADMIN — Medication 5 MILLIGRAM(S): at 12:51

## 2023-11-08 RX ADMIN — ENOXAPARIN SODIUM 40 MILLIGRAM(S): 100 INJECTION SUBCUTANEOUS at 21:06

## 2023-11-08 NOTE — DIETITIAN NUTRITION RISK NOTIFICATION - PHYSICAL ASSESSMENT CLAVICLES
PROGRESS NOTE     Subjective     Otoniel is a 53 year old here for Establish Care and Office Visit (Sinus congestion, dry cough x 4 days )   here to establish care.  He is known to Internal Medicine.    Symptoms began 5d ago with cough (productive of mucus). Notes fatigue as well.   COVID negative home test    Recently began crestor - last lipid panel was June'23 and was elevated thus when crestor was started.    Review of Systems  Negative except as above    Social Determinants Never Smoker     Objective   Vitals:    11/08/23 0851   BP: 102/60   Pulse: (!) 100   Temp: 99.1 °F (37.3 °C)   TempSrc: Temporal   Weight: 73.9 kg (163 lb)     General: Alert and oriented, in no acute distress  HEENT: Oropharynx clear, no cervical lymphadenopathy, no thyromegally, TMs normal appearing bilaterally  Heart: Regular in rate and rhythm, normal S1 and S2, no murmurs, rubs or gallops  Lungs: clear to auscultation bilaterally  Neuro: CN2-12 grossly intact, Moves all extremities  Psych: Appropriate mood and affect    The 10-year ASCVD risk score (Fred MAGDALENO, et al., 2019) is: 3.7%    Values used to calculate the score:      Age: 53 years      Sex: Male      Is Non- : No      Diabetic: No      Tobacco smoker: No      Systolic Blood Pressure: 102 mmHg      Is BP treated: No      HDL Cholesterol: 66 mg/dL      Total Cholesterol: 241 mg/dL         ASSESSMENT AND PLAN       1. Establishing care with new doctor, encounter for  2. Mixed hyperlipidemia  -     Lipid Panel With Reflex; Future  3. Hypovitaminosis D  -     Vitamin D -25 Hydroxy; Future  4. Acute URI  5. Screening for diabetes mellitus (DM)  -     Glycohemoglobin; Future  6. Elevated serum creatinine  -     Basic Metabolic Panel; Future     Hyperlipidemia- due for repeat check as began Crestor at last June check - noted some noncompliance    Hypovitaminosis D- due for repeat    Acute URI - day 5 of symptoms. Discussed day 10 of turning of the corner and will  contact clinic then if not improved.  Reassured by normal exam that it is not strep, pneumonia, bronchitis, mononucleosis.    Elevated serum creatinine noted on June labs however nothing further was done at that time, we will repeat today.  Has been elevated before in 2019, may be his baseline    Follow Up  Return in about 7 months (around 6/8/2024) for physical.          I spent a total of 42 minutes on the day of the visit.  This includes pre-charting, chart review, and documenting.    Chinmay Bess MD, PhD   moderate

## 2023-11-08 NOTE — CONSULT NOTE ADULT - SUBJECTIVE AND OBJECTIVE BOX
Patient is a 83y old  Male who presents with a chief complaint of Lumbar stenosis with neurogenic claudication and posterior L2-L5 decompression (07 Nov 2023 14:41)    HPI, per admission H&P:  Case of an 83 year old right handed Ghanaian speaking male patient with past medical history of HTN, HLD, pre-diabetes, BPH, cervical and lumbar spinal stenosis (s/p Posterior C1-C6 Decompression, C4-5 Posterior Column Osteotomy, C1-C7 Fusion with Plastic Closure on 10/18/2022) who presented to [facility] on 11/3 for a scheduled posterior L2-L5 decompression surgery for lumbar stenosis with neurogenic claudication. Prior to surgery patient had low back pain associated with bilateral leg pain and difficulty walking. he additionally had persistent neck heaviness and bilateral shoulder pain which was worse on the left shoulder. Post-operative hospital course was uncomplicated and surgical drain was removed on 11/7/23. Patient evaluated by PT/OT and was recommended for acute inpatient rehab. Patient is medically stable for discharge to [facility] on 11/7/23. (07 Nov 2023 14:41)    Subjective   Ghanaian telephone  [place]et ID#[id number]   Patient seen and examined at bedside. Reviewed medical history and recent hospitalization with patient. Denies pain/discomfort. In good spirits. Enjoyed therapy session this morning.     PAST MEDICAL & SURGICAL HISTORY:  Hypertension  Hyperlipidemia  Chronic kidney disease  Benign prostate hyperplasia  COVID-19 virus infection -dec 2021 (cough, fever, malaise)  History of carpal tunnel surgery -bilateral hands  History of cataract surgery -bilateral  H/O colonoscopy  S/P cervical spinal fusion    SOCIAL HISTORY: Former smoker, quit 30 years ago. Denies alcohol or drug use  FAMILY HISTORY: Denies FH of heart disease     ALLERGIES:  penicillin (Rash)    MEDICATIONS  (STANDING):  amLODIPine   Tablet 10 milliGRAM(s) Oral at bedtime  atorvastatin 40 milliGRAM(s) Oral at bedtime  enoxaparin Injectable 40 milliGRAM(s) SubCutaneous every 24 hours  losartan 50 milliGRAM(s) Oral daily  methocarbamol 500 milliGRAM(s) Oral three times a day  pantoprazole    Tablet 40 milliGRAM(s) Oral before breakfast  polyethylene glycol 3350 17 Gram(s) Oral two times a day  senna 2 Tablet(s) Oral at bedtime    MEDICATIONS  (PRN):  acetaminophen     Tablet .. 975 milliGRAM(s) Oral every 6 hours PRN Mild Pain (1 - 3)  bisacodyl 5 milliGRAM(s) Oral every 12 hours PRN Constipation  oxyCODONE    IR 5 milliGRAM(s) Oral every 4 hours PRN Moderate Pain (4 - 6)  oxyCODONE    IR 10 milliGRAM(s) Oral every 4 hours PRN Severe Pain (7 - 10)    Review of Systems: Refer to HPI for pertinent positives and negatives. All other ROS reviewed and negative except as otherwise stated above.    Vital Signs Last 24 Hrs  T(F): 98.6 (08 Nov 2023 08:46), Max: 98.9 (07 Nov 2023 13:57)  HR: 91 (08 Nov 2023 08:46) (84 - 91)  BP: 124/71 (08 Nov 2023 08:46) (124/71 - 171/67)  RR: 16 (08 Nov 2023 08:46) (14 - 18)  SpO2: 94% (08 Nov 2023 08:46) (93% - 96%)  I&O's Summary    PHYSICAL EXAM:  GENERAL: NAD, well-groomed  HEAD:  Atraumatic, Normocephalic  EYES: EOMI, PERRL, conjunctiva and sclera clear  ENMT: Moist mucous membranes, Good dentition  NECK: Supple, No JVD  CHEST/LUNG: Clear to auscultation bilaterally, non-labored breathing, good air entry  HEART: RRR; S1/S2, No murmur  ABDOMEN: Soft, Nontender, Nondistended; Bowel sounds present  VASCULAR: Normal pulses, Normal capillary refill  EXTREMITIES: No cyanosis, No edema  LYMPH: No lymphadenopathy noted  SKIN: Warm, Intact  PSYCH: Normal mood and affect  NERVOUS SYSTEM:  A/O x3, Good concentration; CN 2-12 intact, No focal deficits, moves all extremities    LABS:                        11.9   8.84  )-----------( 228      ( 08 Nov 2023 06:00 )             35.5     11-08    135  |  99  |  34  ----------------------------<  115  4.4   |  28  |  1.06    Ca    8.6      08 Nov 2023 06:00    TPro  6.6  /  Alb  2.7  /  TBili  0.5  /  DBili  x   /  AST  28  /  ALT  31  /  AlkPhos  103  11-08    Urinalysis Basic - ( 08 Nov 2023 06:00 )    Color: x / Appearance: x / SG: x / pH: x  Gluc: 115 mg/dL / Ketone: x  / Bili: x / Urobili: x   Blood: x / Protein: x / Nitrite: x   Leuk Esterase: x / RBC: x / WBC x   Sq Epi: x / Non Sq Epi: x / Bacteria: x    COVID-19 PCR: NotDetec (11-07-23 @ 20:45)    RADIOLOGY & ADDITIONAL TESTS:    Care Discussed with Consultants/Other Providers: d/w bedside RN

## 2023-11-08 NOTE — DIETITIAN INITIAL EVALUATION ADULT - OTHER INFO
Case of an 83 year old right handed Mozambican speaking male patient with past medical history of HTN, HLD, pre-diabetes, BPH, cervical and lumbar spinal stenosis (s/p Posterior C1-C6 Decompression, C4-5 Posterior Column Osteotomy, C1-C7 Fusion with Plastic Closure on 10/18/2022) who is admitted for Acute Inpatient Rehabilitation with a multidisciplinary rehab program at [facility] with functional impairments in ADLs and mobility secondary to lumbar stenosis with neurogenic claudication treated surgically with an elective posterior L2-L5 decompression surgery.  Pt tolerating diet with report of good appetite now- observed eating well at lunch today. Endorses weight loss over the last 1-2 yrs, ~40lbs/21% weight change. Current weight 151.8lbs. Pt receptive to adding oral nutrition supplements to further augment nutritional intake and prevent further weight loss. Stage I pressure ulcer lumbar spine. Recommend adding MVI daily. Hx preDM, A1c 5.9%. Suggest change diet to consistent carbohydrate +evening snack +glucerna BID. Pt denies GI issues.

## 2023-11-08 NOTE — CONSULT NOTE ADULT - ASSESSMENT
82 yo M with PMH of Hypertension, Hyperlipidemia, Pre-DM, BPH, Cervical and Lumbar Stenosis (s/p Posterior C1-C6 Decompression, C4-5 Posterior Column Osteotomy, C1-C7 Fusion with Plastic Closure on 10/18/2022) who presented to [facility] on 11/3 for a scheduled posterior L2-L5 decompression surgery for lumbar stenosis with neurogenic claudication. Post-op course uncomplicated. Now admitted to [facility] rehab for functional decline.     Lumbar stenosis   - L2-L5 decompressive laminectomy for lumbar stenosis with neurogenic claudication (11/3)  - PT/OT per PMR  - Pain management per PMR  - Restart daily ASA POD #14     Hypertension  - continue norvasc 10mg, losartan 50mg  - watch for OH    Hyperlipidemia   - Lipitor 40    DVT Prophylaxis:  - Lovenox     Thank you for involving us in the care of this patient. Medicine service will follow.

## 2023-11-08 NOTE — DIETITIAN INITIAL EVALUATION ADULT - PERTINENT LABORATORY DATA
11-08    135  |  99  |  34<H>  ----------------------------<  115<H>  4.4   |  28  |  1.06    Ca    8.6      08 Nov 2023 06:00    TPro  6.6  /  Alb  2.7<L>  /  TBili  0.5  /  DBili  x   /  AST  28  /  ALT  31  /  AlkPhos  103  11-08  A1C with Estimated Average Glucose Result: 5.9 % (10-19-23 @ 18:13)

## 2023-11-08 NOTE — PROGRESS NOTE ADULT - ASSESSMENT
Assessment/Plan:  Case of an 83 year old right handed Occitan speaking male patient with past medical history of HTN, HLD, pre-diabetes, BPH, cervical and lumbar spinal stenosis (s/p Posterior C1-C6 Decompression, C4-5 Posterior Column Osteotomy, C1-C7 Fusion with Plastic Closure on 10/18/2022) who is admitted for Acute Inpatient Rehabilitation with a multidisciplinary rehab program at [facility] with functional impairments in ADLs and mobility secondary to lumbar stenosis with neurogenic claudication treated surgically with an elective posterior L2-L5 decompression surgery.    Lumbar stenosis with neurogenic claudication and posterior L2-L5 decompression  - L2-L5 decompressive laminectomy for lumbar stenosis with neurogenic claudication (11/3)  - surgical drain removed on 11/7  - Impaired ADLs and mobility  - Need for assistance with personal care   - Continue comprehensive rehab program of PT/OT - 3 hours a day, 5 days a week. P&O as needed   - Pain control below  - Fall/Spine precautions  - Restart daily ASA POD #14 (11/17)    Pain Control  - PRN: Tylenol 975 mg Q6H mild pain, oxycodone 5 mg Q4H moderate pain, oxycodone 10 mg Q4H severe pain  - Robaxin 500 mg TID    HTN  - c/w Norvasc 10 mg QHS and losartan 50 mg QD  - Monitor BP    HLD  - cont Lipitor 40 mg QHS    Mood / Cognition  - Neuropsychology consult PRN    Sleep  - Melatonin PRN     GI / Bowel  - Senna qHS  - Miralax PRN BID  - dulcolax PRN  - GI ppx: pantoprazole 40 mg QD     / Bladder  - Currently patient voids independently  - check PVR x1 on admission    Skin / Pressure injury  - Skin assessment on admission performed: 14 cm lumbar incision site, drain site 1 cmx1cm, Stage 1 pressure injury due to medical device on left buttock  - Pressure Injury/Skin: OOB to chair, PT/OT  - nursing to monitor skin qShift    Diet/Dysphagia:  - Diet Consistency: regular    DVT prophylaxis:   - Lovenox 40 mg QD     Outpatient Follow-up:  Gina Bond  Neurosurgery  [address] [place] [address], [address]  [place] [address]  Phone: [phone]  Fax: [phone]  Follow Up Time:      Code Status/Emergency Contact:      --------------- Assessment/Plan:  Case of an 83 year old right handed Yoruba speaking male patient with past medical history of HTN, HLD, pre-diabetes, BPH, cervical and lumbar spinal stenosis (s/p Posterior C1-C6 Decompression, C4-5 Posterior Column Osteotomy, C1-C7 Fusion with Plastic Closure on 10/18/2022) who is admitted for Acute Inpatient Rehabilitation with a multidisciplinary rehab program at [facility] with functional impairments in ADLs and mobility secondary to lumbar stenosis with neurogenic claudication treated surgically with an elective posterior L2-L5 decompression surgery.    Lumbar stenosis with neurogenic claudication and posterior L2-L5 decompression  - L2-L5 decompressive laminectomy for lumbar stenosis with neurogenic claudication (11/3)  - surgical drain removed on 11/7  - Impaired ADLs and mobility  - Need for assistance with personal care   - Continue comprehensive rehab program of PT/OT - 3 hours a day, 5 days a week. P&O as needed   - Pain control below  - Fall/Spine precautions  - Restart daily ASA POD #14 (11/17)    Pain Control  - PRN: Tylenol 975 mg Q6H mild pain, oxycodone 5 mg Q4H moderate pain, oxycodone 10 mg Q4H severe pain  - Robaxin 500 mg TID    HTN  - c/w Norvasc 10 mg QHS and losartan 50 mg QD  - Monitor BP    HLD  - cont Lipitor 40 mg QHS    Mood / Cognition  - Neuropsychology consult PRN    Sleep  - Melatonin PRN     GI / Bowel  - Senna qHS  - Miralax PRN BID  - dulcolax PRN  - GI ppx: pantoprazole 40 mg QD     / Bladder  - Currently patient voids independently  - check PVR x1 on admission    Skin / Pressure injury  - Skin assessment on admission performed: 14 cm lumbar incision site, drain site 1 cmx1cm, Stage 1 pressure injury to lumbar spine  - Pressure Injury/Skin: OOB to chair, PT/OT  - nursing to monitor skin qShift    Diet/Dysphagia:  - Diet Consistency: regular    DVT prophylaxis:   - Lovenox 40 mg QD     Outpatient Follow-up:  Gina Bond  Neurosurgery  [address] [place] [address], [address]  [place] [address]  Phone: [phone]  Fax: [phone]  Follow Up Time:      Code Status/Emergency Contact:      ---------------

## 2023-11-08 NOTE — DIETITIAN INITIAL EVALUATION ADULT - NSFNSPHYEXAMSKINFT_GEN_A_CORE
Pressure Injury 1: lumbar spine, Stage I  Pressure Injury 11: none, none, Pressure Injury 1: lumbar spine, Stage I

## 2023-11-08 NOTE — PROGRESS NOTE ADULT - SUBJECTIVE AND OBJECTIVE BOX
HPI:  Case of an 83 year old right handed Vietnamese speaking male patient with past medical history of HTN, HLD, pre-diabetes, BPH, cervical and lumbar spinal stenosis (s/p Posterior C1-C6 Decompression, C4-5 Posterior Column Osteotomy, C1-C7 Fusion with Plastic Closure on 10/18/2022) who presented to [facility] on 11/3 for a scheduled posterior L2-L5 decompression surgery for lumbar stenosis with neurogenic claudication. Prior to surgery patient had low back pain associated with bilateral leg pain and difficulty walking. he additionally had persistent neck heaviness and bilateral shoulder pain which was worse on the left shoulder. Post-operative hospital course was uncomplicated and surgical drain was removed on 11/7/23. Patient evaluated by PT/OT and was recommended for acute inpatient rehab. Patient is medically stable for discharge to [facility] on 11/7/23. (07 Nov 2023 14:41)    ___________________________________________________________________________    SUBJECTIVE/ROS  Patient was seen and evaluated at bedside today.  Reported no overnight events and is in no acute distress.  Tolerated admission process. Dulcolax suppository added tonight.  Case to be evaluated at Interdisciplinary Team meeting tomorrow.  Eager to participate on the recommended rehabilitation program.  Denies any CP, SOB, GAITAN, palpitations, fever, chills, body aches, cough, congestion, or any other symptoms at this time.   ___________________________________________________________________________    VITALS  T(C): 37 (11-08-23 @ 08:46), Max: 37.2 (11-07-23 @ 13:57)  HR: 91 (11-08-23 @ 08:46) (84 - 91)  BP: 124/71 (11-08-23 @ 08:46) (124/71 - 171/67)  RR: 16 (11-08-23 @ 08:46) (14 - 18)  SpO2: 94% (11-08-23 @ 08:46) (93% - 96%)  ___________________________________________________________________________    LABS                          11.9   8.84  )-----------( 228      ( 08 Nov 2023 06:00 )             35.5       11-08    135  |  99  |  34<H>  ----------------------------<  115<H>  4.4   |  28  |  1.06    Ca    8.6      08 Nov 2023 06:00    TPro  6.6  /  Alb  2.7<L>  /  TBili  0.5  /  DBili  x   /  AST  28  /  ALT  31  /  AlkPhos  103  11-08    ___________________________________________________________________________    MEDICATIONS  (STANDING):  amLODIPine   Tablet 10 milliGRAM(s) Oral at bedtime  atorvastatin 40 milliGRAM(s) Oral at bedtime  enoxaparin Injectable 40 milliGRAM(s) SubCutaneous every 24 hours  losartan 50 milliGRAM(s) Oral daily  methocarbamol 500 milliGRAM(s) Oral three times a day  pantoprazole    Tablet 40 milliGRAM(s) Oral before breakfast  polyethylene glycol 3350 17 Gram(s) Oral two times a day  senna 2 Tablet(s) Oral at bedtime    MEDICATIONS  (PRN):  acetaminophen     Tablet .. 975 milliGRAM(s) Oral every 6 hours PRN Mild Pain (1 - 3)  bisacodyl 5 milliGRAM(s) Oral every 12 hours PRN Constipation  oxyCODONE    IR 5 milliGRAM(s) Oral every 4 hours PRN Moderate Pain (4 - 6)  oxyCODONE    IR 10 milliGRAM(s) Oral every 4 hours PRN Severe Pain (7 - 10)    ___________________________________________________________________________    PHYSICAL EXAM:    General: NAD, Resting Comfortable,                                                                 Constitutional - NAD, Comfortable, in bed   Chest - Breathing comfortably, room air   Cardiovascular - S1S2   Abdomen - Soft   Extremities - No C/C/E, No calf tenderness   Neurologic Exam -                    Cognitive - Awake, Alert, AAO to self, place, date, year, situation     Communication - Fluent, No dysarthria  Motor    LEFT    UE: SF [5/5], EF [5/5], EE [5/5], WE [5/5],  [wnl]  RIGHT UE: SF [5/5], EF [5/5], EE [5/5], WE [5/5],  [wnl]  LEFT    LE:  HF [3-4/5], KE [4/5], DF [3/5], EHL [3/5],  PF [4/5]  RIGHT LE:  HF [3-4/5], KE [4/5], DF [4/5], EHL [4/5],  PF [4/5]      Reflex:  2 + throughout, Babinski negative  Wounds: 14 cm lumbar incision site, drain site 1 cmx1cm, Stage 1 pressure injury due to medical device on left buttock      ___________________________________________________________________________ HPI:  Case of an 83 year old right handed Welsh speaking male patient with past medical history of HTN, HLD, pre-diabetes, BPH, cervical and lumbar spinal stenosis (s/p Posterior C1-C6 Decompression, C4-5 Posterior Column Osteotomy, C1-C7 Fusion with Plastic Closure on 10/18/2022) who presented to [facility] on 11/3 for a scheduled posterior L2-L5 decompression surgery for lumbar stenosis with neurogenic claudication. Prior to surgery patient had low back pain associated with bilateral leg pain and difficulty walking. he additionally had persistent neck heaviness and bilateral shoulder pain which was worse on the left shoulder. Post-operative hospital course was uncomplicated and surgical drain was removed on 11/7/23. Patient evaluated by PT/OT and was recommended for acute inpatient rehab. Patient is medically stable for discharge to [facility] on 11/7/23. (07 Nov 2023 14:41)    ___________________________________________________________________________    SUBJECTIVE/ROS  Patient was seen and evaluated at bedside today.  Reported no overnight events and is in no acute distress.  Tolerated admission process. Dulcolax suppository added tonight.  Case to be evaluated at Interdisciplinary Team meeting tomorrow.  Eager to participate on the recommended rehabilitation program.  Denies any CP, SOB, GAITAN, palpitations, fever, chills, body aches, cough, congestion, or any other symptoms at this time.   ___________________________________________________________________________    VITALS  T(C): 37 (11-08-23 @ 08:46), Max: 37.2 (11-07-23 @ 13:57)  HR: 91 (11-08-23 @ 08:46) (84 - 91)  BP: 124/71 (11-08-23 @ 08:46) (124/71 - 171/67)  RR: 16 (11-08-23 @ 08:46) (14 - 18)  SpO2: 94% (11-08-23 @ 08:46) (93% - 96%)  ___________________________________________________________________________    LABS                          11.9   8.84  )-----------( 228      ( 08 Nov 2023 06:00 )             35.5       11-08    135  |  99  |  34<H>  ----------------------------<  115<H>  4.4   |  28  |  1.06    Ca    8.6      08 Nov 2023 06:00    TPro  6.6  /  Alb  2.7<L>  /  TBili  0.5  /  DBili  x   /  AST  28  /  ALT  31  /  AlkPhos  103  11-08    ___________________________________________________________________________    MEDICATIONS  (STANDING):  amLODIPine   Tablet 10 milliGRAM(s) Oral at bedtime  atorvastatin 40 milliGRAM(s) Oral at bedtime  enoxaparin Injectable 40 milliGRAM(s) SubCutaneous every 24 hours  losartan 50 milliGRAM(s) Oral daily  methocarbamol 500 milliGRAM(s) Oral three times a day  pantoprazole    Tablet 40 milliGRAM(s) Oral before breakfast  polyethylene glycol 3350 17 Gram(s) Oral two times a day  senna 2 Tablet(s) Oral at bedtime    MEDICATIONS  (PRN):  acetaminophen     Tablet .. 975 milliGRAM(s) Oral every 6 hours PRN Mild Pain (1 - 3)  bisacodyl 5 milliGRAM(s) Oral every 12 hours PRN Constipation  oxyCODONE    IR 5 milliGRAM(s) Oral every 4 hours PRN Moderate Pain (4 - 6)  oxyCODONE    IR 10 milliGRAM(s) Oral every 4 hours PRN Severe Pain (7 - 10)    ___________________________________________________________________________    PHYSICAL EXAM:    General: NAD, Resting Comfortable,                                                                 Constitutional - NAD, Comfortable, in bed   Chest - Breathing comfortably, room air   Cardiovascular - S1S2   Abdomen - Soft   Extremities - No C/C/E, No calf tenderness   Neurologic Exam -                    Cognitive - Awake, Alert, AAO to self, place, date, year, situation     Communication - Fluent, No dysarthria  Motor    LEFT    UE: SF [5/5], EF [5/5], EE [5/5], WE [5/5],  [wnl]  RIGHT UE: SF [5/5], EF [5/5], EE [5/5], WE [5/5],  [wnl]  LEFT    LE:  HF [3-4/5], KE [4/5], DF [3/5], EHL [3/5],  PF [4/5]  RIGHT LE:  HF [3-4/5], KE [4/5], DF [4/5], EHL [4/5],  PF [4/5]      Reflex:  2 + throughout, Babinski negative  Wounds: 14 cm lumbar incision site, drain site 1 cmx1cm, Stage 1 pressure injury to lumbar spine      ___________________________________________________________________________

## 2023-11-08 NOTE — DIETITIAN INITIAL EVALUATION ADULT - ORAL INTAKE PTA/DIET HISTORY
[employer] used for Italian translation. Pt endorsed chronically decreased appetite over the last 1-2 yrs since onset of symptoms. States that he and his wife both prepare all meals at home. Denies chewing/swallowing issues. NKFA.

## 2023-11-09 LAB
ALBUMIN SERPL ELPH-MCNC: 2.8 G/DL — LOW (ref 3.3–5)
ALBUMIN SERPL ELPH-MCNC: 2.8 G/DL — LOW (ref 3.3–5)
ALP SERPL-CCNC: 92 U/L — SIGNIFICANT CHANGE UP (ref 40–120)
ALP SERPL-CCNC: 92 U/L — SIGNIFICANT CHANGE UP (ref 40–120)
ALT FLD-CCNC: 35 U/L — SIGNIFICANT CHANGE UP (ref 10–45)
ALT FLD-CCNC: 35 U/L — SIGNIFICANT CHANGE UP (ref 10–45)
ANION GAP SERPL CALC-SCNC: 8 MMOL/L — SIGNIFICANT CHANGE UP (ref 5–17)
ANION GAP SERPL CALC-SCNC: 8 MMOL/L — SIGNIFICANT CHANGE UP (ref 5–17)
AST SERPL-CCNC: 23 U/L — SIGNIFICANT CHANGE UP (ref 10–40)
AST SERPL-CCNC: 23 U/L — SIGNIFICANT CHANGE UP (ref 10–40)
BILIRUB SERPL-MCNC: 0.7 MG/DL — SIGNIFICANT CHANGE UP (ref 0.2–1.2)
BILIRUB SERPL-MCNC: 0.7 MG/DL — SIGNIFICANT CHANGE UP (ref 0.2–1.2)
BUN SERPL-MCNC: 37 MG/DL — HIGH (ref 7–23)
BUN SERPL-MCNC: 37 MG/DL — HIGH (ref 7–23)
CALCIUM SERPL-MCNC: 9.2 MG/DL — SIGNIFICANT CHANGE UP (ref 8.4–10.5)
CALCIUM SERPL-MCNC: 9.2 MG/DL — SIGNIFICANT CHANGE UP (ref 8.4–10.5)
CHLORIDE SERPL-SCNC: 99 MMOL/L — SIGNIFICANT CHANGE UP (ref 96–108)
CHLORIDE SERPL-SCNC: 99 MMOL/L — SIGNIFICANT CHANGE UP (ref 96–108)
CO2 SERPL-SCNC: 28 MMOL/L — SIGNIFICANT CHANGE UP (ref 22–31)
CO2 SERPL-SCNC: 28 MMOL/L — SIGNIFICANT CHANGE UP (ref 22–31)
CREAT SERPL-MCNC: 1.11 MG/DL — SIGNIFICANT CHANGE UP (ref 0.5–1.3)
CREAT SERPL-MCNC: 1.11 MG/DL — SIGNIFICANT CHANGE UP (ref 0.5–1.3)
EGFR: 65 ML/MIN/1.73M2 — SIGNIFICANT CHANGE UP
EGFR: 65 ML/MIN/1.73M2 — SIGNIFICANT CHANGE UP
GLUCOSE SERPL-MCNC: 116 MG/DL — HIGH (ref 70–99)
GLUCOSE SERPL-MCNC: 116 MG/DL — HIGH (ref 70–99)
HCT VFR BLD CALC: 33.5 % — LOW (ref 39–50)
HCT VFR BLD CALC: 33.5 % — LOW (ref 39–50)
HGB BLD-MCNC: 11 G/DL — LOW (ref 13–17)
HGB BLD-MCNC: 11 G/DL — LOW (ref 13–17)
MCHC RBC-ENTMCNC: 29.4 PG — SIGNIFICANT CHANGE UP (ref 27–34)
MCHC RBC-ENTMCNC: 29.4 PG — SIGNIFICANT CHANGE UP (ref 27–34)
MCHC RBC-ENTMCNC: 32.8 GM/DL — SIGNIFICANT CHANGE UP (ref 32–36)
MCHC RBC-ENTMCNC: 32.8 GM/DL — SIGNIFICANT CHANGE UP (ref 32–36)
MCV RBC AUTO: 89.6 FL — SIGNIFICANT CHANGE UP (ref 80–100)
MCV RBC AUTO: 89.6 FL — SIGNIFICANT CHANGE UP (ref 80–100)
NRBC # BLD: 0 /100 WBCS — SIGNIFICANT CHANGE UP (ref 0–0)
NRBC # BLD: 0 /100 WBCS — SIGNIFICANT CHANGE UP (ref 0–0)
PLATELET # BLD AUTO: 233 K/UL — SIGNIFICANT CHANGE UP (ref 150–400)
PLATELET # BLD AUTO: 233 K/UL — SIGNIFICANT CHANGE UP (ref 150–400)
POTASSIUM SERPL-MCNC: 4 MMOL/L — SIGNIFICANT CHANGE UP (ref 3.5–5.3)
POTASSIUM SERPL-MCNC: 4 MMOL/L — SIGNIFICANT CHANGE UP (ref 3.5–5.3)
POTASSIUM SERPL-SCNC: 4 MMOL/L — SIGNIFICANT CHANGE UP (ref 3.5–5.3)
POTASSIUM SERPL-SCNC: 4 MMOL/L — SIGNIFICANT CHANGE UP (ref 3.5–5.3)
PROT SERPL-MCNC: 6.7 G/DL — SIGNIFICANT CHANGE UP (ref 6–8.3)
PROT SERPL-MCNC: 6.7 G/DL — SIGNIFICANT CHANGE UP (ref 6–8.3)
RBC # BLD: 3.74 M/UL — LOW (ref 4.2–5.8)
RBC # BLD: 3.74 M/UL — LOW (ref 4.2–5.8)
RBC # FLD: 13.3 % — SIGNIFICANT CHANGE UP (ref 10.3–14.5)
RBC # FLD: 13.3 % — SIGNIFICANT CHANGE UP (ref 10.3–14.5)
SODIUM SERPL-SCNC: 135 MMOL/L — SIGNIFICANT CHANGE UP (ref 135–145)
SODIUM SERPL-SCNC: 135 MMOL/L — SIGNIFICANT CHANGE UP (ref 135–145)
WBC # BLD: 8.01 K/UL — SIGNIFICANT CHANGE UP (ref 3.8–10.5)
WBC # BLD: 8.01 K/UL — SIGNIFICANT CHANGE UP (ref 3.8–10.5)
WBC # FLD AUTO: 8.01 K/UL — SIGNIFICANT CHANGE UP (ref 3.8–10.5)
WBC # FLD AUTO: 8.01 K/UL — SIGNIFICANT CHANGE UP (ref 3.8–10.5)

## 2023-11-09 PROCEDURE — 99232 SBSQ HOSP IP/OBS MODERATE 35: CPT

## 2023-11-09 RX ORDER — TRAMADOL HYDROCHLORIDE 50 MG/1
50 TABLET ORAL EVERY 6 HOURS
Refills: 0 | Status: DISCONTINUED | OUTPATIENT
Start: 2023-11-09 | End: 2023-11-13

## 2023-11-09 RX ADMIN — Medication 975 MILLIGRAM(S): at 05:23

## 2023-11-09 RX ADMIN — ENOXAPARIN SODIUM 40 MILLIGRAM(S): 100 INJECTION SUBCUTANEOUS at 21:52

## 2023-11-09 RX ADMIN — Medication 975 MILLIGRAM(S): at 06:25

## 2023-11-09 RX ADMIN — ATORVASTATIN CALCIUM 40 MILLIGRAM(S): 80 TABLET, FILM COATED ORAL at 21:53

## 2023-11-09 RX ADMIN — LOSARTAN POTASSIUM 50 MILLIGRAM(S): 100 TABLET, FILM COATED ORAL at 05:23

## 2023-11-09 RX ADMIN — AMLODIPINE BESYLATE 10 MILLIGRAM(S): 2.5 TABLET ORAL at 21:52

## 2023-11-09 RX ADMIN — METHOCARBAMOL 500 MILLIGRAM(S): 500 TABLET, FILM COATED ORAL at 13:21

## 2023-11-09 RX ADMIN — POLYETHYLENE GLYCOL 3350 17 GRAM(S): 17 POWDER, FOR SOLUTION ORAL at 05:23

## 2023-11-09 RX ADMIN — METHOCARBAMOL 500 MILLIGRAM(S): 500 TABLET, FILM COATED ORAL at 05:23

## 2023-11-09 RX ADMIN — METHOCARBAMOL 500 MILLIGRAM(S): 500 TABLET, FILM COATED ORAL at 21:52

## 2023-11-09 RX ADMIN — PANTOPRAZOLE SODIUM 40 MILLIGRAM(S): 20 TABLET, DELAYED RELEASE ORAL at 05:23

## 2023-11-09 NOTE — PROGRESS NOTE ADULT - SUBJECTIVE AND OBJECTIVE BOX
HPI:  Case of an 83 year old right handed Urdu speaking male patient with past medical history of HTN, HLD, pre-diabetes, BPH, cervical and lumbar spinal stenosis (s/p Posterior C1-C6 Decompression, C4-5 Posterior Column Osteotomy, C1-C7 Fusion with Plastic Closure on 10/18/2022) who presented to [facility] on 11/3 for a scheduled posterior L2-L5 decompression surgery for lumbar stenosis with neurogenic claudication. Prior to surgery patient had low back pain associated with bilateral leg pain and difficulty walking. he additionally had persistent neck heaviness and bilateral shoulder pain which was worse on the left shoulder. Post-operative hospital course was uncomplicated and surgical drain was removed on 11/7/23. Patient evaluated by PT/OT and was recommended for acute inpatient rehab. Patient is medically stable for discharge to [facility] on 11/7/23. (07 Nov 2023 14:41)    TDD: 11/21 Home  ___________________________________________________________________________    SUBJECTIVE/ROS  Patient was seen and evaluated at bedside today.  Reported no overnight events and is in no acute distress.  Analgesic medications adjusted today as expresser good pain control.  Case was discussed at Interdisciplinary Team meeting today.  A tentative discharge date is outlined above.  Patient is motivated to continue participation on the recommended rehabilitation program.  Denies any CP, SOB, GAITAN, palpitations, fever, chills, body aches, cough, congestion, or any other symptoms at this time.   ___________________________________________________________________________    Vital Signs Last 24 Hrs  T(C): 36.6 (09 Nov 2023 07:45), Max: 37.4 (08 Nov 2023 20:54)  T(F): 97.8 (09 Nov 2023 07:45), Max: 99.3 (08 Nov 2023 20:54)  HR: 71 (09 Nov 2023 07:45) (71 - 88)  BP: 122/60 (09 Nov 2023 07:45) (122/60 - 147/77)  RR: 16 (09 Nov 2023 07:45) (16 - 17)  SpO2: 95% (09 Nov 2023 07:45) (95% - 96%)    ___________________________________________________________________________    LAB                        11.0   8.01  )-----------( 233      ( 09 Nov 2023 06:16 )             33.5     11-09    135  |  99  |  37<H>  ----------------------------<  116<H>  4.0   |  28  |  1.11    Ca    9.2      09 Nov 2023 06:16    TPro  6.7  /  Alb  2.8<L>  /  TBili  0.7  /  DBili  x   /  AST  23  /  ALT  35  /  AlkPhos  92  11-09    LIVER FUNCTIONS - ( 09 Nov 2023 06:16 )  Alb: 2.8 g/dL / Pro: 6.7 g/dL / ALK PHOS: 92 U/L / ALT: 35 U/L / AST: 23 U/L / GGT: x           ___________________________________________________________________________    MEDICATIONS  (STANDING):  amLODIPine   Tablet 10 milliGRAM(s) Oral at bedtime  atorvastatin 40 milliGRAM(s) Oral at bedtime  enoxaparin Injectable 40 milliGRAM(s) SubCutaneous every 24 hours  losartan 50 milliGRAM(s) Oral daily  methocarbamol 500 milliGRAM(s) Oral three times a day  pantoprazole    Tablet 40 milliGRAM(s) Oral before breakfast  polyethylene glycol 3350 17 Gram(s) Oral two times a day  senna 2 Tablet(s) Oral at bedtime    MEDICATIONS  (PRN):  acetaminophen     Tablet .. 975 milliGRAM(s) Oral every 6 hours PRN Mild Pain (1 - 3)  bisacodyl 5 milliGRAM(s) Oral every 12 hours PRN Constipation  traMADol 50 milliGRAM(s) Oral every 6 hours PRN Severe Pain (7 - 10)    ___________________________________________________________________________    PHYSICAL EXAM:    General: NAD, Resting Comfortable,                                                                 Constitutional - NAD, Comfortable, in bed   Chest - Breathing comfortably, room air   Cardiovascular - S1S2   Abdomen - Soft   Extremities - No C/C/E, No calf tenderness   Neurologic Exam -                    Cognitive - Awake, Alert, AAO to self, place, date, year, situation     Communication - Fluent, No dysarthria  Motor    LEFT    UE: SF [5/5], EF [5/5], EE [5/5], WE [5/5],  [wnl]  RIGHT UE: SF [5/5], EF [5/5], EE [5/5], WE [5/5],  [wnl]  LEFT    LE:  HF [3-4/5], KE [4/5], DF [3/5], EHL [3/5],  PF [4/5]  RIGHT LE:  HF [3-4/5], KE [4/5], DF [4/5], EHL [4/5],  PF [4/5]      Reflex:  2 + throughout, Babinski negative  Wounds: 14 cm lumbar incision site, drain site 1 cmx1cm, Stage 1 pressure injury due to medical device on left buttock      ___________________________________________________________________________ HPI:  Case of an 83 year old right handed Macedonian speaking male patient with past medical history of HTN, HLD, pre-diabetes, BPH, cervical and lumbar spinal stenosis (s/p Posterior C1-C6 Decompression, C4-5 Posterior Column Osteotomy, C1-C7 Fusion with Plastic Closure on 10/18/2022) who presented to [facility] on 11/3 for a scheduled posterior L2-L5 decompression surgery for lumbar stenosis with neurogenic claudication. Prior to surgery patient had low back pain associated with bilateral leg pain and difficulty walking. he additionally had persistent neck heaviness and bilateral shoulder pain which was worse on the left shoulder. Post-operative hospital course was uncomplicated and surgical drain was removed on 11/7/23. Patient evaluated by PT/OT and was recommended for acute inpatient rehab. Patient is medically stable for discharge to [facility] on 11/7/23. (07 Nov 2023 14:41)    TDD: 11/21 Home  ___________________________________________________________________________    SUBJECTIVE/ROS  Patient was seen and evaluated at bedside today.  Reported no overnight events and is in no acute distress.  Analgesic medications adjusted today as expresser good pain control.  Case was discussed at Interdisciplinary Team meeting today.  A tentative discharge date is outlined above.  Patient is motivated to continue participation on the recommended rehabilitation program.  Denies any CP, SOB, GAITAN, palpitations, fever, chills, body aches, cough, congestion, or any other symptoms at this time.   ___________________________________________________________________________    Vital Signs Last 24 Hrs  T(C): 36.6 (09 Nov 2023 07:45), Max: 37.4 (08 Nov 2023 20:54)  T(F): 97.8 (09 Nov 2023 07:45), Max: 99.3 (08 Nov 2023 20:54)  HR: 71 (09 Nov 2023 07:45) (71 - 88)  BP: 122/60 (09 Nov 2023 07:45) (122/60 - 147/77)  RR: 16 (09 Nov 2023 07:45) (16 - 17)  SpO2: 95% (09 Nov 2023 07:45) (95% - 96%)    ___________________________________________________________________________    LAB                        11.0   8.01  )-----------( 233      ( 09 Nov 2023 06:16 )             33.5     11-09    135  |  99  |  37<H>  ----------------------------<  116<H>  4.0   |  28  |  1.11    Ca    9.2      09 Nov 2023 06:16    TPro  6.7  /  Alb  2.8<L>  /  TBili  0.7  /  DBili  x   /  AST  23  /  ALT  35  /  AlkPhos  92  11-09    LIVER FUNCTIONS - ( 09 Nov 2023 06:16 )  Alb: 2.8 g/dL / Pro: 6.7 g/dL / ALK PHOS: 92 U/L / ALT: 35 U/L / AST: 23 U/L / GGT: x           ___________________________________________________________________________    MEDICATIONS  (STANDING):  amLODIPine   Tablet 10 milliGRAM(s) Oral at bedtime  atorvastatin 40 milliGRAM(s) Oral at bedtime  enoxaparin Injectable 40 milliGRAM(s) SubCutaneous every 24 hours  losartan 50 milliGRAM(s) Oral daily  methocarbamol 500 milliGRAM(s) Oral three times a day  pantoprazole    Tablet 40 milliGRAM(s) Oral before breakfast  polyethylene glycol 3350 17 Gram(s) Oral two times a day  senna 2 Tablet(s) Oral at bedtime    MEDICATIONS  (PRN):  acetaminophen     Tablet .. 975 milliGRAM(s) Oral every 6 hours PRN Mild Pain (1 - 3)  bisacodyl 5 milliGRAM(s) Oral every 12 hours PRN Constipation  traMADol 50 milliGRAM(s) Oral every 6 hours PRN Severe Pain (7 - 10)    ___________________________________________________________________________    PHYSICAL EXAM:    General: NAD, Resting Comfortable,                                                                 Constitutional - NAD, Comfortable, in bed   Chest - Breathing comfortably, room air   Cardiovascular - S1S2   Abdomen - Soft   Extremities - No C/C/E, No calf tenderness   Neurologic Exam -                    Cognitive - Awake, Alert, AAO to self, place, date, year, situation     Communication - Fluent, No dysarthria  Motor    LEFT    UE: SF [5/5], EF [5/5], EE [5/5], WE [5/5],  [wnl]  RIGHT UE: SF [5/5], EF [5/5], EE [5/5], WE [5/5],  [wnl]  LEFT    LE:  HF [3-4/5], KE [4/5], DF [3/5], EHL [3/5],  PF [4/5]  RIGHT LE:  HF [3-4/5], KE [4/5], DF [4/5], EHL [4/5],  PF [4/5]      Reflex:  2 + throughout, Babinski negative  Wounds: 14 cm lumbar incision site, drain site 1 cmx1cm, Stage 1 pressure injury to lumbar spine      ___________________________________________________________________________

## 2023-11-09 NOTE — PROGRESS NOTE ADULT - SUBJECTIVE AND OBJECTIVE BOX
Interval History  Patient seen and examined at bedside w/ Dr. Roy. No acute events noted.  Patient reports feeling well, pain is controlled on current regimen.     ALLERGIES:  penicillin (Rash)    MEDICATIONS  (STANDING):  amLODIPine   Tablet 10 milliGRAM(s) Oral at bedtime  atorvastatin 40 milliGRAM(s) Oral at bedtime  enoxaparin Injectable 40 milliGRAM(s) SubCutaneous every 24 hours  losartan 50 milliGRAM(s) Oral daily  methocarbamol 500 milliGRAM(s) Oral three times a day  pantoprazole    Tablet 40 milliGRAM(s) Oral before breakfast  polyethylene glycol 3350 17 Gram(s) Oral two times a day  senna 2 Tablet(s) Oral at bedtime    MEDICATIONS  (PRN):  acetaminophen     Tablet .. 975 milliGRAM(s) Oral every 6 hours PRN Mild Pain (1 - 3)  bisacodyl 5 milliGRAM(s) Oral every 12 hours PRN Constipation  oxyCODONE    IR 10 milliGRAM(s) Oral every 4 hours PRN Severe Pain (7 - 10)  oxyCODONE    IR 5 milliGRAM(s) Oral every 4 hours PRN Moderate Pain (4 - 6)    Vital Signs Last 24 Hrs  T(F): 97.8 (09 Nov 2023 07:45), Max: 99.3 (08 Nov 2023 20:54)  HR: 71 (09 Nov 2023 07:45) (71 - 88)  BP: 122/60 (09 Nov 2023 07:45) (122/60 - 147/77)  RR: 16 (09 Nov 2023 07:45) (16 - 17)  SpO2: 95% (09 Nov 2023 07:45) (95% - 96%)  I&O's Summary    08 Nov 2023 07:01  -  09 Nov 2023 07:00  --------------------------------------------------------  IN: 0 mL / OUT: 2 mL / NET: -2 mL    BMI (kg/m2): 25.3 (11-08-23 @ 11:49)    PHYSICAL EXAM:  GENERAL: NAD  HEENT: NCAT  CHEST/LUNG: Clear to percussion bilaterally; No rales, rhonchi, wheezing  HEART: Regular rate and rhythm; No murmurs  ABDOMEN: Soft, Nontender, Nondistended; Bowel sounds present  MUSCULOSKELETAL/EXTREMITIES:  2+ Peripheral Pulses, No LE edema  SKIN: Lumbar spine incision healing well, no evidence of infection   PSYCH: Appropriate affect  NEURO: Alert & Oriented, non-focal, 5/5 UE, 3-4/5 LE    LABS:                        11.0   8.01  )-----------( 233      ( 09 Nov 2023 06:16 )             33.5       11-09    135  |  99  |  37  ----------------------------<  116  4.0   |  28  |  1.11    Ca    9.2      09 Nov 2023 06:16    TPro  6.7  /  Alb  2.8  /  TBili  0.7  /  DBili  x   /  AST  23  /  ALT  35  /  AlkPhos  92  11-09    Urinalysis Basic - ( 09 Nov 2023 06:16 )    Color: x / Appearance: x / SG: x / pH: x  Gluc: 116 mg/dL / Ketone: x  / Bili: x / Urobili: x   Blood: x / Protein: x / Nitrite: x   Leuk Esterase: x / RBC: x / WBC x   Sq Epi: x / Non Sq Epi: x / Bacteria: x        COVID-19 PCR: NotDetec (11-07-23 @ 20:45)      Care Discussed with Consultants/Other Providers: Yes

## 2023-11-09 NOTE — PROGRESS NOTE ADULT - ASSESSMENT
Assessment/Plan:  Case of an 83 year old right handed Setswana speaking male patient with past medical history of HTN, HLD, pre-diabetes, BPH, cervical and lumbar spinal stenosis (s/p Posterior C1-C6 Decompression, C4-5 Posterior Column Osteotomy, C1-C7 Fusion with Plastic Closure on 10/18/2022) who is admitted for Acute Inpatient Rehabilitation with a multidisciplinary rehab program at [facility] with functional impairments in ADLs and mobility secondary to lumbar stenosis with neurogenic claudication treated surgically with an elective posterior L2-L5 decompression surgery.    Lumbar stenosis with neurogenic claudication and posterior L2-L5 decompression  - L2-L5 decompressive laminectomy for lumbar stenosis with neurogenic claudication (11/3)  - surgical drain removed on 11/7  - Impaired ADLs and mobility  - Need for assistance with personal care   - Continue comprehensive rehab program of PT/OT - 3 hours a day, 5 days a week. P&O as needed   - Pain control below  - Fall/Spine precautions  - Restart daily ASA POD #14 (11/17)    Pain Control  - PRN: Tylenol 975 mg Q6H mild pain, oxycodone 5 mg Q4H moderate pain, oxycodone 10 mg Q4H severe pain  - Robaxin 500 mg TID    HTN  - c/w Norvasc 10 mg QHS and losartan 50 mg QD  - Monitor BP    HLD  - cont Lipitor 40 mg QHS    Mood / Cognition  - Neuropsychology consult PRN    Sleep  - Melatonin PRN     GI / Bowel  - Senna qHS  - Miralax PRN BID  - dulcolax PRN  - GI ppx: pantoprazole 40 mg QD     / Bladder  - Currently patient voids independently  - check PVR x1 on admission    Skin / Pressure injury  - Skin assessment on admission performed: 14 cm lumbar incision site, drain site 1 cmx1cm, Stage 1 pressure injury due to medical device on left buttock  - Pressure Injury/Skin: OOB to chair, PT/OT  - nursing to monitor skin qShift    Diet/Dysphagia:  - Diet Consistency: regular    DVT prophylaxis:   - Lovenox 40 mg QD     Outpatient Follow-up:  Gina Bond  Neurosurgery  [address] [place] [address], [address]  [place] [address]  Phone: [phone]  Fax: [phone]  Follow Up Time:      Code Status/Emergency Contact:      --------------- Assessment/Plan:  Case of an 83 year old right handed Yakut speaking male patient with past medical history of HTN, HLD, pre-diabetes, BPH, cervical and lumbar spinal stenosis (s/p Posterior C1-C6 Decompression, C4-5 Posterior Column Osteotomy, C1-C7 Fusion with Plastic Closure on 10/18/2022) who is admitted for Acute Inpatient Rehabilitation with a multidisciplinary rehab program at [facility] with functional impairments in ADLs and mobility secondary to lumbar stenosis with neurogenic claudication treated surgically with an elective posterior L2-L5 decompression surgery.    Lumbar stenosis with neurogenic claudication and posterior L2-L5 decompression  - L2-L5 decompressive laminectomy for lumbar stenosis with neurogenic claudication (11/3)  - surgical drain removed on 11/7  - Impaired ADLs and mobility  - Need for assistance with personal care   - Continue comprehensive rehab program of PT/OT - 3 hours a day, 5 days a week. P&O as needed   - Pain control below  - Fall/Spine precautions  - Restart daily ASA POD #14 (11/17)    Pain Control  - PRN: Tylenol 975 mg Q6H mild pain, oxycodone 5 mg Q4H moderate pain, oxycodone 10 mg Q4H severe pain  - Robaxin 500 mg TID    HTN  - c/w Norvasc 10 mg QHS and losartan 50 mg QD  - Monitor BP    HLD  - cont Lipitor 40 mg QHS    Mood / Cognition  - Neuropsychology consult PRN    Sleep  - Melatonin PRN     GI / Bowel  - Senna qHS  - Miralax PRN BID  - dulcolax PRN  - GI ppx: pantoprazole 40 mg QD     / Bladder  - Currently patient voids independently  - check PVR x1 on admission    Skin / Pressure injury  - Skin assessment on admission performed: 14 cm lumbar incision site, drain site 1 cmx1cm, Stage 1 pressure injury to lumbar spine  - Pressure Injury/Skin: OOB to chair, PT/OT  - nursing to monitor skin qShift    Diet/Dysphagia:  - Diet Consistency: regular    DVT prophylaxis:   - Lovenox 40 mg QD     Outpatient Follow-up:  Gina Bond  Neurosurgery  [address] [place] [address], [address]  [place] [address]  Phone: [phone]  Fax: [phone]  Follow Up Time:      Code Status/Emergency Contact:      ---------------

## 2023-11-09 NOTE — PROGRESS NOTE ADULT - ASSESSMENT
83 year old male with PMH of Hypertension, Hyperlipidemia, Pre-DM, BPH, Cervical and Lumbar Stenosis (s/p Posterior C1-C6 Decompression, C4-5 Posterior Column Osteotomy, C1-C7 Fusion with Plastic Closure on 10/18/2022) who presented to [facility] on 11/3 for a scheduled posterior L2-L5 decompression surgery for lumbar stenosis with neurogenic claudication. Post-op course uncomplicated. Now admitted to [facility] rehab for functional decline.     Lumbar Stenosis w/ Neurogenic Claudication s/p L2-L5 Decompressive Laminectomy (11/3)  -Spine/Fall precaution  -Pain control and bowel regimen per primary team  -Comprehensive rehab program with PT/OT  -Restart daily ASA POD #14   -Outpatient follow up with neurosurgery     Hypertension  -BP acceptable control  -Continue amlodipine and losartan   -Monitor for OH    Hyperlipidemia   -Continue atorvastatin     Pre-Diabetes  -HbA1C 5.9 on 10/19  -Dietary and lifestyle modification  -Outpatient monitoring with PMD    GI PPx  -PPI    DVT PPx  -Lovenox SC

## 2023-11-10 PROCEDURE — 99232 SBSQ HOSP IP/OBS MODERATE 35: CPT

## 2023-11-10 RX ORDER — PETROLATUM,WHITE
1 JELLY (GRAM) TOPICAL
Refills: 0 | Status: DISCONTINUED | OUTPATIENT
Start: 2023-11-10 | End: 2023-11-21

## 2023-11-10 RX ORDER — ACETAMINOPHEN 500 MG
975 TABLET ORAL EVERY 6 HOURS
Refills: 0 | Status: DISCONTINUED | OUTPATIENT
Start: 2023-11-10 | End: 2023-11-21

## 2023-11-10 RX ADMIN — AMLODIPINE BESYLATE 10 MILLIGRAM(S): 2.5 TABLET ORAL at 21:28

## 2023-11-10 RX ADMIN — METHOCARBAMOL 500 MILLIGRAM(S): 500 TABLET, FILM COATED ORAL at 21:29

## 2023-11-10 RX ADMIN — ATORVASTATIN CALCIUM 40 MILLIGRAM(S): 80 TABLET, FILM COATED ORAL at 21:29

## 2023-11-10 RX ADMIN — Medication 975 MILLIGRAM(S): at 06:05

## 2023-11-10 RX ADMIN — Medication 975 MILLIGRAM(S): at 05:05

## 2023-11-10 RX ADMIN — SENNA PLUS 2 TABLET(S): 8.6 TABLET ORAL at 21:28

## 2023-11-10 RX ADMIN — LOSARTAN POTASSIUM 50 MILLIGRAM(S): 100 TABLET, FILM COATED ORAL at 05:04

## 2023-11-10 RX ADMIN — PANTOPRAZOLE SODIUM 40 MILLIGRAM(S): 20 TABLET, DELAYED RELEASE ORAL at 05:04

## 2023-11-10 RX ADMIN — ENOXAPARIN SODIUM 40 MILLIGRAM(S): 100 INJECTION SUBCUTANEOUS at 21:34

## 2023-11-10 RX ADMIN — METHOCARBAMOL 500 MILLIGRAM(S): 500 TABLET, FILM COATED ORAL at 05:04

## 2023-11-10 RX ADMIN — Medication 1 APPLICATION(S): at 18:01

## 2023-11-10 RX ADMIN — METHOCARBAMOL 500 MILLIGRAM(S): 500 TABLET, FILM COATED ORAL at 13:03

## 2023-11-10 NOTE — PROGRESS NOTE ADULT - ASSESSMENT
Assessment/Plan:  Case of an 83 year old right handed Yi speaking male patient with past medical history of HTN, HLD, pre-diabetes, BPH, cervical and lumbar spinal stenosis (s/p Posterior C1-C6 Decompression, C4-5 Posterior Column Osteotomy, C1-C7 Fusion with Plastic Closure on 10/18/2022) who is admitted for Acute Inpatient Rehabilitation with a multidisciplinary rehab program at [facility] with functional impairments in ADLs and mobility secondary to lumbar stenosis with neurogenic claudication treated surgically with an elective posterior L2-L5 decompression surgery.    Lumbar stenosis with neurogenic claudication and posterior L2-L5 decompression  - L2-L5 decompressive laminectomy for lumbar stenosis with neurogenic claudication (11/3)  - surgical drain removed on 11/7  - Impaired ADLs and mobility  - Need for assistance with personal care   - Continue comprehensive rehab program of PT/OT - 3 hours a day, 5 days a week. P&O as needed   - Pain control below  - Fall/Spine precautions  - Restart daily ASA POD #14 (11/17)    Pain Control  - PRN: Tylenol 975 mg Q6H mild pain, oxycodone 5 mg Q4H moderate pain, oxycodone 10 mg Q4H severe pain  - Robaxin 500 mg TID    HTN  - c/w Norvasc 10 mg QHS and losartan 50 mg QD  - Monitor BP    HLD  - cont Lipitor 40 mg QHS    Mood / Cognition  - Neuropsychology consult PRN    Sleep  - Melatonin PRN     GI / Bowel  - Senna qHS  - Miralax PRN BID  - dulcolax PRN  - GI ppx: pantoprazole 40 mg QD     / Bladder  - Currently patient voids independently  - check PVR x1 on admission    Skin / Pressure injury  - Skin assessment on admission performed: 14 cm lumbar incision site, drain site 1 cmx1cm, Stage 1 pressure injury due to medical device on left buttock  - Pressure Injury/Skin: OOB to chair, PT/OT  - nursing to monitor skin qShift    Diet/Dysphagia:  - Diet Consistency: regular    DVT prophylaxis:   - Lovenox 40 mg QD     Outpatient Follow-up:  Gina Bond  Neurosurgery  [address] [place] [address], [address]  [place] [address]  Phone: [phone]  Fax: [phone]  Follow Up Time:      Code Status/Emergency Contact:      --------------- Assessment/Plan:  Case of an 83 year old right handed Yi speaking male patient with past medical history of HTN, HLD, pre-diabetes, BPH, cervical and lumbar spinal stenosis (s/p Posterior C1-C6 Decompression, C4-5 Posterior Column Osteotomy, C1-C7 Fusion with Plastic Closure on 10/18/2022) who is admitted for Acute Inpatient Rehabilitation with a multidisciplinary rehab program at [facility] with functional impairments in ADLs and mobility secondary to lumbar stenosis with neurogenic claudication treated surgically with an elective posterior L2-L5 decompression surgery.    Lumbar stenosis with neurogenic claudication and posterior L2-L5 decompression  - L2-L5 decompressive laminectomy for lumbar stenosis with neurogenic claudication (11/3)  - surgical drain removed on 11/7  - Impaired ADLs and mobility  - Need for assistance with personal care   - Continue comprehensive rehab program of PT/OT - 3 hours a day, 5 days a week. P&O as needed   - Pain control below  - Fall/Spine precautions  - Restart daily ASA POD #14 (11/17)    Pain Control  - Tylenol 975 mg Q6H PRN mild-mod pain  - Tramadol 50 mg Q6H PRN severe pain  - Robaxin 500 mg TID    HTN  - c/w Norvasc 10 mg QHS and losartan 50 mg QD  - Monitor BP    HLD  - cont Lipitor 40 mg QHS    Mood / Cognition  - Neuropsychology consult PRN    Sleep  - Melatonin PRN     GI / Bowel  - Senna qHS  - Miralax PRN BID  - dulcolax PRN  - GI ppx: pantoprazole 40 mg QD     / Bladder  - Currently patient voids independently  - check PVR x1 on admission    Skin / Pressure injury  - Skin assessment on admission performed: 14 cm lumbar incision site, drain site 1 cmx1cm, Stage 1 pressure injury due to medical device on left buttock  - Pressure Injury/Skin: OOB to chair, PT/OT  - nursing to monitor skin qShift    Diet/Dysphagia:  - Diet Consistency: regular    DVT prophylaxis:   - Lovenox 40 mg QD     Outpatient Follow-up:  Gina Bond  Neurosurgery  [address] [place] [address], [address]  [place] [address]  Phone: [phone]  Fax: [phone]  Follow Up Time:      Code Status/Emergency Contact:      --------------- Assessment/Plan:  Case of an 83 year old right handed Hungarian speaking male patient with past medical history of HTN, HLD, pre-diabetes, BPH, cervical and lumbar spinal stenosis (s/p Posterior C1-C6 Decompression, C4-5 Posterior Column Osteotomy, C1-C7 Fusion with Plastic Closure on 10/18/2022) who is admitted for Acute Inpatient Rehabilitation with a multidisciplinary rehab program at [facility] with functional impairments in ADLs and mobility secondary to lumbar stenosis with neurogenic claudication treated surgically with an elective posterior L2-L5 decompression surgery.    Lumbar stenosis with neurogenic claudication and posterior L2-L5 decompression  - L2-L5 decompressive laminectomy for lumbar stenosis with neurogenic claudication (11/3)  - surgical drain removed on 11/7  - Impaired ADLs and mobility  - Need for assistance with personal care   - Continue comprehensive rehab program of PT/OT - 3 hours a day, 5 days a week. P&O as needed   - Pain control below  - Fall/Spine precautions  - Restart daily ASA POD #14 (11/17)    Pain Control  - Tylenol 975 mg Q6H PRN mild-mod pain  - Tramadol 50 mg Q6H PRN severe pain  - Robaxin 500 mg TID    HTN  - c/w Norvasc 10 mg QHS and losartan 50 mg QD  - Monitor BP    HLD  - cont Lipitor 40 mg QHS    Mood / Cognition  - Neuropsychology consult PRN    Sleep  - Melatonin PRN     GI / Bowel  - Senna qHS  - Miralax PRN BID  - dulcolax PRN  - GI ppx: pantoprazole 40 mg QD     / Bladder  - Currently patient voids independently  - check PVR x1 on admission    Skin / Pressure injury  - Skin assessment on admission performed: 14 cm lumbar incision site, drain site 1 cmx1cm, Stage 1 pressure injury to lumbar spine  - Pressure Injury/Skin: OOB to chair, PT/OT  - nursing to monitor skin qShift    Diet/Dysphagia:  - Diet Consistency: regular    DVT prophylaxis:   - Lovenox 40 mg QD     Outpatient Follow-up:  Gina Bond  Neurosurgery  [address] [place] [address], [address]  [place] [address]  Phone: [phone]  Fax: [phone]  Follow Up Time:      Code Status/Emergency Contact:      ---------------

## 2023-11-10 NOTE — PROGRESS NOTE ADULT - SUBJECTIVE AND OBJECTIVE BOX
Patient is a 83y old  Male who presents with a chief complaint of Lumbar stenosis with neurogenic claudication and posterior L2-L5 decompression (09 Nov 2023 14:01)      SUBJECTIVE / OVERNIGHT EVENTS:  Pt seen and examined at bedside. No acute events overnight.  Pt denies cp, palpitations, sob, abd pain, N/V, fever, chills.    ROS:  All other review of systems negative    Allergies    penicillin (Rash)    Intolerances        MEDICATIONS  (STANDING):  amLODIPine   Tablet 10 milliGRAM(s) Oral at bedtime  AQUAPHOR (petrolatum Ointment) 1 Application(s) Topical two times a day  atorvastatin 40 milliGRAM(s) Oral at bedtime  enoxaparin Injectable 40 milliGRAM(s) SubCutaneous every 24 hours  losartan 50 milliGRAM(s) Oral daily  methocarbamol 500 milliGRAM(s) Oral three times a day  pantoprazole    Tablet 40 milliGRAM(s) Oral before breakfast  polyethylene glycol 3350 17 Gram(s) Oral two times a day  senna 2 Tablet(s) Oral at bedtime    MEDICATIONS  (PRN):  acetaminophen     Tablet .. 975 milliGRAM(s) Oral every 6 hours PRN Mild Pain (1 - 3)  bisacodyl 5 milliGRAM(s) Oral every 12 hours PRN Constipation  traMADol 50 milliGRAM(s) Oral every 6 hours PRN Severe Pain (7 - 10)      Vital Signs Last 24 Hrs  T(C): 36.6 (10 Nov 2023 07:57), Max: 37.3 (09 Nov 2023 20:15)  T(F): 97.8 (10 Nov 2023 07:57), Max: 99.1 (09 Nov 2023 20:15)  HR: 78 (10 Nov 2023 07:57) (78 - 82)  BP: 117/68 (10 Nov 2023 07:57) (117/68 - 144/85)  BP(mean): --  RR: 16 (10 Nov 2023 07:57) (16 - 16)  SpO2: 96% (10 Nov 2023 07:57) (95% - 96%)    Parameters below as of 10 Nov 2023 07:57  Patient On (Oxygen Delivery Method): room air      CAPILLARY BLOOD GLUCOSE        I&O's Summary      PHYSICAL EXAM:  GENERAL: NAD, well-developed elderly male   HEAD:  Atraumatic, Normocephalic  NECK: Supple, No JVD  CHEST/LUNG: Clear to auscultation bilaterally; No wheeze, nonlabored breathing  HEART: Regular rate and rhythm; No murmurs, rubs, or gallops  ABDOMEN: Soft, Nontender, Nondistended; Bowel sounds present  EXTREMITIES:  No clubbing, cyanosis, or edema  PSYCH: calm, appropriate mood    LABS:                        11.0   8.01  )-----------( 233      ( 09 Nov 2023 06:16 )             33.5     11-09    135  |  99  |  37<H>  ----------------------------<  116<H>  4.0   |  28  |  1.11    Ca    9.2      09 Nov 2023 06:16    TPro  6.7  /  Alb  2.8<L>  /  TBili  0.7  /  DBili  x   /  AST  23  /  ALT  35  /  AlkPhos  92  11-09          Urinalysis Basic - ( 09 Nov 2023 06:16 )    Color: x / Appearance: x / SG: x / pH: x  Gluc: 116 mg/dL / Ketone: x  / Bili: x / Urobili: x   Blood: x / Protein: x / Nitrite: x   Leuk Esterase: x / RBC: x / WBC x   Sq Epi: x / Non Sq Epi: x / Bacteria: x        RADIOLOGY & ADDITIONAL TESTS:  Results Reviewed:   Imaging Personally Reviewed:  Electrocardiogram Personally Reviewed:    COORDINATION OF CARE:  Care Discussed with Consultants/Other Providers [Y/N]:  Prior or Outpatient Records Reviewed [Y/N]:

## 2023-11-10 NOTE — PROGRESS NOTE ADULT - SUBJECTIVE AND OBJECTIVE BOX
HPI:  Case of an 83 year old right handed Latvian speaking male patient with past medical history of HTN, HLD, pre-diabetes, BPH, cervical and lumbar spinal stenosis (s/p Posterior C1-C6 Decompression, C4-5 Posterior Column Osteotomy, C1-C7 Fusion with Plastic Closure on 10/18/2022) who presented to [facility] on 11/3 for a scheduled posterior L2-L5 decompression surgery for lumbar stenosis with neurogenic claudication. Prior to surgery patient had low back pain associated with bilateral leg pain and difficulty walking. he additionally had persistent neck heaviness and bilateral shoulder pain which was worse on the left shoulder. Post-operative hospital course was uncomplicated and surgical drain was removed on 11/7/23. Patient evaluated by PT/OT and was recommended for acute inpatient rehab. Patient is medically stable for discharge to [facility] on 11/7/23. (07 Nov 2023 14:41)    TDD: 11/21 Home  ___________________________________________________________________________    SUBJECTIVE/ROS  Patient was seen and evaluated at bedside today.  Reported no overnight events and is in no acute distress.  Analgesic medications adjusted today as expresser good pain control.  Case was discussed at Interdisciplinary Team meeting today.  A tentative discharge date is outlined above.  Patient is motivated to continue participation on the recommended rehabilitation program.  Denies any CP, SOB, GAITAN, palpitations, fever, chills, body aches, cough, congestion, or any other symptoms at this time.   ___________________________________________________________________________    Vital Signs Last 24 Hrs  T(C): 36.6 (10 Nov 2023 07:57), Max: 37.3 (09 Nov 2023 20:15)  T(F): 97.8 (10 Nov 2023 07:57), Max: 99.1 (09 Nov 2023 20:15)  HR: 78 (10 Nov 2023 07:57) (78 - 82)  BP: 117/68 (10 Nov 2023 07:57) (117/68 - 144/85)  BP(mean): --  RR: 16 (10 Nov 2023 07:57) (16 - 16)  SpO2: 96% (10 Nov 2023 07:57) (95% - 96%)  ___________________________________________________________________________    LAB                        11.0   8.01  )-----------( 233      ( 09 Nov 2023 06:16 )             33.5     11-09    135  |  99  |  37<H>  ----------------------------<  116<H>  4.0   |  28  |  1.11    Ca    9.2      09 Nov 2023 06:16    TPro  6.7  /  Alb  2.8<L>  /  TBili  0.7  /  DBili  x   /  AST  23  /  ALT  35  /  AlkPhos  92  11-09    LIVER FUNCTIONS - ( 09 Nov 2023 06:16 )  Alb: 2.8 g/dL / Pro: 6.7 g/dL / ALK PHOS: 92 U/L / ALT: 35 U/L / AST: 23 U/L / GGT: x           ___________________________________________________________________________    MEDICATIONS  (STANDING):  amLODIPine   Tablet 10 milliGRAM(s) Oral at bedtime  atorvastatin 40 milliGRAM(s) Oral at bedtime  enoxaparin Injectable 40 milliGRAM(s) SubCutaneous every 24 hours  losartan 50 milliGRAM(s) Oral daily  methocarbamol 500 milliGRAM(s) Oral three times a day  pantoprazole    Tablet 40 milliGRAM(s) Oral before breakfast  polyethylene glycol 3350 17 Gram(s) Oral two times a day  senna 2 Tablet(s) Oral at bedtime    MEDICATIONS  (PRN):  acetaminophen     Tablet .. 975 milliGRAM(s) Oral every 6 hours PRN Mild Pain (1 - 3)  bisacodyl 5 milliGRAM(s) Oral every 12 hours PRN Constipation  traMADol 50 milliGRAM(s) Oral every 6 hours PRN Severe Pain (7 - 10)    ___________________________________________________________________________    PHYSICAL EXAM:    General: NAD, Resting Comfortable,                                                                 Chest - Breathing comfortably, room air   Cardiovascular - S1S2   Abdomen - Soft   Extremities - No C/C/E, No calf tenderness   Neurologic Exam -                    Cognitive - Awake, Alert, AAO to self, place, date, year, situation     Communication - Fluent, No dysarthria  Motor    LEFT    UE: SF [5/5], EF [5/5], EE [5/5], WE [5/5],  [wnl]  RIGHT UE: SF [5/5], EF [5/5], EE [5/5], WE [5/5],  [wnl]  LEFT    LE:  HF [3-4/5], KE [4/5], DF [3/5], EHL [3/5],  PF [4/5]  RIGHT LE:  HF [3-4/5], KE [4/5], DF [4/5], EHL [4/5],  PF [4/5]    Reflex:  2 + throughout, Babinski negative  Wounds: 14 cm lumbar incision site, drain site 1 cmx1cm, Stage 1 pressure injury due to medical device on left buttock    ___________________________________________________________________________ HPI:  Case of an 83 year old right handed German speaking male patient with past medical history of HTN, HLD, pre-diabetes, BPH, cervical and lumbar spinal stenosis (s/p Posterior C1-C6 Decompression, C4-5 Posterior Column Osteotomy, C1-C7 Fusion with Plastic Closure on 10/18/2022) who presented to [facility] on 11/3 for a scheduled posterior L2-L5 decompression surgery for lumbar stenosis with neurogenic claudication. Prior to surgery patient had low back pain associated with bilateral leg pain and difficulty walking. he additionally had persistent neck heaviness and bilateral shoulder pain which was worse on the left shoulder. Post-operative hospital course was uncomplicated and surgical drain was removed on 11/7/23. Patient evaluated by PT/OT and was recommended for acute inpatient rehab. Patient is medically stable for discharge to [facility] on 11/7/23. (07 Nov 2023 14:41)    TDD: 11/21 Home  ___________________________________________________________________________    SUBJECTIVE/ROS  Patient was seen and evaluated at bedside today.  Reported no overnight events and is in no acute distress.  Patient complains of itching on his back. No erythema noted but skin appears dry. Advised patient that we will order Aquaphor.   Patient reports pain is well controlled.  Case was discussed at Interdisciplinary Team meeting 11/9.  A tentative discharge date is outlined above.  Patient is motivated to continue participation on the recommended rehabilitation program.  Denies any CP, SOB, GAITAN, palpitations, fever, chills, body aches, cough, congestion, or any other symptoms at this time.   ___________________________________________________________________________    Vital Signs Last 24 Hrs  T(C): 36.6 (10 Nov 2023 07:57), Max: 37.3 (09 Nov 2023 20:15)  T(F): 97.8 (10 Nov 2023 07:57), Max: 99.1 (09 Nov 2023 20:15)  HR: 78 (10 Nov 2023 07:57) (78 - 82)  BP: 117/68 (10 Nov 2023 07:57) (117/68 - 144/85)  BP(mean): --  RR: 16 (10 Nov 2023 07:57) (16 - 16)  SpO2: 96% (10 Nov 2023 07:57) (95% - 96%)  ___________________________________________________________________________    LAB                        11.0   8.01  )-----------( 233      ( 09 Nov 2023 06:16 )             33.5     11-09    135  |  99  |  37<H>  ----------------------------<  116<H>  4.0   |  28  |  1.11    Ca    9.2      09 Nov 2023 06:16    TPro  6.7  /  Alb  2.8<L>  /  TBili  0.7  /  DBili  x   /  AST  23  /  ALT  35  /  AlkPhos  92  11-09    LIVER FUNCTIONS - ( 09 Nov 2023 06:16 )  Alb: 2.8 g/dL / Pro: 6.7 g/dL / ALK PHOS: 92 U/L / ALT: 35 U/L / AST: 23 U/L / GGT: x           ___________________________________________________________________________    MEDICATIONS  (STANDING):  amLODIPine   Tablet 10 milliGRAM(s) Oral at bedtime  AQUAPHOR (petrolatum Ointment) 1 Application(s) Topical two times a day  atorvastatin 40 milliGRAM(s) Oral at bedtime  enoxaparin Injectable 40 milliGRAM(s) SubCutaneous every 24 hours  losartan 50 milliGRAM(s) Oral daily  methocarbamol 500 milliGRAM(s) Oral three times a day  pantoprazole    Tablet 40 milliGRAM(s) Oral before breakfast  polyethylene glycol 3350 17 Gram(s) Oral two times a day  senna 2 Tablet(s) Oral at bedtime    MEDICATIONS  (PRN):  acetaminophen     Tablet .. 975 milliGRAM(s) Oral every 6 hours PRN Mild Pain (1 - 3)  bisacodyl 5 milliGRAM(s) Oral every 12 hours PRN Constipation  traMADol 50 milliGRAM(s) Oral every 6 hours PRN Severe Pain (7 - 10)  ___________________________________________________________________________    PHYSICAL EXAM:    General: NAD, Resting Comfortable,                                                                 Chest - Breathing comfortably, room air   Cardiovascular - S1S2   Abdomen - Soft   Extremities - No C/C/E, No calf tenderness   Neurologic Exam -                    Cognitive - Awake, Alert, AAO to self, place, date, year, situation     Communication - Fluent, No dysarthria  Motor    LEFT    UE: SF [5/5], EF [5/5], EE [5/5], WE [5/5],  [wnl]  RIGHT UE: SF [5/5], EF [5/5], EE [5/5], WE [5/5],  [wnl]  LEFT    LE:  HF [3-4/5], KE [4/5], DF [3/5], EHL [3/5],  PF [4/5]  RIGHT LE:  HF [3-4/5], KE [4/5], DF [4/5], EHL [4/5],  PF [4/5]    Reflex:  2 + throughout, Babinski negative  Wounds: 14 cm lumbar incision site, drain site 1 cmx1cm, Stage 1 pressure injury due to medical device on left buttock  ___________________________________________________________________________ HPI:  Case of an 83 year old right handed Romanian speaking male patient with past medical history of HTN, HLD, pre-diabetes, BPH, cervical and lumbar spinal stenosis (s/p Posterior C1-C6 Decompression, C4-5 Posterior Column Osteotomy, C1-C7 Fusion with Plastic Closure on 10/18/2022) who presented to [facility] on 11/3 for a scheduled posterior L2-L5 decompression surgery for lumbar stenosis with neurogenic claudication. Prior to surgery patient had low back pain associated with bilateral leg pain and difficulty walking. he additionally had persistent neck heaviness and bilateral shoulder pain which was worse on the left shoulder. Post-operative hospital course was uncomplicated and surgical drain was removed on 11/7/23. Patient evaluated by PT/OT and was recommended for acute inpatient rehab. Patient is medically stable for discharge to [facility] on 11/7/23. (07 Nov 2023 14:41)    TDD: 11/21 Home  ___________________________________________________________________________    SUBJECTIVE/ROS  Patient was seen and evaluated at bedside today.  Reported no overnight events and is in no acute distress.  Patient complains of itching on his back. No erythema noted but skin appears dry. Advised patient that we will order Aquaphor.   Patient reports pain is well controlled.  Case was discussed at Interdisciplinary Team meeting 11/9.  A tentative discharge date is outlined above.  Patient is motivated to continue participation on the recommended rehabilitation program.  Denies any CP, SOB, GAITAN, palpitations, fever, chills, body aches, cough, congestion, or any other symptoms at this time.   ___________________________________________________________________________    Vital Signs Last 24 Hrs  T(C): 36.6 (10 Nov 2023 07:57), Max: 37.3 (09 Nov 2023 20:15)  T(F): 97.8 (10 Nov 2023 07:57), Max: 99.1 (09 Nov 2023 20:15)  HR: 78 (10 Nov 2023 07:57) (78 - 82)  BP: 117/68 (10 Nov 2023 07:57) (117/68 - 144/85)  RR: 16 (10 Nov 2023 07:57) (16 - 16)  SpO2: 96% (10 Nov 2023 07:57) (95% - 96%)  ___________________________________________________________________________    LAB                        11.0   8.01  )-----------( 233      ( 09 Nov 2023 06:16 )             33.5     11-09    135  |  99  |  37<H>  ----------------------------<  116<H>  4.0   |  28  |  1.11    Ca    9.2      09 Nov 2023 06:16    TPro  6.7  /  Alb  2.8<L>  /  TBili  0.7  /  DBili  x   /  AST  23  /  ALT  35  /  AlkPhos  92  11-09    LIVER FUNCTIONS - ( 09 Nov 2023 06:16 )  Alb: 2.8 g/dL / Pro: 6.7 g/dL / ALK PHOS: 92 U/L / ALT: 35 U/L / AST: 23 U/L / GGT: x           ___________________________________________________________________________    MEDICATIONS  (STANDING):  amLODIPine   Tablet 10 milliGRAM(s) Oral at bedtime  AQUAPHOR (petrolatum Ointment) 1 Application(s) Topical two times a day  atorvastatin 40 milliGRAM(s) Oral at bedtime  enoxaparin Injectable 40 milliGRAM(s) SubCutaneous every 24 hours  losartan 50 milliGRAM(s) Oral daily  methocarbamol 500 milliGRAM(s) Oral three times a day  pantoprazole    Tablet 40 milliGRAM(s) Oral before breakfast  polyethylene glycol 3350 17 Gram(s) Oral two times a day  senna 2 Tablet(s) Oral at bedtime    MEDICATIONS  (PRN):  acetaminophen     Tablet .. 975 milliGRAM(s) Oral every 6 hours PRN Mild Pain (1 - 3)  bisacodyl 5 milliGRAM(s) Oral every 12 hours PRN Constipation  traMADol 50 milliGRAM(s) Oral every 6 hours PRN Severe Pain (7 - 10)  ___________________________________________________________________________    PHYSICAL EXAM:    General: NAD, Resting Comfortable,                                                                 Chest - Breathing comfortably, room air   Cardiovascular - S1S2   Abdomen - Soft   Extremities - No C/C/E, No calf tenderness   Neurologic Exam -                    Cognitive - Awake, Alert, AAO to self, place, date, year, situation     Communication - Fluent, No dysarthria  Motor    LEFT    UE: SF [5/5], EF [5/5], EE [5/5], WE [5/5],  [wnl]  RIGHT UE: SF [5/5], EF [5/5], EE [5/5], WE [5/5],  [wnl]  LEFT    LE:  HF [3-4/5], KE [4/5], DF [3/5], EHL [3/5],  PF [4/5]  RIGHT LE:  HF [3-4/5], KE [4/5], DF [4/5], EHL [4/5],  PF [4/5]    Reflex:  2 + throughout, Babinski negative  Wounds: 14 cm lumbar incision site, drain site 1 cmx1cm, Stage 1 pressure injury due to medical device on left buttock  ___________________________________________________________________________ HPI:  Case of an 83 year old right handed Ukrainian speaking male patient with past medical history of HTN, HLD, pre-diabetes, BPH, cervical and lumbar spinal stenosis (s/p Posterior C1-C6 Decompression, C4-5 Posterior Column Osteotomy, C1-C7 Fusion with Plastic Closure on 10/18/2022) who presented to [facility] on 11/3 for a scheduled posterior L2-L5 decompression surgery for lumbar stenosis with neurogenic claudication. Prior to surgery patient had low back pain associated with bilateral leg pain and difficulty walking. he additionally had persistent neck heaviness and bilateral shoulder pain which was worse on the left shoulder. Post-operative hospital course was uncomplicated and surgical drain was removed on 11/7/23. Patient evaluated by PT/OT and was recommended for acute inpatient rehab. Patient is medically stable for discharge to [facility] on 11/7/23. (07 Nov 2023 14:41)    TDD: 11/21 Home  ___________________________________________________________________________    SUBJECTIVE/ROS  Patient was seen and evaluated at bedside today.  Reported no overnight events and is in no acute distress.  Patient complains of itching on his back. No erythema noted but skin appears dry. Advised patient that we will order Aquaphor.   Patient reports pain is well controlled.  Case was discussed at Interdisciplinary Team meeting 11/9.  A tentative discharge date is outlined above.  Patient is motivated to continue participation on the recommended rehabilitation program.  Denies any CP, SOB, GAITAN, palpitations, fever, chills, body aches, cough, congestion, or any other symptoms at this time.   ___________________________________________________________________________    Vital Signs Last 24 Hrs  T(C): 36.6 (10 Nov 2023 07:57), Max: 37.3 (09 Nov 2023 20:15)  T(F): 97.8 (10 Nov 2023 07:57), Max: 99.1 (09 Nov 2023 20:15)  HR: 78 (10 Nov 2023 07:57) (78 - 82)  BP: 117/68 (10 Nov 2023 07:57) (117/68 - 144/85)  RR: 16 (10 Nov 2023 07:57) (16 - 16)  SpO2: 96% (10 Nov 2023 07:57) (95% - 96%)  ___________________________________________________________________________    LAB                        11.0   8.01  )-----------( 233      ( 09 Nov 2023 06:16 )             33.5     11-09    135  |  99  |  37<H>  ----------------------------<  116<H>  4.0   |  28  |  1.11    Ca    9.2      09 Nov 2023 06:16    TPro  6.7  /  Alb  2.8<L>  /  TBili  0.7  /  DBili  x   /  AST  23  /  ALT  35  /  AlkPhos  92  11-09    LIVER FUNCTIONS - ( 09 Nov 2023 06:16 )  Alb: 2.8 g/dL / Pro: 6.7 g/dL / ALK PHOS: 92 U/L / ALT: 35 U/L / AST: 23 U/L / GGT: x           ___________________________________________________________________________    MEDICATIONS  (STANDING):  amLODIPine   Tablet 10 milliGRAM(s) Oral at bedtime  AQUAPHOR (petrolatum Ointment) 1 Application(s) Topical two times a day  atorvastatin 40 milliGRAM(s) Oral at bedtime  enoxaparin Injectable 40 milliGRAM(s) SubCutaneous every 24 hours  losartan 50 milliGRAM(s) Oral daily  methocarbamol 500 milliGRAM(s) Oral three times a day  pantoprazole    Tablet 40 milliGRAM(s) Oral before breakfast  polyethylene glycol 3350 17 Gram(s) Oral two times a day  senna 2 Tablet(s) Oral at bedtime    MEDICATIONS  (PRN):  acetaminophen     Tablet .. 975 milliGRAM(s) Oral every 6 hours PRN Mild Pain (1 - 3)  bisacodyl 5 milliGRAM(s) Oral every 12 hours PRN Constipation  traMADol 50 milliGRAM(s) Oral every 6 hours PRN Severe Pain (7 - 10)  ___________________________________________________________________________    PHYSICAL EXAM:    General: NAD, Resting Comfortable,                                                                 Chest - Breathing comfortably, room air   Cardiovascular - S1S2   Abdomen - Soft   Extremities - No C/C/E, No calf tenderness   Neurologic Exam -                    Cognitive - Awake, Alert, AAO to self, place, date, year, situation     Communication - Fluent, No dysarthria  Motor    LEFT    UE: SF [5/5], EF [5/5], EE [5/5], WE [5/5],  [wnl]  RIGHT UE: SF [5/5], EF [5/5], EE [5/5], WE [5/5],  [wnl]  LEFT    LE:  HF [3-4/5], KE [4/5], DF [3/5], EHL [3/5],  PF [4/5]  RIGHT LE:  HF [3-4/5], KE [4/5], DF [4/5], EHL [4/5],  PF [4/5]    Reflex:  2 + throughout, Babinski negative  Wounds: 14 cm lumbar incision site, drain site 1 cmx1cm, Stage 1 pressure injury to lumbar spine  ___________________________________________________________________________

## 2023-11-10 NOTE — PROGRESS NOTE ADULT - ASSESSMENT
83 year old male with PMH of Hypertension, Hyperlipidemia, Pre-DM, BPH, Cervical and Lumbar Stenosis (s/p Posterior C1-C6 Decompression, C4-5 Posterior Column Osteotomy, C1-C7 Fusion with Plastic Closure on 10/18/2022) who presented to [facility] on 11/3 for a scheduled posterior L2-L5 decompression surgery for lumbar stenosis with neurogenic claudication. Post-op course uncomplicated. Now admitted to [facility] rehab for functional decline.     Lumbar Stenosis w/ Neurogenic Claudication s/p L2-L5 Decompressive Laminectomy (11/3)  -Spine/Fall precaution  -ASA daily  -Outpatient follow up with neurosurgery     Hypertension  -BP acceptable control  -Continue amlodipine and losartan   -Monitor for OH    Hyperlipidemia   -Continue atorvastatin     Pre-Diabetes  -HbA1C 5.9  -Dietary and lifestyle modification  -Outpatient monitoring with PMD    GI PPx  -PPI    DVT PPx  -Lovenox SC

## 2023-11-10 NOTE — CHART NOTE - NSCHARTNOTEFT_GEN_A_CORE
REHABILITATION DIAGNOSIS/IMPAIRMENT GROUP CODE:  Lumbar stenosis with neurogenic claudication and posterior L2-L5 decompression      COMORBIDITIES/COMPLICATING CONDITIONS IMPACTING REHABILITATION:  HEALTH ISSUES - PROBLEM Dx:  - L2-L5 decompressive laminectomy for lumbar stenosis with neurogenic claudication (11/3)  - surgical drain removed on 11/7  - Impaired ADLs and mobility  - Need for assistance with personal care       PAST MEDICAL & SURGICAL HISTORY:  Hypertension      Hyperlipidemia      Chronic kidney disease      Benign prostate hyperplasia      COVID-19 virus infection  -dec 2021 (cough, fever, malaise)      History of carpal tunnel surgery  -bilateral hands      History of cataract surgery  -bilateral      H/O colonoscopy      S/P cervical spinal fusion          Based upon consideration of the patient's impairments, functional status, complicating conditions and any other contributing factors and after information garnered from the assessments of all therapy disciplines involved in treating the patient and other pertinent clinicians:    INTERDISCIPLINARY REHABILITATION INTERVENTIONS:    [ X  ] Transfer Training  [X   ] Bed Mobility  [ X  ] Therapeutic Exercise  [ X  ] Balance/Coordination Exercises  [ X  ] Locomotion retraining  [ X  ] Stairs  [ X  ] Functional Transfer Training  [ X  ] Bowel/Bladder program  [  X ] Pain Management  [ X  ] Skin/Wound Care  [X   ] Visual/Perceptual Training  [X   ] Therapeutic Recreation Activities  [  X ] Neuromuscular Re-education  [  X ] Activities of Daily Living  [   ] Speech Exercise  [   ] Swallowing Exercises  [   ] Vital Stim  [   ] Dietary Supplements  [   ] Calorie Count  [   ] Cognitive Exercises  [   ] Congnitive/Linguistic Treatment  [   ] Behavior Program  [  X ] Neuropsych Therapy  [ X  ] Patient/Family Counseling  [ X  ] Family Training  [ X  ] Community Re-entry  [ X  ] Orthotic Evaluation  [   ] Prosthetic Eval/Training    MEDICAL PROGNOSIS:  good     REHAB POTENTIAL:  good     EXPECTED DAILY THERAPY:         PT: 2hr/day       OT: 1hr/day       P&O: 0    EXPECTED INTENSITY OF PROGRAM:  3 hrs/day    EXPECTED FREQUENCY OF PROGRAM:  5 days/week    ESTIMATED LOS:  10-14 days    ESTIMATED DISPOSITION:  home    INTERDISCIPLINARY FUNCTIONAL OUTCOMES?GOALS:         Gait/Mobility: mod-independent       Transfers: mod-independent       ADLs: mod-independent       Functional Transfers: mod-independent       Medication Management: mod-independent       Communication: independent       Cognitive:               Bladder: continent       Bowel: continent

## 2023-11-11 PROCEDURE — 99232 SBSQ HOSP IP/OBS MODERATE 35: CPT | Mod: GC

## 2023-11-11 PROCEDURE — 99239 HOSP IP/OBS DSCHRG MGMT >30: CPT

## 2023-11-11 RX ORDER — CALAMINE AND ZINC OXIDE AND PHENOL 160; 10 MG/ML; MG/ML
1 LOTION TOPICAL
Refills: 0 | Status: COMPLETED | OUTPATIENT
Start: 2023-11-11 | End: 2023-11-16

## 2023-11-11 RX ADMIN — PANTOPRAZOLE SODIUM 40 MILLIGRAM(S): 20 TABLET, DELAYED RELEASE ORAL at 05:25

## 2023-11-11 RX ADMIN — METHOCARBAMOL 500 MILLIGRAM(S): 500 TABLET, FILM COATED ORAL at 22:03

## 2023-11-11 RX ADMIN — Medication 975 MILLIGRAM(S): at 21:59

## 2023-11-11 RX ADMIN — AMLODIPINE BESYLATE 10 MILLIGRAM(S): 2.5 TABLET ORAL at 22:03

## 2023-11-11 RX ADMIN — METHOCARBAMOL 500 MILLIGRAM(S): 500 TABLET, FILM COATED ORAL at 05:25

## 2023-11-11 RX ADMIN — LOSARTAN POTASSIUM 50 MILLIGRAM(S): 100 TABLET, FILM COATED ORAL at 05:25

## 2023-11-11 RX ADMIN — Medication 975 MILLIGRAM(S): at 06:23

## 2023-11-11 RX ADMIN — CALAMINE AND ZINC OXIDE AND PHENOL 1 APPLICATION(S): 160; 10 LOTION TOPICAL at 18:36

## 2023-11-11 RX ADMIN — ATORVASTATIN CALCIUM 40 MILLIGRAM(S): 80 TABLET, FILM COATED ORAL at 22:02

## 2023-11-11 RX ADMIN — METHOCARBAMOL 500 MILLIGRAM(S): 500 TABLET, FILM COATED ORAL at 13:03

## 2023-11-11 RX ADMIN — Medication 1 APPLICATION(S): at 05:28

## 2023-11-11 RX ADMIN — Medication 975 MILLIGRAM(S): at 22:36

## 2023-11-11 RX ADMIN — SENNA PLUS 2 TABLET(S): 8.6 TABLET ORAL at 22:02

## 2023-11-11 RX ADMIN — POLYETHYLENE GLYCOL 3350 17 GRAM(S): 17 POWDER, FOR SOLUTION ORAL at 05:27

## 2023-11-11 RX ADMIN — ENOXAPARIN SODIUM 40 MILLIGRAM(S): 100 INJECTION SUBCUTANEOUS at 22:02

## 2023-11-11 RX ADMIN — Medication 975 MILLIGRAM(S): at 05:25

## 2023-11-11 NOTE — PROGRESS NOTE ADULT - SUBJECTIVE AND OBJECTIVE BOX
Patient is a 83y old  Male who presents with a chief complaint of Lumbar stenosis with neurogenic claudication and posterior L2-L5 decompression (10 Nov 2023 11:53)      Subjective and overnight events:  Patient seen and examined at bedside. no fever, chills, sob, cp, abd pain. + c/o pruritis at surgical site. denies pain at all. no surrounding redness.     ALLERGIES:  penicillin (Rash)    MEDICATIONS  (STANDING):  amLODIPine   Tablet 10 milliGRAM(s) Oral at bedtime  AQUAPHOR (petrolatum Ointment) 1 Application(s) Topical two times a day  atorvastatin 40 milliGRAM(s) Oral at bedtime  calamine/zinc oxide Lotion 1 Application(s) Topical two times a day  enoxaparin Injectable 40 milliGRAM(s) SubCutaneous every 24 hours  losartan 50 milliGRAM(s) Oral daily  methocarbamol 500 milliGRAM(s) Oral three times a day  pantoprazole    Tablet 40 milliGRAM(s) Oral before breakfast  polyethylene glycol 3350 17 Gram(s) Oral two times a day  senna 2 Tablet(s) Oral at bedtime    MEDICATIONS  (PRN):  acetaminophen     Tablet .. 975 milliGRAM(s) Oral every 6 hours PRN Mild Pain (1 - 3), Moderate Pain (4 - 6)  bisacodyl 5 milliGRAM(s) Oral every 12 hours PRN Constipation  traMADol 50 milliGRAM(s) Oral every 6 hours PRN Severe Pain (7 - 10)    Vital Signs Last 24 Hrs  T(F): 98.2 (11 Nov 2023 09:07), Max: 98.9 (10 Nov 2023 21:27)  HR: 74 (11 Nov 2023 09:07) (65 - 81)  BP: 148/68 (11 Nov 2023 09:07) (114/70 - 155/73)  RR: 16 (11 Nov 2023 09:07) (16 - 16)  SpO2: 98% (11 Nov 2023 09:07) (97% - 98%)  I&O's Summary    PHYSICAL EXAM:  General: NAD, A/O x 3  ENT: MMM  Neck: Supple, No JVD  Lungs: Clear to auscultation bilaterally  Cardio: RRR, S1/S2, No murmurs  Abdomen: Soft, Nontender, Nondistended; Bowel sounds present  Extremities: No calf tenderness, No pitting edema.   skin: surgical wound at lower back healing well. no drainage. no erythema     LABS:                        11.0   8.01  )-----------( 233      ( 09 Nov 2023 06:16 )             33.5     11-09    135  |  99  |  37  ----------------------------<  116  4.0   |  28  |  1.11    Ca    9.2      09 Nov 2023 06:16    TPro  6.7  /  Alb  2.8  /  TBili  0.7  /  DBili  x   /  AST  23  /  ALT  35  /  AlkPhos  92  11-09                Urinalysis Basic - ( 09 Nov 2023 06:16 )    Color: x / Appearance: x / SG: x / pH: x  Gluc: 116 mg/dL / Ketone: x  / Bili: x / Urobili: x   Blood: x / Protein: x / Nitrite: x   Leuk Esterase: x / RBC: x / WBC x   Sq Epi: x / Non Sq Epi: x / Bacteria: x        COVID-19 PCR: Silvanotejose (11-07-23 @ 20:45)      RADIOLOGY & ADDITIONAL TESTS:    Care Discussed with Consultants/Other Providers:

## 2023-11-11 NOTE — PROGRESS NOTE ADULT - ASSESSMENT
Assessment/Plan:  Case of an 83 year old right handed Yoruba speaking male patient with past medical history of HTN, HLD, pre-diabetes, BPH, cervical and lumbar spinal stenosis (s/p Posterior C1-C6 Decompression, C4-5 Posterior Column Osteotomy, C1-C7 Fusion with Plastic Closure on 10/18/2022) who is admitted for Acute Inpatient Rehabilitation with a multidisciplinary rehab program at [facility] with functional impairments in ADLs and mobility secondary to lumbar stenosis with neurogenic claudication treated surgically with an elective posterior L2-L5 decompression surgery.    Lumbar stenosis with neurogenic claudication and posterior L2-L5 decompression  - L2-L5 decompressive laminectomy for lumbar stenosis with neurogenic claudication (11/3)  - surgical drain removed on 11/7  - Impaired ADLs and mobility  - Need for assistance with personal care   - Continue comprehensive rehab program of PT/OT - 3 hours a day, 5 days a week. P&O as needed   - Pain control below  - Fall/Spine precautions  - Restart daily ASA POD #14 (11/17)    Pain Control  - Tylenol 975 mg Q6H PRN mild-mod pain  - Tramadol 50 mg Q6H PRN severe pain  - Robaxin 500 mg TID    HTN  - c/w Norvasc 10 mg QHS and losartan 50 mg QD  - Monitor BP    HLD  - cont Lipitor 40 mg QHS    Mood / Cognition  - Neuropsychology consult PRN    Sleep  - Melatonin PRN     GI / Bowel  - Senna qHS  - Miralax PRN BID  - dulcolax PRN  - GI ppx: pantoprazole 40 mg QD     / Bladder  - Currently patient voids independently  - check PVR x1 on admission    Skin / Pressure injury  - Skin assessment on admission performed: 14 cm lumbar incision site, drain site 1 cmx1cm, Stage 1 pressure injury due to medical device on left buttock  - Pressure Injury/Skin: OOB to chair, PT/OT  - nursing to monitor skin qShift    Diet/Dysphagia:  - Diet Consistency: regular    DVT prophylaxis:   - Lovenox 40 mg QD     Outpatient Follow-up:  Gina Bond  Neurosurgery  [address] [place] [address], [address]  [place] [address]  Phone: [phone]  Fax: [phone]  Follow Up Time:      Code Status/Emergency Contact:      --------------- Assessment/Plan:  Case of an 83 year old right handed Bulgarian speaking male patient with past medical history of HTN, HLD, pre-diabetes, BPH, cervical and lumbar spinal stenosis (s/p Posterior C1-C6 Decompression, C4-5 Posterior Column Osteotomy, C1-C7 Fusion with Plastic Closure on 10/18/2022) who is admitted for Acute Inpatient Rehabilitation with a multidisciplinary rehab program at [facility] with functional impairments in ADLs and mobility secondary to lumbar stenosis with neurogenic claudication treated surgically with an elective posterior L2-L5 decompression surgery.    Lumbar stenosis with neurogenic claudication and posterior L2-L5 decompression  - L2-L5 decompressive laminectomy for lumbar stenosis with neurogenic claudication (11/3)  - surgical drain removed on 11/7  - Impaired ADLs and mobility  - Need for assistance with personal care   - Continue comprehensive rehab program of PT/OT - 3 hours a day, 5 days a week. P&O as needed   - Pain control below  - Fall/Spine precautions  - Restart daily ASA POD #14 (11/17)    Pain Control  - Tylenol 975 mg Q6H PRN mild-mod pain  - Tramadol 50 mg Q6H PRN severe pain  - Robaxin 500 mg TID    HTN  - c/w Norvasc 10 mg QHS and losartan 50 mg QD  - Monitor BP    HLD  - cont Lipitor 40 mg QHS    Mood / Cognition  - Neuropsychology consult PRN    Sleep  - Melatonin PRN     GI / Bowel  - Senna qHS  - Miralax PRN BID  - dulcolax PRN  - GI ppx: pantoprazole 40 mg QD     / Bladder  - Currently patient voids independently  - check PVR x1 on admission    Skin / Pressure injury  - Skin assessment on admission performed: 14 cm lumbar incision site, drain site 1 cmx1cm, Stage 1 pressure injury due to medical device on left buttock  - Pressure Injury/Skin: OOB to chair, PT/OT  - nursing to monitor skin qShift  calamine lotion to area around incision     Diet/Dysphagia:  - Diet Consistency: regular    DVT prophylaxis:   - Lovenox 40 mg QD     Outpatient Follow-up:  Gina Bond  Neurosurgery  [address] [place] [address], [address]  [place] [address]  Phone: [phone]  Fax: [phone]  Follow Up Time:      Code Status/Emergency Contact:      ---------------

## 2023-11-11 NOTE — PROGRESS NOTE ADULT - ASSESSMENT
83 year old male with PMH of Hypertension, Hyperlipidemia, Pre-DM, BPH, Cervical and Lumbar Stenosis (s/p Posterior C1-C6 Decompression, C4-5 Posterior Column Osteotomy, C1-C7 Fusion with Plastic Closure on 10/18/2022) who presented to [facility] on 11/3 for a scheduled posterior L2-L5 decompression surgery for lumbar stenosis with neurogenic claudication. Post-op course uncomplicated. Now admitted to [facility] rehab for functional decline.     Lumbar Stenosis w/ Neurogenic Claudication s/p L2-L5 Decompressive Laminectomy (11/3)  -Spine/Fall precaution  -ASA daily  -Outpatient follow up with neurosurgery     Surgical wound pruritis  - wound healing well. no signs of infection  - cont supportive care: add trial of calamine lotion. cont with Aquaphor     Hypertension  -BP acceptable control  -Continue amlodipine and losartan   -Monitor for OH    Hyperlipidemia   -Continue atorvastatin     Pre-Diabetes  -HbA1C 5.9  -Dietary and lifestyle modification  -Outpatient monitoring with PMD    GI PPx  -PPI    DVT PPx  -Lovenox SC

## 2023-11-11 NOTE — PROGRESS NOTE ADULT - SUBJECTIVE AND OBJECTIVE BOX
HPI:  Case of an 83 year old right handed Urdu speaking male patient with past medical history of HTN, HLD, pre-diabetes, BPH, cervical and lumbar spinal stenosis (s/p Posterior C1-C6 Decompression, C4-5 Posterior Column Osteotomy, C1-C7 Fusion with Plastic Closure on 10/18/2022) who presented to [facility] on 11/3 for a scheduled posterior L2-L5 decompression surgery for lumbar stenosis with neurogenic claudication. Prior to surgery patient had low back pain associated with bilateral leg pain and difficulty walking. he additionally had persistent neck heaviness and bilateral shoulder pain which was worse on the left shoulder. Post-operative hospital course was uncomplicated and surgical drain was removed on 11/7/23. Patient evaluated by PT/OT and was recommended for acute inpatient rehab. Patient is medically stable for discharge to [facility] on 11/7/23. (07 Nov 2023 14:41)    TDD: 11/21 Home  ___________________________________________________________________________    SUBJECTIVE/ROS  Patient was seen and evaluated at bedside today.  Reported no overnight events and is in no acute distress.  Patient complains of itching on his back. No erythema noted but skin appears dry. Advised patient that we will order Aquaphor.   Patient reports pain is well controlled.  Case was discussed at Interdisciplinary Team meeting 11/9.  A tentative discharge date is outlined above.  Patient is motivated to continue participation on the recommended rehabilitation program.  Denies any CP, SOB, GAITAN, palpitations, fever, chills, body aches, cough, congestion, or any other symptoms at this time.   ___________________________________________________________________________    Vital Signs Last 24 Hrs  T(C): 36.6 (10 Nov 2023 07:57), Max: 37.3 (09 Nov 2023 20:15)  T(F): 97.8 (10 Nov 2023 07:57), Max: 99.1 (09 Nov 2023 20:15)  HR: 78 (10 Nov 2023 07:57) (78 - 82)  BP: 117/68 (10 Nov 2023 07:57) (117/68 - 144/85)  RR: 16 (10 Nov 2023 07:57) (16 - 16)  SpO2: 96% (10 Nov 2023 07:57) (95% - 96%)  ___________________________________________________________________________    LAB                        11.0   8.01  )-----------( 233      ( 09 Nov 2023 06:16 )             33.5     11-09    135  |  99  |  37<H>  ----------------------------<  116<H>  4.0   |  28  |  1.11    Ca    9.2      09 Nov 2023 06:16    TPro  6.7  /  Alb  2.8<L>  /  TBili  0.7  /  DBili  x   /  AST  23  /  ALT  35  /  AlkPhos  92  11-09    LIVER FUNCTIONS - ( 09 Nov 2023 06:16 )  Alb: 2.8 g/dL / Pro: 6.7 g/dL / ALK PHOS: 92 U/L / ALT: 35 U/L / AST: 23 U/L / GGT: x           ___________________________________________________________________________    MEDICATIONS  (STANDING):  amLODIPine   Tablet 10 milliGRAM(s) Oral at bedtime  AQUAPHOR (petrolatum Ointment) 1 Application(s) Topical two times a day  atorvastatin 40 milliGRAM(s) Oral at bedtime  enoxaparin Injectable 40 milliGRAM(s) SubCutaneous every 24 hours  losartan 50 milliGRAM(s) Oral daily  methocarbamol 500 milliGRAM(s) Oral three times a day  pantoprazole    Tablet 40 milliGRAM(s) Oral before breakfast  polyethylene glycol 3350 17 Gram(s) Oral two times a day  senna 2 Tablet(s) Oral at bedtime    MEDICATIONS  (PRN):  acetaminophen     Tablet .. 975 milliGRAM(s) Oral every 6 hours PRN Mild Pain (1 - 3)  bisacodyl 5 milliGRAM(s) Oral every 12 hours PRN Constipation  traMADol 50 milliGRAM(s) Oral every 6 hours PRN Severe Pain (7 - 10)  ___________________________________________________________________________    PHYSICAL EXAM:    General: NAD, Resting Comfortable,                                                                 Chest - Breathing comfortably, room air   Cardiovascular - S1S2   Abdomen - Soft   Extremities - No C/C/E, No calf tenderness   Neurologic Exam -                    Cognitive - Awake, Alert, AAO to self, place, date, year, situation     Communication - Fluent, No dysarthria  Motor    LEFT    UE: SF [5/5], EF [5/5], EE [5/5], WE [5/5],  [wnl]  RIGHT UE: SF [5/5], EF [5/5], EE [5/5], WE [5/5],  [wnl]  LEFT    LE:  HF [3-4/5], KE [4/5], DF [3/5], EHL [3/5],  PF [4/5]  RIGHT LE:  HF [3-4/5], KE [4/5], DF [4/5], EHL [4/5],  PF [4/5]    Reflex:  2 + throughout, Babinski negative  Wounds: 14 cm lumbar incision site, drain site 1 cmx1cm, Stage 1 pressure injury due to medical device on left buttock  ___________________________________________________________________________ HPI:  Case of an 83 year old right handed Chinese speaking male patient with past medical history of HTN, HLD, pre-diabetes, BPH, cervical and lumbar spinal stenosis (s/p Posterior C1-C6 Decompression, C4-5 Posterior Column Osteotomy, C1-C7 Fusion with Plastic Closure on 10/18/2022) who presented to [facility] on 11/3 for a scheduled posterior L2-L5 decompression surgery for lumbar stenosis with neurogenic claudication. Prior to surgery patient had low back pain associated with bilateral leg pain and difficulty walking. he additionally had persistent neck heaviness and bilateral shoulder pain which was worse on the left shoulder. Post-operative hospital course was uncomplicated and surgical drain was removed on 11/7/23. Patient evaluated by PT/OT and was recommended for acute inpatient rehab. Patient is medically stable for discharge to [facility] on 11/7/23. (07 Nov 2023 14:41)    TDD: 11/21 Home  ___________________________________________________________________________    SUBJECTIVE/ROS  Patient was seen and evaluated at bedside today.  Reported no overnight events and is in no acute distress.  Patient complains of itching on his back. No erythema noted but skin appears dry. aquaphor ineffective- will try calamine lotion  Patient reports pain is well controlled.  voiding, last BM 11/10  Patient is motivated to continue participation on the recommended rehabilitation program.  Denies any CP, SOB, GAITAN, palpitations, fever, chills, body aches, cough, congestion, or any other symptoms at this time.   ___________________________________________________________________________    Vital Signs Last 24 Hrs  T(C): 36.6 (11 Nov 2023 22:04), Max: 36.8 (11 Nov 2023 09:07)  T(F): 97.9 (11 Nov 2023 22:04), Max: 98.2 (11 Nov 2023 09:07)  HR: 79 (11 Nov 2023 22:04) (65 - 79)  BP: 157/75 (11 Nov 2023 22:04) (114/70 - 157/75)  BP(mean): --  RR: 16 (11 Nov 2023 22:04) (16 - 16)  SpO2: 96% (11 Nov 2023 22:04) (96% - 98%)    Parameters below as of 11 Nov 2023 22:04  Patient On (Oxygen Delivery Method): room air      ___________________________________________________________________________    LAB                        11.0   8.01  )-----------( 233      ( 09 Nov 2023 06:16 )             33.5     11-09    135  |  99  |  37<H>  ----------------------------<  116<H>  4.0   |  28  |  1.11    Ca    9.2      09 Nov 2023 06:16    TPro  6.7  /  Alb  2.8<L>  /  TBili  0.7  /  DBili  x   /  AST  23  /  ALT  35  /  AlkPhos  92  11-09    LIVER FUNCTIONS - ( 09 Nov 2023 06:16 )  Alb: 2.8 g/dL / Pro: 6.7 g/dL / ALK PHOS: 92 U/L / ALT: 35 U/L / AST: 23 U/L / GGT: x           ___________________________________________________________________________    MEDICATIONS  (STANDING):  amLODIPine   Tablet 10 milliGRAM(s) Oral at bedtime  AQUAPHOR (petrolatum Ointment) 1 Application(s) Topical two times a day  atorvastatin 40 milliGRAM(s) Oral at bedtime  enoxaparin Injectable 40 milliGRAM(s) SubCutaneous every 24 hours  losartan 50 milliGRAM(s) Oral daily  methocarbamol 500 milliGRAM(s) Oral three times a day  pantoprazole    Tablet 40 milliGRAM(s) Oral before breakfast  polyethylene glycol 3350 17 Gram(s) Oral two times a day  senna 2 Tablet(s) Oral at bedtime    MEDICATIONS  (PRN):  acetaminophen     Tablet .. 975 milliGRAM(s) Oral every 6 hours PRN Mild Pain (1 - 3)  bisacodyl 5 milliGRAM(s) Oral every 12 hours PRN Constipation  traMADol 50 milliGRAM(s) Oral every 6 hours PRN Severe Pain (7 - 10)  ___________________________________________________________________________    PHYSICAL EXAM:    General: NAD, Resting Comfortable,                                                                 Chest - Breathing comfortably, room air   Cardiovascular - S1S2   Abdomen - Soft   Extremities - No C/C/E, No calf tenderness   Neurologic Exam -                    Cognitive - Awake, Alert, AAO to self, place, date, year, situation     Communication - Fluent, No dysarthria  Motor    LEFT    UE: SF [5/5], EF [5/5], EE [5/5], WE [5/5],  [wnl]  RIGHT UE: SF [5/5], EF [5/5], EE [5/5], WE [5/5],  [wnl]  LEFT    LE:  HF [3-4/5], KE [4/5], DF [3/5], EHL [3/5],  PF [4/5]  RIGHT LE:  HF [3-4/5], KE [4/5], DF [4/5], EHL [4/5],  PF [4/5]    Reflex:  2 + throughout, Babinski negative  Wounds: 14 cm lumbar incision site, drain site 1 cmx1cm, Stage 1 pressure injury due to medical device on left buttock  ___________________________________________________________________________

## 2023-11-12 PROCEDURE — 99231 SBSQ HOSP IP/OBS SF/LOW 25: CPT | Mod: GC

## 2023-11-12 RX ADMIN — TRAMADOL HYDROCHLORIDE 50 MILLIGRAM(S): 50 TABLET ORAL at 00:44

## 2023-11-12 RX ADMIN — CALAMINE AND ZINC OXIDE AND PHENOL 1 APPLICATION(S): 160; 10 LOTION TOPICAL at 05:12

## 2023-11-12 RX ADMIN — Medication 1 APPLICATION(S): at 05:11

## 2023-11-12 RX ADMIN — CALAMINE AND ZINC OXIDE AND PHENOL 1 APPLICATION(S): 160; 10 LOTION TOPICAL at 17:43

## 2023-11-12 RX ADMIN — SENNA PLUS 2 TABLET(S): 8.6 TABLET ORAL at 22:18

## 2023-11-12 RX ADMIN — Medication 975 MILLIGRAM(S): at 05:14

## 2023-11-12 RX ADMIN — Medication 975 MILLIGRAM(S): at 22:18

## 2023-11-12 RX ADMIN — TRAMADOL HYDROCHLORIDE 50 MILLIGRAM(S): 50 TABLET ORAL at 01:15

## 2023-11-12 RX ADMIN — Medication 1 APPLICATION(S): at 17:44

## 2023-11-12 RX ADMIN — Medication 975 MILLIGRAM(S): at 05:44

## 2023-11-12 RX ADMIN — Medication 975 MILLIGRAM(S): at 23:00

## 2023-11-12 RX ADMIN — METHOCARBAMOL 500 MILLIGRAM(S): 500 TABLET, FILM COATED ORAL at 22:18

## 2023-11-12 RX ADMIN — ENOXAPARIN SODIUM 40 MILLIGRAM(S): 100 INJECTION SUBCUTANEOUS at 22:17

## 2023-11-12 RX ADMIN — PANTOPRAZOLE SODIUM 40 MILLIGRAM(S): 20 TABLET, DELAYED RELEASE ORAL at 05:11

## 2023-11-12 RX ADMIN — METHOCARBAMOL 500 MILLIGRAM(S): 500 TABLET, FILM COATED ORAL at 05:11

## 2023-11-12 RX ADMIN — POLYETHYLENE GLYCOL 3350 17 GRAM(S): 17 POWDER, FOR SOLUTION ORAL at 17:44

## 2023-11-12 RX ADMIN — AMLODIPINE BESYLATE 10 MILLIGRAM(S): 2.5 TABLET ORAL at 22:18

## 2023-11-12 RX ADMIN — POLYETHYLENE GLYCOL 3350 17 GRAM(S): 17 POWDER, FOR SOLUTION ORAL at 05:14

## 2023-11-12 RX ADMIN — LOSARTAN POTASSIUM 50 MILLIGRAM(S): 100 TABLET, FILM COATED ORAL at 05:11

## 2023-11-12 RX ADMIN — ATORVASTATIN CALCIUM 40 MILLIGRAM(S): 80 TABLET, FILM COATED ORAL at 22:18

## 2023-11-12 RX ADMIN — METHOCARBAMOL 500 MILLIGRAM(S): 500 TABLET, FILM COATED ORAL at 14:11

## 2023-11-12 NOTE — PROGRESS NOTE ADULT - ASSESSMENT
Assessment/Plan:  Case of an 83 year old right handed Urdu speaking male patient with past medical history of HTN, HLD, pre-diabetes, BPH, cervical and lumbar spinal stenosis (s/p Posterior C1-C6 Decompression, C4-5 Posterior Column Osteotomy, C1-C7 Fusion with Plastic Closure on 10/18/2022) who is admitted for Acute Inpatient Rehabilitation with a multidisciplinary rehab program at [facility] with functional impairments in ADLs and mobility secondary to lumbar stenosis with neurogenic claudication treated surgically with an elective posterior L2-L5 decompression surgery.    Lumbar stenosis with neurogenic claudication and posterior L2-L5 decompression  - L2-L5 decompressive laminectomy for lumbar stenosis with neurogenic claudication (11/3)  - surgical drain removed on 11/7  - Impaired ADLs and mobility  - Need for assistance with personal care   - Continue comprehensive rehab program of PT/OT - 3 hours a day, 5 days a week. P&O as needed   - Pain control below  - Fall/Spine precautions  - Restart daily ASA POD #14 (11/17)    Pain Control  - Tylenol 975 mg Q6H PRN mild-mod pain  - Tramadol 50 mg Q6H PRN severe pain  - Robaxin 500 mg TID    HTN  - c/w Norvasc 10 mg QHS and losartan 50 mg QD  - Monitor BP    HLD  - cont Lipitor 40 mg QHS    Mood / Cognition  - Neuropsychology consult PRN    Sleep  - Melatonin PRN     GI / Bowel  - Senna qHS  - Miralax PRN BID  - dulcolax PRN  - GI ppx: pantoprazole 40 mg QD     / Bladder  - Currently patient voids independently  - check PVR x1 on admission    Skin / Pressure injury  - Skin assessment on admission performed: 14 cm lumbar incision site, drain site 1 cmx1cm, Stage 1 pressure injury due to medical device on left buttock  - Pressure Injury/Skin: OOB to chair, PT/OT  - nursing to monitor skin qShift  calamine lotion to area around incision     Diet/Dysphagia:  - Diet Consistency: regular    DVT prophylaxis:   - Lovenox 40 mg QD     Outpatient Follow-up:  Gina Bond  Neurosurgery  [address] [place] [address], [address]  [place] [address]  Phone: [phone]  Fax: [phone]  Follow Up Time:      Code Status/Emergency Contact:      ---------------

## 2023-11-12 NOTE — PROGRESS NOTE ADULT - SUBJECTIVE AND OBJECTIVE BOX
HPI:  Case of an 83 year old right handed German speaking male patient with past medical history of HTN, HLD, pre-diabetes, BPH, cervical and lumbar spinal stenosis (s/p Posterior C1-C6 Decompression, C4-5 Posterior Column Osteotomy, C1-C7 Fusion with Plastic Closure on 10/18/2022) who presented to [facility] on 11/3 for a scheduled posterior L2-L5 decompression surgery for lumbar stenosis with neurogenic claudication. Prior to surgery patient had low back pain associated with bilateral leg pain and difficulty walking. he additionally had persistent neck heaviness and bilateral shoulder pain which was worse on the left shoulder. Post-operative hospital course was uncomplicated and surgical drain was removed on 11/7/23. Patient evaluated by PT/OT and was recommended for acute inpatient rehab. Patient is medically stable for discharge to [facility] on 11/7/23. (07 Nov 2023 14:41)    TDD: 11/21 Home  ___________________________________________________________________________    SUBJECTIVE/ROS  Patient was seen and evaluated at bedside today.  Reported no overnight events and is in no acute distress.  Less itching on his back- calamine lotion  Patient reports pain is well controlled.  voiding, last BM 11/11  Patient is motivated to continue participation on the recommended rehabilitation program.  Denies any CP, SOB, GAITAN, palpitations, fever, chills, body aches, cough, congestion, or any other symptoms at this time.   ___________________________________________________________________________    Vital Signs Last 24 Hrs  T(C): 36.5 (12 Nov 2023 08:31), Max: 36.6 (11 Nov 2023 22:04)  T(F): 97.7 (12 Nov 2023 08:31), Max: 97.9 (11 Nov 2023 22:04)  HR: 81 (12 Nov 2023 08:31) (74 - 81)  BP: 145/74 (12 Nov 2023 08:31) (138/70 - 157/75)  BP(mean): --  RR: 16 (12 Nov 2023 08:31) (16 - 16)  SpO2: 96% (12 Nov 2023 08:31) (96% - 96%)    Parameters below as of 12 Nov 2023 08:31  Patient On (Oxygen Delivery Method): room air    r      ___________________________________________________________________________    LAB                        11.0   8.01  )-----------( 233      ( 09 Nov 2023 06:16 )             33.5     11-09    135  |  99  |  37<H>  ----------------------------<  116<H>  4.0   |  28  |  1.11    Ca    9.2      09 Nov 2023 06:16    TPro  6.7  /  Alb  2.8<L>  /  TBili  0.7  /  DBili  x   /  AST  23  /  ALT  35  /  AlkPhos  92  11-09    LIVER FUNCTIONS - ( 09 Nov 2023 06:16 )  Alb: 2.8 g/dL / Pro: 6.7 g/dL / ALK PHOS: 92 U/L / ALT: 35 U/L / AST: 23 U/L / GGT: x           ___________________________________________________________________________    MEDICATIONS  (STANDING):  amLODIPine   Tablet 10 milliGRAM(s) Oral at bedtime  AQUAPHOR (petrolatum Ointment) 1 Application(s) Topical two times a day  atorvastatin 40 milliGRAM(s) Oral at bedtime  enoxaparin Injectable 40 milliGRAM(s) SubCutaneous every 24 hours  losartan 50 milliGRAM(s) Oral daily  methocarbamol 500 milliGRAM(s) Oral three times a day  pantoprazole    Tablet 40 milliGRAM(s) Oral before breakfast  polyethylene glycol 3350 17 Gram(s) Oral two times a day  senna 2 Tablet(s) Oral at bedtime    MEDICATIONS  (PRN):  acetaminophen     Tablet .. 975 milliGRAM(s) Oral every 6 hours PRN Mild Pain (1 - 3)  bisacodyl 5 milliGRAM(s) Oral every 12 hours PRN Constipation  traMADol 50 milliGRAM(s) Oral every 6 hours PRN Severe Pain (7 - 10)  ___________________________________________________________________________    PHYSICAL EXAM:    General: NAD, Resting Comfortable,                                                                 Chest - Breathing comfortably, room air   Cardiovascular - S1S2   Abdomen - Soft   Extremities - No C/C/E, No calf tenderness   Neurologic Exam -                    Cognitive - Awake, Alert, AAO to self, place, date, year, situation     Communication - Fluent, No dysarthria  Motor    LEFT    UE: SF [5/5], EF [5/5], EE [5/5], WE [5/5],  [wnl]  RIGHT UE: SF [5/5], EF [5/5], EE [5/5], WE [5/5],  [wnl]  LEFT    LE:  HF [3-4/5], KE [4/5], DF [3/5], EHL [3/5],  PF [4/5]  RIGHT LE:  HF [3-4/5], KE [4/5], DF [4/5], EHL [4/5],  PF [4/5]    Reflex:  2 + throughout, Babinski negative  Wounds: 14 cm lumbar incision site, drain site 1 cmx1cm, Stage 1 pressure injury due to medical device on left buttock  ___________________________________________________________________________

## 2023-11-13 LAB
ALBUMIN SERPL ELPH-MCNC: 3.1 G/DL — LOW (ref 3.3–5)
ALBUMIN SERPL ELPH-MCNC: 3.1 G/DL — LOW (ref 3.3–5)
ALP SERPL-CCNC: 113 U/L — SIGNIFICANT CHANGE UP (ref 40–120)
ALP SERPL-CCNC: 113 U/L — SIGNIFICANT CHANGE UP (ref 40–120)
ALT FLD-CCNC: 32 U/L — SIGNIFICANT CHANGE UP (ref 10–45)
ALT FLD-CCNC: 32 U/L — SIGNIFICANT CHANGE UP (ref 10–45)
ANION GAP SERPL CALC-SCNC: 6 MMOL/L — SIGNIFICANT CHANGE UP (ref 5–17)
ANION GAP SERPL CALC-SCNC: 6 MMOL/L — SIGNIFICANT CHANGE UP (ref 5–17)
AST SERPL-CCNC: 18 U/L — SIGNIFICANT CHANGE UP (ref 10–40)
AST SERPL-CCNC: 18 U/L — SIGNIFICANT CHANGE UP (ref 10–40)
BILIRUB SERPL-MCNC: 0.4 MG/DL — SIGNIFICANT CHANGE UP (ref 0.2–1.2)
BILIRUB SERPL-MCNC: 0.4 MG/DL — SIGNIFICANT CHANGE UP (ref 0.2–1.2)
BUN SERPL-MCNC: 29 MG/DL — HIGH (ref 7–23)
BUN SERPL-MCNC: 29 MG/DL — HIGH (ref 7–23)
CALCIUM SERPL-MCNC: 9.3 MG/DL — SIGNIFICANT CHANGE UP (ref 8.4–10.5)
CALCIUM SERPL-MCNC: 9.3 MG/DL — SIGNIFICANT CHANGE UP (ref 8.4–10.5)
CHLORIDE SERPL-SCNC: 101 MMOL/L — SIGNIFICANT CHANGE UP (ref 96–108)
CHLORIDE SERPL-SCNC: 101 MMOL/L — SIGNIFICANT CHANGE UP (ref 96–108)
CO2 SERPL-SCNC: 31 MMOL/L — SIGNIFICANT CHANGE UP (ref 22–31)
CO2 SERPL-SCNC: 31 MMOL/L — SIGNIFICANT CHANGE UP (ref 22–31)
CREAT SERPL-MCNC: 1.12 MG/DL — SIGNIFICANT CHANGE UP (ref 0.5–1.3)
CREAT SERPL-MCNC: 1.12 MG/DL — SIGNIFICANT CHANGE UP (ref 0.5–1.3)
EGFR: 64 ML/MIN/1.73M2 — SIGNIFICANT CHANGE UP
EGFR: 64 ML/MIN/1.73M2 — SIGNIFICANT CHANGE UP
GLUCOSE SERPL-MCNC: 105 MG/DL — HIGH (ref 70–99)
GLUCOSE SERPL-MCNC: 105 MG/DL — HIGH (ref 70–99)
HCT VFR BLD CALC: 32.9 % — LOW (ref 39–50)
HCT VFR BLD CALC: 32.9 % — LOW (ref 39–50)
HGB BLD-MCNC: 10.6 G/DL — LOW (ref 13–17)
HGB BLD-MCNC: 10.6 G/DL — LOW (ref 13–17)
MCHC RBC-ENTMCNC: 29.5 PG — SIGNIFICANT CHANGE UP (ref 27–34)
MCHC RBC-ENTMCNC: 29.5 PG — SIGNIFICANT CHANGE UP (ref 27–34)
MCHC RBC-ENTMCNC: 32.2 GM/DL — SIGNIFICANT CHANGE UP (ref 32–36)
MCHC RBC-ENTMCNC: 32.2 GM/DL — SIGNIFICANT CHANGE UP (ref 32–36)
MCV RBC AUTO: 91.6 FL — SIGNIFICANT CHANGE UP (ref 80–100)
MCV RBC AUTO: 91.6 FL — SIGNIFICANT CHANGE UP (ref 80–100)
NRBC # BLD: 0 /100 WBCS — SIGNIFICANT CHANGE UP (ref 0–0)
NRBC # BLD: 0 /100 WBCS — SIGNIFICANT CHANGE UP (ref 0–0)
PLATELET # BLD AUTO: 337 K/UL — SIGNIFICANT CHANGE UP (ref 150–400)
PLATELET # BLD AUTO: 337 K/UL — SIGNIFICANT CHANGE UP (ref 150–400)
POTASSIUM SERPL-MCNC: 4.3 MMOL/L — SIGNIFICANT CHANGE UP (ref 3.5–5.3)
POTASSIUM SERPL-MCNC: 4.3 MMOL/L — SIGNIFICANT CHANGE UP (ref 3.5–5.3)
POTASSIUM SERPL-SCNC: 4.3 MMOL/L — SIGNIFICANT CHANGE UP (ref 3.5–5.3)
POTASSIUM SERPL-SCNC: 4.3 MMOL/L — SIGNIFICANT CHANGE UP (ref 3.5–5.3)
PROT SERPL-MCNC: 7.2 G/DL — SIGNIFICANT CHANGE UP (ref 6–8.3)
PROT SERPL-MCNC: 7.2 G/DL — SIGNIFICANT CHANGE UP (ref 6–8.3)
RBC # BLD: 3.59 M/UL — LOW (ref 4.2–5.8)
RBC # BLD: 3.59 M/UL — LOW (ref 4.2–5.8)
RBC # FLD: 13.2 % — SIGNIFICANT CHANGE UP (ref 10.3–14.5)
RBC # FLD: 13.2 % — SIGNIFICANT CHANGE UP (ref 10.3–14.5)
SODIUM SERPL-SCNC: 138 MMOL/L — SIGNIFICANT CHANGE UP (ref 135–145)
SODIUM SERPL-SCNC: 138 MMOL/L — SIGNIFICANT CHANGE UP (ref 135–145)
WBC # BLD: 7.21 K/UL — SIGNIFICANT CHANGE UP (ref 3.8–10.5)
WBC # BLD: 7.21 K/UL — SIGNIFICANT CHANGE UP (ref 3.8–10.5)
WBC # FLD AUTO: 7.21 K/UL — SIGNIFICANT CHANGE UP (ref 3.8–10.5)
WBC # FLD AUTO: 7.21 K/UL — SIGNIFICANT CHANGE UP (ref 3.8–10.5)

## 2023-11-13 PROCEDURE — 99232 SBSQ HOSP IP/OBS MODERATE 35: CPT

## 2023-11-13 RX ORDER — TRAMADOL HYDROCHLORIDE 50 MG/1
50 TABLET ORAL EVERY 6 HOURS
Refills: 0 | Status: DISCONTINUED | OUTPATIENT
Start: 2023-11-13 | End: 2023-11-14

## 2023-11-13 RX ADMIN — CALAMINE AND ZINC OXIDE AND PHENOL 1 APPLICATION(S): 160; 10 LOTION TOPICAL at 17:09

## 2023-11-13 RX ADMIN — ENOXAPARIN SODIUM 40 MILLIGRAM(S): 100 INJECTION SUBCUTANEOUS at 21:19

## 2023-11-13 RX ADMIN — PANTOPRAZOLE SODIUM 40 MILLIGRAM(S): 20 TABLET, DELAYED RELEASE ORAL at 05:46

## 2023-11-13 RX ADMIN — METHOCARBAMOL 500 MILLIGRAM(S): 500 TABLET, FILM COATED ORAL at 05:46

## 2023-11-13 RX ADMIN — AMLODIPINE BESYLATE 10 MILLIGRAM(S): 2.5 TABLET ORAL at 21:18

## 2023-11-13 RX ADMIN — POLYETHYLENE GLYCOL 3350 17 GRAM(S): 17 POWDER, FOR SOLUTION ORAL at 17:10

## 2023-11-13 RX ADMIN — ATORVASTATIN CALCIUM 40 MILLIGRAM(S): 80 TABLET, FILM COATED ORAL at 21:18

## 2023-11-13 RX ADMIN — Medication 1 APPLICATION(S): at 17:12

## 2023-11-13 RX ADMIN — METHOCARBAMOL 500 MILLIGRAM(S): 500 TABLET, FILM COATED ORAL at 15:04

## 2023-11-13 RX ADMIN — METHOCARBAMOL 500 MILLIGRAM(S): 500 TABLET, FILM COATED ORAL at 21:18

## 2023-11-13 RX ADMIN — Medication 1 APPLICATION(S): at 05:47

## 2023-11-13 RX ADMIN — CALAMINE AND ZINC OXIDE AND PHENOL 1 APPLICATION(S): 160; 10 LOTION TOPICAL at 05:47

## 2023-11-13 RX ADMIN — SENNA PLUS 2 TABLET(S): 8.6 TABLET ORAL at 21:18

## 2023-11-13 RX ADMIN — LOSARTAN POTASSIUM 50 MILLIGRAM(S): 100 TABLET, FILM COATED ORAL at 05:46

## 2023-11-13 NOTE — CHART NOTE - NSCHARTNOTEFT_GEN_A_CORE
Nutrition Follow Up Note  Hospital Course   (Per Electronic Medical Record)    Source:  Patient [X]  Nursing Staff [X]   Medical Record [X]      Diet: Diet, Consistent Carbohydrate w/Evening Snack:   Supplement Feeding Modality:  Oral  Glucerna Shake Cans or Servings Per Day:  1       Frequency:  Two Times a day (11-08-23 @ 15:14) [Active]    Patient is Iraqi speaking. Follow up interview conducted using [employer] via telephone (ID#:[id number] ). At this time patient tolerating diet w/ adequate appetite/intake consuming % of meals. Recommend discontinuing Glucerna 8oz PO BID (Provides 440kcal-20grams of Protein) at this time. Encouraged intake of HBV protein at meals.  No issues w/ dentition or chewing and swallowing current diet texture. Denies N/V/C/D, last BM 11/11 Per nursing flowsheets. Patient w/ pre-diabetes A1C: 5.9%, addressed w/ Consistent Carbohydrate meal pattern. Patient reports good understanding and acceptance of Consistent Carbohydrate meal pattern.     Enteral/Parenteral Nutrition: Not Applicable    Current Weight: 151.8lb on 11/9    Pertinent Medications: MEDICATIONS  (STANDING):  amLODIPine   Tablet 10 milliGRAM(s) Oral at bedtime  AQUAPHOR (petrolatum Ointment) 1 Application(s) Topical two times a day  atorvastatin 40 milliGRAM(s) Oral at bedtime  calamine/zinc oxide Lotion 1 Application(s) Topical two times a day  enoxaparin Injectable 40 milliGRAM(s) SubCutaneous every 24 hours  losartan 50 milliGRAM(s) Oral daily  methocarbamol 500 milliGRAM(s) Oral three times a day  pantoprazole    Tablet 40 milliGRAM(s) Oral before breakfast  polyethylene glycol 3350 17 Gram(s) Oral two times a day  senna 2 Tablet(s) Oral at bedtime    MEDICATIONS  (PRN):  acetaminophen     Tablet .. 975 milliGRAM(s) Oral every 6 hours PRN Mild Pain (1 - 3), Moderate Pain (4 - 6)  bisacodyl 5 milliGRAM(s) Oral every 12 hours PRN Constipation  traMADol 50 milliGRAM(s) Oral every 6 hours PRN Severe Pain (7 - 10)      Pertinent Labs:  11-13 Na138 mmol/L Glu 105 mg/dL<H> K+ 4.3 mmol/L Cr  1.12 mg/dL BUN 29 mg/dL<H> 11-13 Alb 3.1 g/dL<L>    Skin: PI stage 1     Edema: No edema noted per nursing flowsheet    Last Bowel Movement: on 11/11    Estimated Needs:   [X] No Change Since Previous Assessment    Previous Nutrition Diagnosis:   Malnutrition...  moderate, chronic  related to inadequate energy intake in setting of cervical & lumbar spinal stenosis  as evidenced by 21% weight loss within 1-2 yrs, moderate muscle wasting/fat loss per NFPE    Nutrition Diagnosis is [X] Ongoing - addressed w/ PO intake >75% on Consistent Carbohydrate meal pattern     New Nutrition Diagnosis: [X] Not Applicable    Interventions:   1. Recommend continuing with current plan of care  2. Encourage PO intake  3. Obtain and honor food preferences as able  4. Ongoing diet education     Monitoring & Evaluation:   [X] Weights   [X] PO Intake   [X] Skin Integrity   [X] Follow Up (Per Protocol)  [X] Tolerance to Diet Prescription   [X] Other: Labs     Registered Dietitian/Nutritionist Remains Available.  Fabiola Castaneda RD, MS, CDN    Phone# [phone]

## 2023-11-13 NOTE — PROGRESS NOTE ADULT - ASSESSMENT
Assessment/Plan:  Case of an 83 year old right handed Macedonian speaking male patient with past medical history of HTN, HLD, pre-diabetes, BPH, cervical and lumbar spinal stenosis (s/p Posterior C1-C6 Decompression, C4-5 Posterior Column Osteotomy, C1-C7 Fusion with Plastic Closure on 10/18/2022) who is admitted for Acute Inpatient Rehabilitation with a multidisciplinary rehab program at [facility] with functional impairments in ADLs and mobility secondary to lumbar stenosis with neurogenic claudication treated surgically with an elective posterior L2-L5 decompression surgery.    Lumbar stenosis with neurogenic claudication and posterior L2-L5 decompression  - L2-L5 decompressive laminectomy for lumbar stenosis with neurogenic claudication (11/3)  - surgical drain removed on 11/7  - Impaired ADLs and mobility  - Need for assistance with personal care   - Continue comprehensive rehab program of PT/OT - 3 hours a day, 5 days a week. P&O as needed   - Pain control below  - Fall/Spine precautions  - Restart daily ASA POD #14 (11/17)    Pain Control  - Tylenol 975 mg Q6H PRN mild-mod pain  - Tramadol 50 mg Q6H PRN severe pain  - Robaxin 500 mg TID    HTN  - c/w Norvasc 10 mg QHS and losartan 50 mg QD  - Monitor BP    HLD  - cont Lipitor 40 mg QHS    Mood / Cognition  - Neuropsychology consult PRN    Sleep  - Melatonin PRN     GI / Bowel  - Senna qHS  - Miralax PRN BID  - dulcolax PRN  - GI ppx: pantoprazole 40 mg QD     / Bladder  - Currently patient voids independently  - check PVR x1 on admission    Skin / Pressure injury  - Skin assessment on admission performed: 14 cm lumbar incision site, drain site 1 cmx1cm, Stage 1 pressure injury due to medical device on left buttock  - Pressure Injury/Skin: OOB to chair, PT/OT  - nursing to monitor skin qShift    Diet/Dysphagia:  - Diet Consistency: regular    DVT prophylaxis:   - Lovenox 40 mg QD     Outpatient Follow-up:  Gina Bond  Neurosurgery  [address] [place] [address], [address]  [place] [address]  Phone: [phone]  Fax: [phone]  Follow Up Time:      Code Status/Emergency Contact:      --------------- Assessment/Plan:  Case of an 83 year old right handed Korean speaking male patient with past medical history of HTN, HLD, pre-diabetes, BPH, cervical and lumbar spinal stenosis (s/p Posterior C1-C6 Decompression, C4-5 Posterior Column Osteotomy, C1-C7 Fusion with Plastic Closure on 10/18/2022) who is admitted for Acute Inpatient Rehabilitation with a multidisciplinary rehab program at [facility] with functional impairments in ADLs and mobility secondary to lumbar stenosis with neurogenic claudication treated surgically with an elective posterior L2-L5 decompression surgery.    Lumbar stenosis with neurogenic claudication and posterior L2-L5 decompression  - L2-L5 decompressive laminectomy for lumbar stenosis with neurogenic claudication (11/3)  - surgical drain removed on 11/7  - Impaired ADLs and mobility  - Need for assistance with personal care   - Continue comprehensive rehab program of PT/OT - 3 hours a day, 5 days a week. P&O as needed   - Pain control below  - Fall/Spine precautions  - Restart daily ASA POD #14 (11/17)    Pain Control  - Tylenol 975 mg Q6H PRN mild-mod pain  - Tramadol 50 mg Q6H PRN severe pain  - Robaxin 500 mg TID    HTN  - c/w Norvasc 10 mg QHS and losartan 50 mg QD  - Monitor BP    HLD  - cont Lipitor 40 mg QHS    Mood / Cognition  - Neuropsychology consult PRN    Sleep  - Melatonin PRN     GI / Bowel  - Senna qHS  - Miralax PRN BID  - dulcolax PRN  - GI ppx: pantoprazole 40 mg QD     / Bladder  - Currently patient voids independently  - check PVR x1 on admission    Skin / Pressure injury  - Skin assessment on admission performed: 14 cm lumbar incision site, drain site 1 cmx1cm, Stage 1 pressure injury to lumbar spine  - Pressure Injury/Skin: OOB to chair, PT/OT  - nursing to monitor skin qShift    Diet/Dysphagia:  - Diet Consistency: regular    DVT prophylaxis:   - Lovenox 40 mg QD     Outpatient Follow-up:  Gina Bond  Neurosurgery  [address] [place] [address], [address]  [place] [address]  Phone: [phone]  Fax: [phone]  Follow Up Time:      Code Status/Emergency Contact:      ---------------

## 2023-11-13 NOTE — PROGRESS NOTE ADULT - SUBJECTIVE AND OBJECTIVE BOX
PROGRESS NOTE:     Patient is a 83y old  Male who presents with a chief complaint of Lumbar stenosis with neurogenic claudication and posterior L2-L5 decompression (12 Nov 2023 15:38)          SUBJECTIVE & OBJECTIVE:   Pt seen and examined at bedside in AM    no overnight events.   no new complaints    REVIEW OF SYSTEMS: remaining ROS negative     PHYSICAL EXAM:  VITALS:  Vital Signs Last 24 Hrs  T(C): 36.8 (13 Nov 2023 08:38), Max: 36.8 (13 Nov 2023 08:38)  T(F): 98.3 (13 Nov 2023 08:38), Max: 98.3 (13 Nov 2023 08:38)  HR: 81 (13 Nov 2023 08:38) (76 - 81)  BP: 155/71 (13 Nov 2023 08:38) (136/80 - 155/71)  BP(mean): --  RR: 16 (13 Nov 2023 08:38) (16 - 18)  SpO2: 96% (13 Nov 2023 08:38) (96% - 98%)    Parameters below as of 13 Nov 2023 08:38  Patient On (Oxygen Delivery Method): room air          GENERAL: NAD,  no increased WOB  HEAD:  Atraumatic, Normocephalic  EYES: EOMI, conjunctiva and sclera clear  ENMT: Moist mucous membranes  NECK: Supple, No JVD  NERVOUS SYSTEM:  Alert & Oriented   CHEST/LUNG: Clear to auscultation bilaterally; No rales, rhonchi, wheezing  HEART: Regular rate and rhythm; S1 S2  ABDOMEN: Soft, Nontender, Nondistended; Bowel sounds present  EXTREMITIES: No clubbing, cyanosis, calf tenderness or edema b/l      MEDICATIONS  (STANDING):  amLODIPine   Tablet 10 milliGRAM(s) Oral at bedtime  AQUAPHOR (petrolatum Ointment) 1 Application(s) Topical two times a day  atorvastatin 40 milliGRAM(s) Oral at bedtime  calamine/zinc oxide Lotion 1 Application(s) Topical two times a day  enoxaparin Injectable 40 milliGRAM(s) SubCutaneous every 24 hours  losartan 50 milliGRAM(s) Oral daily  methocarbamol 500 milliGRAM(s) Oral three times a day  pantoprazole    Tablet 40 milliGRAM(s) Oral before breakfast  polyethylene glycol 3350 17 Gram(s) Oral two times a day  senna 2 Tablet(s) Oral at bedtime    MEDICATIONS  (PRN):  acetaminophen     Tablet .. 975 milliGRAM(s) Oral every 6 hours PRN Mild Pain (1 - 3), Moderate Pain (4 - 6)  bisacodyl 5 milliGRAM(s) Oral every 12 hours PRN Constipation  traMADol 50 milliGRAM(s) Oral every 6 hours PRN Severe Pain (7 - 10)      Allergies    penicillin (Rash)    Intolerances              LABS:                           10.6   7.21  )-----------( 337      ( 13 Nov 2023 05:39 )             32.9     11-13    138  |  101  |  29<H>  ----------------------------<  105<H>  4.3   |  31  |  1.12    Ca    9.3      13 Nov 2023 05:39    TPro  7.2  /  Alb  3.1<L>  /  TBili  0.4  /  DBili  x   /  AST  18  /  ALT  32  /  AlkPhos  113  11-13      Urinalysis Basic - ( 13 Nov 2023 05:39 )    Color: x / Appearance: x / SG: x / pH: x  Gluc: 105 mg/dL / Ketone: x  / Bili: x / Urobili: x   Blood: x / Protein: x / Nitrite: x   Leuk Esterase: x / RBC: x / WBC x   Sq Epi: x / Non Sq Epi: x / Bacteria: x      CAPILLARY BLOOD GLUCOSE                    RECENT CULTURES:        COVID-19 PCR: Carrie (11-07-23 @ 20:45)      RADIOLOGY & ADDITIONAL TESTS:    Care Discussed with Consultants/Other Providers:

## 2023-11-13 NOTE — PROGRESS NOTE ADULT - SUBJECTIVE AND OBJECTIVE BOX
HPI:  Case of an 83 year old right handed Irish speaking male patient with past medical history of HTN, HLD, pre-diabetes, BPH, cervical and lumbar spinal stenosis (s/p Posterior C1-C6 Decompression, C4-5 Posterior Column Osteotomy, C1-C7 Fusion with Plastic Closure on 10/18/2022) who presented to [facility] on 11/3 for a scheduled posterior L2-L5 decompression surgery for lumbar stenosis with neurogenic claudication. Prior to surgery patient had low back pain associated with bilateral leg pain and difficulty walking. he additionally had persistent neck heaviness and bilateral shoulder pain which was worse on the left shoulder. Post-operative hospital course was uncomplicated and surgical drain was removed on 11/7/23. Patient evaluated by PT/OT and was recommended for acute inpatient rehab. Patient is medically stable for discharge to [facility] on 11/7/23. (07 Nov 2023 14:41)    TDD: 11/21 Home  ___________________________________________________________________________    SUBJECTIVE/ROS  Patient was seen and evaluated at bedside today.  Reported no overnight events and is in no acute distress.  Patient states the itching on his back is responding to Aquaphor.   Patient reports pain is well controlled.  Case was discussed at Interdisciplinary Team meeting 11/9.  A tentative discharge date is outlined above.  Patient is motivated to continue participation on the recommended rehabilitation program.  Denies any CP, SOB, GAITAN, palpitations, fever, chills, body aches, cough, congestion, or any other symptoms at this time.   ___________________________________________________________________________    Vital Signs Last 24 Hrs  T(C): 36.8 (13 Nov 2023 08:38), Max: 36.8 (13 Nov 2023 08:38)  T(F): 98.3 (13 Nov 2023 08:38), Max: 98.3 (13 Nov 2023 08:38)  HR: 81 (13 Nov 2023 08:38) (76 - 81)  BP: 155/71 (13 Nov 2023 08:38) (136/80 - 155/71)  RR: 16 (13 Nov 2023 08:38) (16 - 18)  SpO2: 96% (13 Nov 2023 08:38) (96% - 98%)    ___________________________________________________________________________    LAB                        10.6   7.21  )-----------( 337      ( 13 Nov 2023 05:39 )             32.9         11-13    138  |  101  |  29<H>  ----------------------------<  105<H>  4.3   |  31  |  1.12    Ca    9.3      13 Nov 2023 05:39    TPro  7.2  /  Alb  3.1<L>  /  TBili  0.4  /  DBili  x   /  AST  18  /  ALT  32  /  AlkPhos  113  11-13    LIVER FUNCTIONS - ( 13 Nov 2023 05:39 )  Alb: 3.1 g/dL / Pro: 7.2 g/dL / ALK PHOS: 113 U/L / ALT: 32 U/L / AST: 18 U/L / GGT: x           ___________________________________________________________________________    MEDICATIONS  (STANDING):  amLODIPine   Tablet 10 milliGRAM(s) Oral at bedtime  AQUAPHOR (petrolatum Ointment) 1 Application(s) Topical two times a day  atorvastatin 40 milliGRAM(s) Oral at bedtime  calamine/zinc oxide Lotion 1 Application(s) Topical two times a day  enoxaparin Injectable 40 milliGRAM(s) SubCutaneous every 24 hours  losartan 50 milliGRAM(s) Oral daily  methocarbamol 500 milliGRAM(s) Oral three times a day  pantoprazole    Tablet 40 milliGRAM(s) Oral before breakfast  polyethylene glycol 3350 17 Gram(s) Oral two times a day  senna 2 Tablet(s) Oral at bedtime    MEDICATIONS  (PRN):  acetaminophen     Tablet .. 975 milliGRAM(s) Oral every 6 hours PRN Mild Pain (1 - 3), Moderate Pain (4 - 6)  bisacodyl 5 milliGRAM(s) Oral every 12 hours PRN Constipation  traMADol 50 milliGRAM(s) Oral every 6 hours PRN Severe Pain (7 - 10)    ___________________________________________________________________________    PHYSICAL EXAM:    General: NAD, Resting Comfortable,                                                                 Chest - Breathing comfortably, room air   Cardiovascular - S1S2   Abdomen - Soft   Extremities - No C/C/E, No calf tenderness   Neurologic Exam -                    Cognitive - Awake, Alert, AAO to self, place, date, year, situation     Communication - Fluent, No dysarthria  Motor    LEFT    UE: SF [5/5], EF [5/5], EE [5/5], WE [5/5],  [wnl]  RIGHT UE: SF [5/5], EF [5/5], EE [5/5], WE [5/5],  [wnl]  LEFT    LE:  HF [3-4/5], KE [4/5], DF [3/5], EHL [3/5],  PF [4/5]  RIGHT LE:  HF [3-4/5], KE [4/5], DF [4/5], EHL [4/5],  PF [4/5]    Reflex:  2 + throughout, Babinski negative  Wounds: 14 cm lumbar incision site, drain site 1 cmx1cm, Stage 1 pressure injury due to medical device on left buttock  ___________________________________________________________________________ HPI:  Case of an 83 year old right handed Tamazight speaking male patient with past medical history of HTN, HLD, pre-diabetes, BPH, cervical and lumbar spinal stenosis (s/p Posterior C1-C6 Decompression, C4-5 Posterior Column Osteotomy, C1-C7 Fusion with Plastic Closure on 10/18/2022) who presented to [facility] on 11/3 for a scheduled posterior L2-L5 decompression surgery for lumbar stenosis with neurogenic claudication. Prior to surgery patient had low back pain associated with bilateral leg pain and difficulty walking. he additionally had persistent neck heaviness and bilateral shoulder pain which was worse on the left shoulder. Post-operative hospital course was uncomplicated and surgical drain was removed on 11/7/23. Patient evaluated by PT/OT and was recommended for acute inpatient rehab. Patient is medically stable for discharge to [facility] on 11/7/23. (07 Nov 2023 14:41)    TDD: 11/21 Home  ___________________________________________________________________________    SUBJECTIVE/ROS  Patient was seen and evaluated at bedside today.  Reported no overnight events and is in no acute distress.  Patient states the itching on his back is responding to Aquaphor.   Patient reports pain is well controlled.  Case was discussed at Interdisciplinary Team meeting 11/9.  A tentative discharge date is outlined above.  Patient is motivated to continue participation on the recommended rehabilitation program.  Denies any CP, SOB, GAITAN, palpitations, fever, chills, body aches, cough, congestion, or any other symptoms at this time.   ___________________________________________________________________________    Vital Signs Last 24 Hrs  T(C): 36.8 (13 Nov 2023 08:38), Max: 36.8 (13 Nov 2023 08:38)  T(F): 98.3 (13 Nov 2023 08:38), Max: 98.3 (13 Nov 2023 08:38)  HR: 81 (13 Nov 2023 08:38) (76 - 81)  BP: 155/71 (13 Nov 2023 08:38) (136/80 - 155/71)  RR: 16 (13 Nov 2023 08:38) (16 - 18)  SpO2: 96% (13 Nov 2023 08:38) (96% - 98%)    ___________________________________________________________________________    LAB                        10.6   7.21  )-----------( 337      ( 13 Nov 2023 05:39 )             32.9         11-13    138  |  101  |  29<H>  ----------------------------<  105<H>  4.3   |  31  |  1.12    Ca    9.3      13 Nov 2023 05:39    TPro  7.2  /  Alb  3.1<L>  /  TBili  0.4  /  DBili  x   /  AST  18  /  ALT  32  /  AlkPhos  113  11-13    LIVER FUNCTIONS - ( 13 Nov 2023 05:39 )  Alb: 3.1 g/dL / Pro: 7.2 g/dL / ALK PHOS: 113 U/L / ALT: 32 U/L / AST: 18 U/L / GGT: x           ___________________________________________________________________________    MEDICATIONS  (STANDING):  amLODIPine   Tablet 10 milliGRAM(s) Oral at bedtime  AQUAPHOR (petrolatum Ointment) 1 Application(s) Topical two times a day  atorvastatin 40 milliGRAM(s) Oral at bedtime  calamine/zinc oxide Lotion 1 Application(s) Topical two times a day  enoxaparin Injectable 40 milliGRAM(s) SubCutaneous every 24 hours  losartan 50 milliGRAM(s) Oral daily  methocarbamol 500 milliGRAM(s) Oral three times a day  pantoprazole    Tablet 40 milliGRAM(s) Oral before breakfast  polyethylene glycol 3350 17 Gram(s) Oral two times a day  senna 2 Tablet(s) Oral at bedtime    MEDICATIONS  (PRN):  acetaminophen     Tablet .. 975 milliGRAM(s) Oral every 6 hours PRN Mild Pain (1 - 3), Moderate Pain (4 - 6)  bisacodyl 5 milliGRAM(s) Oral every 12 hours PRN Constipation  traMADol 50 milliGRAM(s) Oral every 6 hours PRN Severe Pain (7 - 10)    ___________________________________________________________________________    PHYSICAL EXAM:    General: NAD, Resting Comfortable,                                                                 Chest - Breathing comfortably, room air   Cardiovascular - S1S2   Abdomen - Soft   Extremities - No C/C/E, No calf tenderness   Neurologic Exam -                    Cognitive - Awake, Alert, AAO to self, place, date, year, situation     Communication - Fluent, No dysarthria  Motor    LEFT    UE: SF [5/5], EF [5/5], EE [5/5], WE [5/5],  [wnl]  RIGHT UE: SF [5/5], EF [5/5], EE [5/5], WE [5/5],  [wnl]  LEFT    LE:  HF [3-4/5], KE [4/5], DF [3/5], EHL [3/5],  PF [4/5]  RIGHT LE:  HF [3-4/5], KE [4/5], DF [4/5], EHL [4/5],  PF [4/5]    Reflex:  2 + throughout, Babinski negative  Wounds: 14 cm lumbar incision site, drain site 1 cmx1cm, Stage 1 pressure injury to lumbar spine  ___________________________________________________________________________

## 2023-11-13 NOTE — PROGRESS NOTE ADULT - ASSESSMENT
83 year old male with PMH of Hypertension, Hyperlipidemia, Pre-DM, BPH, Cervical and Lumbar Stenosis (s/p Posterior C1-C6 Decompression, C4-5 Posterior Column Osteotomy, C1-C7 Fusion with Plastic Closure on 10/18/2022) who presented to [facility] on 11/3 for a scheduled posterior L2-L5 decompression surgery for lumbar stenosis with neurogenic claudication. Post-op course uncomplicated. Now admitted to [facility] rehab for functional decline.     Lumbar Stenosis w/ Neurogenic Claudication s/p L2-L5 Decompressive Laminectomy (11/3)  -Spine/Fall precaution  -ASA daily  -Outpatient follow up with neurosurgery     Surgical wound pruritis  - wound healing well. no signs of infection  - cont supportive care: add trial of calamine lotion. cont with Aquaphor     Hypertension  -BP acceptable control  -Continue amlodipine and losartan   -Monitor for OH    Hyperlipidemia   -Continue atorvastatin     Pre-Diabetes  -HbA1C 5.9  -Dietary and lifestyle modification  -Outpatient monitoring with PMD    GI PPx  -PPI    DVT PPx  -Lovenox SC

## 2023-11-14 PROCEDURE — 99232 SBSQ HOSP IP/OBS MODERATE 35: CPT

## 2023-11-14 RX ADMIN — CALAMINE AND ZINC OXIDE AND PHENOL 1 APPLICATION(S): 160; 10 LOTION TOPICAL at 17:29

## 2023-11-14 RX ADMIN — METHOCARBAMOL 500 MILLIGRAM(S): 500 TABLET, FILM COATED ORAL at 21:06

## 2023-11-14 RX ADMIN — PANTOPRAZOLE SODIUM 40 MILLIGRAM(S): 20 TABLET, DELAYED RELEASE ORAL at 05:05

## 2023-11-14 RX ADMIN — METHOCARBAMOL 500 MILLIGRAM(S): 500 TABLET, FILM COATED ORAL at 14:26

## 2023-11-14 RX ADMIN — LOSARTAN POTASSIUM 50 MILLIGRAM(S): 100 TABLET, FILM COATED ORAL at 05:05

## 2023-11-14 RX ADMIN — Medication 1 APPLICATION(S): at 18:22

## 2023-11-14 RX ADMIN — METHOCARBAMOL 500 MILLIGRAM(S): 500 TABLET, FILM COATED ORAL at 05:05

## 2023-11-14 RX ADMIN — POLYETHYLENE GLYCOL 3350 17 GRAM(S): 17 POWDER, FOR SOLUTION ORAL at 05:05

## 2023-11-14 RX ADMIN — AMLODIPINE BESYLATE 10 MILLIGRAM(S): 2.5 TABLET ORAL at 21:06

## 2023-11-14 RX ADMIN — ATORVASTATIN CALCIUM 40 MILLIGRAM(S): 80 TABLET, FILM COATED ORAL at 21:06

## 2023-11-14 RX ADMIN — CALAMINE AND ZINC OXIDE AND PHENOL 1 APPLICATION(S): 160; 10 LOTION TOPICAL at 05:06

## 2023-11-14 RX ADMIN — Medication 1 APPLICATION(S): at 05:06

## 2023-11-14 RX ADMIN — POLYETHYLENE GLYCOL 3350 17 GRAM(S): 17 POWDER, FOR SOLUTION ORAL at 17:29

## 2023-11-14 RX ADMIN — ENOXAPARIN SODIUM 40 MILLIGRAM(S): 100 INJECTION SUBCUTANEOUS at 21:06

## 2023-11-14 NOTE — PROGRESS NOTE ADULT - SUBJECTIVE AND OBJECTIVE BOX
Patient is a 83y old  Male who presents with a chief complaint of Lumbar stenosis with neurogenic claudication and posterior L2-L5 decompression (13 Nov 2023 15:24)      SUBJECTIVE / OVERNIGHT EVENTS:  Pt seen and examined at bedside. No acute events overnight.  Pt denies cp, palpitations, sob, abd pain, N/V, fever, chills.    ROS:  All other review of systems negative    Allergies    penicillin (Rash)    Intolerances        MEDICATIONS  (STANDING):  amLODIPine   Tablet 10 milliGRAM(s) Oral at bedtime  AQUAPHOR (petrolatum Ointment) 1 Application(s) Topical two times a day  atorvastatin 40 milliGRAM(s) Oral at bedtime  calamine/zinc oxide Lotion 1 Application(s) Topical two times a day  enoxaparin Injectable 40 milliGRAM(s) SubCutaneous every 24 hours  losartan 50 milliGRAM(s) Oral daily  methocarbamol 500 milliGRAM(s) Oral three times a day  pantoprazole    Tablet 40 milliGRAM(s) Oral before breakfast  polyethylene glycol 3350 17 Gram(s) Oral two times a day  senna 2 Tablet(s) Oral at bedtime    MEDICATIONS  (PRN):  acetaminophen     Tablet .. 975 milliGRAM(s) Oral every 6 hours PRN Mild Pain (1 - 3), Moderate Pain (4 - 6)  bisacodyl 5 milliGRAM(s) Oral every 12 hours PRN Constipation  traMADol 50 milliGRAM(s) Oral every 6 hours PRN Severe Pain (7 - 10)      Vital Signs Last 24 Hrs  T(C): 36.9 (14 Nov 2023 05:07), Max: 36.9 (14 Nov 2023 05:07)  T(F): 98.5 (14 Nov 2023 05:07), Max: 98.5 (14 Nov 2023 05:07)  HR: 79 (14 Nov 2023 05:07) (79 - 84)  BP: 148/80 (14 Nov 2023 05:07) (148/80 - 163/77)  BP(mean): --  RR: 15 (14 Nov 2023 05:07) (15 - 15)  SpO2: 97% (14 Nov 2023 05:07) (97% - 97%)    Parameters below as of 14 Nov 2023 05:07  Patient On (Oxygen Delivery Method): room air      CAPILLARY BLOOD GLUCOSE        I&O's Summary      PHYSICAL EXAM:  GENERAL: NAD, well-developed elderly male   HEAD:  Atraumatic, Normocephalic  NECK: Supple, No JVD  CHEST/LUNG: Clear to auscultation bilaterally; No wheeze, nonlabored breathing  HEART: Regular rate and rhythm; No murmurs, rubs, or gallops  ABDOMEN: Soft, Nontender, Nondistended; Bowel sounds present  EXTREMITIES:  No clubbing, cyanosis, or edema  PSYCH: calm, appropriate mood    LABS:                        10.6   7.21  )-----------( 337      ( 13 Nov 2023 05:39 )             32.9     11-13    138  |  101  |  29<H>  ----------------------------<  105<H>  4.3   |  31  |  1.12    Ca    9.3      13 Nov 2023 05:39    TPro  7.2  /  Alb  3.1<L>  /  TBili  0.4  /  DBili  x   /  AST  18  /  ALT  32  /  AlkPhos  113  11-13          Urinalysis Basic - ( 13 Nov 2023 05:39 )    Color: x / Appearance: x / SG: x / pH: x  Gluc: 105 mg/dL / Ketone: x  / Bili: x / Urobili: x   Blood: x / Protein: x / Nitrite: x   Leuk Esterase: x / RBC: x / WBC x   Sq Epi: x / Non Sq Epi: x / Bacteria: x        RADIOLOGY & ADDITIONAL TESTS:  Results Reviewed:   Imaging Personally Reviewed:  Electrocardiogram Personally Reviewed:    COORDINATION OF CARE:  Care Discussed with Consultants/Other Providers [Y/N]:  Prior or Outpatient Records Reviewed [Y/N]:

## 2023-11-14 NOTE — PROGRESS NOTE ADULT - ASSESSMENT
Assessment/Plan:  Case of an 83 year old right handed Lithuanian speaking male patient with past medical history of HTN, HLD, pre-diabetes, BPH, cervical and lumbar spinal stenosis (s/p Posterior C1-C6 Decompression, C4-5 Posterior Column Osteotomy, C1-C7 Fusion with Plastic Closure on 10/18/2022) who is admitted for Acute Inpatient Rehabilitation with a multidisciplinary rehab program at [facility] with functional impairments in ADLs and mobility secondary to lumbar stenosis with neurogenic claudication treated surgically with an elective posterior L2-L5 decompression surgery.    Lumbar stenosis with neurogenic claudication and posterior L2-L5 decompression  - L2-L5 decompressive laminectomy for lumbar stenosis with neurogenic claudication (11/3)  - surgical drain removed on 11/7  - Impaired ADLs and mobility  - Need for assistance with personal care   - Continue comprehensive rehab program of PT/OT - 3 hours a day, 5 days a week. P&O as needed   - Pain control below  - Fall/Spine precautions  - Restart daily ASA POD #14 (11/17)    Pain Control  - Tylenol 975 mg Q6H PRN mild-mod pain  - Tramadol 50 mg Q6H PRN severe pain - discontinued on 11/14 as patient not requiring  - Robaxin 500 mg TID    HTN  - c/w Norvasc 10 mg QHS and losartan 50 mg QD  - Monitor BP    HLD  - cont Lipitor 40 mg QHS    Mood / Cognition  - Neuropsychology consult PRN    Sleep  - Melatonin PRN     GI / Bowel  - Senna qHS  - Miralax PRN BID  - dulcolax PRN  - GI ppx: pantoprazole 40 mg QD     / Bladder  - Currently patient voids independently  - check PVR x1 on admission    Skin / Pressure injury  - Skin assessment on admission performed: 14 cm lumbar incision site, drain site 1 cmx1cm, Stage 1 pressure injury due to medical device on left buttock  - Pressure Injury/Skin: OOB to chair, PT/OT  - nursing to monitor skin qShift    Diet/Dysphagia:  - Diet Consistency: regular    DVT prophylaxis:   - Lovenox 40 mg QD     Outpatient Follow-up:  Gina Bond  Neurosurgery  [address] [place] [address], [address]  [place] [address]  Phone: [phone]  Fax: [phone]  Follow Up Time:      Code Status/Emergency Contact:      --------------- Assessment/Plan:  Case of an 83 year old right handed Serbian speaking male patient with past medical history of HTN, HLD, pre-diabetes, BPH, cervical and lumbar spinal stenosis (s/p Posterior C1-C6 Decompression, C4-5 Posterior Column Osteotomy, C1-C7 Fusion with Plastic Closure on 10/18/2022) who is admitted for Acute Inpatient Rehabilitation with a multidisciplinary rehab program at [facility] with functional impairments in ADLs and mobility secondary to lumbar stenosis with neurogenic claudication treated surgically with an elective posterior L2-L5 decompression surgery.    Lumbar stenosis with neurogenic claudication and posterior L2-L5 decompression  - L2-L5 decompressive laminectomy for lumbar stenosis with neurogenic claudication (11/3)  - surgical drain removed on 11/7  - Impaired ADLs and mobility  - Need for assistance with personal care   - Continue comprehensive rehab program of PT/OT - 3 hours a day, 5 days a week. P&O as needed   - Pain control below  - Fall/Spine precautions  - Restart daily ASA POD #14 (11/17)    Pain Control  - Tylenol 975 mg Q6H PRN mild-mod pain  - Tramadol 50 mg Q6H PRN severe pain - discontinued on 11/14 as patient not requiring  - Robaxin 500 mg TID    HTN  - c/w Norvasc 10 mg QHS and losartan 50 mg QD  - Monitor BP    HLD  - cont Lipitor 40 mg QHS    Mood / Cognition  - Neuropsychology consult PRN    Sleep  - Melatonin PRN     GI / Bowel  - Senna qHS  - Miralax PRN BID  - dulcolax PRN  - GI ppx: pantoprazole 40 mg QD     / Bladder  - Currently patient voids independently  - check PVR x1 on admission    Skin / Pressure injury  - Skin assessment on admission performed: 14 cm lumbar incision site, drain site 1 cmx1cm, Stage 1 pressure injury to lumbar spine  - Pressure Injury/Skin: OOB to chair, PT/OT  - nursing to monitor skin qShift    Diet/Dysphagia:  - Diet Consistency: regular    DVT prophylaxis:   - Lovenox 40 mg QD     Outpatient Follow-up:  Gina Bond  Neurosurgery  [address] [place] [address], [address]  [place] [address]  Phone: [phone]  Fax: [phone]  Follow Up Time:      Code Status/Emergency Contact:      ---------------

## 2023-11-14 NOTE — PROGRESS NOTE ADULT - ASSESSMENT
83 year old male with PMH of Hypertension, Hyperlipidemia, Pre-DM, BPH, Cervical and Lumbar Stenosis (s/p Posterior C1-C6 Decompression, C4-5 Posterior Column Osteotomy, C1-C7 Fusion with Plastic Closure on 10/18/2022) who presented to [facility] on 11/3 for a scheduled posterior L2-L5 decompression surgery for lumbar stenosis with neurogenic claudication. Post-op course uncomplicated. Now admitted to [facility] rehab for functional decline.     Lumbar Stenosis w/ Neurogenic Claudication s/p L2-L5 Decompressive Laminectomy (11/3)  -Spine/Fall precaution  -ASA daily  -Outpatient follow up with neurosurgery     Surgical wound pruritis  - wound healing well. no signs of infection  - cont supportive care    Hypertension  -Continue amlodipine and losartan   -Monitor for OH    Hyperlipidemia   -Continue atorvastatin     Pre-Diabetes  -HbA1C 5.9  -Dietary and lifestyle modification  -Outpatient monitoring with PMD    GI PPx  -PPI    DVT PPx  -Lovenox SC

## 2023-11-14 NOTE — PROGRESS NOTE ADULT - SUBJECTIVE AND OBJECTIVE BOX
HPI:  Case of an 83 year old right handed Italian speaking male patient with past medical history of HTN, HLD, pre-diabetes, BPH, cervical and lumbar spinal stenosis (s/p Posterior C1-C6 Decompression, C4-5 Posterior Column Osteotomy, C1-C7 Fusion with Plastic Closure on 10/18/2022) who presented to [facility] on 11/3 for a scheduled posterior L2-L5 decompression surgery for lumbar stenosis with neurogenic claudication. Prior to surgery patient had low back pain associated with bilateral leg pain and difficulty walking. he additionally had persistent neck heaviness and bilateral shoulder pain which was worse on the left shoulder. Post-operative hospital course was uncomplicated and surgical drain was removed on 11/7/23. Patient evaluated by PT/OT and was recommended for acute inpatient rehab. Patient is medically stable for discharge to [facility] on 11/7/23. (07 Nov 2023 14:41)    TDD: 11/21 Home  ___________________________________________________________________________    SUBJECTIVE/ROS  Patient was seen and evaluated in therapy today.  Reported no overnight events and is in no acute distress.  Patient states the itching on his back is responding to Aquaphor.   Patient reports pain is well controlled.  Case was discussed at Interdisciplinary Team meeting 11/9.  A tentative discharge date is outlined above.  Patient is motivated to continue participation on the recommended rehabilitation program.  Denies any CP, SOB, GAITAN, palpitations, fever, chills, body aches, cough, congestion, or any other symptoms at this time.   ___________________________________________________________________________    Vital Signs Last 24 Hrs  T(C): 36.9 (14 Nov 2023 05:07), Max: 36.9 (14 Nov 2023 05:07)  T(F): 98.5 (14 Nov 2023 05:07), Max: 98.5 (14 Nov 2023 05:07)  HR: 79 (14 Nov 2023 05:07) (79 - 84)  BP: 148/80 (14 Nov 2023 05:07) (148/80 - 163/77)  BP(mean): --  RR: 15 (14 Nov 2023 05:07) (15 - 15)  SpO2: 97% (14 Nov 2023 05:07) (97% - 97%)  ___________________________________________________________________________    LAB                        10.6   7.21  )-----------( 337      ( 13 Nov 2023 05:39 )             32.9         11-13    138  |  101  |  29<H>  ----------------------------<  105<H>  4.3   |  31  |  1.12    Ca    9.3      13 Nov 2023 05:39    TPro  7.2  /  Alb  3.1<L>  /  TBili  0.4  /  DBili  x   /  AST  18  /  ALT  32  /  AlkPhos  113  11-13    LIVER FUNCTIONS - ( 13 Nov 2023 05:39 )  Alb: 3.1 g/dL / Pro: 7.2 g/dL / ALK PHOS: 113 U/L / ALT: 32 U/L / AST: 18 U/L / GGT: x           ___________________________________________________________________________    MEDICATIONS  (STANDING):  amLODIPine   Tablet 10 milliGRAM(s) Oral at bedtime  AQUAPHOR (petrolatum Ointment) 1 Application(s) Topical two times a day  atorvastatin 40 milliGRAM(s) Oral at bedtime  calamine/zinc oxide Lotion 1 Application(s) Topical two times a day  enoxaparin Injectable 40 milliGRAM(s) SubCutaneous every 24 hours  losartan 50 milliGRAM(s) Oral daily  methocarbamol 500 milliGRAM(s) Oral three times a day  pantoprazole    Tablet 40 milliGRAM(s) Oral before breakfast  polyethylene glycol 3350 17 Gram(s) Oral two times a day  senna 2 Tablet(s) Oral at bedtime    MEDICATIONS  (PRN):  acetaminophen     Tablet .. 975 milliGRAM(s) Oral every 6 hours PRN Mild Pain (1 - 3), Moderate Pain (4 - 6)  bisacodyl 5 milliGRAM(s) Oral every 12 hours PRN Constipation  traMADol 50 milliGRAM(s) Oral every 6 hours PRN Severe Pain (7 - 10)    ___________________________________________________________________________    PHYSICAL EXAM:    General: NAD, walking in therapy                                                               Chest - Breathing comfortably, room air   Cardiovascular - warm and well perfused   Abdomen - Soft   Extremities - No C/C/E, No calf tenderness   Neurologic Exam -                    Cognitive - Awake, Alert, AAO to self, place, date, year, situation     Communication - Fluent, No dysarthria  Motor    LEFT    UE: SF [5/5], EF [5/5], EE [5/5], WE [5/5],  [wnl]  RIGHT UE: SF [5/5], EF [5/5], EE [5/5], WE [5/5],  [wnl]  LEFT    LE:  HF [3-4/5], KE [4/5], DF [3/5], EHL [3/5],  PF [4/5]  RIGHT LE:  HF [3-4/5], KE [4/5], DF [4/5], EHL [4/5],  PF [4/5]    Reflex:  2 + throughout, Babinski negative  Wounds: 14 cm lumbar incision site, drain site 1 cmx1cm, Stage 1 pressure injury due to medical device on left buttock  ___________________________________________________________________________ HPI:  Case of an 83 year old right handed Faroese speaking male patient with past medical history of HTN, HLD, pre-diabetes, BPH, cervical and lumbar spinal stenosis (s/p Posterior C1-C6 Decompression, C4-5 Posterior Column Osteotomy, C1-C7 Fusion with Plastic Closure on 10/18/2022) who presented to [facility] on 11/3 for a scheduled posterior L2-L5 decompression surgery for lumbar stenosis with neurogenic claudication. Prior to surgery patient had low back pain associated with bilateral leg pain and difficulty walking. he additionally had persistent neck heaviness and bilateral shoulder pain which was worse on the left shoulder. Post-operative hospital course was uncomplicated and surgical drain was removed on 11/7/23. Patient evaluated by PT/OT and was recommended for acute inpatient rehab. Patient is medically stable for discharge to [facility] on 11/7/23. (07 Nov 2023 14:41)    TDD: 11/21 Home  ___________________________________________________________________________    SUBJECTIVE/ROS  Patient was seen and evaluated in therapy today.  Reported no overnight events and is in no acute distress.  Patient states the itching on his back is responding to Aquaphor.   Patient reports pain is well controlled.  Case was discussed at Interdisciplinary Team meeting 11/9.  A tentative discharge date is outlined above.  Patient is motivated to continue participation on the recommended rehabilitation program.  Denies any CP, SOB, GAITAN, palpitations, fever, chills, body aches, cough, congestion, or any other symptoms at this time.   ___________________________________________________________________________    Vital Signs Last 24 Hrs  T(C): 36.9 (14 Nov 2023 05:07), Max: 36.9 (14 Nov 2023 05:07)  T(F): 98.5 (14 Nov 2023 05:07), Max: 98.5 (14 Nov 2023 05:07)  HR: 79 (14 Nov 2023 05:07) (79 - 84)  BP: 148/80 (14 Nov 2023 05:07) (148/80 - 163/77)  RR: 15 (14 Nov 2023 05:07) (15 - 15)  SpO2: 97% (14 Nov 2023 05:07) (97% - 97%)  ___________________________________________________________________________    LAB                        10.6   7.21  )-----------( 337      ( 13 Nov 2023 05:39 )             32.9         11-13    138  |  101  |  29<H>  ----------------------------<  105<H>  4.3   |  31  |  1.12    Ca    9.3      13 Nov 2023 05:39    TPro  7.2  /  Alb  3.1<L>  /  TBili  0.4  /  DBili  x   /  AST  18  /  ALT  32  /  AlkPhos  113  11-13    LIVER FUNCTIONS - ( 13 Nov 2023 05:39 )  Alb: 3.1 g/dL / Pro: 7.2 g/dL / ALK PHOS: 113 U/L / ALT: 32 U/L / AST: 18 U/L / GGT: x           ___________________________________________________________________________    MEDICATIONS  (STANDING):  amLODIPine   Tablet 10 milliGRAM(s) Oral at bedtime  AQUAPHOR (petrolatum Ointment) 1 Application(s) Topical two times a day  atorvastatin 40 milliGRAM(s) Oral at bedtime  calamine/zinc oxide Lotion 1 Application(s) Topical two times a day  enoxaparin Injectable 40 milliGRAM(s) SubCutaneous every 24 hours  losartan 50 milliGRAM(s) Oral daily  methocarbamol 500 milliGRAM(s) Oral three times a day  pantoprazole    Tablet 40 milliGRAM(s) Oral before breakfast  polyethylene glycol 3350 17 Gram(s) Oral two times a day  senna 2 Tablet(s) Oral at bedtime    MEDICATIONS  (PRN):  acetaminophen     Tablet .. 975 milliGRAM(s) Oral every 6 hours PRN Mild Pain (1 - 3), Moderate Pain (4 - 6)  bisacodyl 5 milliGRAM(s) Oral every 12 hours PRN Constipation  traMADol 50 milliGRAM(s) Oral every 6 hours PRN Severe Pain (7 - 10)    ___________________________________________________________________________    PHYSICAL EXAM:    General: NAD, walking in therapy                                                               Chest - Breathing comfortably, room air   Cardiovascular - warm and well perfused   Abdomen - Soft   Extremities - No C/C/E, No calf tenderness   Neurologic Exam -                    Cognitive - Awake, Alert, AAO to self, place, date, year, situation     Communication - Fluent, No dysarthria  Motor    LEFT    UE: SF [5/5], EF [5/5], EE [5/5], WE [5/5],  [wnl]  RIGHT UE: SF [5/5], EF [5/5], EE [5/5], WE [5/5],  [wnl]  LEFT    LE:  HF [3-4/5], KE [4/5], DF [3/5], EHL [3/5],  PF [4/5]  RIGHT LE:  HF [3-4/5], KE [4/5], DF [4/5], EHL [4/5],  PF [4/5]    Reflex:  2 + throughout, Babinski negative  Wounds: 14 cm lumbar incision site, drain site 1 cmx1cm, Stage 1 pressure injury due to medical device on left buttock  ___________________________________________________________________________ HPI:  Case of an 83 year old right handed Latvian speaking male patient with past medical history of HTN, HLD, pre-diabetes, BPH, cervical and lumbar spinal stenosis (s/p Posterior C1-C6 Decompression, C4-5 Posterior Column Osteotomy, C1-C7 Fusion with Plastic Closure on 10/18/2022) who presented to [facility] on 11/3 for a scheduled posterior L2-L5 decompression surgery for lumbar stenosis with neurogenic claudication. Prior to surgery patient had low back pain associated with bilateral leg pain and difficulty walking. he additionally had persistent neck heaviness and bilateral shoulder pain which was worse on the left shoulder. Post-operative hospital course was uncomplicated and surgical drain was removed on 11/7/23. Patient evaluated by PT/OT and was recommended for acute inpatient rehab. Patient is medically stable for discharge to [facility] on 11/7/23. (07 Nov 2023 14:41)    TDD: 11/21 Home  ___________________________________________________________________________    SUBJECTIVE/ROS  Patient was seen and evaluated in therapy today.  Reported no overnight events and is in no acute distress.  Patient states the itching on his back is responding to Aquaphor.   Patient reports pain is well controlled.  Case was discussed at Interdisciplinary Team meeting 11/9.  A tentative discharge date is outlined above.  Patient is motivated to continue participation on the recommended rehabilitation program.  Denies any CP, SOB, GAITAN, palpitations, fever, chills, body aches, cough, congestion, or any other symptoms at this time.   ___________________________________________________________________________    Vital Signs Last 24 Hrs  T(C): 36.9 (14 Nov 2023 05:07), Max: 36.9 (14 Nov 2023 05:07)  T(F): 98.5 (14 Nov 2023 05:07), Max: 98.5 (14 Nov 2023 05:07)  HR: 79 (14 Nov 2023 05:07) (79 - 84)  BP: 148/80 (14 Nov 2023 05:07) (148/80 - 163/77)  RR: 15 (14 Nov 2023 05:07) (15 - 15)  SpO2: 97% (14 Nov 2023 05:07) (97% - 97%)  ___________________________________________________________________________    LAB                        10.6   7.21  )-----------( 337      ( 13 Nov 2023 05:39 )             32.9         11-13    138  |  101  |  29<H>  ----------------------------<  105<H>  4.3   |  31  |  1.12    Ca    9.3      13 Nov 2023 05:39    TPro  7.2  /  Alb  3.1<L>  /  TBili  0.4  /  DBili  x   /  AST  18  /  ALT  32  /  AlkPhos  113  11-13    LIVER FUNCTIONS - ( 13 Nov 2023 05:39 )  Alb: 3.1 g/dL / Pro: 7.2 g/dL / ALK PHOS: 113 U/L / ALT: 32 U/L / AST: 18 U/L / GGT: x           ___________________________________________________________________________    MEDICATIONS  (STANDING):  amLODIPine   Tablet 10 milliGRAM(s) Oral at bedtime  AQUAPHOR (petrolatum Ointment) 1 Application(s) Topical two times a day  atorvastatin 40 milliGRAM(s) Oral at bedtime  calamine/zinc oxide Lotion 1 Application(s) Topical two times a day  enoxaparin Injectable 40 milliGRAM(s) SubCutaneous every 24 hours  losartan 50 milliGRAM(s) Oral daily  methocarbamol 500 milliGRAM(s) Oral three times a day  pantoprazole    Tablet 40 milliGRAM(s) Oral before breakfast  polyethylene glycol 3350 17 Gram(s) Oral two times a day  senna 2 Tablet(s) Oral at bedtime    MEDICATIONS  (PRN):  acetaminophen     Tablet .. 975 milliGRAM(s) Oral every 6 hours PRN Mild Pain (1 - 3), Moderate Pain (4 - 6)  bisacodyl 5 milliGRAM(s) Oral every 12 hours PRN Constipation  traMADol 50 milliGRAM(s) Oral every 6 hours PRN Severe Pain (7 - 10)    ___________________________________________________________________________    PHYSICAL EXAM:    General: NAD, walking in therapy                                                               Chest - Breathing comfortably, room air   Cardiovascular - warm and well perfused   Abdomen - Soft   Extremities - No C/C/E, No calf tenderness   Neurologic Exam -                    Cognitive - Awake, Alert, AAO to self, place, date, year, situation     Communication - Fluent, No dysarthria  Motor    LEFT    UE: SF [5/5], EF [5/5], EE [5/5], WE [5/5],  [wnl]  RIGHT UE: SF [5/5], EF [5/5], EE [5/5], WE [5/5],  [wnl]  LEFT    LE:  HF [3-4/5], KE [4/5], DF [3/5], EHL [3/5],  PF [4/5]  RIGHT LE:  HF [3-4/5], KE [4/5], DF [4/5], EHL [4/5],  PF [4/5]    Reflex:  2 + throughout, Babinski negative  Wounds: 14 cm lumbar incision site, drain site 1 cmx1cm, Stage 1 pressure injury to lumbar spine  ___________________________________________________________________________

## 2023-11-15 PROCEDURE — 99232 SBSQ HOSP IP/OBS MODERATE 35: CPT

## 2023-11-15 RX ADMIN — CALAMINE AND ZINC OXIDE AND PHENOL 1 APPLICATION(S): 160; 10 LOTION TOPICAL at 18:29

## 2023-11-15 RX ADMIN — Medication 975 MILLIGRAM(S): at 06:24

## 2023-11-15 RX ADMIN — Medication 975 MILLIGRAM(S): at 07:20

## 2023-11-15 RX ADMIN — LOSARTAN POTASSIUM 50 MILLIGRAM(S): 100 TABLET, FILM COATED ORAL at 06:24

## 2023-11-15 RX ADMIN — METHOCARBAMOL 500 MILLIGRAM(S): 500 TABLET, FILM COATED ORAL at 21:20

## 2023-11-15 RX ADMIN — ENOXAPARIN SODIUM 40 MILLIGRAM(S): 100 INJECTION SUBCUTANEOUS at 21:21

## 2023-11-15 RX ADMIN — METHOCARBAMOL 500 MILLIGRAM(S): 500 TABLET, FILM COATED ORAL at 13:50

## 2023-11-15 RX ADMIN — CALAMINE AND ZINC OXIDE AND PHENOL 1 APPLICATION(S): 160; 10 LOTION TOPICAL at 06:24

## 2023-11-15 RX ADMIN — Medication 1 APPLICATION(S): at 06:24

## 2023-11-15 RX ADMIN — POLYETHYLENE GLYCOL 3350 17 GRAM(S): 17 POWDER, FOR SOLUTION ORAL at 18:29

## 2023-11-15 RX ADMIN — SENNA PLUS 2 TABLET(S): 8.6 TABLET ORAL at 21:20

## 2023-11-15 RX ADMIN — ATORVASTATIN CALCIUM 40 MILLIGRAM(S): 80 TABLET, FILM COATED ORAL at 21:20

## 2023-11-15 RX ADMIN — PANTOPRAZOLE SODIUM 40 MILLIGRAM(S): 20 TABLET, DELAYED RELEASE ORAL at 06:23

## 2023-11-15 RX ADMIN — AMLODIPINE BESYLATE 10 MILLIGRAM(S): 2.5 TABLET ORAL at 21:21

## 2023-11-15 RX ADMIN — METHOCARBAMOL 500 MILLIGRAM(S): 500 TABLET, FILM COATED ORAL at 06:24

## 2023-11-15 RX ADMIN — Medication 1 APPLICATION(S): at 18:29

## 2023-11-15 NOTE — PROGRESS NOTE ADULT - SUBJECTIVE AND OBJECTIVE BOX
Patient is a 83y old  Male who presents with a chief complaint of Lumbar stenosis with neurogenic claudication and posterior L2-L5 decompression (14 Nov 2023 10:58)      SUBJECTIVE / OVERNIGHT EVENTS:  Pt seen and examined at bedside. No acute events overnight.  Pt denies cp, palpitations, sob, abd pain, N/V, fever, chills.    ROS:  All other review of systems negative    Allergies    penicillin (Rash)    Intolerances        MEDICATIONS  (STANDING):  amLODIPine   Tablet 10 milliGRAM(s) Oral at bedtime  AQUAPHOR (petrolatum Ointment) 1 Application(s) Topical two times a day  atorvastatin 40 milliGRAM(s) Oral at bedtime  calamine/zinc oxide Lotion 1 Application(s) Topical two times a day  enoxaparin Injectable 40 milliGRAM(s) SubCutaneous every 24 hours  losartan 50 milliGRAM(s) Oral daily  methocarbamol 500 milliGRAM(s) Oral three times a day  pantoprazole    Tablet 40 milliGRAM(s) Oral before breakfast  polyethylene glycol 3350 17 Gram(s) Oral two times a day  senna 2 Tablet(s) Oral at bedtime    MEDICATIONS  (PRN):  acetaminophen     Tablet .. 975 milliGRAM(s) Oral every 6 hours PRN Mild Pain (1 - 3), Moderate Pain (4 - 6)  bisacodyl 5 milliGRAM(s) Oral every 12 hours PRN Constipation      Vital Signs Last 24 Hrs  T(C): 36.9 (15 Nov 2023 06:28), Max: 36.9 (15 Nov 2023 06:28)  T(F): 98.5 (15 Nov 2023 06:28), Max: 98.5 (15 Nov 2023 06:28)  HR: 75 (15 Nov 2023 06:28) (75 - 80)  BP: 150/82 (15 Nov 2023 06:28) (149/76 - 150/82)  BP(mean): --  RR: 15 (15 Nov 2023 06:28) (15 - 15)  SpO2: 97% (15 Nov 2023 06:28) (97% - 97%)    Parameters below as of 15 Nov 2023 06:28  Patient On (Oxygen Delivery Method): room air      CAPILLARY BLOOD GLUCOSE        I&O's Summary      PHYSICAL EXAM:  GENERAL: NAD, well-developed elderly male   HEAD:  Atraumatic, Normocephalic  NECK: Supple, No JVD  CHEST/LUNG: Clear to auscultation bilaterally; No wheeze, nonlabored breathing  HEART: Regular rate and rhythm; No murmurs, rubs, or gallops  ABDOMEN: Soft, Nontender, Nondistended; Bowel sounds present  EXTREMITIES:  No clubbing, cyanosis, or edema  PSYCH: calm, appropriate mood    LABS:                    RADIOLOGY & ADDITIONAL TESTS:  Results Reviewed:   Imaging Personally Reviewed:  Electrocardiogram Personally Reviewed:    COORDINATION OF CARE:  Care Discussed with Consultants/Other Providers [Y/N]:  Prior or Outpatient Records Reviewed [Y/N]:

## 2023-11-15 NOTE — PROGRESS NOTE ADULT - ASSESSMENT
Assessment/Plan:  Case of an 83 year old right handed Italian speaking male patient with past medical history of HTN, HLD, pre-diabetes, BPH, cervical and lumbar spinal stenosis (s/p Posterior C1-C6 Decompression, C4-5 Posterior Column Osteotomy, C1-C7 Fusion with Plastic Closure on 10/18/2022) who is admitted for Acute Inpatient Rehabilitation with a multidisciplinary rehab program at [facility] with functional impairments in ADLs and mobility secondary to lumbar stenosis with neurogenic claudication treated surgically with an elective posterior L2-L5 decompression surgery.    Lumbar stenosis with neurogenic claudication and posterior L2-L5 decompression  - L2-L5 decompressive laminectomy for lumbar stenosis with neurogenic claudication (11/3)  - surgical drain removed on 11/7  - Impaired ADLs and mobility  - Need for assistance with personal care   - Continue comprehensive rehab program of PT/OT - 3 hours a day, 5 days a week. P&O as needed   - Pain control below  - Fall/Spine precautions  - Restart daily ASA POD #14 (11/17)    Pain Control  - Tylenol 975 mg Q6H PRN mild-mod pain  - Tramadol 50 mg Q6H PRN severe pain - discontinued on 11/14 as patient not requiring  - Robaxin 500 mg TID    HTN  - c/w Norvasc 10 mg QHS and losartan 50 mg QD  - Monitor BP    HLD  - cont Lipitor 40 mg QHS    Mood / Cognition  - Neuropsychology consult PRN    Sleep  - Melatonin PRN     GI / Bowel  - Senna qHS  - Miralax PRN BID  - dulcolax PRN  - GI ppx: pantoprazole 40 mg QD     / Bladder  - Currently patient voids independently  - check PVR x1 on admission    Skin / Pressure injury  - Skin assessment on admission performed: 14 cm lumbar incision site, drain site 1 cmx1cm, Stage 1 pressure injury to lumbar spine  - Pressure Injury/Skin: OOB to chair, PT/OT  - nursing to monitor skin qShift    Diet/Dysphagia:  - Diet Consistency: regular    DVT prophylaxis:   - Lovenox 40 mg QD     Outpatient Follow-up:  Gina Bond  Neurosurgery  [address] [place] [address], [address]  [place] [address]  Phone: [phone]  Fax: [phone]  Follow Up Time:      Code Status/Emergency Contact:      ---------------

## 2023-11-15 NOTE — PROGRESS NOTE ADULT - ASSESSMENT
83 year old male with PMH of Hypertension, Hyperlipidemia, Pre-DM, BPH, Cervical and Lumbar Stenosis (s/p Posterior C1-C6 Decompression, C4-5 Posterior Column Osteotomy, C1-C7 Fusion with Plastic Closure on 10/18/2022) who presented to [facility] on 11/3 for a scheduled posterior L2-L5 decompression surgery for lumbar stenosis with neurogenic claudication. Post-op course uncomplicated. Now admitted to [facility] rehab for functional decline.     Lumbar Stenosis w/ Neurogenic Claudication s/p L2-L5 Decompressive Laminectomy (11/3)  -Spine/Fall precaution  -ASA daily  -Outpatient follow up with neurosurgery     Surgical wound pruritis  - wound healing well. no signs of infection  - cont supportive care    Hypertension  -Continue amlodipine and losartan   -Monitor for OH    Hyperlipidemia   -Continue atorvastatin     Pre-Diabetes  -HbA1C 5.9  -Dietary and lifestyle modification  -Outpatient monitoring with PMD    GI PPx  -PPI    DVT PPx  -Lovenox SC

## 2023-11-15 NOTE — PROGRESS NOTE ADULT - SUBJECTIVE AND OBJECTIVE BOX
HPI:  Case of an 83 year old right handed Czech speaking male patient with past medical history of HTN, HLD, pre-diabetes, BPH, cervical and lumbar spinal stenosis (s/p Posterior C1-C6 Decompression, C4-5 Posterior Column Osteotomy, C1-C7 Fusion with Plastic Closure on 10/18/2022) who presented to [facility] on 11/3 for a scheduled posterior L2-L5 decompression surgery for lumbar stenosis with neurogenic claudication. Prior to surgery patient had low back pain associated with bilateral leg pain and difficulty walking. he additionally had persistent neck heaviness and bilateral shoulder pain which was worse on the left shoulder. Post-operative hospital course was uncomplicated and surgical drain was removed on 11/7/23. Patient evaluated by PT/OT and was recommended for acute inpatient rehab. Patient is medically stable for discharge to [facility] on 11/7/23. (07 Nov 2023 14:41)    TDD: 11/21 Home  ___________________________________________________________________________    SUBJECTIVE/ROS  Patient was seen and evaluated in therapy today.  Reported no overnight events and is in no acute distress.  Patient notes almost resolution of the itching on his back.  Case was discussed at Interdisciplinary Team meeting 11/9.  A tentative discharge date is outlined above.  Patient is motivated to continue participation on the recommended rehabilitation program.  Denies any CP, SOB, GAITAN, palpitations, fever, chills, body aches, cough, congestion, or any other symptoms at this time.   ___________________________________________________________________________    Vital Signs Last 24 Hrs  T(C): 36.9 (15 Nov 2023 06:28), Max: 36.9 (15 Nov 2023 06:28)  T(F): 98.5 (15 Nov 2023 06:28), Max: 98.5 (15 Nov 2023 06:28)  HR: 75 (15 Nov 2023 06:28) (75 - 80)  BP: 150/82 (15 Nov 2023 06:28) (149/76 - 150/82)  RR: 15 (15 Nov 2023 06:28) (15 - 15)  SpO2: 97% (15 Nov 2023 06:28) (97% - 97%)    ___________________________________________________________________________    LAB                        10.6   7.21  )-----------( 337      ( 13 Nov 2023 05:39 )             32.9         11-13    138  |  101  |  29<H>  ----------------------------<  105<H>  4.3   |  31  |  1.12    Ca    9.3      13 Nov 2023 05:39    TPro  7.2  /  Alb  3.1<L>  /  TBili  0.4  /  DBili  x   /  AST  18  /  ALT  32  /  AlkPhos  113  11-13    LIVER FUNCTIONS - ( 13 Nov 2023 05:39 )  Alb: 3.1 g/dL / Pro: 7.2 g/dL / ALK PHOS: 113 U/L / ALT: 32 U/L / AST: 18 U/L / GGT: x           ___________________________________________________________________________    MEDICATIONS  (STANDING):  amLODIPine   Tablet 10 milliGRAM(s) Oral at bedtime  AQUAPHOR (petrolatum Ointment) 1 Application(s) Topical two times a day  atorvastatin 40 milliGRAM(s) Oral at bedtime  calamine/zinc oxide Lotion 1 Application(s) Topical two times a day  enoxaparin Injectable 40 milliGRAM(s) SubCutaneous every 24 hours  losartan 50 milliGRAM(s) Oral daily  methocarbamol 500 milliGRAM(s) Oral three times a day  pantoprazole    Tablet 40 milliGRAM(s) Oral before breakfast  polyethylene glycol 3350 17 Gram(s) Oral two times a day  senna 2 Tablet(s) Oral at bedtime    MEDICATIONS  (PRN):  acetaminophen     Tablet .. 975 milliGRAM(s) Oral every 6 hours PRN Mild Pain (1 - 3), Moderate Pain (4 - 6)  bisacodyl 5 milliGRAM(s) Oral every 12 hours PRN Constipation    ___________________________________________________________________________    PHYSICAL EXAM:    General: NAD, walking in therapy                                                               Chest - Breathing comfortably, room air   Cardiovascular - warm and well perfused   Abdomen - Soft   Extremities - No C/C/E, No calf tenderness   Neurologic Exam -                    Cognitive - Awake, Alert, AAO to self, place, date, year, situation     Communication - Fluent, No dysarthria  Motor    LEFT    UE: SF [5/5], EF [5/5], EE [5/5], WE [5/5],  [wnl]  RIGHT UE: SF [5/5], EF [5/5], EE [5/5], WE [5/5],  [wnl]  LEFT    LE:  HF [3-4/5], KE [4/5], DF [3/5], EHL [3/5],  PF [4/5]  RIGHT LE:  HF [3-4/5], KE [4/5], DF [4/5], EHL [4/5],  PF [4/5]    Reflex:  2 + throughout, Babinski negative  Wounds: 14 cm lumbar incision site, drain site 1 cmx1cm, Stage 1 pressure injury to lumbar spine  ___________________________________________________________________________

## 2023-11-16 LAB
ALBUMIN SERPL ELPH-MCNC: 3.3 G/DL — SIGNIFICANT CHANGE UP (ref 3.3–5)
ALBUMIN SERPL ELPH-MCNC: 3.3 G/DL — SIGNIFICANT CHANGE UP (ref 3.3–5)
ALP SERPL-CCNC: 118 U/L — SIGNIFICANT CHANGE UP (ref 40–120)
ALP SERPL-CCNC: 118 U/L — SIGNIFICANT CHANGE UP (ref 40–120)
ALT FLD-CCNC: 28 U/L — SIGNIFICANT CHANGE UP (ref 10–45)
ALT FLD-CCNC: 28 U/L — SIGNIFICANT CHANGE UP (ref 10–45)
ANION GAP SERPL CALC-SCNC: 9 MMOL/L — SIGNIFICANT CHANGE UP (ref 5–17)
ANION GAP SERPL CALC-SCNC: 9 MMOL/L — SIGNIFICANT CHANGE UP (ref 5–17)
AST SERPL-CCNC: 18 U/L — SIGNIFICANT CHANGE UP (ref 10–40)
AST SERPL-CCNC: 18 U/L — SIGNIFICANT CHANGE UP (ref 10–40)
BILIRUB SERPL-MCNC: 0.3 MG/DL — SIGNIFICANT CHANGE UP (ref 0.2–1.2)
BILIRUB SERPL-MCNC: 0.3 MG/DL — SIGNIFICANT CHANGE UP (ref 0.2–1.2)
BUN SERPL-MCNC: 38 MG/DL — HIGH (ref 7–23)
BUN SERPL-MCNC: 38 MG/DL — HIGH (ref 7–23)
CALCIUM SERPL-MCNC: 9.7 MG/DL — SIGNIFICANT CHANGE UP (ref 8.4–10.5)
CALCIUM SERPL-MCNC: 9.7 MG/DL — SIGNIFICANT CHANGE UP (ref 8.4–10.5)
CHLORIDE SERPL-SCNC: 101 MMOL/L — SIGNIFICANT CHANGE UP (ref 96–108)
CHLORIDE SERPL-SCNC: 101 MMOL/L — SIGNIFICANT CHANGE UP (ref 96–108)
CO2 SERPL-SCNC: 28 MMOL/L — SIGNIFICANT CHANGE UP (ref 22–31)
CO2 SERPL-SCNC: 28 MMOL/L — SIGNIFICANT CHANGE UP (ref 22–31)
CREAT SERPL-MCNC: 1.19 MG/DL — SIGNIFICANT CHANGE UP (ref 0.5–1.3)
CREAT SERPL-MCNC: 1.19 MG/DL — SIGNIFICANT CHANGE UP (ref 0.5–1.3)
EGFR: 60 ML/MIN/1.73M2 — SIGNIFICANT CHANGE UP
EGFR: 60 ML/MIN/1.73M2 — SIGNIFICANT CHANGE UP
GLUCOSE SERPL-MCNC: 102 MG/DL — HIGH (ref 70–99)
GLUCOSE SERPL-MCNC: 102 MG/DL — HIGH (ref 70–99)
HCT VFR BLD CALC: 34.4 % — LOW (ref 39–50)
HCT VFR BLD CALC: 34.4 % — LOW (ref 39–50)
HGB BLD-MCNC: 11.2 G/DL — LOW (ref 13–17)
HGB BLD-MCNC: 11.2 G/DL — LOW (ref 13–17)
MCHC RBC-ENTMCNC: 29.8 PG — SIGNIFICANT CHANGE UP (ref 27–34)
MCHC RBC-ENTMCNC: 29.8 PG — SIGNIFICANT CHANGE UP (ref 27–34)
MCHC RBC-ENTMCNC: 32.6 GM/DL — SIGNIFICANT CHANGE UP (ref 32–36)
MCHC RBC-ENTMCNC: 32.6 GM/DL — SIGNIFICANT CHANGE UP (ref 32–36)
MCV RBC AUTO: 91.5 FL — SIGNIFICANT CHANGE UP (ref 80–100)
MCV RBC AUTO: 91.5 FL — SIGNIFICANT CHANGE UP (ref 80–100)
NRBC # BLD: 0 /100 WBCS — SIGNIFICANT CHANGE UP (ref 0–0)
NRBC # BLD: 0 /100 WBCS — SIGNIFICANT CHANGE UP (ref 0–0)
PLATELET # BLD AUTO: 384 K/UL — SIGNIFICANT CHANGE UP (ref 150–400)
PLATELET # BLD AUTO: 384 K/UL — SIGNIFICANT CHANGE UP (ref 150–400)
POTASSIUM SERPL-MCNC: 4.4 MMOL/L — SIGNIFICANT CHANGE UP (ref 3.5–5.3)
POTASSIUM SERPL-MCNC: 4.4 MMOL/L — SIGNIFICANT CHANGE UP (ref 3.5–5.3)
POTASSIUM SERPL-SCNC: 4.4 MMOL/L — SIGNIFICANT CHANGE UP (ref 3.5–5.3)
POTASSIUM SERPL-SCNC: 4.4 MMOL/L — SIGNIFICANT CHANGE UP (ref 3.5–5.3)
PROT SERPL-MCNC: 7.4 G/DL — SIGNIFICANT CHANGE UP (ref 6–8.3)
PROT SERPL-MCNC: 7.4 G/DL — SIGNIFICANT CHANGE UP (ref 6–8.3)
RBC # BLD: 3.76 M/UL — LOW (ref 4.2–5.8)
RBC # BLD: 3.76 M/UL — LOW (ref 4.2–5.8)
RBC # FLD: 13.2 % — SIGNIFICANT CHANGE UP (ref 10.3–14.5)
RBC # FLD: 13.2 % — SIGNIFICANT CHANGE UP (ref 10.3–14.5)
SODIUM SERPL-SCNC: 138 MMOL/L — SIGNIFICANT CHANGE UP (ref 135–145)
SODIUM SERPL-SCNC: 138 MMOL/L — SIGNIFICANT CHANGE UP (ref 135–145)
WBC # BLD: 9.01 K/UL — SIGNIFICANT CHANGE UP (ref 3.8–10.5)
WBC # BLD: 9.01 K/UL — SIGNIFICANT CHANGE UP (ref 3.8–10.5)
WBC # FLD AUTO: 9.01 K/UL — SIGNIFICANT CHANGE UP (ref 3.8–10.5)
WBC # FLD AUTO: 9.01 K/UL — SIGNIFICANT CHANGE UP (ref 3.8–10.5)

## 2023-11-16 PROCEDURE — 99232 SBSQ HOSP IP/OBS MODERATE 35: CPT

## 2023-11-16 RX ADMIN — METHOCARBAMOL 500 MILLIGRAM(S): 500 TABLET, FILM COATED ORAL at 05:35

## 2023-11-16 RX ADMIN — METHOCARBAMOL 500 MILLIGRAM(S): 500 TABLET, FILM COATED ORAL at 21:52

## 2023-11-16 RX ADMIN — SENNA PLUS 2 TABLET(S): 8.6 TABLET ORAL at 21:52

## 2023-11-16 RX ADMIN — ENOXAPARIN SODIUM 40 MILLIGRAM(S): 100 INJECTION SUBCUTANEOUS at 21:54

## 2023-11-16 RX ADMIN — Medication 975 MILLIGRAM(S): at 10:46

## 2023-11-16 RX ADMIN — Medication 975 MILLIGRAM(S): at 11:30

## 2023-11-16 RX ADMIN — CALAMINE AND ZINC OXIDE AND PHENOL 1 APPLICATION(S): 160; 10 LOTION TOPICAL at 05:35

## 2023-11-16 RX ADMIN — AMLODIPINE BESYLATE 10 MILLIGRAM(S): 2.5 TABLET ORAL at 21:52

## 2023-11-16 RX ADMIN — POLYETHYLENE GLYCOL 3350 17 GRAM(S): 17 POWDER, FOR SOLUTION ORAL at 17:05

## 2023-11-16 RX ADMIN — PANTOPRAZOLE SODIUM 40 MILLIGRAM(S): 20 TABLET, DELAYED RELEASE ORAL at 05:34

## 2023-11-16 RX ADMIN — LOSARTAN POTASSIUM 50 MILLIGRAM(S): 100 TABLET, FILM COATED ORAL at 05:34

## 2023-11-16 RX ADMIN — ATORVASTATIN CALCIUM 40 MILLIGRAM(S): 80 TABLET, FILM COATED ORAL at 21:52

## 2023-11-16 RX ADMIN — Medication 1 APPLICATION(S): at 17:05

## 2023-11-16 RX ADMIN — METHOCARBAMOL 500 MILLIGRAM(S): 500 TABLET, FILM COATED ORAL at 12:54

## 2023-11-16 RX ADMIN — POLYETHYLENE GLYCOL 3350 17 GRAM(S): 17 POWDER, FOR SOLUTION ORAL at 05:34

## 2023-11-16 RX ADMIN — Medication 1 APPLICATION(S): at 05:35

## 2023-11-16 NOTE — PROGRESS NOTE ADULT - ASSESSMENT
Assessment/Plan:  Case of an 83 year old right handed Hebrew speaking male patient with past medical history of HTN, HLD, pre-diabetes, BPH, cervical and lumbar spinal stenosis (s/p Posterior C1-C6 Decompression, C4-5 Posterior Column Osteotomy, C1-C7 Fusion with Plastic Closure on 10/18/2022) who is admitted for Acute Inpatient Rehabilitation with a multidisciplinary rehab program at [facility] with functional impairments in ADLs and mobility secondary to lumbar stenosis with neurogenic claudication treated surgically with an elective posterior L2-L5 decompression surgery.    Lumbar stenosis with neurogenic claudication and posterior L2-L5 decompression  - L2-L5 decompressive laminectomy for lumbar stenosis with neurogenic claudication (11/3)  - surgical drain removed on 11/7  - Impaired ADLs and mobility  - Need for assistance with personal care   - Continue comprehensive rehab program of PT/OT - 3 hours a day, 5 days a week. P&O as needed   - Pain control below  - Fall/Spine precautions  - Restart daily ASA POD #14 (11/17)    Pain Control  - Tylenol 975 mg Q6H PRN mild-mod pain  - Tramadol 50 mg Q6H PRN severe pain - discontinued on 11/14 as patient not requiring  - Robaxin 500 mg TID    HTN  - c/w Norvasc 10 mg QHS and losartan 50 mg QD  - Monitor BP    HLD  - cont Lipitor 40 mg QHS    Mood / Cognition  - Neuropsychology consult PRN    Sleep  - Melatonin PRN     GI / Bowel  - Senna qHS  - Miralax PRN BID  - dulcolax PRN  - GI ppx: pantoprazole 40 mg QD     / Bladder  - Currently patient voids independently  - check PVR x1 on admission    Skin / Pressure injury  - Skin assessment on admission performed: 14 cm lumbar incision site, drain site 1 cmx1cm, Stage 1 pressure injury to lumbar spine  - Pressure Injury/Skin: OOB to chair, PT/OT  - nursing to monitor skin qShift    Diet/Dysphagia:  - Diet Consistency: regular    DVT prophylaxis:   - Lovenox 40 mg QD     Outpatient Follow-up:  Gina Bond  Neurosurgery  [address] [place] [address], [address]  [place] [address]  Phone: [phone]  Fax: [phone]  Follow Up Time:      Code Status/Emergency Contact:      --------------- Assessment/Plan:  Case of an 83 year old right handed Latvian speaking male patient with past medical history of HTN, HLD, pre-diabetes, BPH, cervical and lumbar spinal stenosis (s/p Posterior C1-C6 Decompression, C4-5 Posterior Column Osteotomy, C1-C7 Fusion with Plastic Closure on 10/18/2022) who is admitted for Acute Inpatient Rehabilitation with a multidisciplinary rehab program at [facility] with functional impairments in ADLs and mobility secondary to lumbar stenosis with neurogenic claudication treated surgically with an elective posterior L2-L5 decompression surgery.    Lumbar stenosis with neurogenic claudication and posterior L2-L5 decompression  - L2-L5 decompressive laminectomy for lumbar stenosis with neurogenic claudication (11/3)  - surgical drain removed on 11/7  - Impaired ADLs and mobility  - Need for assistance with personal care   - Continue comprehensive rehab program of PT/OT - 3 hours a day, 5 days a week. P&O as needed   - Pain control below  - Fall/Spine precautions  - Restart daily ASA POD #14 (11/17)    Pain Control  - Tylenol 975 mg Q6H PRN mild-mod pain  - Tramadol 50 mg Q6H PRN severe pain - discontinued on 11/14 as patient not requiring  - Robaxin 500 mg TID    HTN  - c/w Norvasc 10 mg QHS and losartan 50 mg QD  - Monitor BP    HLD  - cont Lipitor 40 mg QHS    Mood / Cognition  - Neuropsychology consult PRN    Sleep  - Melatonin PRN     GI / Bowel  - Senna qHS  - Miralax PRN BID  - dulcolax PRN  - GI ppx: pantoprazole 40 mg QD     / Bladder  - Currently patient voids independently  - check PVR x1 on admission    Skin / Pressure injury  - Skin assessment on admission performed: 14 cm lumbar incision site, drain site 1 cmx1cm, Stage 1 pressure injury to lumbar spine  - Pressure Injury/Skin: OOB to chair, PT/OT  - nursing to monitor skin qShift    Diet/Dysphagia:  - Diet Consistency: regular    DVT prophylaxis:   - Lovenox 40 mg QD     Outpatient Follow-up:  Gina Bond  Neurosurgery  [address] [place] [address], [address]  [place] [address]  Phone: [phone]  Fax: [phone]  Follow Up Time:      Code Status/Emergency Contact:      ---------------

## 2023-11-16 NOTE — PROGRESS NOTE ADULT - ASSESSMENT
83 year old male with PMH of Hypertension, Hyperlipidemia, Pre-DM, BPH, Cervical and Lumbar Stenosis (s/p Posterior C1-C6 Decompression, C4-5 Posterior Column Osteotomy, C1-C7 Fusion with Plastic Closure on 10/18/2022) who presented to [facility] on 11/3 for a scheduled posterior L2-L5 decompression surgery for lumbar stenosis with neurogenic claudication. Post-op course uncomplicated. Now admitted to [facility] rehab for functional decline.     Lumbar Stenosis w/ Neurogenic Claudication s/p L2-L5 Decompressive Laminectomy (11/3)  -Spine/Fall precaution  -ASA daily  -Outpatient follow up with neurosurgery     Surgical wound pruritis  - wound healing well. no signs of infection  - cont supportive care    Hypertension  -Continue amlodipine and losartan   -Monitor for OH    Hyperlipidemia   -Continue atorvastatin     Pre-Diabetes  -HbA1C 5.9  -Dietary and lifestyle modification  -Outpatient monitoring with PMD    GI PPx  -PPI    DVT PPx  -Lovenox SC

## 2023-11-16 NOTE — PROGRESS NOTE ADULT - SUBJECTIVE AND OBJECTIVE BOX
HPI:  Case of an 83 year old right handed Turkmen speaking male patient with past medical history of HTN, HLD, pre-diabetes, BPH, cervical and lumbar spinal stenosis (s/p Posterior C1-C6 Decompression, C4-5 Posterior Column Osteotomy, C1-C7 Fusion with Plastic Closure on 10/18/2022) who presented to [facility] on 11/3 for a scheduled posterior L2-L5 decompression surgery for lumbar stenosis with neurogenic claudication. Prior to surgery patient had low back pain associated with bilateral leg pain and difficulty walking. he additionally had persistent neck heaviness and bilateral shoulder pain which was worse on the left shoulder. Post-operative hospital course was uncomplicated and surgical drain was removed on 11/7/23. Patient evaluated by PT/OT and was recommended for acute inpatient rehab. Patient is medically stable for discharge to [facility] on 11/7/23. (07 Nov 2023 14:41)    TDD: 11/21 Home  ___________________________________________________________________________    SUBJECTIVE/ROS  Patient was seen and evaluated in therapy today.  Reported no overnight events and is in no acute distress.  Patient notes almost resolution of the itching on his back.  Case was discussed at Interdisciplinary Team meeting today.  No changes to tentative discharge date outlined above.  Patient is motivated to continue participation on the recommended rehabilitation program.  Denies any CP, SOB, GAITAN, palpitations, fever, chills, body aches, cough, congestion, or any other symptoms at this time.   ___________________________________________________________________________    Vital Signs Last 24 Hrs  T(C): 36.9 (15 Nov 2023 06:28), Max: 36.9 (15 Nov 2023 06:28)  T(F): 98.5 (15 Nov 2023 06:28), Max: 98.5 (15 Nov 2023 06:28)  HR: 75 (15 Nov 2023 06:28) (75 - 80)  BP: 150/82 (15 Nov 2023 06:28) (149/76 - 150/82)  RR: 15 (15 Nov 2023 06:28) (15 - 15)  SpO2: 97% (15 Nov 2023 06:28) (97% - 97%)    ___________________________________________________________________________    LABS:                          11.2   9.01  )-----------( 384      ( 16 Nov 2023 05:45 )             34.4     11-16    138  |  101  |  38<H>  ----------------------------<  102<H>  4.4   |  28  |  1.19    Ca    9.7      16 Nov 2023 05:45    TPro  7.4  /  Alb  3.3  /  TBili  0.3  /  DBili  x   /  AST  18  /  ALT  28  /  AlkPhos  118  11-16    ___________________________________________________________________________    MEDICATIONS  (STANDING):  amLODIPine   Tablet 10 milliGRAM(s) Oral at bedtime  AQUAPHOR (petrolatum Ointment) 1 Application(s) Topical two times a day  atorvastatin 40 milliGRAM(s) Oral at bedtime  enoxaparin Injectable 40 milliGRAM(s) SubCutaneous every 24 hours  losartan 50 milliGRAM(s) Oral daily  methocarbamol 500 milliGRAM(s) Oral three times a day  pantoprazole    Tablet 40 milliGRAM(s) Oral before breakfast  polyethylene glycol 3350 17 Gram(s) Oral two times a day  senna 2 Tablet(s) Oral at bedtime    MEDICATIONS  (PRN):  acetaminophen     Tablet .. 975 milliGRAM(s) Oral every 6 hours PRN Mild Pain (1 - 3), Moderate Pain (4 - 6)  bisacodyl 5 milliGRAM(s) Oral every 12 hours PRN Constipation  ___________________________________________________________________________    PHYSICAL EXAM:    General: NAD, walking in therapy                                                               Chest - Breathing comfortably, room air   Cardiovascular - warm and well perfused   Abdomen - Soft   Extremities - No C/C/E, No calf tenderness   Neurologic Exam -                    Cognitive - Awake, Alert, AAO to self, place, date, year, situation     Communication - Fluent, No dysarthria  Motor    LEFT    UE: SF [5/5], EF [5/5], EE [5/5], WE [5/5],  [wnl]  RIGHT UE: SF [5/5], EF [5/5], EE [5/5], WE [5/5],  [wnl]  LEFT    LE:  HF [3-4/5], KE [4/5], DF [3/5], EHL [3/5],  PF [4/5]  RIGHT LE:  HF [3-4/5], KE [4/5], DF [4/5], EHL [4/5],  PF [4/5]    Reflex:  2 + throughout, Babinski negative  Wounds: 14 cm lumbar incision site, drain site 1 cmx1cm, Stage 1 pressure injury to lumbar spine  ___________________________________________________________________________ HPI:  Case of an 83 year old right handed Mohawk speaking male patient with past medical history of HTN, HLD, pre-diabetes, BPH, cervical and lumbar spinal stenosis (s/p Posterior C1-C6 Decompression, C4-5 Posterior Column Osteotomy, C1-C7 Fusion with Plastic Closure on 10/18/2022) who presented to [facility] on 11/3 for a scheduled posterior L2-L5 decompression surgery for lumbar stenosis with neurogenic claudication. Prior to surgery patient had low back pain associated with bilateral leg pain and difficulty walking. he additionally had persistent neck heaviness and bilateral shoulder pain which was worse on the left shoulder. Post-operative hospital course was uncomplicated and surgical drain was removed on 11/7/23. Patient evaluated by PT/OT and was recommended for acute inpatient rehab. Patient is medically stable for discharge to [facility] on 11/7/23. (07 Nov 2023 14:41)    TDD: 11/21 Home  ___________________________________________________________________________    SUBJECTIVE/ROS  Patient was seen and evaluated in therapy today.  Reported no overnight events and is in no acute distress.  Patient notes almost resolution of the itching on his back.  Will resume ASA tomorrow.  Case was discussed at Interdisciplinary Team meeting today.  No changes to tentative discharge date outlined above.  Patient is motivated to continue participation on the recommended rehabilitation program.  Denies any CP, SOB, GAITAN, palpitations, fever, chills, body aches, cough, congestion, or any other symptoms at this time.   ___________________________________________________________________________    Vital Signs Last 24 Hrs  T(C): 36.9 (15 Nov 2023 06:28), Max: 36.9 (15 Nov 2023 06:28)  T(F): 98.5 (15 Nov 2023 06:28), Max: 98.5 (15 Nov 2023 06:28)  HR: 75 (15 Nov 2023 06:28) (75 - 80)  BP: 150/82 (15 Nov 2023 06:28) (149/76 - 150/82)  RR: 15 (15 Nov 2023 06:28) (15 - 15)  SpO2: 97% (15 Nov 2023 06:28) (97% - 97%)    ___________________________________________________________________________    LABS:                          11.2   9.01  )-----------( 384      ( 16 Nov 2023 05:45 )             34.4     11-16    138  |  101  |  38<H>  ----------------------------<  102<H>  4.4   |  28  |  1.19    Ca    9.7      16 Nov 2023 05:45    TPro  7.4  /  Alb  3.3  /  TBili  0.3  /  DBili  x   /  AST  18  /  ALT  28  /  AlkPhos  118  11-16    ___________________________________________________________________________    MEDICATIONS  (STANDING):  amLODIPine   Tablet 10 milliGRAM(s) Oral at bedtime  AQUAPHOR (petrolatum Ointment) 1 Application(s) Topical two times a day  atorvastatin 40 milliGRAM(s) Oral at bedtime  enoxaparin Injectable 40 milliGRAM(s) SubCutaneous every 24 hours  losartan 50 milliGRAM(s) Oral daily  methocarbamol 500 milliGRAM(s) Oral three times a day  pantoprazole    Tablet 40 milliGRAM(s) Oral before breakfast  polyethylene glycol 3350 17 Gram(s) Oral two times a day  senna 2 Tablet(s) Oral at bedtime    MEDICATIONS  (PRN):  acetaminophen     Tablet .. 975 milliGRAM(s) Oral every 6 hours PRN Mild Pain (1 - 3), Moderate Pain (4 - 6)  bisacodyl 5 milliGRAM(s) Oral every 12 hours PRN Constipation  ___________________________________________________________________________    PHYSICAL EXAM:    General: NAD, walking in therapy                                                               Chest - Breathing comfortably, room air   Cardiovascular - warm and well perfused   Abdomen - Soft   Extremities - No C/C/E, No calf tenderness   Neurologic Exam -                    Cognitive - Awake, Alert, AAO to self, place, date, year, situation     Communication - Fluent, No dysarthria  Motor    LEFT    UE: SF [5/5], EF [5/5], EE [5/5], WE [5/5],  [wnl]  RIGHT UE: SF [5/5], EF [5/5], EE [5/5], WE [5/5],  [wnl]  LEFT    LE:  HF [3-4/5], KE [4/5], DF [3/5], EHL [3/5],  PF [4/5]  RIGHT LE:  HF [3-4/5], KE [4/5], DF [4/5], EHL [4/5],  PF [4/5]    Reflex:  2 + throughout, Babinski negative  Wounds: 14 cm lumbar incision site, drain site 1 cmx1cm, Stage 1 pressure injury to lumbar spine  ___________________________________________________________________________

## 2023-11-16 NOTE — PROGRESS NOTE ADULT - SUBJECTIVE AND OBJECTIVE BOX
Patient is a 83y old  Male who presents with a chief complaint of Lumbar stenosis with neurogenic claudication and posterior L2-L5 decompression (15 Nov 2023 16:08)      SUBJECTIVE / OVERNIGHT EVENTS:  Pt seen and examined at bedside. No acute events overnight.    ROS:  All other review of systems negative    Allergies    penicillin (Rash)    Intolerances        MEDICATIONS  (STANDING):  amLODIPine   Tablet 10 milliGRAM(s) Oral at bedtime  AQUAPHOR (petrolatum Ointment) 1 Application(s) Topical two times a day  atorvastatin 40 milliGRAM(s) Oral at bedtime  enoxaparin Injectable 40 milliGRAM(s) SubCutaneous every 24 hours  losartan 50 milliGRAM(s) Oral daily  methocarbamol 500 milliGRAM(s) Oral three times a day  pantoprazole    Tablet 40 milliGRAM(s) Oral before breakfast  polyethylene glycol 3350 17 Gram(s) Oral two times a day  senna 2 Tablet(s) Oral at bedtime    MEDICATIONS  (PRN):  acetaminophen     Tablet .. 975 milliGRAM(s) Oral every 6 hours PRN Mild Pain (1 - 3), Moderate Pain (4 - 6)  bisacodyl 5 milliGRAM(s) Oral every 12 hours PRN Constipation      Vital Signs Last 24 Hrs  T(C): 36.7 (16 Nov 2023 07:30), Max: 36.8 (15 Nov 2023 14:01)  T(F): 98 (16 Nov 2023 07:30), Max: 98.3 (15 Nov 2023 14:01)  HR: 71 (16 Nov 2023 07:30) (71 - 77)  BP: 148/77 (16 Nov 2023 07:30) (113/67 - 160/70)  BP(mean): --  RR: 15 (16 Nov 2023 07:30) (15 - 18)  SpO2: 99% (16 Nov 2023 07:30) (95% - 99%)    Parameters below as of 16 Nov 2023 07:30  Patient On (Oxygen Delivery Method): room air      CAPILLARY BLOOD GLUCOSE        I&O's Summary      PHYSICAL EXAM:  GENERAL: NAD, well-developed elderly male   HEAD:  Atraumatic, Normocephalic  NECK: Supple, No JVD  CHEST/LUNG: Clear to auscultation bilaterally; No wheeze, nonlabored breathing  HEART: Regular rate and rhythm; No murmurs, rubs, or gallops  ABDOMEN: Soft, Nontender, Nondistended; Bowel sounds present  EXTREMITIES:  No clubbing, cyanosis, or edema  PSYCH: calm, appropriate mood      LABS:                        11.2   9.01  )-----------( 384      ( 16 Nov 2023 05:45 )             34.4     11-16    138  |  101  |  38<H>  ----------------------------<  102<H>  4.4   |  28  |  1.19    Ca    9.7      16 Nov 2023 05:45    TPro  7.4  /  Alb  3.3  /  TBili  0.3  /  DBili  x   /  AST  18  /  ALT  28  /  AlkPhos  118  11-16          Urinalysis Basic - ( 16 Nov 2023 05:45 )    Color: x / Appearance: x / SG: x / pH: x  Gluc: 102 mg/dL / Ketone: x  / Bili: x / Urobili: x   Blood: x / Protein: x / Nitrite: x   Leuk Esterase: x / RBC: x / WBC x   Sq Epi: x / Non Sq Epi: x / Bacteria: x        RADIOLOGY & ADDITIONAL TESTS:  Results Reviewed:   Imaging Personally Reviewed:  Electrocardiogram Personally Reviewed:    COORDINATION OF CARE:  Care Discussed with Consultants/Other Providers [Y/N]:  Prior or Outpatient Records Reviewed [Y/N]:

## 2023-11-17 ENCOUNTER — TRANSCRIPTION ENCOUNTER (OUTPATIENT)
Age: 83
End: 2023-11-17

## 2023-11-17 PROCEDURE — 99232 SBSQ HOSP IP/OBS MODERATE 35: CPT

## 2023-11-17 RX ADMIN — Medication 1 APPLICATION(S): at 05:51

## 2023-11-17 RX ADMIN — Medication 81 MILLIGRAM(S): at 11:46

## 2023-11-17 RX ADMIN — ATORVASTATIN CALCIUM 40 MILLIGRAM(S): 80 TABLET, FILM COATED ORAL at 21:14

## 2023-11-17 RX ADMIN — PANTOPRAZOLE SODIUM 40 MILLIGRAM(S): 20 TABLET, DELAYED RELEASE ORAL at 05:51

## 2023-11-17 RX ADMIN — Medication 975 MILLIGRAM(S): at 12:15

## 2023-11-17 RX ADMIN — METHOCARBAMOL 500 MILLIGRAM(S): 500 TABLET, FILM COATED ORAL at 05:51

## 2023-11-17 RX ADMIN — LOSARTAN POTASSIUM 50 MILLIGRAM(S): 100 TABLET, FILM COATED ORAL at 05:51

## 2023-11-17 RX ADMIN — SENNA PLUS 2 TABLET(S): 8.6 TABLET ORAL at 21:13

## 2023-11-17 RX ADMIN — POLYETHYLENE GLYCOL 3350 17 GRAM(S): 17 POWDER, FOR SOLUTION ORAL at 17:35

## 2023-11-17 RX ADMIN — ENOXAPARIN SODIUM 40 MILLIGRAM(S): 100 INJECTION SUBCUTANEOUS at 21:13

## 2023-11-17 RX ADMIN — Medication 975 MILLIGRAM(S): at 17:37

## 2023-11-17 RX ADMIN — Medication 975 MILLIGRAM(S): at 18:40

## 2023-11-17 RX ADMIN — METHOCARBAMOL 500 MILLIGRAM(S): 500 TABLET, FILM COATED ORAL at 15:05

## 2023-11-17 RX ADMIN — AMLODIPINE BESYLATE 10 MILLIGRAM(S): 2.5 TABLET ORAL at 21:14

## 2023-11-17 RX ADMIN — METHOCARBAMOL 500 MILLIGRAM(S): 500 TABLET, FILM COATED ORAL at 21:14

## 2023-11-17 RX ADMIN — Medication 975 MILLIGRAM(S): at 11:47

## 2023-11-17 RX ADMIN — Medication 1 APPLICATION(S): at 17:36

## 2023-11-17 NOTE — PROGRESS NOTE ADULT - SUBJECTIVE AND OBJECTIVE BOX
HPI:  Case of an 83 year old right handed Mongolian speaking male patient with past medical history of HTN, HLD, pre-diabetes, BPH, cervical and lumbar spinal stenosis (s/p Posterior C1-C6 Decompression, C4-5 Posterior Column Osteotomy, C1-C7 Fusion with Plastic Closure on 10/18/2022) who presented to [facility] on 11/3 for a scheduled posterior L2-L5 decompression surgery for lumbar stenosis with neurogenic claudication. Prior to surgery patient had low back pain associated with bilateral leg pain and difficulty walking. he additionally had persistent neck heaviness and bilateral shoulder pain which was worse on the left shoulder. Post-operative hospital course was uncomplicated and surgical drain was removed on 11/7/23. Patient evaluated by PT/OT and was recommended for acute inpatient rehab. Patient is medically stable for discharge to [facility] on 11/7/23. (07 Nov 2023 14:41)    TDD: 11/21 Home  ___________________________________________________________________________    SUBJECTIVE/ROS  Patient was seen and evaluated in therapy today.  Reported no overnight events and is in no acute distress.  ASA resumed today.  Case was discussed at Interdisciplinary Team meeting 11/16.  No changes to tentative discharge date outlined above.  Patient is motivated to continue participation on the recommended rehabilitation program.  Denies any CP, SOB, GAITAN, palpitations, fever, chills, body aches, cough, congestion, or any other symptoms at this time.   ___________________________________________________________________________    Vital Signs Last 24 Hrs  T(C): 36.7 (17 Nov 2023 07:34), Max: 36.7 (17 Nov 2023 07:34)  T(F): 98.1 (17 Nov 2023 07:34), Max: 98.1 (17 Nov 2023 07:34)  HR: 73 (17 Nov 2023 07:34) (73 - 79)  BP: 135/69 (17 Nov 2023 07:34) (135/69 - 155/72)  BP(mean): --  RR: 15 (17 Nov 2023 07:34) (15 - 16)  SpO2: 97% (17 Nov 2023 07:34) (97% - 97%)  ___________________________________________________________________________    LABS:                          11.2   9.01  )-----------( 384      ( 16 Nov 2023 05:45 )             34.4     11-16    138  |  101  |  38<H>  ----------------------------<  102<H>  4.4   |  28  |  1.19    Ca    9.7      16 Nov 2023 05:45    TPro  7.4  /  Alb  3.3  /  TBili  0.3  /  DBili  x   /  AST  18  /  ALT  28  /  AlkPhos  118  11-16    ___________________________________________________________________________    MEDICATIONS  (STANDING):  amLODIPine   Tablet 10 milliGRAM(s) Oral at bedtime  AQUAPHOR (petrolatum Ointment) 1 Application(s) Topical two times a day  atorvastatin 40 milliGRAM(s) Oral at bedtime  enoxaparin Injectable 40 milliGRAM(s) SubCutaneous every 24 hours  losartan 50 milliGRAM(s) Oral daily  methocarbamol 500 milliGRAM(s) Oral three times a day  pantoprazole    Tablet 40 milliGRAM(s) Oral before breakfast  polyethylene glycol 3350 17 Gram(s) Oral two times a day  senna 2 Tablet(s) Oral at bedtime    MEDICATIONS  (PRN):  acetaminophen     Tablet .. 975 milliGRAM(s) Oral every 6 hours PRN Mild Pain (1 - 3), Moderate Pain (4 - 6)  bisacodyl 5 milliGRAM(s) Oral every 12 hours PRN Constipation  ___________________________________________________________________________    PHYSICAL EXAM:    General: NAD, walking in therapy                                                               Chest - Breathing comfortably, room air   Cardiovascular - warm and well perfused   Abdomen - Soft   Extremities - No C/C/E, No calf tenderness   Neurologic Exam -                    Cognitive - Awake, Alert, AAO to self, place, date, year, situation     Communication - Fluent, No dysarthria  Motor    LEFT    UE: SF [5/5], EF [5/5], EE [5/5], WE [5/5],  [wnl]  RIGHT UE: SF [5/5], EF [5/5], EE [5/5], WE [5/5],  [wnl]  LEFT    LE:  HF [5/5], KE [5/5], DF [5/5], EHL [5/5],  PF [5/5]  RIGHT LE:  HF [5/5], KE [5/5], DF [5/5], EHL [5/5],  PF [5/5]    Reflex:  2 + throughout, Babinski negative  Wounds: 14 cm lumbar incision site, drain site 1 cmx1cm, Stage 1 pressure injury to lumbar spine  ___________________________________________________________________________ HPI:  Case of an 83 year old right handed Mongolian speaking male patient with past medical history of HTN, HLD, pre-diabetes, BPH, cervical and lumbar spinal stenosis (s/p Posterior C1-C6 Decompression, C4-5 Posterior Column Osteotomy, C1-C7 Fusion with Plastic Closure on 10/18/2022) who presented to [facility] on 11/3 for a scheduled posterior L2-L5 decompression surgery for lumbar stenosis with neurogenic claudication. Prior to surgery patient had low back pain associated with bilateral leg pain and difficulty walking. he additionally had persistent neck heaviness and bilateral shoulder pain which was worse on the left shoulder. Post-operative hospital course was uncomplicated and surgical drain was removed on 11/7/23. Patient evaluated by PT/OT and was recommended for acute inpatient rehab. Patient is medically stable for discharge to [facility] on 11/7/23. (07 Nov 2023 14:41)    TDD: 11/21 Home  ___________________________________________________________________________    SUBJECTIVE/ROS  Patient was seen and evaluated in therapy today.  Reported no overnight events and is in no acute distress.  ASA resumed today.  Case was discussed at Interdisciplinary Team meeting 11/16.  No changes to tentative discharge date outlined above.  Patient is motivated to continue participation on the recommended rehabilitation program.  Denies any CP, SOB, GAITAN, palpitations, fever, chills, body aches, cough, congestion, or any other symptoms at this time.   ___________________________________________________________________________    Vital Signs Last 24 Hrs  T(C): 36.7 (17 Nov 2023 07:34), Max: 36.7 (17 Nov 2023 07:34)  T(F): 98.1 (17 Nov 2023 07:34), Max: 98.1 (17 Nov 2023 07:34)  HR: 73 (17 Nov 2023 07:34) (73 - 79)  BP: 135/69 (17 Nov 2023 07:34) (135/69 - 155/72)  RR: 15 (17 Nov 2023 07:34) (15 - 16)  SpO2: 97% (17 Nov 2023 07:34) (97% - 97%)    ___________________________________________________________________________    LABS:                          11.2   9.01  )-----------( 384      ( 16 Nov 2023 05:45 )             34.4     11-16    138  |  101  |  38<H>  ----------------------------<  102<H>  4.4   |  28  |  1.19    Ca    9.7      16 Nov 2023 05:45    TPro  7.4  /  Alb  3.3  /  TBili  0.3  /  DBili  x   /  AST  18  /  ALT  28  /  AlkPhos  118  11-16    ___________________________________________________________________________    MEDICATIONS  (STANDING):  amLODIPine   Tablet 10 milliGRAM(s) Oral at bedtime  AQUAPHOR (petrolatum Ointment) 1 Application(s) Topical two times a day  atorvastatin 40 milliGRAM(s) Oral at bedtime  enoxaparin Injectable 40 milliGRAM(s) SubCutaneous every 24 hours  losartan 50 milliGRAM(s) Oral daily  methocarbamol 500 milliGRAM(s) Oral three times a day  pantoprazole    Tablet 40 milliGRAM(s) Oral before breakfast  polyethylene glycol 3350 17 Gram(s) Oral two times a day  senna 2 Tablet(s) Oral at bedtime    MEDICATIONS  (PRN):  acetaminophen     Tablet .. 975 milliGRAM(s) Oral every 6 hours PRN Mild Pain (1 - 3), Moderate Pain (4 - 6)  bisacodyl 5 milliGRAM(s) Oral every 12 hours PRN Constipation  ___________________________________________________________________________    PHYSICAL EXAM:    General: NAD, walking in therapy                                                               Chest - Breathing comfortably, room air   Cardiovascular - warm and well perfused   Abdomen - Soft   Extremities - No C/C/E, No calf tenderness   Neurologic Exam -                    Cognitive - Awake, Alert, AAO to self, place, date, year, situation     Communication - Fluent, No dysarthria  Motor    LEFT    UE: SF [5/5], EF [5/5], EE [5/5], WE [5/5],  [wnl]  RIGHT UE: SF [5/5], EF [5/5], EE [5/5], WE [5/5],  [wnl]  LEFT    LE:  HF [5/5], KE [5/5], DF [5/5], EHL [5/5],  PF [5/5]  RIGHT LE:  HF [5/5], KE [5/5], DF [5/5], EHL [5/5],  PF [5/5]    Reflex:  2 + throughout, Babinski negative  Wounds: 14 cm lumbar incision site, drain site 1 cmx1cm, Stage 1 pressure injury to lumbar spine  ___________________________________________________________________________

## 2023-11-17 NOTE — PROGRESS NOTE ADULT - ASSESSMENT
83 year old male with PMH of Hypertension, Hyperlipidemia, Pre-DM, BPH, Cervical and Lumbar Stenosis (s/p Posterior C1-C6 Decompression, C4-5 Posterior Column Osteotomy, C1-C7 Fusion with Plastic Closure on 10/18/2022) who presented to [facility] on 11/3 for a scheduled posterior L2-L5 decompression surgery for lumbar stenosis with neurogenic claudication. Post-op course uncomplicated. Now admitted to [facility] rehab for functional decline.     Lumbar Stenosis w/ Neurogenic Claudication s/p L2-L5 Decompressive Laminectomy (11/3)  -Spine/Fall precaution  -ASA daily  -Outpatient follow up with neurosurgery     Surgical wound pruritis  - wound healing well. no signs of infection  - cont supportive care    Hypertension  -Continue amlodipine and losartan   -Monitor for OH    Hyperlipidemia   -Continue atorvastatin     Pre-Diabetes  -HbA1C 5.9  -Dietary and lifestyle modification  -Outpatient monitoring with PMD    GI PPx  -PPI    DVT PPx  -Lovenox SC

## 2023-11-17 NOTE — DISCHARGE NOTE PROVIDER - NSDCMRMEDTOKEN_GEN_ALL_CORE_FT
acetaminophen 500 mg oral tablet: 2 tab(s) orally every 6 hours As needed Temp greater or equal to 38C (100.4F), Mild Pain (1 - 3)  amLODIPine 10 mg oral tablet: 1 tab(s) orally once a day (at bedtime)  atorvastatin 40 mg oral tablet: 1 tab(s) orally once a day (at bedtime)  bisacodyl 10 mg rectal suppository: 1 suppository(ies) rectal once a day As needed Constipation  enoxaparin: 40 milligram(s) subcutaneous once a day (at bedtime)  losartan 50 mg oral tablet: 1 tab(s) orally once a day  methocarbamol 500 mg oral tablet: 1 tab(s) orally 3 times a day  oxyCODONE 10 mg oral tablet: 1 tab(s) orally every 4 hours As needed Severe Pain (7 - 10)  oxyCODONE 5 mg oral tablet: 1 tab(s) orally every 4 hours As needed Moderate Pain (4 - 6)  pantoprazole 40 mg oral delayed release tablet: 1 tab(s) orally once a day (before a meal)  polyethylene glycol 3350 oral powder for reconstitution: 17 gram(s) orally 2 times a day  senna leaf extract oral tablet: 2 tab(s) orally once a day (at bedtime)   acetaminophen 325 mg oral tablet: 3 tab(s) orally every 6 hours As needed Mild Pain (1 - 3), Moderate Pain (4 - 6)  amLODIPine 10 mg oral tablet: 1 tab(s) orally once a day (at bedtime)  aspirin 81 mg oral tablet, chewable: 1 tab(s) orally once a day  atorvastatin 40 mg oral tablet: 1 tab(s) orally once a day (at bedtime)  losartan 50 mg oral tablet: 1 tab(s) orally once a day  methocarbamol 500 mg oral tablet: 1 tab(s) orally 3 times a day as needed for Muscle Spasm  pantoprazole 40 mg oral delayed release tablet: 1 tab(s) orally once a day (before a meal)  petrolatum topical ointment: 1 Apply topically to affected area 2 times a day  polyethylene glycol 3350 oral powder for reconstitution: 17 gram(s) orally once a day as needed for  constipation

## 2023-11-17 NOTE — DISCHARGE NOTE PROVIDER - PROVIDER TOKENS
PROVIDER:[TOKEN:[[id number]],FOLLOWUP:[1 week]],PROVIDER:[TOKEN:[[id number]],FOLLOWUP:[1 month]],FREE:[LAST:[Primary Care Physician],PHONE:[(   )    -],FAX:[(   )    -]]

## 2023-11-17 NOTE — DISCHARGE NOTE PROVIDER - CARE PROVIDER_API CALL
Gina Bond Devendra  Neurosurgery  [address], [address]  [place] [address]  Phone: [phone]  Fax: [phone]  Follow Up Time: 1 week    Jah Mac  Physical/Rehab Medicine  [address] [place] [address]  Phone: [phone]  Fax: [phone]  Follow Up Time: 1 month    Primary Care Physician,   Phone: (   )    -  Fax: (   )    -  Follow Up Time:

## 2023-11-17 NOTE — PROGRESS NOTE ADULT - ASSESSMENT
Assessment/Plan:  Case of an 83 year old right handed Frisian speaking male patient with past medical history of HTN, HLD, pre-diabetes, BPH, cervical and lumbar spinal stenosis (s/p Posterior C1-C6 Decompression, C4-5 Posterior Column Osteotomy, C1-C7 Fusion with Plastic Closure on 10/18/2022) who is admitted for Acute Inpatient Rehabilitation with a multidisciplinary rehab program at [facility] with functional impairments in ADLs and mobility secondary to lumbar stenosis with neurogenic claudication treated surgically with an elective posterior L2-L5 decompression surgery.    Lumbar stenosis with neurogenic claudication and posterior L2-L5 decompression  - L2-L5 decompressive laminectomy for lumbar stenosis with neurogenic claudication (11/3)  - surgical drain removed on 11/7  - Impaired ADLs and mobility  - Need for assistance with personal care   - Continue comprehensive rehab program of PT/OT - 3 hours a day, 5 days a week. P&O as needed   - Pain control below  - Fall/Spine precautions  - Restart daily ASA POD #14 (11/17)    Pain Control  - Tylenol 975 mg Q6H PRN mild-mod pain  - Tramadol 50 mg Q6H PRN severe pain - discontinued on 11/14 as patient not requiring  - Robaxin 500 mg TID    HTN  - c/w Norvasc 10 mg QHS and losartan 50 mg QD  - Monitor BP    HLD  - cont Lipitor 40 mg QHS    Mood / Cognition  - Neuropsychology consult PRN    Sleep  - Melatonin PRN     GI / Bowel  - Senna qHS  - Miralax PRN BID  - dulcolax PRN  - GI ppx: pantoprazole 40 mg QD     / Bladder  - Currently patient voids independently  - check PVR x1 on admission    Skin / Pressure injury  - Skin assessment on admission performed: 14 cm lumbar incision site, drain site 1 cmx1cm, Stage 1 pressure injury to lumbar spine  - Pressure Injury/Skin: OOB to chair, PT/OT  - nursing to monitor skin qShift    Diet/Dysphagia:  - Diet Consistency: regular    DVT prophylaxis:   - Lovenox 40 mg QD     Outpatient Follow-up:  Gina Bond  Neurosurgery  [address] [place] [address], [address]  [place] [address]  Phone: [phone]  Fax: [phone]  Follow Up Time:      Code Status/Emergency Contact:      ---------------

## 2023-11-17 NOTE — DISCHARGE NOTE PROVIDER - HOSPITAL COURSE
HPI:  Case of an 83 year old right handed Vietnamese speaking male patient with past medical history of HTN, HLD, pre-diabetes, BPH, cervical and lumbar spinal stenosis (s/p Posterior C1-C6 Decompression, C4-5 Posterior Column Osteotomy, C1-C7 Fusion with Plastic Closure on 10/18/2022) who presented to [facility] on 11/3 for a scheduled posterior L2-L5 decompression surgery for lumbar stenosis with neurogenic claudication. Prior to surgery patient had low back pain associated with bilateral leg pain and difficulty walking. he additionally had persistent neck heaviness and bilateral shoulder pain which was worse on the left shoulder. Post-operative hospital course was uncomplicated and surgical drain was removed on 11/7/23. Patient evaluated by PT/OT and was recommended for acute inpatient rehab. Patient is medically stable for discharge to [facility] on 11/7/23. (07 Nov 2023 14:41)    REHAB COURSE:  Patient participated in daily therapies and made good functional gains.  Rehab course unremarkable.     Patient tolerated course of inpatient PT/OT/SLP rehab with significant functional improvements. Patient seen and examined on day of discharge.  Medications, medication side effects, and discharge instructions were reviewed with the patient, who expressed understanding of all information.  Patient was medically and functionally optimized and cleared for discharge.

## 2023-11-17 NOTE — DISCHARGE NOTE PROVIDER - NSDCCPCAREPLAN_GEN_ALL_CORE_FT
PRINCIPAL DISCHARGE DIAGNOSIS  Diagnosis: Lumbar stenosis  Assessment and Plan of Treatment: You were admitted to [facility] due to weakness and functional impairments after your spinal surgery (L2-L5 decompressive laminectomy) for lumbar stenosis. You participated in daily therapies and made functional gains throughout your stay. Take your medications as prescribed. Follow up with neurosurgery, PM&R, and your primary care doctor upon discharge.

## 2023-11-17 NOTE — DISCHARGE NOTE PROVIDER - DETAILS OF MALNUTRITION DIAGNOSIS/DIAGNOSES
This patient has been assessed with a concern for Malnutrition and was treated during this hospitalization for the following Nutrition diagnosis/diagnoses:     -  11/08/2023: Moderate protein-calorie malnutrition

## 2023-11-18 PROCEDURE — 99232 SBSQ HOSP IP/OBS MODERATE 35: CPT | Mod: GC

## 2023-11-18 PROCEDURE — 99232 SBSQ HOSP IP/OBS MODERATE 35: CPT

## 2023-11-18 RX ADMIN — Medication 81 MILLIGRAM(S): at 11:54

## 2023-11-18 RX ADMIN — Medication 1 APPLICATION(S): at 18:19

## 2023-11-18 RX ADMIN — METHOCARBAMOL 500 MILLIGRAM(S): 500 TABLET, FILM COATED ORAL at 05:38

## 2023-11-18 RX ADMIN — PANTOPRAZOLE SODIUM 40 MILLIGRAM(S): 20 TABLET, DELAYED RELEASE ORAL at 05:37

## 2023-11-18 RX ADMIN — ATORVASTATIN CALCIUM 40 MILLIGRAM(S): 80 TABLET, FILM COATED ORAL at 22:00

## 2023-11-18 RX ADMIN — Medication 1 APPLICATION(S): at 05:38

## 2023-11-18 RX ADMIN — AMLODIPINE BESYLATE 10 MILLIGRAM(S): 2.5 TABLET ORAL at 22:00

## 2023-11-18 RX ADMIN — METHOCARBAMOL 500 MILLIGRAM(S): 500 TABLET, FILM COATED ORAL at 22:00

## 2023-11-18 RX ADMIN — Medication 975 MILLIGRAM(S): at 11:54

## 2023-11-18 RX ADMIN — METHOCARBAMOL 500 MILLIGRAM(S): 500 TABLET, FILM COATED ORAL at 13:29

## 2023-11-18 RX ADMIN — Medication 975 MILLIGRAM(S): at 12:55

## 2023-11-18 RX ADMIN — ENOXAPARIN SODIUM 40 MILLIGRAM(S): 100 INJECTION SUBCUTANEOUS at 22:00

## 2023-11-18 RX ADMIN — LOSARTAN POTASSIUM 50 MILLIGRAM(S): 100 TABLET, FILM COATED ORAL at 05:37

## 2023-11-18 RX ADMIN — SENNA PLUS 2 TABLET(S): 8.6 TABLET ORAL at 22:00

## 2023-11-18 RX ADMIN — POLYETHYLENE GLYCOL 3350 17 GRAM(S): 17 POWDER, FOR SOLUTION ORAL at 05:37

## 2023-11-18 NOTE — PROGRESS NOTE ADULT - ASSESSMENT
Assessment/Plan:  Case of an 83 year old right handed Kiswahili speaking male patient with past medical history of HTN, HLD, pre-diabetes, BPH, cervical and lumbar spinal stenosis (s/p Posterior C1-C6 Decompression, C4-5 Posterior Column Osteotomy, C1-C7 Fusion with Plastic Closure on 10/18/2022) who is admitted for Acute Inpatient Rehabilitation with a multidisciplinary rehab program at [facility] with functional impairments in ADLs and mobility secondary to lumbar stenosis with neurogenic claudication treated surgically with an elective posterior L2-L5 decompression surgery.    Lumbar stenosis with neurogenic claudication and posterior L2-L5 decompression  - L2-L5 decompressive laminectomy for lumbar stenosis with neurogenic claudication (11/3)  - surgical drain removed on 11/7  - Impaired ADLs and mobility  - Need for assistance with personal care   - Continue comprehensive rehab program of PT/OT - 3 hours a day, 5 days a week. P&O as needed   - Pain control below  - Fall/Spine precautions  - Restart daily ASA POD #14 (11/17)    Pain Control  - Tylenol 975 mg Q6H PRN mild-mod pain  - Tramadol 50 mg Q6H PRN severe pain - discontinued on 11/14 as patient not requiring  - Robaxin 500 mg TID    HTN  - c/w Norvasc 10 mg QHS and losartan 50 mg QD  - Monitor BP    HLD  - cont Lipitor 40 mg QHS    Mood / Cognition  - Neuropsychology consult PRN    Sleep  - Melatonin PRN     GI / Bowel  - Senna qHS  - Miralax PRN BID  - dulcolax PRN  - GI ppx: pantoprazole 40 mg QD     / Bladder  - Currently patient voids independently  - check PVR x1 on admission    Skin / Pressure injury  - Skin assessment on admission performed: 14 cm lumbar incision site, drain site 1 cmx1cm, Stage 1 pressure injury to lumbar spine  - Pressure Injury/Skin: OOB to chair, PT/OT  - nursing to monitor skin qShift    Diet/Dysphagia:  - Diet Consistency: regular    DVT prophylaxis:   - Lovenox 40 mg QD     Outpatient Follow-up:  Gina Bond  Neurosurgery  [address] [place] [address], [address]  [place] [address]  Phone: [phone]  Fax: [phone]  Follow Up Time:      Code Status/Emergency Contact:      ---------------

## 2023-11-18 NOTE — PROGRESS NOTE ADULT - SUBJECTIVE AND OBJECTIVE BOX
HPI:  Case of an 83 year old right handed Bulgarian speaking male patient with past medical history of HTN, HLD, pre-diabetes, BPH, cervical and lumbar spinal stenosis (s/p Posterior C1-C6 Decompression, C4-5 Posterior Column Osteotomy, C1-C7 Fusion with Plastic Closure on 10/18/2022) who presented to [facility] on 11/3 for a scheduled posterior L2-L5 decompression surgery for lumbar stenosis with neurogenic claudication. Prior to surgery patient had low back pain associated with bilateral leg pain and difficulty walking. he additionally had persistent neck heaviness and bilateral shoulder pain which was worse on the left shoulder. Post-operative hospital course was uncomplicated and surgical drain was removed on 11/7/23. Patient evaluated by PT/OT and was recommended for acute inpatient rehab. Patient is medically stable for discharge to [facility] on 11/7/23. (07 Nov 2023 14:41)    TDD: 11/21 Home  ___________________________________________________________________________    SUBJECTIVE/ROS  Patient was seen and evaluated in therapy today.  Reported no overnight events and is in no acute distress.  ASA resumed.  No changes to tentative discharge date outlined above.  Patient is motivated to continue participation on the recommended rehabilitation program.  Denies any CP, SOB, GAITAN, palpitations, fever, chills, body aches, cough, congestion, or any other symptoms at this time.   ___________________________________________________________________________    Vital Signs Last 24 Hrs  T(C): 36.3 (18 Nov 2023 07:50), Max: 36.9 (17 Nov 2023 21:11)  T(F): 97.4 (18 Nov 2023 07:50), Max: 98.5 (17 Nov 2023 21:11)  HR: 72 (18 Nov 2023 07:50) (65 - 72)  BP: 142/71 (18 Nov 2023 07:50) (142/71 - 163/82)  RR: 16 (18 Nov 2023 07:50) (16 - 16)  SpO2: 96% (18 Nov 2023 07:50) (96% - 96%)    ___________________________________________________________________________    LABS:                          11.2   9.01  )-----------( 384      ( 16 Nov 2023 05:45 )             34.4     11-16    138  |  101  |  38<H>  ----------------------------<  102<H>  4.4   |  28  |  1.19    Ca    9.7      16 Nov 2023 05:45    TPro  7.4  /  Alb  3.3  /  TBili  0.3  /  DBili  x   /  AST  18  /  ALT  28  /  AlkPhos  118  11-16    ___________________________________________________________________________    MEDICATIONS  (STANDING):  amLODIPine   Tablet 10 milliGRAM(s) Oral at bedtime  AQUAPHOR (petrolatum Ointment) 1 Application(s) Topical two times a day  atorvastatin 40 milliGRAM(s) Oral at bedtime  enoxaparin Injectable 40 milliGRAM(s) SubCutaneous every 24 hours  losartan 50 milliGRAM(s) Oral daily  methocarbamol 500 milliGRAM(s) Oral three times a day  pantoprazole    Tablet 40 milliGRAM(s) Oral before breakfast  polyethylene glycol 3350 17 Gram(s) Oral two times a day  senna 2 Tablet(s) Oral at bedtime    MEDICATIONS  (PRN):  acetaminophen     Tablet .. 975 milliGRAM(s) Oral every 6 hours PRN Mild Pain (1 - 3), Moderate Pain (4 - 6)  bisacodyl 5 milliGRAM(s) Oral every 12 hours PRN Constipation  ___________________________________________________________________________    PHYSICAL EXAM:    General: NAD, walking in therapy                                                               Chest - Breathing comfortably, room air   Cardiovascular - warm and well perfused   Abdomen - Soft   Extremities - No C/C/E, No calf tenderness   Neurologic Exam -                    Cognitive - Awake, Alert, AAO to self, place, date, year, situation     Communication - Fluent, No dysarthria  Motor    LEFT    UE: SF [5/5], EF [5/5], EE [5/5], WE [5/5],  [wnl]  RIGHT UE: SF [5/5], EF [5/5], EE [5/5], WE [5/5],  [wnl]  LEFT    LE:  HF [5/5], KE [5/5], DF [5/5], EHL [5/5],  PF [5/5]  RIGHT LE:  HF [5/5], KE [5/5], DF [5/5], EHL [5/5],  PF [5/5]    Reflex:  2 + throughout, Babinski negative  Wounds: 14 cm lumbar incision site, drain site 1 cmx1cm, Stage 1 pressure injury to lumbar spine  ___________________________________________________________________________

## 2023-11-18 NOTE — PROGRESS NOTE ADULT - SUBJECTIVE AND OBJECTIVE BOX
Patient is a 83y old  Male who presents with a chief complaint of Lumbar stenosis with neurogenic claudication and posterior L2-L5 decompression (17 Nov 2023 14:24)      SUBJECTIVE / OVERNIGHT EVENTS:  Pt seen and examined at bedside. No acute events overnight.      Allergies    penicillin (Rash)    Intolerances        MEDICATIONS  (STANDING):  amLODIPine   Tablet 10 milliGRAM(s) Oral at bedtime  AQUAPHOR (petrolatum Ointment) 1 Application(s) Topical two times a day  aspirin  chewable 81 milliGRAM(s) Oral daily  atorvastatin 40 milliGRAM(s) Oral at bedtime  enoxaparin Injectable 40 milliGRAM(s) SubCutaneous every 24 hours  losartan 50 milliGRAM(s) Oral daily  methocarbamol 500 milliGRAM(s) Oral three times a day  pantoprazole    Tablet 40 milliGRAM(s) Oral before breakfast  polyethylene glycol 3350 17 Gram(s) Oral two times a day  senna 2 Tablet(s) Oral at bedtime    MEDICATIONS  (PRN):  acetaminophen     Tablet .. 975 milliGRAM(s) Oral every 6 hours PRN Mild Pain (1 - 3), Moderate Pain (4 - 6)  bisacodyl 5 milliGRAM(s) Oral every 12 hours PRN Constipation      Vital Signs Last 24 Hrs  T(C): 36.3 (18 Nov 2023 07:50), Max: 36.9 (17 Nov 2023 21:11)  T(F): 97.4 (18 Nov 2023 07:50), Max: 98.5 (17 Nov 2023 21:11)  HR: 72 (18 Nov 2023 07:50) (65 - 72)  BP: 142/71 (18 Nov 2023 07:50) (142/71 - 163/82)  BP(mean): --  RR: 16 (18 Nov 2023 07:50) (16 - 16)  SpO2: 96% (18 Nov 2023 07:50) (96% - 96%)    Parameters below as of 18 Nov 2023 07:50  Patient On (Oxygen Delivery Method): room air      CAPILLARY BLOOD GLUCOSE        I&O's Summary      PHYSICAL EXAM:  GENERAL: NAD, well-developed elderly male   HEAD:  Atraumatic, Normocephalic  NECK: Supple, No JVD  CHEST/LUNG: Clear to auscultation bilaterally; No wheeze, nonlabored breathing  HEART: Regular rate and rhythm; No murmurs, rubs, or gallops  ABDOMEN: Soft, Nontender, Nondistended; Bowel sounds present  EXTREMITIES:  No clubbing, cyanosis, or edema  PSYCH: calm, appropriate mood    LABS:                    RADIOLOGY & ADDITIONAL TESTS:  Results Reviewed:   Imaging Personally Reviewed:  Electrocardiogram Personally Reviewed:    COORDINATION OF CARE:  Care Discussed with Consultants/Other Providers [Y/N]:  Prior or Outpatient Records Reviewed [Y/N]:

## 2023-11-18 NOTE — PROGRESS NOTE ADULT - ASSESSMENT
83 year old male with PMH of Hypertension, Hyperlipidemia, Pre-DM, BPH, Cervical and Lumbar Stenosis (s/p Posterior C1-C6 Decompression, C4-5 Posterior Column Osteotomy, C1-C7 Fusion with Plastic Closure on 10/18/2022) who presented to [facility] on 11/3 for a scheduled posterior L2-L5 decompression surgery for lumbar stenosis with neurogenic claudication. Post-op course uncomplicated. Now admitted to [facility] rehab for functional decline.     Lumbar Stenosis w/ Neurogenic Claudication s/p L2-L5 Decompressive Laminectomy (11/3)  -Spine/Fall precaution  -ASA daily  -Outpatient follow up with neurosurgery     Surgical wound pruritis  - wound healing well. no signs of infection  - cont supportive care    Hypertension  -Continue amlodipine and losartan   -Monitor for OH    Hyperlipidemia   -Continue atorvastatin     Pre-Diabetes  -HbA1C 5.9  -Dietary and lifestyle modification  -Outpatient monitoring with PMD    GI PPx  -PPI    DVT PPx  -Lovenox SC

## 2023-11-19 PROCEDURE — 99232 SBSQ HOSP IP/OBS MODERATE 35: CPT | Mod: GC

## 2023-11-19 PROCEDURE — 99232 SBSQ HOSP IP/OBS MODERATE 35: CPT

## 2023-11-19 RX ADMIN — Medication 1 APPLICATION(S): at 05:23

## 2023-11-19 RX ADMIN — METHOCARBAMOL 500 MILLIGRAM(S): 500 TABLET, FILM COATED ORAL at 13:06

## 2023-11-19 RX ADMIN — LOSARTAN POTASSIUM 50 MILLIGRAM(S): 100 TABLET, FILM COATED ORAL at 05:23

## 2023-11-19 RX ADMIN — Medication 81 MILLIGRAM(S): at 11:28

## 2023-11-19 RX ADMIN — POLYETHYLENE GLYCOL 3350 17 GRAM(S): 17 POWDER, FOR SOLUTION ORAL at 05:22

## 2023-11-19 RX ADMIN — ATORVASTATIN CALCIUM 40 MILLIGRAM(S): 80 TABLET, FILM COATED ORAL at 22:32

## 2023-11-19 RX ADMIN — Medication 1 APPLICATION(S): at 17:01

## 2023-11-19 RX ADMIN — PANTOPRAZOLE SODIUM 40 MILLIGRAM(S): 20 TABLET, DELAYED RELEASE ORAL at 05:25

## 2023-11-19 RX ADMIN — METHOCARBAMOL 500 MILLIGRAM(S): 500 TABLET, FILM COATED ORAL at 05:23

## 2023-11-19 RX ADMIN — METHOCARBAMOL 500 MILLIGRAM(S): 500 TABLET, FILM COATED ORAL at 22:32

## 2023-11-19 RX ADMIN — SENNA PLUS 2 TABLET(S): 8.6 TABLET ORAL at 22:33

## 2023-11-19 RX ADMIN — ENOXAPARIN SODIUM 40 MILLIGRAM(S): 100 INJECTION SUBCUTANEOUS at 22:32

## 2023-11-19 RX ADMIN — AMLODIPINE BESYLATE 10 MILLIGRAM(S): 2.5 TABLET ORAL at 22:32

## 2023-11-19 NOTE — PROGRESS NOTE ADULT - ASSESSMENT
Assessment/Plan:  Case of an 83 year old right handed Faroese speaking male patient with past medical history of HTN, HLD, pre-diabetes, BPH, cervical and lumbar spinal stenosis (s/p Posterior C1-C6 Decompression, C4-5 Posterior Column Osteotomy, C1-C7 Fusion with Plastic Closure on 10/18/2022) who is admitted for Acute Inpatient Rehabilitation with a multidisciplinary rehab program at [facility] with functional impairments in ADLs and mobility secondary to lumbar stenosis with neurogenic claudication treated surgically with an elective posterior L2-L5 decompression surgery.    Lumbar stenosis with neurogenic claudication and posterior L2-L5 decompression  - L2-L5 decompressive laminectomy for lumbar stenosis with neurogenic claudication (11/3)  - surgical drain removed on 11/7  - Impaired ADLs and mobility  - Need for assistance with personal care   - Continue comprehensive rehab program of PT/OT - 3 hours a day, 5 days a week. P&O as needed   - Pain control below  - Fall/Spine precautions  - Restart daily ASA POD #14 (11/17)    Pain Control  - Tylenol 975 mg Q6H PRN mild-mod pain  - Tramadol 50 mg Q6H PRN severe pain - discontinued on 11/14 as patient not requiring  - Robaxin 500 mg TID    HTN  - c/w Norvasc 10 mg QHS and losartan 50 mg QD  - Monitor BP    HLD  - cont Lipitor 40 mg QHS    Mood / Cognition  - Neuropsychology consult PRN    Sleep  - Melatonin PRN     GI / Bowel  - Senna qHS  - Miralax PRN BID  - dulcolax PRN  - GI ppx: pantoprazole 40 mg QD     / Bladder  - Currently patient voids independently  - check PVR x1 on admission    Skin / Pressure injury  - Skin assessment on admission performed: 14 cm lumbar incision site, drain site 1 cmx1cm, Stage 1 pressure injury to lumbar spine  - Pressure Injury/Skin: OOB to chair, PT/OT  - nursing to monitor skin qShift    Diet:  - Diet Consistency: regular    DVT prophylaxis:   - Lovenox 40 mg QD     Outpatient Follow-up:  Gina Bond  Neurosurgery  [address] [place] [address], [address]  [place] [address]  Phone: [phone]  Fax: [phone]  Follow Up Time:      Code Status/Emergency Contact:      ---------------

## 2023-11-19 NOTE — PROGRESS NOTE ADULT - NSPROGADDITIONALINFOA_GEN_ALL_CORE
Spent 36 minutes on face-face visit, evaluate, examine and treat
Spent 36 minutes on face-face visit, evaluate, examine and treat

## 2023-11-19 NOTE — PROGRESS NOTE ADULT - ASSESSMENT
83 year old male with PMH of Hypertension, Hyperlipidemia, Pre-DM, BPH, Cervical and Lumbar Stenosis (s/p Posterior C1-C6 Decompression, C4-5 Posterior Column Osteotomy, C1-C7 Fusion with Plastic Closure on 10/18/2022) who presented to [facility] on 11/3 for a scheduled posterior L2-L5 decompression surgery for lumbar stenosis with neurogenic claudication. Post-op course uncomplicated. Now admitted to [facility] rehab for functional decline.     Lumbar Stenosis w/ Neurogenic Claudication s/p L2-L5 Decompressive Laminectomy (11/3)  -Spine/Fall precaution  -ASA daily  -Outpatient follow up with neurosurgery     Surgical wound pruritis  - wound healing well. no signs of infection  - cont supportive care    Hypertension  -Continue amlodipine and losartan   -Monitor for OH    Hyperlipidemia   -Continue atorvastatin     Pre-Diabetes  -HbA1C 5.9  -Dietary and lifestyle modification  -Outpatient monitoring with PMD    GI PPx  -PPI    DVT PPx  -Lovenox SC

## 2023-11-19 NOTE — PROGRESS NOTE ADULT - SUBJECTIVE AND OBJECTIVE BOX
Patient is a 83y old  Male who presents with a chief complaint of Lumbar stenosis with neurogenic claudication and posterior L2-L5 decompression (18 Nov 2023 12:24)      SUBJECTIVE / OVERNIGHT EVENTS:  Pt seen and examined at bedside. No acute events overnight.  Pt denies cp, palpitations, sob, abd pain, N/V, fever, chills.    ROS:  All other review of systems negative    Allergies    penicillin (Rash)    Intolerances        MEDICATIONS  (STANDING):  amLODIPine   Tablet 10 milliGRAM(s) Oral at bedtime  AQUAPHOR (petrolatum Ointment) 1 Application(s) Topical two times a day  aspirin  chewable 81 milliGRAM(s) Oral daily  atorvastatin 40 milliGRAM(s) Oral at bedtime  enoxaparin Injectable 40 milliGRAM(s) SubCutaneous every 24 hours  losartan 50 milliGRAM(s) Oral daily  methocarbamol 500 milliGRAM(s) Oral three times a day  pantoprazole    Tablet 40 milliGRAM(s) Oral before breakfast  polyethylene glycol 3350 17 Gram(s) Oral two times a day  senna 2 Tablet(s) Oral at bedtime    MEDICATIONS  (PRN):  acetaminophen     Tablet .. 975 milliGRAM(s) Oral every 6 hours PRN Mild Pain (1 - 3), Moderate Pain (4 - 6)  bisacodyl 5 milliGRAM(s) Oral every 12 hours PRN Constipation      Vital Signs Last 24 Hrs  T(C): 37.2 (18 Nov 2023 21:57), Max: 37.2 (18 Nov 2023 21:57)  T(F): 98.9 (18 Nov 2023 21:57), Max: 98.9 (18 Nov 2023 21:57)  HR: 85 (19 Nov 2023 05:20) (73 - 85)  BP: 157/83 (19 Nov 2023 05:20) (146/68 - 157/83)  BP(mean): --  RR: 16 (18 Nov 2023 21:57) (16 - 16)  SpO2: 97% (18 Nov 2023 21:57) (97% - 97%)    Parameters below as of 18 Nov 2023 21:57  Patient On (Oxygen Delivery Method): room air      CAPILLARY BLOOD GLUCOSE        I&O's Summary      PHYSICAL EXAM:  GENERAL: NAD, well-developed elderly male   HEAD:  Atraumatic, Normocephalic  NECK: Supple, No JVD  CHEST/LUNG: Clear to auscultation bilaterally; No wheeze, nonlabored breathing  HEART: Regular rate and rhythm; No murmurs, rubs, or gallops  ABDOMEN: Soft, Nontender, Nondistended; Bowel sounds present  EXTREMITIES:  No clubbing, cyanosis, or edema  PSYCH: calm, appropriate mood    LABS:                    RADIOLOGY & ADDITIONAL TESTS:  Results Reviewed:   Imaging Personally Reviewed:  Electrocardiogram Personally Reviewed:    COORDINATION OF CARE:  Care Discussed with Consultants/Other Providers [Y/N]:  Prior or Outpatient Records Reviewed [Y/N]:

## 2023-11-19 NOTE — PROGRESS NOTE ADULT - SUBJECTIVE AND OBJECTIVE BOX
HPI:  Case of an 83 year old right handed Greek speaking male patient with past medical history of HTN, HLD, pre-diabetes, BPH, cervical and lumbar spinal stenosis (s/p Posterior C1-C6 Decompression, C4-5 Posterior Column Osteotomy, C1-C7 Fusion with Plastic Closure on 10/18/2022) who presented to [facility] on 11/3 for a scheduled posterior L2-L5 decompression surgery for lumbar stenosis with neurogenic claudication. Prior to surgery patient had low back pain associated with bilateral leg pain and difficulty walking. he additionally had persistent neck heaviness and bilateral shoulder pain which was worse on the left shoulder. Post-operative hospital course was uncomplicated and surgical drain was removed on 11/7/23. Patient evaluated by PT/OT and was recommended for acute inpatient rehab. Patient is medically stable for discharge to [facility] on 11/7/23. (07 Nov 2023 14:41)    TDD: 11/21 Home  ___________________________________________________________________________    SUBJECTIVE/ROS  Patient was seen and evaluated at bed side, comfortable in bed   Reported slept well, no overnight events and is in no acute distress.  Last BM (11/18), voiding without issues   ASA resumed.  No changes to tentative discharge date outlined above.  Patient is motivated to continue participation on the recommended rehabilitation program.  Denies any CP, SOB, GAITAN, palpitations, fever, chills, body aches, cough, congestion, or any other symptoms at this time.   ___________________________________________________________________________    Vital Signs Last 24 Hrs  T(C): 36.6 (19 Nov 2023 09:20), Max: 37.2 (18 Nov 2023 21:57)  T(F): 97.8 (19 Nov 2023 09:20), Max: 98.9 (18 Nov 2023 21:57)  HR: 69 (19 Nov 2023 09:20) (69 - 85)  BP: 136/67 (19 Nov 2023 09:20) (136/67 - 157/83)  RR: 15 (19 Nov 2023 09:20) (15 - 16)  SpO2: 98% (19 Nov 2023 09:20) (97% - 98%)    ___________________________________________________________________________    LABS:                          11.2   9.01  )-----------( 384      ( 16 Nov 2023 05:45 )             34.4     11-16    138  |  101  |  38<H>  ----------------------------<  102<H>  4.4   |  28  |  1.19    Ca    9.7      16 Nov 2023 05:45    TPro  7.4  /  Alb  3.3  /  TBili  0.3  /  DBili  x   /  AST  18  /  ALT  28  /  AlkPhos  118  11-16    ___________________________________________________________________________    MEDICATIONS  (STANDING):  amLODIPine   Tablet 10 milliGRAM(s) Oral at bedtime  AQUAPHOR (petrolatum Ointment) 1 Application(s) Topical two times a day  aspirin  chewable 81 milliGRAM(s) Oral daily  atorvastatin 40 milliGRAM(s) Oral at bedtime  enoxaparin Injectable 40 milliGRAM(s) SubCutaneous every 24 hours  losartan 50 milliGRAM(s) Oral daily  methocarbamol 500 milliGRAM(s) Oral three times a day  pantoprazole    Tablet 40 milliGRAM(s) Oral before breakfast  polyethylene glycol 3350 17 Gram(s) Oral two times a day  senna 2 Tablet(s) Oral at bedtime    MEDICATIONS  (PRN):  acetaminophen     Tablet .. 975 milliGRAM(s) Oral every 6 hours PRN Mild Pain (1 - 3), Moderate Pain (4 - 6)  bisacodyl 5 milliGRAM(s) Oral every 12 hours PRN Constipation    ___________________________________________________________________________    PHYSICAL EXAM:    General: NAD, comfortable                                                               Chest - Breathing comfortably, room air   Cardiovascular - warm and well perfused   Abdomen - Soft   Extremities - No C/C/E, No calf tenderness   Neurologic Exam -                    Cognitive - Awake, Alert, Oriented x 4, follows command      Communication - Fluent, No dysarthria  Motor    LEFT    UE: SF [5/5], EF [5/5], EE [5/5], WE [5/5],  [wnl]  RIGHT UE: SF [5/5], EF [5/5], EE [5/5], WE [5/5],  [wnl]  LEFT    LE:  HF [5/5], KE [5/5], DF [5/5], EHL [5/5],  PF [5/5]  RIGHT LE:  HF [5/5], KE [5/5], DF [5/5], EHL [5/5],  PF [5/5]    Reflex:  2 + throughout, Babinski negative  Wounds: 14 cm lumbar incision site, drain site 1 cmx1cm, Stage 1 pressure injury to lumbar spine  ___________________________________________________________________________

## 2023-11-19 NOTE — CHART NOTE - NSCHARTNOTEFT_GEN_A_CORE
NUTRITION FOLLOW UP    SOURCE: Patient [X)     Medical Record (X)  Spoke with patient using  #[id number]. Patient reports great appetite with 100% po intake breakfast this morning. Patient denies n/v/d/c at this time. Reviewed Consistent Carbohydrate diet with patient, patient verbalized understanding. Recommend continue current nutrition plan.     DIET: Consistent Carbohydrate w/ Evening Snack     PATIENT REPORT [x] other: no GI distress    PO INTAKE:  [x]   %              CURRENT WEIGHT: 152.1 lb (69 kg) - (11/19)                               151.8 lb (68.9 kg) - (11/9)    PERTINENT MEDS:   Pertinent Medications: MEDICATIONS  (STANDING):  amLODIPine   Tablet 10 milliGRAM(s) Oral at bedtime  AQUAPHOR (petrolatum Ointment) 1 Application(s) Topical two times a day  aspirin  chewable 81 milliGRAM(s) Oral daily  atorvastatin 40 milliGRAM(s) Oral at bedtime  enoxaparin Injectable 40 milliGRAM(s) SubCutaneous every 24 hours  losartan 50 milliGRAM(s) Oral daily  methocarbamol 500 milliGRAM(s) Oral three times a day  pantoprazole    Tablet 40 milliGRAM(s) Oral before breakfast  polyethylene glycol 3350 17 Gram(s) Oral two times a day  senna 2 Tablet(s) Oral at bedtime    MEDICATIONS  (PRN):  acetaminophen     Tablet .. 975 milliGRAM(s) Oral every 6 hours PRN Mild Pain (1 - 3), Moderate Pain (4 - 6)  bisacodyl 5 milliGRAM(s) Oral every 12 hours PRN Constipation    PERTINENT LABS:   11-16 Alb 3.3 g/dL    SKIN: Stage 1 Pressure Injury   EDEMA: WDL  LAST BM: 11/18 per flowsheet     ESTIMATED NEEDS:   [X] no change since previous assessment    PREVIOUS NUTRITION DIAGNOSIS: Malnutrition     NUTRITION DIAGNOSIS is :  (x)  Ongoing - patient consuming 100% meals to meet nutrition needs     NEW NUTRITION DIAGNOSIS: NO new nutrition dx at this time     NUTRITION RECOMMENDATIONS:   1. Continue Consistent Carbohydrate w/ Evening Snack Diet  2. Provide food preferences as able  3. Ongoing diet education     MONITORING AND EVALUATION:   1. Tolerance to diet prescription   2. PO intake  3. Weights  4. Labs  5. Follow Up per protocol     RD to remain available  Giuliana Kidd MS,RD,CDN

## 2023-11-20 ENCOUNTER — TRANSCRIPTION ENCOUNTER (OUTPATIENT)
Age: 83
End: 2023-11-20

## 2023-11-20 LAB
ALBUMIN SERPL ELPH-MCNC: 3.2 G/DL — LOW (ref 3.3–5)
ALBUMIN SERPL ELPH-MCNC: 3.2 G/DL — LOW (ref 3.3–5)
ALP SERPL-CCNC: 101 U/L — SIGNIFICANT CHANGE UP (ref 40–120)
ALP SERPL-CCNC: 101 U/L — SIGNIFICANT CHANGE UP (ref 40–120)
ALT FLD-CCNC: 25 U/L — SIGNIFICANT CHANGE UP (ref 10–45)
ALT FLD-CCNC: 25 U/L — SIGNIFICANT CHANGE UP (ref 10–45)
ANION GAP SERPL CALC-SCNC: 8 MMOL/L — SIGNIFICANT CHANGE UP (ref 5–17)
ANION GAP SERPL CALC-SCNC: 8 MMOL/L — SIGNIFICANT CHANGE UP (ref 5–17)
AST SERPL-CCNC: 14 U/L — SIGNIFICANT CHANGE UP (ref 10–40)
AST SERPL-CCNC: 14 U/L — SIGNIFICANT CHANGE UP (ref 10–40)
BILIRUB SERPL-MCNC: 0.3 MG/DL — SIGNIFICANT CHANGE UP (ref 0.2–1.2)
BILIRUB SERPL-MCNC: 0.3 MG/DL — SIGNIFICANT CHANGE UP (ref 0.2–1.2)
BUN SERPL-MCNC: 31 MG/DL — HIGH (ref 7–23)
BUN SERPL-MCNC: 31 MG/DL — HIGH (ref 7–23)
CALCIUM SERPL-MCNC: 9.2 MG/DL — SIGNIFICANT CHANGE UP (ref 8.4–10.5)
CALCIUM SERPL-MCNC: 9.2 MG/DL — SIGNIFICANT CHANGE UP (ref 8.4–10.5)
CHLORIDE SERPL-SCNC: 102 MMOL/L — SIGNIFICANT CHANGE UP (ref 96–108)
CHLORIDE SERPL-SCNC: 102 MMOL/L — SIGNIFICANT CHANGE UP (ref 96–108)
CO2 SERPL-SCNC: 29 MMOL/L — SIGNIFICANT CHANGE UP (ref 22–31)
CO2 SERPL-SCNC: 29 MMOL/L — SIGNIFICANT CHANGE UP (ref 22–31)
CREAT SERPL-MCNC: 1.24 MG/DL — SIGNIFICANT CHANGE UP (ref 0.5–1.3)
CREAT SERPL-MCNC: 1.24 MG/DL — SIGNIFICANT CHANGE UP (ref 0.5–1.3)
EGFR: 57 ML/MIN/1.73M2 — LOW
EGFR: 57 ML/MIN/1.73M2 — LOW
GLUCOSE SERPL-MCNC: 100 MG/DL — HIGH (ref 70–99)
GLUCOSE SERPL-MCNC: 100 MG/DL — HIGH (ref 70–99)
HCT VFR BLD CALC: 35.4 % — LOW (ref 39–50)
HCT VFR BLD CALC: 35.4 % — LOW (ref 39–50)
HGB BLD-MCNC: 11.3 G/DL — LOW (ref 13–17)
HGB BLD-MCNC: 11.3 G/DL — LOW (ref 13–17)
MCHC RBC-ENTMCNC: 29.3 PG — SIGNIFICANT CHANGE UP (ref 27–34)
MCHC RBC-ENTMCNC: 29.3 PG — SIGNIFICANT CHANGE UP (ref 27–34)
MCHC RBC-ENTMCNC: 31.9 GM/DL — LOW (ref 32–36)
MCHC RBC-ENTMCNC: 31.9 GM/DL — LOW (ref 32–36)
MCV RBC AUTO: 91.7 FL — SIGNIFICANT CHANGE UP (ref 80–100)
MCV RBC AUTO: 91.7 FL — SIGNIFICANT CHANGE UP (ref 80–100)
NRBC # BLD: 0 /100 WBCS — SIGNIFICANT CHANGE UP (ref 0–0)
NRBC # BLD: 0 /100 WBCS — SIGNIFICANT CHANGE UP (ref 0–0)
PLATELET # BLD AUTO: 427 K/UL — HIGH (ref 150–400)
PLATELET # BLD AUTO: 427 K/UL — HIGH (ref 150–400)
POTASSIUM SERPL-MCNC: 4 MMOL/L — SIGNIFICANT CHANGE UP (ref 3.5–5.3)
POTASSIUM SERPL-MCNC: 4 MMOL/L — SIGNIFICANT CHANGE UP (ref 3.5–5.3)
POTASSIUM SERPL-SCNC: 4 MMOL/L — SIGNIFICANT CHANGE UP (ref 3.5–5.3)
POTASSIUM SERPL-SCNC: 4 MMOL/L — SIGNIFICANT CHANGE UP (ref 3.5–5.3)
PROT SERPL-MCNC: 7.2 G/DL — SIGNIFICANT CHANGE UP (ref 6–8.3)
PROT SERPL-MCNC: 7.2 G/DL — SIGNIFICANT CHANGE UP (ref 6–8.3)
RBC # BLD: 3.86 M/UL — LOW (ref 4.2–5.8)
RBC # BLD: 3.86 M/UL — LOW (ref 4.2–5.8)
RBC # FLD: 13 % — SIGNIFICANT CHANGE UP (ref 10.3–14.5)
RBC # FLD: 13 % — SIGNIFICANT CHANGE UP (ref 10.3–14.5)
SODIUM SERPL-SCNC: 139 MMOL/L — SIGNIFICANT CHANGE UP (ref 135–145)
SODIUM SERPL-SCNC: 139 MMOL/L — SIGNIFICANT CHANGE UP (ref 135–145)
WBC # BLD: 8.11 K/UL — SIGNIFICANT CHANGE UP (ref 3.8–10.5)
WBC # BLD: 8.11 K/UL — SIGNIFICANT CHANGE UP (ref 3.8–10.5)
WBC # FLD AUTO: 8.11 K/UL — SIGNIFICANT CHANGE UP (ref 3.8–10.5)
WBC # FLD AUTO: 8.11 K/UL — SIGNIFICANT CHANGE UP (ref 3.8–10.5)

## 2023-11-20 PROCEDURE — 99232 SBSQ HOSP IP/OBS MODERATE 35: CPT

## 2023-11-20 RX ORDER — AMLODIPINE BESYLATE 2.5 MG/1
1 TABLET ORAL
Qty: 30 | Refills: 0
Start: 2023-11-20 | End: 2023-12-19

## 2023-11-20 RX ORDER — PANTOPRAZOLE SODIUM 20 MG/1
1 TABLET, DELAYED RELEASE ORAL
Qty: 30 | Refills: 0
Start: 2023-11-20 | End: 2023-12-19

## 2023-11-20 RX ORDER — ACETAMINOPHEN 500 MG
3 TABLET ORAL
Qty: 0 | Refills: 0 | DISCHARGE
Start: 2023-11-20

## 2023-11-20 RX ORDER — METHOCARBAMOL 500 MG/1
1 TABLET, FILM COATED ORAL
Qty: 90 | Refills: 0
Start: 2023-11-20 | End: 2023-12-19

## 2023-11-20 RX ORDER — METHOCARBAMOL 500 MG/1
500 TABLET, FILM COATED ORAL THREE TIMES A DAY
Refills: 0 | Status: DISCONTINUED | OUTPATIENT
Start: 2023-11-20 | End: 2023-11-21

## 2023-11-20 RX ORDER — PETROLATUM,WHITE
1 JELLY (GRAM) TOPICAL
Qty: 0 | Refills: 0 | DISCHARGE
Start: 2023-11-20

## 2023-11-20 RX ORDER — ASPIRIN/CALCIUM CARB/MAGNESIUM 324 MG
1 TABLET ORAL
Qty: 30 | Refills: 0
Start: 2023-11-20 | End: 2023-12-19

## 2023-11-20 RX ORDER — LOSARTAN POTASSIUM 100 MG/1
1 TABLET, FILM COATED ORAL
Qty: 30 | Refills: 0
Start: 2023-11-20 | End: 2023-12-19

## 2023-11-20 RX ORDER — POLYETHYLENE GLYCOL 3350 17 G/17G
17 POWDER, FOR SOLUTION ORAL
Qty: 0 | Refills: 0 | DISCHARGE
Start: 2023-11-20

## 2023-11-20 RX ORDER — ATORVASTATIN CALCIUM 80 MG/1
1 TABLET, FILM COATED ORAL
Qty: 30 | Refills: 0
Start: 2023-11-20 | End: 2023-12-19

## 2023-11-20 RX ADMIN — LOSARTAN POTASSIUM 50 MILLIGRAM(S): 100 TABLET, FILM COATED ORAL at 05:17

## 2023-11-20 RX ADMIN — ENOXAPARIN SODIUM 40 MILLIGRAM(S): 100 INJECTION SUBCUTANEOUS at 21:50

## 2023-11-20 RX ADMIN — SENNA PLUS 2 TABLET(S): 8.6 TABLET ORAL at 21:51

## 2023-11-20 RX ADMIN — Medication 81 MILLIGRAM(S): at 12:06

## 2023-11-20 RX ADMIN — POLYETHYLENE GLYCOL 3350 17 GRAM(S): 17 POWDER, FOR SOLUTION ORAL at 05:17

## 2023-11-20 RX ADMIN — PANTOPRAZOLE SODIUM 40 MILLIGRAM(S): 20 TABLET, DELAYED RELEASE ORAL at 05:17

## 2023-11-20 RX ADMIN — ATORVASTATIN CALCIUM 40 MILLIGRAM(S): 80 TABLET, FILM COATED ORAL at 21:51

## 2023-11-20 RX ADMIN — METHOCARBAMOL 500 MILLIGRAM(S): 500 TABLET, FILM COATED ORAL at 05:17

## 2023-11-20 RX ADMIN — Medication 1 APPLICATION(S): at 05:16

## 2023-11-20 RX ADMIN — Medication 1 APPLICATION(S): at 18:00

## 2023-11-20 RX ADMIN — AMLODIPINE BESYLATE 10 MILLIGRAM(S): 2.5 TABLET ORAL at 21:51

## 2023-11-20 NOTE — PROGRESS NOTE ADULT - ATTENDING COMMENTS
I independently performed the documented the history, exam, and medical decision making. I have made amendments to the documentation where necessary. I have personally seen and examined the patient. Medical records were reviewed and I have made amendments to the documentation where necessary and adjusted the history, physical examination, and plan as documented by the Resident Physician.

## 2023-11-20 NOTE — PROGRESS NOTE ADULT - SUBJECTIVE AND OBJECTIVE BOX
: [id number]    Interval History  Patient seen and examined at bedside. No acute events noted.  Patient reports feeling well, pain is controlled. He denies any acute complaints.    ALLERGIES:  penicillin (Rash)    MEDICATIONS  (STANDING):  amLODIPine   Tablet 10 milliGRAM(s) Oral at bedtime  AQUAPHOR (petrolatum Ointment) 1 Application(s) Topical two times a day  aspirin  chewable 81 milliGRAM(s) Oral daily  atorvastatin 40 milliGRAM(s) Oral at bedtime  enoxaparin Injectable 40 milliGRAM(s) SubCutaneous every 24 hours  losartan 50 milliGRAM(s) Oral daily  pantoprazole    Tablet 40 milliGRAM(s) Oral before breakfast  polyethylene glycol 3350 17 Gram(s) Oral two times a day  senna 2 Tablet(s) Oral at bedtime    MEDICATIONS  (PRN):  acetaminophen     Tablet .. 975 milliGRAM(s) Oral every 6 hours PRN Mild Pain (1 - 3), Moderate Pain (4 - 6)  bisacodyl 5 milliGRAM(s) Oral every 12 hours PRN Constipation  methocarbamol 500 milliGRAM(s) Oral three times a day PRN Muscle Spasm    Vital Signs Last 24 Hrs  T(F): 98.2 (20 Nov 2023 08:31), Max: 98.4 (20 Nov 2023 06:29)  HR: 73 (20 Nov 2023 08:31) (73 - 86)  BP: 121/72 (20 Nov 2023 08:31) (121/72 - 149/88)  RR: 15 (20 Nov 2023 08:31) (14 - 15)  SpO2: 97% (20 Nov 2023 08:31) (97% - 99%)  I&O's Summary    19 Nov 2023 07:01  -  20 Nov 2023 07:00  --------------------------------------------------------  IN: 480 mL / OUT: 0 mL / NET: 480 mL      PHYSICAL EXAM:  GENERAL: NAD  HEENT: NCAT  CHEST/LUNG: Clear to percussion bilaterally; No rales, rhonchi, wheezing  HEART: Regular rate and rhythm; No murmurs  ABDOMEN: Soft, Nontender, Nondistended; Bowel sounds present  MUSCULOSKELETAL/EXTREMITIES:  2+ Peripheral Pulses, No LE edema, lumbar incision well healed, no evidence of infection   PSYCH: Appropriate affect  NEURO: Alert & Oriented, 5/5 UE/LE    LABS:                        11.3   8.11  )-----------( 427      ( 20 Nov 2023 05:52 )             35.4       11-20    139  |  102  |  31  ----------------------------<  100  4.0   |  29  |  1.24    Ca    9.2      20 Nov 2023 05:52    TPro  7.2  /  Alb  3.2  /  TBili  0.3  /  DBili  x   /  AST  14  /  ALT  25  /  AlkPhos  101  11-20     Urinalysis Basic - ( 20 Nov 2023 05:52 )    Color: x / Appearance: x / SG: x / pH: x  Gluc: 100 mg/dL / Ketone: x  / Bili: x / Urobili: x   Blood: x / Protein: x / Nitrite: x   Leuk Esterase: x / RBC: x / WBC x   Sq Epi: x / Non Sq Epi: x / Bacteria: x    COVID-19 PCR: Carrie (11-07-23 @ 20:45)

## 2023-11-20 NOTE — DISCHARGE NOTE NURSING/CASE MANAGEMENT/SOCIAL WORK - PATIENT PORTAL LINK FT
You can access the FollowMyHealth Patient Portal offered by [facility] by registering at the following website: [email or web address]. By joining [facility]’s FollowMyHealth portal, you will also be able to view your health information using other applications (apps) compatible with our system.

## 2023-11-20 NOTE — PROGRESS NOTE ADULT - SUBJECTIVE AND OBJECTIVE BOX
HPI:  Case of an 83 year old right handed Chinese speaking male patient with past medical history of HTN, HLD, pre-diabetes, BPH, cervical and lumbar spinal stenosis (s/p Posterior C1-C6 Decompression, C4-5 Posterior Column Osteotomy, C1-C7 Fusion with Plastic Closure on 10/18/2022) who presented to [facility] on 11/3 for a scheduled posterior L2-L5 decompression surgery for lumbar stenosis with neurogenic claudication. Prior to surgery patient had low back pain associated with bilateral leg pain and difficulty walking. he additionally had persistent neck heaviness and bilateral shoulder pain which was worse on the left shoulder. Post-operative hospital course was uncomplicated and surgical drain was removed on 11/7/23. Patient evaluated by PT/OT and was recommended for acute inpatient rehab. Patient is medically stable for discharge to [facility] on 11/7/23. (07 Nov 2023 14:41)      ___________________________________________________________________________    SUBJECTIVE/ROS  Patient was seen and evaluated at bedside today.  Reported no overnight events and is in no acute distress.  Last BM yesterday.  Eager to continue the recommended rehabilitation program outpatient.  Scheduled for discharge tomorrow.   Denies any CP, SOB, GAITAN, palpitations, fever, chills, body aches, cough, congestion, or any other symptoms at this time.   ___________________________________________________________________________    VITALS  T(C): 36.8 (11-20-23 @ 08:31), Max: 36.9 (11-20-23 @ 06:29)  HR: 73 (11-20-23 @ 08:31) (73 - 86)  BP: 121/72 (11-20-23 @ 08:31) (121/72 - 149/88)  RR: 15 (11-20-23 @ 08:31) (14 - 15)  SpO2: 97% (11-20-23 @ 08:31) (97% - 99%)  ___________________________________________________________________________    LABS                          11.3   8.11  )-----------( 427      ( 20 Nov 2023 05:52 )             35.4       11-20    139  |  102  |  31<H>  ----------------------------<  100<H>  4.0   |  29  |  1.24    Ca    9.2      20 Nov 2023 05:52    TPro  7.2  /  Alb  3.2<L>  /  TBili  0.3  /  DBili  x   /  AST  14  /  ALT  25  /  AlkPhos  101  11-20  ___________________________________________________________________________    MEDICATIONS  (STANDING):  amLODIPine   Tablet 10 milliGRAM(s) Oral at bedtime  AQUAPHOR (petrolatum Ointment) 1 Application(s) Topical two times a day  aspirin  chewable 81 milliGRAM(s) Oral daily  atorvastatin 40 milliGRAM(s) Oral at bedtime  enoxaparin Injectable 40 milliGRAM(s) SubCutaneous every 24 hours  losartan 50 milliGRAM(s) Oral daily  methocarbamol 500 milliGRAM(s) Oral three times a day  pantoprazole    Tablet 40 milliGRAM(s) Oral before breakfast  polyethylene glycol 3350 17 Gram(s) Oral two times a day  senna 2 Tablet(s) Oral at bedtime    MEDICATIONS  (PRN):  acetaminophen     Tablet .. 975 milliGRAM(s) Oral every 6 hours PRN Mild Pain (1 - 3), Moderate Pain (4 - 6)  bisacodyl 5 milliGRAM(s) Oral every 12 hours PRN Constipation    ___________________________________________________________________________    PHYSICAL EXAM:  General: NAD, sitting comfortably                                                             Chest - Breathing comfortably, room air   Cardiovascular - warm and well perfused   Abdomen - Soft   Extremities - No C/C/E, No calf tenderness   Neurologic Exam -                    Cognitive - Awake, Alert, AAO to self, place, date, year, situation     Communication - Fluent, No dysarthria  Motor    LEFT    UE: SF [5/5], EF [5/5], EE [5/5], WE [5/5],  [wnl]  RIGHT UE: SF [5/5], EF [5/5], EE [5/5], WE [5/5],  [wnl]  LEFT    LE:  HF [5/5], KE [5/5], DF [5/5], EHL [5/5],  PF [5/5]  RIGHT LE:  HF [5/5], KE [5/5], DF [5/5], EHL [5/5],  PF [5/5]    Reflex:  2 + throughout, Babinski negative  Wounds: 14 cm lumbar incision site, drain site 1 cmx1cm, Stage 1 pressure injury to lumbar spine  ___________________________________________________________________________ HPI:  Case of an 83 year old right handed Chinese speaking male patient with past medical history of HTN, HLD, pre-diabetes, BPH, cervical and lumbar spinal stenosis (s/p Posterior C1-C6 Decompression, C4-5 Posterior Column Osteotomy, C1-C7 Fusion with Plastic Closure on 10/18/2022) who presented to [facility] on 11/3 for a scheduled posterior L2-L5 decompression surgery for lumbar stenosis with neurogenic claudication. Prior to surgery patient had low back pain associated with bilateral leg pain and difficulty walking. he additionally had persistent neck heaviness and bilateral shoulder pain which was worse on the left shoulder. Post-operative hospital course was uncomplicated and surgical drain was removed on 11/7/23. Patient evaluated by PT/OT and was recommended for acute inpatient rehab. Patient is medically stable for discharge to [facility] on 11/7/23. (07 Nov 2023 14:41)  ___________________________________________________________________________    SUBJECTIVE/ROS  Patient was seen and evaluated at bedside today.  Reported no overnight events and is in no acute distress.  Last BM yesterday.  Eager to continue the recommended rehabilitation program outpatient.  Scheduled for discharge tomorrow.   Denies any CP, SOB, GAITAN, palpitations, fever, chills, body aches, cough, congestion, or any other symptoms at this time.   ___________________________________________________________________________    VITALS  T(C): 36.8 (11-20-23 @ 08:31), Max: 36.9 (11-20-23 @ 06:29)  HR: 73 (11-20-23 @ 08:31) (73 - 86)  BP: 121/72 (11-20-23 @ 08:31) (121/72 - 149/88)  RR: 15 (11-20-23 @ 08:31) (14 - 15)  SpO2: 97% (11-20-23 @ 08:31) (97% - 99%)  ___________________________________________________________________________    LABS                          11.3   8.11  )-----------( 427      ( 20 Nov 2023 05:52 )             35.4       11-20    139  |  102  |  31<H>  ----------------------------<  100<H>  4.0   |  29  |  1.24    Ca    9.2      20 Nov 2023 05:52    TPro  7.2  /  Alb  3.2<L>  /  TBili  0.3  /  DBili  x   /  AST  14  /  ALT  25  /  AlkPhos  101  11-20  ___________________________________________________________________________    MEDICATIONS  (STANDING):  amLODIPine   Tablet 10 milliGRAM(s) Oral at bedtime  AQUAPHOR (petrolatum Ointment) 1 Application(s) Topical two times a day  aspirin  chewable 81 milliGRAM(s) Oral daily  atorvastatin 40 milliGRAM(s) Oral at bedtime  enoxaparin Injectable 40 milliGRAM(s) SubCutaneous every 24 hours  losartan 50 milliGRAM(s) Oral daily  methocarbamol 500 milliGRAM(s) Oral three times a day  pantoprazole    Tablet 40 milliGRAM(s) Oral before breakfast  polyethylene glycol 3350 17 Gram(s) Oral two times a day  senna 2 Tablet(s) Oral at bedtime    MEDICATIONS  (PRN):  acetaminophen     Tablet .. 975 milliGRAM(s) Oral every 6 hours PRN Mild Pain (1 - 3), Moderate Pain (4 - 6)  bisacodyl 5 milliGRAM(s) Oral every 12 hours PRN Constipation    ___________________________________________________________________________    PHYSICAL EXAM:  General: NAD, sitting comfortably                                                             Chest - Breathing comfortably, room air   Cardiovascular - warm and well perfused   Abdomen - Soft   Extremities - No C/C/E, No calf tenderness   Neurologic Exam -                    Cognitive - Awake, Alert, AAO to self, place, date, year, situation     Communication - Fluent, No dysarthria  Motor    LEFT    UE: SF [5/5], EF [5/5], EE [5/5], WE [5/5],  [wnl]  RIGHT UE: SF [5/5], EF [5/5], EE [5/5], WE [5/5],  [wnl]  LEFT    LE:  HF [5/5], KE [5/5], DF [5/5], EHL [5/5],  PF [5/5]  RIGHT LE:  HF [5/5], KE [5/5], DF [5/5], EHL [5/5],  PF [5/5]    Reflex:  2 + throughout, Babinski negative  Wounds: 14 cm lumbar incision site, drain site 1 cmx1cm, Stage 1 pressure injury to lumbar spine  ___________________________________________________________________________ HPI:  Case of an 83 year old right handed Latvian speaking male patient with past medical history of HTN, HLD, pre-diabetes, BPH, cervical and lumbar spinal stenosis (s/p Posterior C1-C6 Decompression, C4-5 Posterior Column Osteotomy, C1-C7 Fusion with Plastic Closure on 10/18/2022) who presented to [facility] on 11/3 for a scheduled posterior L2-L5 decompression surgery for lumbar stenosis with neurogenic claudication. Prior to surgery patient had low back pain associated with bilateral leg pain and difficulty walking. He additionally had persistent neck heaviness and bilateral shoulder pain which was worse on the left shoulder. Post-operative hospital course was uncomplicated and surgical drain was removed on 11/7/23. Patient evaluated by PT/OT and was recommended for acute inpatient rehab. Patient is medically stable for discharge to [facility] on 11/7/23. (07 Nov 2023 14:41)  ___________________________________________________________________________    SUBJECTIVE/ROS  Patient was seen and evaluated at bedside today.  Reported no overnight events and is in no acute distress.  Last BM yesterday.  Eager to continue the recommended rehabilitation program outpatient.  Scheduled for discharge tomorrow.   Denies any CP, SOB, GAITAN, palpitations, fever, chills, body aches, cough, congestion, or any other symptoms at this time.   ___________________________________________________________________________    VITALS  T(C): 36.8 (11-20-23 @ 08:31), Max: 36.9 (11-20-23 @ 06:29)  HR: 73 (11-20-23 @ 08:31) (73 - 86)  BP: 121/72 (11-20-23 @ 08:31) (121/72 - 149/88)  RR: 15 (11-20-23 @ 08:31) (14 - 15)  SpO2: 97% (11-20-23 @ 08:31) (97% - 99%)  ___________________________________________________________________________    LABS                          11.3   8.11  )-----------( 427      ( 20 Nov 2023 05:52 )             35.4       11-20    139  |  102  |  31<H>  ----------------------------<  100<H>  4.0   |  29  |  1.24    Ca    9.2      20 Nov 2023 05:52    TPro  7.2  /  Alb  3.2<L>  /  TBili  0.3  /  DBili  x   /  AST  14  /  ALT  25  /  AlkPhos  101  11-20  ___________________________________________________________________________    MEDICATIONS  (STANDING):  amLODIPine   Tablet 10 milliGRAM(s) Oral at bedtime  AQUAPHOR (petrolatum Ointment) 1 Application(s) Topical two times a day  aspirin  chewable 81 milliGRAM(s) Oral daily  atorvastatin 40 milliGRAM(s) Oral at bedtime  enoxaparin Injectable 40 milliGRAM(s) SubCutaneous every 24 hours  losartan 50 milliGRAM(s) Oral daily  methocarbamol 500 milliGRAM(s) Oral three times a day  pantoprazole    Tablet 40 milliGRAM(s) Oral before breakfast  polyethylene glycol 3350 17 Gram(s) Oral two times a day  senna 2 Tablet(s) Oral at bedtime    MEDICATIONS  (PRN):  acetaminophen     Tablet .. 975 milliGRAM(s) Oral every 6 hours PRN Mild Pain (1 - 3), Moderate Pain (4 - 6)  bisacodyl 5 milliGRAM(s) Oral every 12 hours PRN Constipation    ___________________________________________________________________________    PHYSICAL EXAM:  General: NAD, sitting comfortably                                                             Chest - Breathing comfortably, room air   Cardiovascular - warm and well perfused   Abdomen - Soft   Extremities - No C/C/E, No calf tenderness   Neurologic Exam -                    Cognitive - Awake, Alert, AAO to self, place, date, year, situation     Communication - Fluent, No dysarthria  Motor    LEFT    UE: SF [5/5], EF [5/5], EE [5/5], WE [5/5],  [wnl]  RIGHT UE: SF [5/5], EF [5/5], EE [5/5], WE [5/5],  [wnl]  LEFT    LE:  HF [5/5], KE [5/5], DF [5/5], EHL [5/5],  PF [5/5]  RIGHT LE:  HF [5/5], KE [5/5], DF [5/5], EHL [5/5],  PF [5/5]    Reflex:  2 + throughout, Babinski negative  Wounds: 14 cm lumbar incision site, drain site 1 cmx1cm, Stage 1 pressure injury to lumbar spine  ___________________________________________________________________________

## 2023-11-20 NOTE — PROGRESS NOTE ADULT - ASSESSMENT
83 year old male with PMH of Hypertension, Hyperlipidemia, Pre-DM, BPH, Cervical and Lumbar Stenosis (s/p Posterior C1-C6 Decompression, C4-5 Posterior Column Osteotomy, C1-C7 Fusion with Plastic Closure on 10/18/2022) who presented to [facility] on 11/3 for a scheduled posterior L2-L5 decompression surgery for lumbar stenosis with neurogenic claudication. Post-op course uncomplicated. Now admitted to [facility] rehab for functional decline.     Lumbar Stenosis w/ Neurogenic Claudication s/p L2-L5 Decompressive Laminectomy (11/3)  -Spine/Fall precaution  -Pain control and bowel regimen per primary team  -Comprehensive rehab program with PT/OT  -Outpatient follow up with neurosurgery     Surgical wound pruritis (Resolved)  -Wound healing well. no signs of infection  -Supportive care     Hypertension  -Continue amlodipine and losartan   -Monitor BP   -Monitor for OH    Hyperlipidemia   -Continue aspirin   -Continue atorvastatin     Pre-Diabetes  -HbA1C 5.9  -Dietary and lifestyle modification  -Outpatient monitoring with PMD    GI PPx  -PPI    DVT PPx  -Lovenox SC

## 2023-11-20 NOTE — PROGRESS NOTE ADULT - ASSESSMENT
Assessment/Plan:  Case of an 83 year old right handed Latvian speaking male patient with past medical history of HTN, HLD, pre-diabetes, BPH, cervical and lumbar spinal stenosis (s/p Posterior C1-C6 Decompression, C4-5 Posterior Column Osteotomy, C1-C7 Fusion with Plastic Closure on 10/18/2022) who is admitted for Acute Inpatient Rehabilitation with a multidisciplinary rehab program at [facility] with functional impairments in ADLs and mobility secondary to lumbar stenosis with neurogenic claudication treated surgically with an elective posterior L2-L5 decompression surgery.    Lumbar stenosis with neurogenic claudication and posterior L2-L5 decompression  - L2-L5 decompressive laminectomy for lumbar stenosis with neurogenic claudication (11/3)  - surgical drain removed on 11/7  - Impaired ADLs and mobility  - Need for assistance with personal care   - Continue comprehensive rehab program of PT/OT - 3 hours a day, 5 days a week. P&O as needed   - Pain control below  - Fall/Spine precautions  - ASA restarted on POD #14 (11/17)    Pain Control  - Tylenol 975 mg Q6H PRN mild-mod pain  - Tramadol 50 mg Q6H PRN severe pain - discontinued on 11/14 as patient not requiring  - Robaxin 500 mg TID    HTN  - c/w Norvasc 10 mg QHS and losartan 50 mg QD  - Monitor BP    HLD  - cont Lipitor 40 mg QHS    Mood / Cognition  - Neuropsychology consult PRN    Sleep  - Melatonin PRN     GI / Bowel  - Senna qHS  - Miralax PRN BID  - dulcolax PRN  - GI ppx: pantoprazole 40 mg QD     / Bladder  - Currently patient voids independently  - check PVR x1 on admission    Skin / Pressure injury  - Skin assessment on admission performed: 14 cm lumbar incision site, drain site 1 cmx1cm, Stage 1 pressure injury to lumbar spine  - Pressure Injury/Skin: OOB to chair, PT/OT  - nursing to monitor skin qShift    Diet:  - Diet Consistency: regular    DVT prophylaxis:   - Lovenox 40 mg QD     Outpatient Follow-up:  Gina Bond  Neurosurgery  [address] [place] [address], [address]  [place] [address]  Phone: [phone]  Fax: [phone]  Follow Up Time:      Code Status/Emergency Contact:      --------------- Assessment/Plan:  Case of an 83 year old right handed Yoruba speaking male patient with past medical history of HTN, HLD, pre-diabetes, BPH, cervical and lumbar spinal stenosis (s/p Posterior C1-C6 Decompression, C4-5 Posterior Column Osteotomy, C1-C7 Fusion with Plastic Closure on 10/18/2022) who is admitted for Acute Inpatient Rehabilitation with a multidisciplinary rehab program at [facility] with functional impairments in ADLs and mobility secondary to lumbar stenosis with neurogenic claudication treated surgically with an elective posterior L2-L5 decompression surgery.    Lumbar stenosis with neurogenic claudication and posterior L2-L5 decompression  - L2-L5 decompressive laminectomy for lumbar stenosis with neurogenic claudication (11/3)  - surgical drain removed on 11/7  - Impaired ADLs and mobility  - Need for assistance with personal care   - Continue comprehensive rehab program of PT/OT - 3 hours a day, 5 days a week. P&O as needed   - Pain control below  - Fall/Spine precautions  - ASA restarted on POD #14 (11/17)    Pain Control  - Tylenol 975 mg Q6H PRN mild-mod pain  - Tramadol 50 mg Q6H PRN severe pain - discontinued on 11/14 as patient not requiring  - Robaxin 500 mg TID made PRN on 11/20    HTN  - c/w Norvasc 10 mg QHS and losartan 50 mg QD  - Monitor BP    HLD  - cont Lipitor 40 mg QHS    Mood / Cognition  - Neuropsychology consult PRN    Sleep  - Melatonin PRN     GI / Bowel  - Senna qHS  - Miralax PRN BID  - dulcolax PRN  - GI ppx: pantoprazole 40 mg QD     / Bladder  - Currently patient voids independently  - check PVR x1 on admission    Skin / Pressure injury  - Skin assessment on admission performed: 14 cm lumbar incision site, drain site 1 cmx1cm, Stage 1 pressure injury to lumbar spine  - Pressure Injury/Skin: OOB to chair, PT/OT  - nursing to monitor skin qShift    Diet:  - Diet Consistency: regular    DVT prophylaxis:   - Lovenox 40 mg QD     Outpatient Follow-up:  Gina Bond  Neurosurgery  [address] [place] [address], [address]  [place] [address]  Phone: [phone]  Fax: [phone]  Follow Up Time:      Code Status/Emergency Contact:      --------------- Assessment/Plan:  Case of an 83 year old right handed Amharic speaking male patient with past medical history of HTN, HLD, pre-diabetes, BPH, cervical and lumbar spinal stenosis (s/p Posterior C1-C6 Decompression, C4-5 Posterior Column Osteotomy, C1-C7 Fusion with Plastic Closure on 10/18/2022) who is admitted for Acute Inpatient Rehabilitation with a multidisciplinary rehab program at [facility] with functional impairments in ADLs and mobility secondary to lumbar stenosis with neurogenic claudication treated surgically with an elective posterior L2-L5 decompression surgery.    Lumbar stenosis with neurogenic claudication and posterior L2-L5 decompression  - L2-L5 decompressive laminectomy for lumbar stenosis with neurogenic claudication (11/3)  - surgical drain removed on 11/7  - Impaired ADLs and mobility  - Need for assistance with personal care   - Complete comprehensive rehab program of PT/OT - 3 hours a day, 5 days a week. P&O as needed   - Pain control below  - Fall/Spine precautions  - ASA restarted on POD #14 (11/17)    Pain Control  - Tylenol 975 mg Q6H PRN mild-mod pain  - Tramadol 50 mg Q6H PRN severe pain - discontinued on 11/14 as patient not requiring  - Robaxin 500 mg TID made PRN on 11/20    HTN  - c/w Norvasc 10 mg QHS and losartan 50 mg QD  - Monitor BP    HLD  - cont Lipitor 40 mg QHS    Mood / Cognition  - Neuropsychology consult PRN    Sleep  - Melatonin PRN     GI / Bowel  - Senna qHS  - Miralax PRN BID  - dulcolax PRN  - GI ppx: pantoprazole 40 mg QD     / Bladder  - Currently patient voids independently  - check PVR x1 on admission    Skin / Pressure injury  - Skin assessment on admission performed: 14 cm lumbar incision site, drain site 1 cmx1cm, Stage 1 pressure injury to lumbar spine  - Pressure Injury/Skin: OOB to chair, PT/OT  - nursing to monitor skin qShift    Diet:  - Diet Consistency: regular    DVT prophylaxis:   - Lovenox 40 mg QD     Outpatient Follow-up:  Gina Bond  Neurosurgery  [address] [place] [address], [address]  [place] [address]  Phone: [phone]  Fax: [phone]  Follow Up Time:      Code Status/Emergency Contact:      ---------------

## 2023-11-21 VITALS
OXYGEN SATURATION: 98 % | SYSTOLIC BLOOD PRESSURE: 153 MMHG | RESPIRATION RATE: 15 BRPM | HEART RATE: 74 BPM | DIASTOLIC BLOOD PRESSURE: 73 MMHG | TEMPERATURE: 98 F

## 2023-11-21 PROCEDURE — 97530 THERAPEUTIC ACTIVITIES: CPT

## 2023-11-21 PROCEDURE — 99239 HOSP IP/OBS DSCHRG MGMT >30: CPT

## 2023-11-21 PROCEDURE — 97116 GAIT TRAINING THERAPY: CPT

## 2023-11-21 PROCEDURE — 85025 COMPLETE CBC W/AUTO DIFF WBC: CPT

## 2023-11-21 PROCEDURE — 85027 COMPLETE CBC AUTOMATED: CPT

## 2023-11-21 PROCEDURE — 97167 OT EVAL HIGH COMPLEX 60 MIN: CPT

## 2023-11-21 PROCEDURE — 97110 THERAPEUTIC EXERCISES: CPT

## 2023-11-21 PROCEDURE — 97163 PT EVAL HIGH COMPLEX 45 MIN: CPT

## 2023-11-21 PROCEDURE — 36415 COLL VENOUS BLD VENIPUNCTURE: CPT

## 2023-11-21 PROCEDURE — 80053 COMPREHEN METABOLIC PANEL: CPT

## 2023-11-21 PROCEDURE — 97535 SELF CARE MNGMENT TRAINING: CPT

## 2023-11-21 PROCEDURE — 97112 NEUROMUSCULAR REEDUCATION: CPT

## 2023-11-21 PROCEDURE — 87635 SARS-COV-2 COVID-19 AMP PRB: CPT

## 2023-11-21 RX ADMIN — LOSARTAN POTASSIUM 50 MILLIGRAM(S): 100 TABLET, FILM COATED ORAL at 06:40

## 2023-11-21 RX ADMIN — PANTOPRAZOLE SODIUM 40 MILLIGRAM(S): 20 TABLET, DELAYED RELEASE ORAL at 06:40

## 2023-11-21 RX ADMIN — Medication 1 APPLICATION(S): at 06:40

## 2023-11-21 RX ADMIN — POLYETHYLENE GLYCOL 3350 17 GRAM(S): 17 POWDER, FOR SOLUTION ORAL at 06:40

## 2023-11-21 NOTE — PROGRESS NOTE ADULT - SUBJECTIVE AND OBJECTIVE BOX
HPI:  Case of an 83 year old right handed Wolof speaking male patient with past medical history of HTN, HLD, pre-diabetes, BPH, cervical and lumbar spinal stenosis (s/p Posterior C1-C6 Decompression, C4-5 Posterior Column Osteotomy, C1-C7 Fusion with Plastic Closure on 10/18/2022) who presented to [facility] on 11/3 for a scheduled posterior L2-L5 decompression surgery for lumbar stenosis with neurogenic claudication. Prior to surgery patient had low back pain associated with bilateral leg pain and difficulty walking. He additionally had persistent neck heaviness and bilateral shoulder pain which was worse on the left shoulder. Post-operative hospital course was uncomplicated and surgical drain was removed on 11/7/23. Patient evaluated by PT/OT and was recommended for acute inpatient rehab. Patient is medically stable for discharge to [facility] on 11/7/23. (07 Nov 2023 14:41)  ___________________________________________________________________________    SUBJECTIVE/ROS  Patient was seen and evaluated at bedside today.  Reported no overnight events and is in no acute distress.  Last BM yesterday.  Eager to continue the recommended rehabilitation program outpatient.  Scheduled for discharge today.   Denies any CP, SOB, GAITAN, palpitations, fever, chills, body aches, cough, congestion, or any other symptoms at this time.   ___________________________________________________________________________    Vital Signs Last 24 Hrs  T(C): 36.4 (21 Nov 2023 08:27), Max: 36.9 (20 Nov 2023 20:35)  T(F): 97.6 (21 Nov 2023 08:27), Max: 98.5 (20 Nov 2023 20:35)  HR: 74 (21 Nov 2023 08:27) (72 - 78)  BP: 153/73 (21 Nov 2023 08:27) (139/72 - 157/75)  RR: 15 (21 Nov 2023 08:27) (15 - 17)  SpO2: 98% (21 Nov 2023 08:27) (97% - 98%)  ___________________________________________________________________________    LABS                          11.3   8.11  )-----------( 427      ( 20 Nov 2023 05:52 )             35.4       11-20    139  |  102  |  31<H>  ----------------------------<  100<H>  4.0   |  29  |  1.24    Ca    9.2      20 Nov 2023 05:52    TPro  7.2  /  Alb  3.2<L>  /  TBili  0.3  /  DBili  x   /  AST  14  /  ALT  25  /  AlkPhos  101  11-20  ___________________________________________________________________________    MEDICATIONS  (STANDING):  amLODIPine   Tablet 10 milliGRAM(s) Oral at bedtime  AQUAPHOR (petrolatum Ointment) 1 Application(s) Topical two times a day  aspirin  chewable 81 milliGRAM(s) Oral daily  atorvastatin 40 milliGRAM(s) Oral at bedtime  enoxaparin Injectable 40 milliGRAM(s) SubCutaneous every 24 hours  losartan 50 milliGRAM(s) Oral daily  methocarbamol 500 milliGRAM(s) Oral three times a day  pantoprazole    Tablet 40 milliGRAM(s) Oral before breakfast  polyethylene glycol 3350 17 Gram(s) Oral two times a day  senna 2 Tablet(s) Oral at bedtime    MEDICATIONS  (PRN):  acetaminophen     Tablet .. 975 milliGRAM(s) Oral every 6 hours PRN Mild Pain (1 - 3), Moderate Pain (4 - 6)  bisacodyl 5 milliGRAM(s) Oral every 12 hours PRN Constipation    ___________________________________________________________________________    PHYSICAL EXAM:  General: NAD, sitting comfortably                                                             Chest - Breathing comfortably, room air   Cardiovascular - warm and well perfused   Abdomen - Soft   Extremities - No C/C/E, No calf tenderness   Neurologic Exam -                    Cognitive - Awake, Alert, AAO to self, place, date, year, situation     Communication - Fluent, No dysarthria  Motor    LEFT    UE: SF [5/5], EF [5/5], EE [5/5], WE [5/5],  [wnl]  RIGHT UE: SF [5/5], EF [5/5], EE [5/5], WE [5/5],  [wnl]  LEFT    LE:  HF [5/5], KE [5/5], DF [5/5], EHL [5/5],  PF [5/5]  RIGHT LE:  HF [5/5], KE [5/5], DF [5/5], EHL [5/5],  PF [5/5]    Reflex:  2 + throughout, Babinski negative  Wounds: 14 cm lumbar incision site, drain site 1 cmx1cm, Stage 1 pressure injury to lumbar spine  ___________________________________________________________________________

## 2023-11-21 NOTE — PROGRESS NOTE ADULT - NUTRITIONAL ASSESSMENT
This patient has been assessed with a concern for Malnutrition and has been determined to have a diagnosis/diagnoses of Moderate protein-calorie malnutrition.    This patient is being managed with:   Diet Consistent Carbohydrate w/Evening Snack-  Entered: Nov 13 2023 10:12AM  
This patient has been assessed with a concern for Malnutrition and has been determined to have a diagnosis/diagnoses of Moderate protein-calorie malnutrition.    This patient is being managed with:   Diet Consistent Carbohydrate w/Evening Snack-  Supplement Feeding Modality:  Oral  Glucerna Shake Cans or Servings Per Day:  1       Frequency:  Two Times a day  Entered: Nov 8 2023  3:13PM  
This patient has been assessed with a concern for Malnutrition and has been determined to have a diagnosis/diagnoses of Moderate protein-calorie malnutrition.    This patient is being managed with:   Diet Consistent Carbohydrate w/Evening Snack-  Entered: Nov 13 2023 10:12AM  
This patient has been assessed with a concern for Malnutrition and has been determined to have a diagnosis/diagnoses of Moderate protein-calorie malnutrition.    This patient is being managed with:   Diet Consistent Carbohydrate w/Evening Snack-  Supplement Feeding Modality:  Oral  Glucerna Shake Cans or Servings Per Day:  1       Frequency:  Two Times a day  Entered: Nov 8 2023  3:13PM  
This patient has been assessed with a concern for Malnutrition and has been determined to have a diagnosis/diagnoses of Moderate protein-calorie malnutrition.    This patient is being managed with:   Diet Consistent Carbohydrate w/Evening Snack-  Entered: Nov 13 2023 10:12AM  
This patient has been assessed with a concern for Malnutrition and has been determined to have a diagnosis/diagnoses of Moderate protein-calorie malnutrition.    This patient is being managed with:   Diet Consistent Carbohydrate w/Evening Snack-  Entered: Nov 13 2023 10:12AM  
This patient has been assessed with a concern for Malnutrition and has been determined to have a diagnosis/diagnoses of Moderate protein-calorie malnutrition.    This patient is being managed with:   Diet Consistent Carbohydrate w/Evening Snack-  Supplement Feeding Modality:  Oral  Glucerna Shake Cans or Servings Per Day:  1       Frequency:  Two Times a day  Entered: Nov 8 2023  3:13PM  
This patient has been assessed with a concern for Malnutrition and has been determined to have a diagnosis/diagnoses of Moderate protein-calorie malnutrition.    This patient is being managed with:   Diet Consistent Carbohydrate w/Evening Snack-  Entered: Nov 13 2023 10:12AM  
This patient has been assessed with a concern for Malnutrition and has been determined to have a diagnosis/diagnoses of Moderate protein-calorie malnutrition.    This patient is being managed with:   Diet Consistent Carbohydrate w/Evening Snack-  Entered: Nov 13 2023 10:12AM  
This patient has been assessed with a concern for Malnutrition and has been determined to have a diagnosis/diagnoses of Moderate protein-calorie malnutrition.    This patient is being managed with:   Diet Consistent Carbohydrate w/Evening Snack-  Supplement Feeding Modality:  Oral  Glucerna Shake Cans or Servings Per Day:  1       Frequency:  Two Times a day  Entered: Nov 8 2023  3:13PM  
This patient has been assessed with a concern for Malnutrition and has been determined to have a diagnosis/diagnoses of Moderate protein-calorie malnutrition.    This patient is being managed with:   Diet Consistent Carbohydrate w/Evening Snack-  Entered: Nov 13 2023 10:12AM

## 2023-11-21 NOTE — PROGRESS NOTE ADULT - REASON FOR ADMISSION
Lumbar stenosis with neurogenic claudication and posterior L2-L5 decompression

## 2023-11-21 NOTE — PROGRESS NOTE ADULT - ASSESSMENT
Assessment/Plan:  Case of an 83 year old right handed Maori speaking male patient with past medical history of HTN, HLD, pre-diabetes, BPH, cervical and lumbar spinal stenosis (s/p Posterior C1-C6 Decompression, C4-5 Posterior Column Osteotomy, C1-C7 Fusion with Plastic Closure on 10/18/2022) who is admitted for Acute Inpatient Rehabilitation with a multidisciplinary rehab program at [facility] with functional impairments in ADLs and mobility secondary to lumbar stenosis with neurogenic claudication treated surgically with an elective posterior L2-L5 decompression surgery.    Lumbar stenosis with neurogenic claudication and posterior L2-L5 decompression  - L2-L5 decompressive laminectomy for lumbar stenosis with neurogenic claudication (11/3)  - surgical drain removed on 11/7  - Impaired ADLs and mobility  - Need for assistance with personal care   - Completed comprehensive rehab program of PT/OT - 3 hours a day, 5 days a week. P&O as needed   - Pain control below  - Fall/Spine precautions  - ASA restarted on POD #14 (11/17)    Pain Control  - Tylenol 975 mg Q6H PRN mild-mod pain  - Tramadol 50 mg Q6H PRN severe pain - discontinued on 11/14 as patient not requiring  - Robaxin 500 mg TID made PRN on 11/20    HTN  - c/w Norvasc 10 mg QHS and losartan 50 mg QD  - Monitor BP    HLD  - cont Lipitor 40 mg QHS    Mood / Cognition  - Neuropsychology consult PRN    Sleep  - Melatonin PRN     GI / Bowel  - Senna qHS  - Miralax PRN BID  - dulcolax PRN  - GI ppx: pantoprazole 40 mg QD     / Bladder  - Currently patient voids independently  - check PVR x1 on admission    Skin / Pressure injury  - Skin assessment on admission performed: 14 cm lumbar incision site, drain site 1 cmx1cm, Stage 1 pressure injury to lumbar spine  - Pressure Injury/Skin: OOB to chair, PT/OT  - nursing to monitor skin qShift    Diet:  - Diet Consistency: regular    DVT prophylaxis:   - Lovenox 40 mg QD     Outpatient Follow-up:  Gina Bond  Neurosurgery  [address] [place] [address], [address]  [place] [address]  Phone: [phone]  Fax: [phone]  Follow Up Time:      Code Status/Emergency Contact:      ---------------

## 2023-11-21 NOTE — PROGRESS NOTE ADULT - PROVIDER SPECIALTY LIST ADULT
Hospitalist
Physiatry
Hospitalist
Physiatry
Physiatry
Hospitalist
Hospitalist
Physiatry
Hospitalist
Physiatry

## 2023-12-01 ENCOUNTER — APPOINTMENT (OUTPATIENT)
Dept: SPINE | Facility: CLINIC | Age: 83
End: 2023-12-01

## 2023-12-01 ENCOUNTER — APPOINTMENT (OUTPATIENT)
Dept: SPINE | Facility: CLINIC | Age: 83
End: 2023-12-01
Payer: MEDICARE

## 2023-12-01 VITALS
DIASTOLIC BLOOD PRESSURE: 70 MMHG | BODY MASS INDEX: 25.66 KG/M2 | WEIGHT: 154 LBS | HEART RATE: 82 BPM | HEIGHT: 65 IN | SYSTOLIC BLOOD PRESSURE: 150 MMHG | OXYGEN SATURATION: 98 %

## 2023-12-01 DIAGNOSIS — Z09 ENCOUNTER FOR FOLLOW-UP EXAMINATION AFTER COMPLETED TREATMENT FOR CONDITIONS OTHER THAN MALIGNANT NEOPLASM: ICD-10-CM

## 2023-12-01 PROCEDURE — 99024 POSTOP FOLLOW-UP VISIT: CPT

## 2023-12-01 RX ORDER — MUPIROCIN 20 MG/G
2 OINTMENT TOPICAL 3 TIMES DAILY
Qty: 1 | Refills: 0 | Status: DISCONTINUED | COMMUNITY
Start: 2022-10-11 | End: 2023-12-01

## 2023-12-01 RX ORDER — MELOXICAM 15 MG/1
15 TABLET ORAL
Qty: 30 | Refills: 1 | Status: DISCONTINUED | COMMUNITY
Start: 2023-07-18 | End: 2023-12-01

## 2023-12-01 RX ORDER — MUPIROCIN 20 MG/G
2 OINTMENT TOPICAL
Qty: 1 | Refills: 0 | Status: DISCONTINUED | COMMUNITY
Start: 2023-10-20 | End: 2023-12-01

## 2023-12-01 RX ORDER — TIZANIDINE 2 MG/1
2 TABLET ORAL
Qty: 45 | Refills: 0 | Status: DISCONTINUED | COMMUNITY
Start: 2023-07-18 | End: 2023-12-01

## 2023-12-10 ENCOUNTER — NON-APPOINTMENT (OUTPATIENT)
Age: 83
End: 2023-12-10

## 2024-01-29 ENCOUNTER — APPOINTMENT (OUTPATIENT)
Dept: NEUROSURGERY | Facility: CLINIC | Age: 84
End: 2024-01-29
Payer: MEDICARE

## 2024-01-29 VITALS
WEIGHT: 154 LBS | HEIGHT: 65 IN | OXYGEN SATURATION: 97 % | HEART RATE: 76 BPM | SYSTOLIC BLOOD PRESSURE: 135 MMHG | BODY MASS INDEX: 25.66 KG/M2 | TEMPERATURE: 97.8 F | DIASTOLIC BLOOD PRESSURE: 74 MMHG

## 2024-01-29 DIAGNOSIS — M48.02 SPINAL STENOSIS, CERVICAL REGION: ICD-10-CM

## 2024-01-29 PROCEDURE — 99024 POSTOP FOLLOW-UP VISIT: CPT

## 2024-01-29 NOTE — PHYSICAL EXAM
[General Appearance - Alert] : alert [General Appearance - In No Acute Distress] : in no acute distress [Longitudinal] : longitudinal [No Drainage] : without drainage [Normal Skin] : normal [Oriented To Time, Place, And Person] : oriented to person, place, and time [Impaired Insight] : insight and judgment were intact [No Visual Abnormalities] : no visible abnormailities [] : no respiratory distress [Heart Rate And Rhythm] : heart rate was normal and rhythm regular [FreeTextEntry1] : ambulate with a wheeled walker

## 2024-01-29 NOTE — REASON FOR VISIT
[de-identified] : S/P Lumbar Laminectomy [de-identified] : 11/3/23 [de-identified] : Today he reports that he is doing well with improved gait and balance and overall pain syndrome. He ambulates with a wheeled walker with a steady gait. He continues to engage in outpatient PT.  Despite the family's advice, he continues to lift heavy objects and furniture. He reports that he feels fine.

## 2024-01-29 NOTE — ASSESSMENT
[FreeTextEntry1] : Mr. COBY BEGUM is an 83-year-old man who presents S/P Lumbar Laminectomy 11/3/23 and is doing well. He recovered from his posterior cervical spine surgery and is doing well.  Plan: Continue PT for Lumbar Stabilizers exercise to strengthen abdominal core muscles. Maintain BLT precautions.   Follow up in 3 months.   Please see Dr. Bond's dictation for details. I, Dr. Kerrie Bond evaluated the patient with the nurse practitioner Mian Fields and established the plan of care. I discussed this patient with the nurse practitioner during the visit. I agree with the assessment and plan as written unless noted below.

## 2024-04-22 NOTE — DISCHARGE NOTE PROVIDER - PROVIDER RX CONTACT NUMBER
From: Tra Orourke  To: Snehal Conroy  Sent: 4/19/2024 3:46 PM CDT  Subject: Follow up    I’m still waiting for the medicine for my son ADHD and nothing the school year is almost over.     Also I tried to get a refill on his allergy medication but I can’t. He takes Montelukast 10mg  
Have not received medical records from Dr. Amado Veliz     Called Dr. Veliz's office, asked if they received our request for medical records. They reports they have not. Provided their office fax number, [phone].    Medical record request faxed to:  Dr. Veliz  F: [phone]  Pages 3    Waiting for medical records.    
Pt mother request refill for pt Montelukast. Medication was added to pt medication list at time of visit but not filled, Montelukast is not listed as discussed at last OV 02/01/24.    Medical records for patient have been requested again from prior PCP [facility].      Refill request pended for provider review  
[phone]

## 2024-04-28 ENCOUNTER — NON-APPOINTMENT (OUTPATIENT)
Age: 84
End: 2024-04-28

## 2024-04-29 ENCOUNTER — APPOINTMENT (OUTPATIENT)
Dept: NEUROSURGERY | Facility: CLINIC | Age: 84
End: 2024-04-29
Payer: MEDICARE

## 2024-04-29 VITALS
BODY MASS INDEX: 26.33 KG/M2 | SYSTOLIC BLOOD PRESSURE: 140 MMHG | WEIGHT: 158 LBS | HEIGHT: 65 IN | HEART RATE: 80 BPM | DIASTOLIC BLOOD PRESSURE: 75 MMHG | OXYGEN SATURATION: 96 % | TEMPERATURE: 98.3 F

## 2024-04-29 DIAGNOSIS — G95.9 DISEASE OF SPINAL CORD, UNSPECIFIED: ICD-10-CM

## 2024-04-29 DIAGNOSIS — M48.062 SPINAL STENOSIS, LUMBAR REGION WITH NEUROGENIC CLAUDICATION: ICD-10-CM

## 2024-04-29 PROCEDURE — 99212 OFFICE O/P EST SF 10 MIN: CPT

## 2024-04-30 PROBLEM — M48.062 LUMBAR STENOSIS WITH NEUROGENIC CLAUDICATION: Status: ACTIVE | Noted: 2022-06-20

## 2024-04-30 PROBLEM — G95.9 CERVICAL MYELOPATHY: Status: ACTIVE | Noted: 2022-09-14

## 2024-05-01 ENCOUNTER — APPOINTMENT (OUTPATIENT)
Dept: NEUROSURGERY | Facility: CLINIC | Age: 84
End: 2024-05-01

## 2024-05-05 NOTE — REASON FOR VISIT
[Follow-Up: _____] : a [unfilled] follow-up visit [FreeTextEntry1] : Doing well. Ambulates with wheeled walker. He has paused PT because of the recent death of his wife He demies any new neurological deficits..

## 2024-05-05 NOTE — REVIEW OF SYSTEMS
[Arthralgias] : arthralgias [Negative] : Heme/Lymph [FreeTextEntry9] : Ambulates with wheeled walker

## 2024-05-05 NOTE — PHYSICAL EXAM
[General Appearance - Alert] : alert [General Appearance - In No Acute Distress] : in no acute distress [No Visual Abnormalities] : no visible abnormailities [Abnormal Walk] : normal gait [FreeTextEntry1] : Ambukates with wheeled walker for postural support

## 2024-09-10 ENCOUNTER — APPOINTMENT (OUTPATIENT)
Dept: NEUROSURGERY | Facility: CLINIC | Age: 84
End: 2024-09-10
Payer: MEDICARE

## 2024-09-10 VITALS — HEART RATE: 73 BPM | SYSTOLIC BLOOD PRESSURE: 143 MMHG | OXYGEN SATURATION: 98 % | DIASTOLIC BLOOD PRESSURE: 89 MMHG

## 2024-09-10 VITALS — BODY MASS INDEX: 25.79 KG/M2 | WEIGHT: 155 LBS

## 2024-09-10 DIAGNOSIS — M48.062 SPINAL STENOSIS, LUMBAR REGION WITH NEUROGENIC CLAUDICATION: ICD-10-CM

## 2024-09-10 DIAGNOSIS — G95.9 DISEASE OF SPINAL CORD, UNSPECIFIED: ICD-10-CM

## 2024-09-10 PROCEDURE — 99212 OFFICE O/P EST SF 10 MIN: CPT

## 2024-09-12 NOTE — DIETITIAN INITIAL EVALUATION ADULT - NSFNSNUTROBTAINREPAIRFT_GEN_A_CORE
Contacted pt in regards to lab results. Informed pt of lab results. Pt voiced understanding and had no further questions.     ----- Message from BELLA Rivera sent at 9/9/2024  4:21 PM CDT -----  Labs stable   n/a

## 2024-09-15 NOTE — PHYSICAL EXAM
[General Appearance - Alert] : alert [General Appearance - In No Acute Distress] : in no acute distress [Oriented To Time, Place, And Person] : oriented to person, place, and time [] : no respiratory distress [Heart Rate And Rhythm] : heart rate was normal and rhythm regular [Abnormal Walk] : normal gait

## 2024-09-15 NOTE — ASSESSMENT
[FreeTextEntry1] : Mr. COBY BRANDT is a 84 year- old- man who presents with a diagnosis of S/P Lumbar Laminectomy and Posterior cervical fusion. Doing well since both surgeries.     Imaging and diagnostic workup evaluation show evidence of   Plan: Follow up in 6 months continue PT Please remember the following information: Mr. Coby Brandt is an 84-year-old man who has undergone lumbar laminectomy and posterior cervical fusion surgeries. He is recovering well from both procedures.   Imaging and diagnostic workup show evidence of:  Plan: - Follow-up in 6 months - Continue PT  Please refer to Dr. Bond's dictation for more details. During the visit, I, Dr. Kerrie Bond, evaluated the patient with nurse practitioner Mian Fields and established the plan of care. I discussed this patient with the nurse practitioner and agree with the assessment and plan as written unless noted below.   Please see Dr. Bond's dictation for details. I, Dr. Kerrie Bond evaluated the patient with the nurse practitioner Mian Fields and established the plan of care. I discussed this patient with the nurse practitioner during the visit. I agree with the assessment and plan as written unless noted below.

## 2024-09-15 NOTE — REASON FOR VISIT
[Follow-Up: _____] : a [unfilled] follow-up visit [FreeTextEntry1] : Today he reports that he is doing well.

## 2024-09-15 NOTE — ASSESSMENT
[FreeTextEntry1] : Mr. COBY BRANDT is a 84 year- old- man who presents with a diagnosis of S/P Lumbar Laminectomy and Posterior cervical fusion. Doing well since both surgeries.     Imaging and diagnostic workup evaluation show evidence of   Plan: Follow up in 6 months continue PT Please remember the following information: Mr. Cboy Brandt is an 84-year-old man who has undergone lumbar laminectomy and posterior cervical fusion surgeries. He is recovering well from both procedures.   Imaging and diagnostic workup show evidence of:  Plan: - Follow-up in 6 months - Continue PT  Please refer to Dr. Bond's dictation for more details. During the visit, I, Dr. Kerrie Bond, evaluated the patient with nurse practitioner Mian Fields and established the plan of care. I discussed this patient with the nurse practitioner and agree with the assessment and plan as written unless noted below.   Please see Dr. Bond's dictation for details. I, Dr. Kerrie Bond evaluated the patient with the nurse practitioner Mian Fields and established the plan of care. I discussed this patient with the nurse practitioner during the visit. I agree with the assessment and plan as written unless noted below.

## 2024-09-15 NOTE — ASSESSMENT
[FreeTextEntry1] : Mr. COBY BRANDT is a 84 year- old- man who presents with a diagnosis of S/P Lumbar Laminectomy and Posterior cervical fusion. Doing well since both surgeries.     Imaging and diagnostic workup evaluation show evidence of   Plan: Follow up in 6 months continue PT Please remember the following information: Mr. Coby Brandt is an 84-year-old man who has undergone lumbar laminectomy and posterior cervical fusion surgeries. He is recovering well from both procedures.   Imaging and diagnostic workup show evidence of:  Plan: - Follow-up in 6 months - Continue PT  Please refer to Dr. Bond's dictation for more details. During the visit, I, Dr. Kerrie Bond, evaluated the patient with nurse practitioner Mian Fields and established the plan of care. I discussed this patient with the nurse practitioner and agree with the assessment and plan as written unless noted below.   Please see Dr. Bond's dictation for details. I, Dr. Kerrie Bnod evaluated the patient with the nurse practitioner Mian Fields and established the plan of care. I discussed this patient with the nurse practitioner during the visit. I agree with the assessment and plan as written unless noted below.

## 2024-10-11 NOTE — PHYSICAL EXAM
PAT interview completed. Confirmed procedure date, arrival time, location & ride home. Reinforced NPO status & medication instructions per anesthesia guidelines. Informed patient regarding visitor policy and possible dual occupancy day surgery room.    [General Appearance - Alert] : alert [General Appearance - In No Acute Distress] : in no acute distress [General Appearance - Well Nourished] : well nourished [General Appearance - Well Developed] : well developed [Oriented To Time, Place, And Person] : oriented to person, place, and time [Impaired Insight] : insight and judgment were intact [Affect] : the affect was normal [Motor Tone] : muscle tone was normal in all four extremities [Motor Strength] : muscle strength was normal in all four extremities [No Muscle Atrophy] : normal bulk in all four extremities [Limited Balance] : the patient's balance was impaired [Neck Appearance] : the appearance of the neck was normal [Neck Cervical Mass (___cm)] : no neck mass was observed [] : no respiratory distress [Exaggerated Use Of Accessory Muscles For Inspiration] : no accessory muscle use [Bowel Sounds] : normal bowel sounds [No Spinal Tenderness] : no spinal tenderness [Musculoskeletal - Swelling] : no joint swelling seen [Skin Color & Pigmentation] : normal skin color and pigmentation

## 2025-03-11 ENCOUNTER — APPOINTMENT (OUTPATIENT)
Dept: NEUROSURGERY | Facility: CLINIC | Age: 85
End: 2025-03-11

## 2025-03-11 DIAGNOSIS — M54.16 RADICULOPATHY, LUMBAR REGION: ICD-10-CM

## 2025-03-11 DIAGNOSIS — G95.9 DISEASE OF SPINAL CORD, UNSPECIFIED: ICD-10-CM

## 2025-03-14 ENCOUNTER — APPOINTMENT (OUTPATIENT)
Dept: RADIOLOGY | Facility: IMAGING CENTER | Age: 85
End: 2025-03-14
Payer: MEDICARE

## 2025-03-14 ENCOUNTER — OUTPATIENT (OUTPATIENT)
Dept: OUTPATIENT SERVICES | Facility: HOSPITAL | Age: 85
LOS: 1 days | End: 2025-03-14
Payer: COMMERCIAL

## 2025-03-14 DIAGNOSIS — Z98.49 CATARACT EXTRACTION STATUS, UNSPECIFIED EYE: Chronic | ICD-10-CM

## 2025-03-14 DIAGNOSIS — G95.9 DISEASE OF SPINAL CORD, UNSPECIFIED: ICD-10-CM

## 2025-03-14 DIAGNOSIS — Z98.890 OTHER SPECIFIED POSTPROCEDURAL STATES: Chronic | ICD-10-CM

## 2025-03-14 DIAGNOSIS — Z98.1 ARTHRODESIS STATUS: Chronic | ICD-10-CM

## 2025-03-14 DIAGNOSIS — M48.02 SPINAL STENOSIS, CERVICAL REGION: ICD-10-CM

## 2025-03-14 PROCEDURE — 72040 X-RAY EXAM NECK SPINE 2-3 VW: CPT

## 2025-03-14 PROCEDURE — 72040 X-RAY EXAM NECK SPINE 2-3 VW: CPT | Mod: 26

## 2025-04-08 ENCOUNTER — APPOINTMENT (OUTPATIENT)
Dept: NEUROSURGERY | Facility: CLINIC | Age: 85
End: 2025-04-08
Payer: MEDICARE

## 2025-04-08 ENCOUNTER — NON-APPOINTMENT (OUTPATIENT)
Age: 85
End: 2025-04-08

## 2025-04-08 VITALS
BODY MASS INDEX: 27.49 KG/M2 | SYSTOLIC BLOOD PRESSURE: 144 MMHG | DIASTOLIC BLOOD PRESSURE: 63 MMHG | HEIGHT: 65 IN | RESPIRATION RATE: 15 BRPM | OXYGEN SATURATION: 96 % | HEART RATE: 87 BPM | WEIGHT: 165 LBS

## 2025-04-08 DIAGNOSIS — G95.9 DISEASE OF SPINAL CORD, UNSPECIFIED: ICD-10-CM

## 2025-04-08 DIAGNOSIS — G44.89 OTHER HEADACHE SYNDROME: ICD-10-CM

## 2025-04-08 DIAGNOSIS — M54.16 RADICULOPATHY, LUMBAR REGION: ICD-10-CM

## 2025-04-08 PROCEDURE — 99212 OFFICE O/P EST SF 10 MIN: CPT

## 2025-04-22 ENCOUNTER — APPOINTMENT (OUTPATIENT)
Dept: MRI IMAGING | Facility: IMAGING CENTER | Age: 85
End: 2025-04-22
Payer: MEDICARE

## 2025-04-22 ENCOUNTER — OUTPATIENT (OUTPATIENT)
Dept: OUTPATIENT SERVICES | Facility: HOSPITAL | Age: 85
LOS: 1 days | End: 2025-04-22
Payer: COMMERCIAL

## 2025-04-22 DIAGNOSIS — M48.02 SPINAL STENOSIS, CERVICAL REGION: ICD-10-CM

## 2025-04-22 DIAGNOSIS — Z98.890 OTHER SPECIFIED POSTPROCEDURAL STATES: Chronic | ICD-10-CM

## 2025-04-22 DIAGNOSIS — Z98.49 CATARACT EXTRACTION STATUS, UNSPECIFIED EYE: Chronic | ICD-10-CM

## 2025-04-22 DIAGNOSIS — Z98.1 ARTHRODESIS STATUS: Chronic | ICD-10-CM

## 2025-04-22 DIAGNOSIS — R26.9 UNSPECIFIED ABNORMALITIES OF GAIT AND MOBILITY: ICD-10-CM

## 2025-04-22 DIAGNOSIS — G44.89 OTHER HEADACHE SYNDROME: ICD-10-CM

## 2025-04-22 PROCEDURE — 72146 MRI CHEST SPINE W/O DYE: CPT | Mod: 26

## 2025-04-22 PROCEDURE — 72141 MRI NECK SPINE W/O DYE: CPT | Mod: 26

## 2025-04-22 PROCEDURE — 70551 MRI BRAIN STEM W/O DYE: CPT | Mod: 26

## 2025-04-22 PROCEDURE — 70551 MRI BRAIN STEM W/O DYE: CPT

## 2025-04-22 PROCEDURE — 72141 MRI NECK SPINE W/O DYE: CPT

## 2025-04-22 PROCEDURE — 72146 MRI CHEST SPINE W/O DYE: CPT

## 2025-04-23 ENCOUNTER — NON-APPOINTMENT (OUTPATIENT)
Age: 85
End: 2025-04-23

## 2025-04-24 ENCOUNTER — APPOINTMENT (OUTPATIENT)
Dept: MRI IMAGING | Facility: IMAGING CENTER | Age: 85
End: 2025-04-24

## 2025-05-27 ENCOUNTER — OUTPATIENT (OUTPATIENT)
Dept: OUTPATIENT SERVICES | Facility: HOSPITAL | Age: 85
LOS: 1 days | End: 2025-05-27
Payer: COMMERCIAL

## 2025-05-27 ENCOUNTER — APPOINTMENT (OUTPATIENT)
Dept: MRI IMAGING | Facility: IMAGING CENTER | Age: 85
End: 2025-05-27
Payer: MEDICARE

## 2025-05-27 DIAGNOSIS — G44.89 OTHER HEADACHE SYNDROME: ICD-10-CM

## 2025-05-27 DIAGNOSIS — Z98.890 OTHER SPECIFIED POSTPROCEDURAL STATES: Chronic | ICD-10-CM

## 2025-05-27 DIAGNOSIS — Z98.1 ARTHRODESIS STATUS: Chronic | ICD-10-CM

## 2025-05-27 DIAGNOSIS — Z98.49 CATARACT EXTRACTION STATUS, UNSPECIFIED EYE: Chronic | ICD-10-CM

## 2025-05-27 PROCEDURE — 72148 MRI LUMBAR SPINE W/O DYE: CPT | Mod: 26

## 2025-05-27 PROCEDURE — 72148 MRI LUMBAR SPINE W/O DYE: CPT

## 2025-06-11 ENCOUNTER — APPOINTMENT (OUTPATIENT)
Dept: NEUROSURGERY | Facility: CLINIC | Age: 85
End: 2025-06-11
Payer: MEDICARE

## 2025-06-11 VITALS
BODY MASS INDEX: 28.7 KG/M2 | SYSTOLIC BLOOD PRESSURE: 161 MMHG | DIASTOLIC BLOOD PRESSURE: 73 MMHG | OXYGEN SATURATION: 93 % | HEIGHT: 63 IN | HEART RATE: 80 BPM | WEIGHT: 162 LBS

## 2025-06-11 PROCEDURE — 99212 OFFICE O/P EST SF 10 MIN: CPT

## 2025-06-11 RX ORDER — AMLODIPINE BESYLATE 10 MG/1
10 TABLET ORAL
Refills: 0 | Status: ACTIVE | COMMUNITY

## 2025-06-11 RX ORDER — ATORVASTATIN CALCIUM 40 MG/1
40 TABLET, FILM COATED ORAL
Refills: 0 | Status: ACTIVE | COMMUNITY

## 2025-06-11 RX ORDER — LOSARTAN POTASSIUM 50 MG/1
50 TABLET, FILM COATED ORAL
Refills: 0 | Status: ACTIVE | COMMUNITY

## 2025-06-17 ENCOUNTER — APPOINTMENT (OUTPATIENT)
Dept: UROLOGY | Facility: CLINIC | Age: 85
End: 2025-06-17

## 2025-06-18 ENCOUNTER — APPOINTMENT (OUTPATIENT)
Dept: UROLOGY | Facility: CLINIC | Age: 85
End: 2025-06-18
Payer: MEDICARE

## 2025-06-18 VITALS
SYSTOLIC BLOOD PRESSURE: 159 MMHG | HEART RATE: 94 BPM | TEMPERATURE: 97 F | OXYGEN SATURATION: 93 % | DIASTOLIC BLOOD PRESSURE: 68 MMHG

## 2025-06-18 PROBLEM — R30.0 DYSURIA: Status: ACTIVE | Noted: 2025-06-18

## 2025-06-18 PROCEDURE — 99213 OFFICE O/P EST LOW 20 MIN: CPT

## 2025-06-20 LAB
APPEARANCE: CLEAR
BACTERIA: NEGATIVE /HPF
BILIRUBIN URINE: NEGATIVE
BLOOD URINE: NEGATIVE
CAST: 0 /LPF
COLOR: YELLOW
EPITHELIAL CELLS: 0 /HPF
GLUCOSE QUALITATIVE U: NEGATIVE MG/DL
KETONES URINE: NEGATIVE MG/DL
LEUKOCYTE ESTERASE URINE: NEGATIVE
MICROSCOPIC-UA: NORMAL
NITRITE URINE: NEGATIVE
PH URINE: 5.5
PROTEIN URINE: 30 MG/DL
RED BLOOD CELLS URINE: 0 /HPF
SPECIFIC GRAVITY URINE: 1.01
UROBILINOGEN URINE: 0.2 MG/DL
WHITE BLOOD CELLS URINE: 0 /HPF

## 2025-06-22 LAB — BACTERIA UR CULT: NORMAL

## 2025-06-30 ENCOUNTER — APPOINTMENT (OUTPATIENT)
Dept: SPINE | Facility: CLINIC | Age: 85
End: 2025-06-30
Payer: MEDICARE

## 2025-06-30 ENCOUNTER — OUTPATIENT (OUTPATIENT)
Dept: OUTPATIENT SERVICES | Facility: HOSPITAL | Age: 85
LOS: 1 days | End: 2025-06-30
Payer: COMMERCIAL

## 2025-06-30 ENCOUNTER — APPOINTMENT (OUTPATIENT)
Dept: RADIOLOGY | Facility: HOSPITAL | Age: 85
End: 2025-06-30

## 2025-06-30 ENCOUNTER — RESULT REVIEW (OUTPATIENT)
Age: 85
End: 2025-06-30

## 2025-06-30 VITALS
OXYGEN SATURATION: 96 % | SYSTOLIC BLOOD PRESSURE: 160 MMHG | DIASTOLIC BLOOD PRESSURE: 88 MMHG | HEART RATE: 79 BPM | WEIGHT: 162 LBS | BODY MASS INDEX: 28.7 KG/M2 | HEIGHT: 63 IN

## 2025-06-30 DIAGNOSIS — Z98.49 CATARACT EXTRACTION STATUS, UNSPECIFIED EYE: Chronic | ICD-10-CM

## 2025-06-30 DIAGNOSIS — Z98.890 OTHER SPECIFIED POSTPROCEDURAL STATES: Chronic | ICD-10-CM

## 2025-06-30 DIAGNOSIS — Z98.1 ARTHRODESIS STATUS: Chronic | ICD-10-CM

## 2025-06-30 DIAGNOSIS — G91.2 (IDIOPATHIC) NORMAL PRESSURE HYDROCEPHALUS: ICD-10-CM

## 2025-06-30 PROCEDURE — 99212 OFFICE O/P EST SF 10 MIN: CPT

## 2025-06-30 PROCEDURE — 87070 CULTURE OTHR SPECIMN AEROBIC: CPT

## 2025-06-30 PROCEDURE — 97530 THERAPEUTIC ACTIVITIES: CPT

## 2025-06-30 PROCEDURE — 97161 PT EVAL LOW COMPLEX 20 MIN: CPT

## 2025-06-30 PROCEDURE — 97116 GAIT TRAINING THERAPY: CPT

## 2025-06-30 PROCEDURE — 84157 ASSAY OF PROTEIN OTHER: CPT

## 2025-06-30 PROCEDURE — 82945 GLUCOSE OTHER FLUID: CPT

## 2025-06-30 PROCEDURE — 87205 SMEAR GRAM STAIN: CPT

## 2025-06-30 PROCEDURE — 87015 SPECIMEN INFECT AGNT CONCNTJ: CPT

## 2025-06-30 PROCEDURE — 62329 THER SPI PNXR CSF FLUOR/CT: CPT

## 2025-06-30 PROCEDURE — 89051 BODY FLUID CELL COUNT: CPT

## 2025-06-30 NOTE — PHYSICAL THERAPY INITIAL EVALUATION ADULT - GAIT DEVIATIONS NOTED, PT EVAL
narrow base of support, increased BLE external rotation R>L, frequent crossing of midline BLE, veer to R, decreased RUE swing, FHP, bilateral hands 50% closed, decreased trunk rotation/decreased elsa/decreased step length

## 2025-06-30 NOTE — PHYSICAL THERAPY INITIAL EVALUATION ADULT - TIMED UP AND GO TEST, REHAB EVAL
1st rep: 18.50sec with 4 steps during turn; 2nd rep: 18.97sec with 7 steps during turn (wide turn); 3rd rep: 17.96sec with 5 steps during turn

## 2025-06-30 NOTE — PHYSICAL THERAPY INITIAL EVALUATION ADULT - ADDITIONAL COMMENTS
pt uses walker when outside on the street, straight cane inside and no assistive device for shorter distances

## (undated) DEVICE — Device

## (undated) DEVICE — DRSG TELFA 3 X 8

## (undated) DEVICE — PACK THYROID HEAD NECK

## (undated) DEVICE — STAPLER SKIN VISI-STAT 35 WIDE

## (undated) DEVICE — SUT VICRYL 0 18" OS-6 (POP-OFF)

## (undated) DEVICE — GLV 6.5 PROTEXIS (WHITE)

## (undated) DEVICE — ELCTR MONOPOLAR STIMULATOR PROBE FLUSH-TIP

## (undated) DEVICE — PREP CHLORAPREP HI-LITE ORANGE 26ML

## (undated) DEVICE — WARMING BLANKET LOWER ADULT

## (undated) DEVICE — GOWN TRIMAX LG

## (undated) DEVICE — STRYKER BONE MILL BLADE FINE 3.2MM

## (undated) DEVICE — GLV 8.5 PROTEXIS (WHITE)

## (undated) DEVICE — ELCTR BIPOLAR PROBE

## (undated) DEVICE — LAP PAD 18 X 18"

## (undated) DEVICE — WARMING BLANKET UPPER ADULT

## (undated) DEVICE — NDL COUNTER FOAM AND MAGNET 40-70

## (undated) DEVICE — GLV 8 PROTEXIS (WHITE)

## (undated) DEVICE — VENODYNE/SCD SLEEVE CALF MEDIUM

## (undated) DEVICE — MEDICATION LABELS W MARKER

## (undated) DEVICE — SUT VICRYL 2-0 18" CP-2 UNDYED (POP-OFF)

## (undated) DEVICE — DRAPE BACK TABLE COVER HEAVY DUTY 60"

## (undated) DEVICE — DRAPE C ARM UNIVERSAL

## (undated) DEVICE — ELCTR SUBDERMAL CORKSCREW NDL 1.2MM

## (undated) DEVICE — MARKING PEN W RULER

## (undated) DEVICE — ELCTR BOVIE PENCIL HANDPIECE

## (undated) DEVICE — SUT VICRYL 3-0 18" CP-2 UNDYED (POP-OFF)

## (undated) DEVICE — ELCTR SUBDERMAL NDL 27G X 1/2" WITH TWISTED PAIR

## (undated) DEVICE — VISITEC 4X4

## (undated) DEVICE — DRSG XEROFORM 1 X 8"

## (undated) DEVICE — PACK GENERAL MINOR

## (undated) DEVICE — VENODYNE/SCD SLEEVE CALF LARGE

## (undated) DEVICE — ELCTR BOVIE TIP BLADE INSULATED 2.75" EDGE

## (undated) DEVICE — SPHERE MARKER (5 SPHERES)

## (undated) DEVICE — MIDAS REX LEGEND MATCH HEAD FLUTED LG BORE 3.0MM X 14CM

## (undated) DEVICE — DRAPE INSTRUMENT POUCH 6.75" X 11"

## (undated) DEVICE — ELCTR SUBDERMAL NDL CLASSIC 1.5M X 59" (6 COLOR)

## (undated) DEVICE — DRAPE MAYO STAND 30"

## (undated) DEVICE — DRSG AQUACEL 3.5 X 10"

## (undated) DEVICE — SPECIMEN CONTAINER 100ML

## (undated) DEVICE — DRSG STERISTRIPS 0.5 X 4"

## (undated) DEVICE — PREP BETADINE 5% STERILE OPTHALMIC SOLUTION

## (undated) DEVICE — PACK LUMBAR LAMI

## (undated) DEVICE — DRAPE 1/2 SHEET 40X57"

## (undated) DEVICE — GLV 7.5 PROTEXIS (WHITE)

## (undated) DEVICE — DRAIN JACKSON PRATT 10MM FLAT FULL NO TROCAR

## (undated) DEVICE — SOL IRR POUR NS 0.9% 500ML

## (undated) DEVICE — POSITIONER FOAM EGG CRATE ULNAR 2PCS (PINK)

## (undated) DEVICE — SUCTION YANKAUER NO CONTROL VENT

## (undated) DEVICE — GLV 7 PROTEXIS (WHITE)

## (undated) DEVICE — ELCTR AQUAMANTYS BIPOLAR SEALER 6.0

## (undated) DEVICE — DRAPE TOWEL BLUE 17" X 24"

## (undated) DEVICE — SUT MONOCRYL 4-0 27" PS-2 UNDYED

## (undated) DEVICE — ELCTR 4-DISC 20MM 49" (RED, BLUE, GREEN, BLACK)

## (undated) DEVICE — MIDAS REX LEGEND LUBRICANT DIFFUSER CARTRIDGE

## (undated) DEVICE — DRAPE 3/4 SHEET W REINFORCEMENT 56X77"

## (undated) DEVICE — DRAPE LIGHT HANDLE COVER (BLUE)

## (undated) DEVICE — BLADE SCALPEL SAFETYLOCK #15

## (undated) DEVICE — PACK BREAST MAJOR

## (undated) DEVICE — GLV 8 PROTEXIS ORTHO (CREAM)

## (undated) DEVICE — BLADE SCALPEL SAFETYLOCK #10

## (undated) DEVICE — ELCTR PEDICLE SCREW PROBE 3MM BALL 1.8MM X 100MM

## (undated) DEVICE — MIDAS REX MR8 MATCH HEAD FLUTED LG BORE 3MM X 14CM

## (undated) DEVICE — POSITIONER CUSHION INSERT PRONE VIEW LG

## (undated) DEVICE — FOLEY TRAY 16FR LF URINE METER SURESTEP

## (undated) DEVICE — SOL IRR POUR H2O 250ML

## (undated) DEVICE — TAP 3MM

## (undated) DEVICE — BLADE SCALPEL SAFETYLOCK #11

## (undated) DEVICE — DRAIN RESERVOIR FOR JACKSON PRATT 100CC CARDINAL

## (undated) DEVICE — MISONIX BONESCALPEL BLUNT BLADE & TUBESET 10MM

## (undated) DEVICE — DRAPE MICROSCOPE OPMI VISIONGUARD 48X118"

## (undated) DEVICE — IRRIGATION TRAY W PISTON SYRINGE 60ML

## (undated) DEVICE — FOLEY TRAY 16FR 5CC LTX UMETER CLOSED

## (undated) DEVICE — DRILL BIT J&J DEPUY SPINE 2.4MM

## (undated) DEVICE — DRAPE MICROSCOPE OPMI VISIONGUARD 48X82"

## (undated) DEVICE — NDL SPINAL 18G X 3.5" (PINK)

## (undated) DEVICE — SUT BIOSYN 4-0 18" P-12

## (undated) DEVICE — DRSG DERMABOND PRINEO 22CM

## (undated) DEVICE — DRAIN JACKSON PRATT 3 SPRING RESERVOIR W 10FR PVC DRAIN

## (undated) DEVICE — WOUND IRR SURGIPHOR

## (undated) DEVICE — SYR ASEPTO

## (undated) DEVICE — TRACKING BALL STRL

## (undated) DEVICE — DRAPE EQUIPMENT BANDED BAG 30 X 30" (SHOWER CAP)

## (undated) DEVICE — SYR CONTROL LUER LOK 10CC

## (undated) DEVICE — PACK EXTREMITY

## (undated) DEVICE — DRSG TAPE HYPAFIX 4"

## (undated) DEVICE — SYR LUER LOK 20CC

## (undated) DEVICE — DRAIN JACKSON PRATT 7MM FLAT FULL NO TROCAR